# Patient Record
Sex: MALE | Race: AMERICAN INDIAN OR ALASKA NATIVE
[De-identification: names, ages, dates, MRNs, and addresses within clinical notes are randomized per-mention and may not be internally consistent; named-entity substitution may affect disease eponyms.]

---

## 2019-07-04 ENCOUNTER — HOSPITAL ENCOUNTER (INPATIENT)
Dept: HOSPITAL 5 - ED | Age: 59
LOS: 36 days | Discharge: LEFT BEFORE DISCHARGE | DRG: 374 | End: 2019-08-09
Attending: INTERNAL MEDICINE | Admitting: INTERNAL MEDICINE
Payer: COMMERCIAL

## 2019-07-04 DIAGNOSIS — E43: ICD-10-CM

## 2019-07-04 DIAGNOSIS — N18.6: ICD-10-CM

## 2019-07-04 DIAGNOSIS — R18.0: ICD-10-CM

## 2019-07-04 DIAGNOSIS — K65.2: ICD-10-CM

## 2019-07-04 DIAGNOSIS — J45.909: ICD-10-CM

## 2019-07-04 DIAGNOSIS — C79.9: ICD-10-CM

## 2019-07-04 DIAGNOSIS — E87.5: ICD-10-CM

## 2019-07-04 DIAGNOSIS — N13.30: ICD-10-CM

## 2019-07-04 DIAGNOSIS — C20: Primary | ICD-10-CM

## 2019-07-04 DIAGNOSIS — D50.9: ICD-10-CM

## 2019-07-04 DIAGNOSIS — R06.6: ICD-10-CM

## 2019-07-04 DIAGNOSIS — E87.2: ICD-10-CM

## 2019-07-04 DIAGNOSIS — I12.0: ICD-10-CM

## 2019-07-04 DIAGNOSIS — N17.9: ICD-10-CM

## 2019-07-04 DIAGNOSIS — N13.8: ICD-10-CM

## 2019-07-04 LAB
ALBUMIN SERPL-MCNC: 2.8 G/DL (ref 3.9–5)
ALT SERPL-CCNC: 5 UNITS/L (ref 7–56)
BUN SERPL-MCNC: 129 MG/DL (ref 9–20)
BUN/CREAT SERPL: 4 %
CALCIUM SERPL-MCNC: 9.4 MG/DL (ref 8.4–10.2)
HCT VFR BLD CALC: 20.7 % (ref 35.5–45.6)
HEMOLYSIS INDEX: 0
HGB BLD-MCNC: 6.3 GM/DL (ref 11.8–15.2)
MCHC RBC AUTO-ENTMCNC: 31 % (ref 32–34)
MCV RBC AUTO: 58 FL (ref 84–94)
PLATELET # BLD: 693 K/MM3 (ref 140–440)
RBC # BLD AUTO: 3.56 M/MM3 (ref 3.65–5.03)

## 2019-07-04 PROCEDURE — 82106 ALPHA-FETOPROTEIN AMNIOTIC: CPT

## 2019-07-04 PROCEDURE — 83605 ASSAY OF LACTIC ACID: CPT

## 2019-07-04 PROCEDURE — 87186 SC STD MICRODIL/AGAR DIL: CPT

## 2019-07-04 PROCEDURE — C1750 CATH, HEMODIALYSIS,LONG-TERM: HCPCS

## 2019-07-04 PROCEDURE — 83550 IRON BINDING TEST: CPT

## 2019-07-04 PROCEDURE — 82570 ASSAY OF URINE CREATININE: CPT

## 2019-07-04 PROCEDURE — 77001 FLUOROGUIDE FOR VEIN DEVICE: CPT

## 2019-07-04 PROCEDURE — 93005 ELECTROCARDIOGRAM TRACING: CPT

## 2019-07-04 PROCEDURE — 83970 ASSAY OF PARATHORMONE: CPT

## 2019-07-04 PROCEDURE — 88112 CYTOPATH CELL ENHANCE TECH: CPT

## 2019-07-04 PROCEDURE — 84160 ASSAY OF PROTEIN ANY SOURCE: CPT

## 2019-07-04 PROCEDURE — 5A1D70Z PERFORMANCE OF URINARY FILTRATION, INTERMITTENT, LESS THAN 6 HOURS PER DAY: ICD-10-PCS | Performed by: INTERNAL MEDICINE

## 2019-07-04 PROCEDURE — 82728 ASSAY OF FERRITIN: CPT

## 2019-07-04 PROCEDURE — 83690 ASSAY OF LIPASE: CPT

## 2019-07-04 PROCEDURE — 80074 ACUTE HEPATITIS PANEL: CPT

## 2019-07-04 PROCEDURE — 76937 US GUIDE VASCULAR ACCESS: CPT

## 2019-07-04 PROCEDURE — C1788 PORT, INDWELLING, IMP: HCPCS

## 2019-07-04 PROCEDURE — 84132 ASSAY OF SERUM POTASSIUM: CPT

## 2019-07-04 PROCEDURE — 36558 INSERT TUNNELED CV CATH: CPT

## 2019-07-04 PROCEDURE — 86021 WBC ANTIBODY IDENTIFICATION: CPT

## 2019-07-04 PROCEDURE — 82040 ASSAY OF SERUM ALBUMIN: CPT

## 2019-07-04 PROCEDURE — 86920 COMPATIBILITY TEST SPIN: CPT

## 2019-07-04 PROCEDURE — 06HM33Z INSERTION OF INFUSION DEVICE INTO RIGHT FEMORAL VEIN, PERCUTANEOUS APPROACH: ICD-10-PCS | Performed by: SURGERY

## 2019-07-04 PROCEDURE — 86850 RBC ANTIBODY SCREEN: CPT

## 2019-07-04 PROCEDURE — P9016 RBC LEUKOCYTES REDUCED: HCPCS

## 2019-07-04 PROCEDURE — 86301 IMMUNOASSAY TUMOR CA 19-9: CPT

## 2019-07-04 PROCEDURE — 74176 CT ABD & PELVIS W/O CONTRAST: CPT

## 2019-07-04 PROCEDURE — 36415 COLL VENOUS BLD VENIPUNCTURE: CPT

## 2019-07-04 PROCEDURE — 86160 COMPLEMENT ANTIGEN: CPT

## 2019-07-04 PROCEDURE — 87076 CULTURE ANAEROBE IDENT EACH: CPT

## 2019-07-04 PROCEDURE — 88305 TISSUE EXAM BY PATHOLOGIST: CPT

## 2019-07-04 PROCEDURE — 30233N1 TRANSFUSION OF NONAUTOLOGOUS RED BLOOD CELLS INTO PERIPHERAL VEIN, PERCUTANEOUS APPROACH: ICD-10-PCS | Performed by: INTERNAL MEDICINE

## 2019-07-04 PROCEDURE — B54BZZA ULTRASONOGRAPHY OF RIGHT LOWER EXTREMITY VEINS, GUIDANCE: ICD-10-PCS | Performed by: SURGERY

## 2019-07-04 PROCEDURE — 36430 TRANSFUSION BLD/BLD COMPNT: CPT

## 2019-07-04 PROCEDURE — 83735 ASSAY OF MAGNESIUM: CPT

## 2019-07-04 PROCEDURE — 80202 ASSAY OF VANCOMYCIN: CPT

## 2019-07-04 PROCEDURE — 84100 ASSAY OF PHOSPHORUS: CPT

## 2019-07-04 PROCEDURE — 94640 AIRWAY INHALATION TREATMENT: CPT

## 2019-07-04 PROCEDURE — 86901 BLOOD TYPING SEROLOGIC RH(D): CPT

## 2019-07-04 PROCEDURE — 86900 BLOOD TYPING SEROLOGIC ABO: CPT

## 2019-07-04 PROCEDURE — 89051 BODY FLUID CELL COUNT: CPT

## 2019-07-04 PROCEDURE — 85027 COMPLETE CBC AUTOMATED: CPT

## 2019-07-04 PROCEDURE — 86038 ANTINUCLEAR ANTIBODIES: CPT

## 2019-07-04 PROCEDURE — 84165 PROTEIN E-PHORESIS SERUM: CPT

## 2019-07-04 PROCEDURE — 82962 GLUCOSE BLOOD TEST: CPT

## 2019-07-04 PROCEDURE — 82947 ASSAY GLUCOSE BLOOD QUANT: CPT

## 2019-07-04 PROCEDURE — 84300 ASSAY OF URINE SODIUM: CPT

## 2019-07-04 PROCEDURE — 87116 MYCOBACTERIA CULTURE: CPT

## 2019-07-04 PROCEDURE — 82607 VITAMIN B-12: CPT

## 2019-07-04 PROCEDURE — 80048 BASIC METABOLIC PNL TOTAL CA: CPT

## 2019-07-04 PROCEDURE — 85014 HEMATOCRIT: CPT

## 2019-07-04 PROCEDURE — 49083 ABD PARACENTESIS W/IMAGING: CPT

## 2019-07-04 PROCEDURE — 85007 BL SMEAR W/DIFF WBC COUNT: CPT

## 2019-07-04 PROCEDURE — 82378 CARCINOEMBRYONIC ANTIGEN: CPT

## 2019-07-04 PROCEDURE — 81001 URINALYSIS AUTO W/SCOPE: CPT

## 2019-07-04 PROCEDURE — 93010 ELECTROCARDIOGRAM REPORT: CPT

## 2019-07-04 PROCEDURE — 71046 X-RAY EXAM CHEST 2 VIEWS: CPT

## 2019-07-04 PROCEDURE — 85025 COMPLETE CBC W/AUTO DIFF WBC: CPT

## 2019-07-04 PROCEDURE — 82747 ASSAY OF FOLIC ACID RBC: CPT

## 2019-07-04 PROCEDURE — 85610 PROTHROMBIN TIME: CPT

## 2019-07-04 PROCEDURE — 85018 HEMOGLOBIN: CPT

## 2019-07-04 PROCEDURE — 85045 AUTOMATED RETICULOCYTE COUNT: CPT

## 2019-07-04 PROCEDURE — 80053 COMPREHEN METABOLIC PANEL: CPT

## 2019-07-04 RX ADMIN — Medication SCH ML: at 22:15

## 2019-07-04 RX ADMIN — FAMOTIDINE SCH MG: 10 INJECTION, SOLUTION INTRAVENOUS at 22:08

## 2019-07-04 RX ADMIN — METOPROLOL TARTRATE SCH MG: 50 TABLET, FILM COATED ORAL at 23:47

## 2019-07-04 NOTE — OPERATIVE REPORT
Operative Report


Operative Report: 





Date of Procedure: 07/04/2019





Pre-operative Diagnosis: Acute Renal Failure





Post-operative Diagnosis: Same





Procedure(s): 


1.  Ultrasound-Guided Access Right Common Femoral Vein


2.  Placement of 30 Cm Trialysis Vas-Cath Right Common Femoral Vein





Surgeon: Tank Shaw M.D. 





Assistant: None





Anesthesia: 2% Lidocaine





EBL: Minimal





Counts: Correct





Complications: None





Condition: Stable





Findings:  All ports flushed and aspirated easily after placement of right 

femoral Vas-Cath.





Specimen: None





Indication: 





The patient is a 59-year-old male who presented to the emergency department 

complaints of approximately 10 days of abdominal pain and back pain associated 

with 1 day of inability to urinate.  Upon arrival in the emergency department he

was found to be in acute renal failure with hyperkalemia with a potassium of 

9.5.  It was determined that he required emergent dialysis and needed emergent 

placement of a Vas-Cath.  He was given the risk, benefits, and alternative 

procedures and consented to the procedure.





Description of Procedure:  





The procedure was performed at the patient's bedside in the emergency 

department.  Ultrasound was used to identify the right common femoral vein and 

confirmed patency.  Once patency was confirmed the overlying skin and soft 

tissue was anesthetized with 2% lidocaine.  A small stab incision was created 

with an 11 blade and an 18-gauge needle was used with ultrasound guidance and 

the anterior surface of the right common femoral vein.  A 0.035 J-wire was 

advanced into the vein and the tract was serially dilated up to a 14 Estonian 

dilator.  The dilator was removed and the 30 cm Trialysis Vas-Cath was placed by

Seldinger technique.  The wire was moving all 2 ports were easily aspirated and 

flushed with saline.  The catheter was then sutured in place with 3-0 Ethilon 

and dressed sterilely.  The patient tolerated the procedure well and was t

ransported to the ICU for emergent dialysis.

## 2019-07-04 NOTE — EMERGENCY DEPARTMENT REPORT
ED Abdominal Pain HPI





- General


Chief Complaint: Abdominal Pain


Stated Complaint: FREQUENT URINE/BACK/STOMACH PAIN


Time Seen by Provider: 19 12:20


Source: patient


Mode of arrival: Wheelchair


Limitations: No Limitations





- History of Present Illness


Initial Comments: 





Patient is a 59-year-old asthmatic male past history of hypertension who has 

been noncompliant with medications who states for the last week she's felt weak 

fatigued and has some abdominal pain.  Patient states he feels some discomfort 

in his lower abdomen as well as his lower back is aching throbbing pain.  He 

denies any diarrhea but does have some nausea and one episode of vomiting after 

eating several days ago.  Patient states that his abdomen is distended.  Patient

states he does not drink alcohol and does not use drugs.  Patient denies fevers 

chills cough, congestion or dysuria at this time.


Severity scale (0 -10): 8





- Related Data


                                  Previous Rx's











 Medication  Instructions  Recorded  Last Taken  Type


 


Prednisone [predniSONE 10 mg 10 mg PO .TAPER #1 tab.ds.pk 09/10/15 Unknown Rx





(6-Day Pack, 21 Tabs)]    











                                    Allergies











Allergy/AdvReac Type Severity Reaction Status Date / Time


 


No Known Allergies Allergy   Verified 19 12:02














ED Review of Systems


ROS: 


Stated complaint: FREQUENT URINE/BACK/STOMACH PAIN


Other details as noted in HPI





Comment: All other systems reviewed and negative





ED Past Medical Hx





- Past Medical History


Hx Hypertension: Yes





- Surgical History


Past Surgical History?: No





- Social History


Smoking Status: Never Smoker





- Medications


Home Medications: 


                                Home Medications











 Medication  Instructions  Recorded  Confirmed  Last Taken  Type


 


Prednisone [predniSONE 10 mg 10 mg PO .TAPER #1 tab.ds.pk 09/10/15  Unknown Rx





(6-Day Pack, 21 Tabs)]     














ED Physical Exam





- General


Limitations: No Limitations


General appearance: alert, in no apparent distress





- Head


Head exam: Present: atraumatic, normocephalic





- Eye


Eye exam: Present: normal appearance, PERRL





- ENT


ENT exam: Present: mucous membranes moist





- Neck


Neck exam: Present: normal inspection





- Respiratory


Respiratory exam: Present: normal lung sounds bilaterally.  Absent: respiratory 

distress, wheezes, rales, rhonchi





- Cardiovascular


Cardiovascular Exam: Present: regular rate, normal rhythm, normal heart sounds. 

Absent: systolic murmur, diastolic murmur, rubs, gallop





- GI/Abdominal


GI/Abdominal exam: Present: soft, distended, tenderness, normal bowel sounds.  

Absent: guarding, rebound, rigid





- Rectal


Rectal exam: Present: deferred





- Extremities Exam


Extremities exam: Present: normal inspection





- Back Exam


Back exam: Present: normal inspection





- Neurological Exam


Neurological exam: Present: alert, oriented X3





- Psychiatric


Psychiatric exam: Present: normal affect, normal mood





- Skin


Skin exam: Present: warm, dry, intact, normal color.  Absent: rash





ED Course


                                   Vital Signs











  19





  12:12 14:53


 


Temperature 97.5 F L 


 


Pulse Rate 77 75


 


Respiratory 18 18





Rate  


 


Blood Pressure 160/72 


 


Blood Pressure  164/83





[Left]  


 


O2 Sat by Pulse 100 100





Oximetry  














ED Medical Decision Making





- Lab Data


Result diagrams: 


                                 19 12:47





                                 19 14:59








                                   Lab Results











  19 Range/Units





  12:47 12:47 13:54 


 


WBC  13.6 H    (4.5-11.0)  K/mm3


 


RBC  3.56 L    (3.65-5.03)  M/mm3


 


Hgb  6.3 L    (11.8-15.2)  gm/dl


 


Hct  20.7 L    (35.5-45.6)  %


 


MCV  58 L    (84-94)  fl


 


MCH  18 L    (28-32)  pg


 


MCHC  31 L    (32-34)  %


 


RDW  24.5 H    (13.2-15.2)  %


 


Plt Count  693 H    (140-440)  K/mm3


 


Sodium   132 L   (137-145)  mmol/L


 


Potassium   9.1 H*  9.5 H*  (3.6-5.0)  mmol/L


 


Chloride   94.0 L   ()  mmol/L


 


Carbon Dioxide   11 L   (22-30)  mmol/L


 


Anion Gap   36   mmol/L


 


BUN   129 H   (9-20)  mg/dL


 


Creatinine   34.8 H   (0.8-1.5)  mg/dL


 


Estimated GFR   2   ml/min


 


BUN/Creatinine Ratio   4   %


 


Glucose   92   ()  mg/dL


 


POC Glucose     ()  


 


Calcium   9.4   (8.4-10.2)  mg/dL


 


Total Bilirubin   0.40   (0.1-1.2)  mg/dL


 


AST   < 5 L   (5-40)  units/L


 


ALT   5 L   (7-56)  units/L


 


Alkaline Phosphatase   70   ()  units/L


 


Total Protein   8.1   (6.3-8.2)  g/dL


 


Albumin   2.8 L   (3.9-5)  g/dL


 


Albumin/Globulin Ratio   0.5   %


 


Lipase   88 H   (13-60)  units/L














  19 Range/Units





  14:59 15:23 


 


WBC    (4.5-11.0)  K/mm3


 


RBC    (3.65-5.03)  M/mm3


 


Hgb    (11.8-15.2)  gm/dl


 


Hct    (35.5-45.6)  %


 


MCV    (84-94)  fl


 


MCH    (28-32)  pg


 


MCHC    (32-34)  %


 


RDW    (13.2-15.2)  %


 


Plt Count    (140-440)  K/mm3


 


Sodium    (137-145)  mmol/L


 


Potassium  9.5 H*   (3.6-5.0)  mmol/L


 


Chloride    ()  mmol/L


 


Carbon Dioxide    (22-30)  mmol/L


 


Anion Gap    mmol/L


 


BUN    (9-20)  mg/dL


 


Creatinine    (0.8-1.5)  mg/dL


 


Estimated GFR    ml/min


 


BUN/Creatinine Ratio    %


 


Glucose    ()  mg/dL


 


POC Glucose   107 H  ()  


 


Calcium    (8.4-10.2)  mg/dL


 


Total Bilirubin    (0.1-1.2)  mg/dL


 


AST    (5-40)  units/L


 


ALT    (7-56)  units/L


 


Alkaline Phosphatase    ()  units/L


 


Total Protein    (6.3-8.2)  g/dL


 


Albumin    (3.9-5)  g/dL


 


Albumin/Globulin Ratio    %


 


Lipase    (13-60)  units/L














- EKG Data


-: EKG Interpreted by Me


EKG shows normal: sinus rhythm, axis, intervals, QRS complexes


Rate: normal





- EKG Data


Interpretation: other (prominant t waves)





- Radiology Data





[facility] 


[address] [place] [address] 





Cat Scan Report 


Signed 





Patient: RASHAD JACKSON MR#: [id number] 


09 


: 1960 Acct:[id number] 





Age/Sex: 59 / M ADM Date: 19 





Loc: ED 


Attending Dr: 








Ordering Physician: CHATO WALLACE MD 


Date of Service: 19 


Procedure(s): CT abdomen pelvis wo con 


Accession Number(s): [id number] 





cc: CHATO WALLACE MD 








CT abdomen pelvis wo con 





INDICATION: 


abd distension, new renal failure. 





TECHNIQUE: 


All CT scans at this location are performed using the following dose modulation 

technique: 


Automated exposure control. Helical slices were obtained through the abdomen and

 pelvis. No contrast


is administered. 





COMPARISON: 


None available. 





FINDINGS: 


Abdomen: The lung bases are essentially clear. There is ascites in the abdomen 

and pelvis. No focal


hepatic or splenic abnormalities are identified on this noncontrasted study. The

 pancreas, and 


adrenal glands are unremarkable. There is no obstruction. There is no free air. 





There is mild bilateral hydrocele nephrosis. There is minimal nephrolithiasis on

 the left. No 


ureteral calculi are identified. 





There is increased density in the omentum which may simply be related to the 

ascites. The 


possibility of carcinomatosis is included in the differential. 





There are small retroperitoneal lymph nodes small pelvic lymph nodes. These are 

not pathologically 


enlarged. There are multiple small lymph nodes in the perirectal fat. There is 

abnormal soft tissue 


density structure in the mid pelvis to the left of the distal sigmoid colon 

which could represent 


yony lesion. This has a masslike appearance. 











On review of bone windows, no acute osseous abnormalities are seen. 











IMPRESSION: 


1. There is ascites. There is increased density in the omentum. Findings are 

concerning for 


carcinomatosis. There is some mild adenopathy in the pelvis a possible 

pericolonic mass, series 2 


image 139. 





There is mild bilateral hydronephrosis. The exact cause is not determined by 

this study. There is 


minimal nephrolithiasis on the left. No ureteral calculi are identified however.

 





Signer Name: Pradip Rogers MD 


Signed: 2019 2:43 PM 


Workstation Name: [id number] 








Transcribed By:  


Dictated By: Pradip Rogers MD 


Electronically Authenticated By: Pradip Rogers MD 


Signed Date/Time: 19 1443 








- Medical Decision Making





Patient is a 59-year-old Male whose only past medical history is hypertension 

who is presenting with abdominal distention.  Patient states that he's had no 

diarrhea and is still making urine.  Patient was noted to have acute renal 

failure with hyperkalemia.  We did repeat this time showed it was accurate.  

Patient has no significant T-wave issues with his EKG.  Patient was given 

Kayexalate albuterol calcium bicarbonate 10 of insulin and D50 ampule.  Dr. ra house with nephrology been consulted.  Also consulted vascular to place a Vas-Cath 

in this patient.  Patient's vital signs are stable at the time of admission.


Critical Care Time: Yes (50)


Critical care attestation.: 


If time is entered above; I have spent that time in minutes in the direct care 

of this critically ill patient, excluding procedure time.








ED Disposition


Clinical Impression: 


 Hyperkalemia, Ascites





Acute renal failure


Qualifiers:


 Acute renal failure type: unspecified Qualified Code(s): N17.9 - Acute kidney 

failure, unspecified





Disposition: -09 OP ADMIT IP TO THIS HOSP


Is pt being admited?: Yes


Does the pt Need Aspirin: No


Condition: Critical


Referrals: 


[facility], MD [Primary Care Provider] - 3-5 Days


Time of Disposition: 16:02

## 2019-07-04 NOTE — CONSULTATION
History of Present Illness





- Reason for Consult


Consult date: 07/04/19


acute renal failure, hyperkalemia





- History of Present Illness


The patient is a 60 Yo male with history significant for HTN (not on any meds) 

and Asthma who presented presented to [facility] ED with 2 weeks h/o generalized 

weakness, fatigue and abdominal discomfort.  Patient is a vague historian.  He 

also reports nausea, decreased appetite, poor PO intake, abdominal distention, 

one episode of vomiting and back pain on movement.  He denies any fever, chills,

diarrhea, dysuria, hematuria, leg swelling, dizziness or syncope.  In the ED his

labs were significant for creatinine 35, potassium 9.5 and bicarb 11.  CT 

abdomen showed ascites and mild b/l hydronephrosis.  Nephrology was consulted 

for further evaluation.





Past History


Past Medical History: hypertension, other (asthma)





Medications and Allergies


                                    Allergies











Allergy/AdvReac Type Severity Reaction Status Date / Time


 


No Known Allergies Allergy   Verified 07/04/19 12:02











                                Home Medications











 Medication  Instructions  Recorded  Confirmed  Last Taken  Type


 


Prednisone [predniSONE 10 mg 10 mg PO .TAPER #1 tab.ds.pk 09/10/15  Unknown Rx





(6-Day Pack, 21 Tabs)]     











Active Meds: 


Active Medications





Acetaminophen (Tylenol)  650 mg PO Q4H PRN


   PRN Reason: Pain MILD(1-3)/Fever >100.5/HA


Albuterol (Proventil)  2.5 mg IH Q4HRT PRN


   PRN Reason: Shortness Of Breath


Famotidine (Pepcid)  10 mg IV BID BOWEN


Hydromorphone HCl (Dilaudid)  0.5 mg IV Q3H PRN


   PRN Reason: Pain , Severe (7-10)


Sodium Bicarbonate 150 meq/ (Dextrose)  1,150 mls @ 125 mls/hr IV AS DIRECT ONE


   Stop: 07/05/19 00:51


   Last Admin: 07/04/19 16:59 Dose:  125 mls/hr


   Documented by: 


Calcium Gluconate 2,000 mg/ (Sodium Chloride)  120 mls @ 660 mls/hr IV ONCE ONE


   Stop: 07/04/19 17:35


Metoclopramide HCl (Reglan)  10 mg IV Q6H PRN


   PRN Reason: Nausea And Vomiting


Ondansetron HCl (Zofran)  4 mg IV Q3H PRN


   PRN Reason: Nausea And Vomiting


Sodium Chloride (Sodium Chloride Flush Syringe 10 Ml)  10 ml IV BID BOWEN


Sodium Chloride (Sodium Chloride Flush Syringe 10 Ml)  10 ml IV PRN PRN


   PRN Reason: LINE FLUSH











Review of Systems


Constitutional: weight loss, anorexia, fatigue, weakness, malaise, poor 

appetite, chronic pain, no weight gain, no fever, no chills


Cardiovascular: high blood pressure, decreased exercise tolerance, no chest 

pain, no orthopnea, no edema, no syncope, no lightheadedness, no shortness of 

breath, no leg edema


Respiratory: no hemoptysis, no shortness of breath


Gastrointestinal: abdominal pain, nausea, vomiting, no diarrhea, no melena


Genitourinary Male: no dysuria, no hematuria, no urinary frequency, no kidney 

stones


Musculoskeletal: low back pain, muscle weakness, no muscle cramps


Integumentary: no rash, no redness, no wounds, no jaundice


Neurological: no paralysis, no seizures, no syncope, no headaches, no change in 

speech, no change in mentation, no confusion





Exam





- Vital Signs


Vital signs: 


                                   Vital Signs











Temp Pulse Resp BP Pulse Ox


 


 97.5 F L  77   18   160/72   100 


 


 07/04/19 12:12  07/04/19 12:12  07/04/19 12:12  07/04/19 12:12  07/04/19 12:12














- General Appearance


General appearance: well-developed, appears stated age, other (no distress)


EENT: ATNC, PERRL, hearing intact, vision intact


Neck: Present: neck supple, trachea midline


Respiratory: Clear to Ascultation


Heart: regular, S1S2, no murmurs


Gastrointestinal: Present: normoactive bowel sounds, distended.  Absent: 

tenderness


Integumentary: no rash, warm and dry


Neurologic: no focal deficit, no asterixis, alert and oriented x3


Musculoskeletal: Present: other (no edema)


Psychiatric: cooperative





Results





- Lab Results





                                 07/04/19 12:47





                                 07/04/19 14:59


                             Most recent lab results











Calcium  9.4 mg/dL (8.4-10.2)   07/04/19  12:47    














- Image


Kidney/bladder ultrasound: other





Assessment and Plan





1. Acute kidney injury:


Suspect vasomotor ANTHONY from volume depletion.


Other causes are not ruled out.


CT abdomen showed mild bilateral hydronephosis.


Started on bicarb drip.


Monitor renal function.


Renal prognosis is guarded to poor.


Avoid nephrotoxic agents.


Meds dosage based on GFR.


Straight urinary catheter ordered.


Due to persistent hyperkalemia patient require urgent hemodialysis.


D/w patient regarding indications, benefits and risks involved in hemodialysis.


He voiced understanding and gave consent.





2. FEN:


Hyperkalemia, urgent HD.


Anion gap MA, 2/2 ANTHONY.


Continue IV fluids.


Monitor lytes.





3. Anemia:


One unit PRBC today.


Monitor H/H.





4. Uncontrolled HTN:


Start on Metoprolol.





5. Ascites.

## 2019-07-04 NOTE — CAT SCAN REPORT
CT abdomen pelvis wo con



INDICATION:

abd distension, new renal failure.



TECHNIQUE:

All CT scans at this location are performed using the following dose modulation technique: Automated 
exposure control. Helical slices were obtained through the abdomen and pelvis. No contrast is adminis
tered. 



COMPARISON:

None available.



FINDINGS:

Abdomen: The lung bases are essentially clear. There is ascites in the abdomen and pelvis. No focal h
epatic or splenic abnormalities are identified on this noncontrasted study. The pancreas, and adrenal
 glands are unremarkable. There is no obstruction. There is no free air.



There is mild bilateral hydrocele nephrosis. There is minimal nephrolithiasis on the left. No uretera
l calculi are identified.



There is increased density in the omentum which may simply be related to the ascites. The possibility
 of carcinomatosis is included in the differential.



There are small retroperitoneal lymph nodes small pelvic lymph nodes. These are not pathologically en
larged. There are multiple small lymph nodes in the perirectal fat. There is abnormal soft tissue den
sity structure in the mid pelvis to the left of the distal sigmoid colon which could represent yony 
lesion. This has a masslike appearance.







On review of bone windows, no acute osseous abnormalities are seen.







IMPRESSION:

1. There is ascites. There is increased density in the omentum. Findings are concerning for carcinoma
tosis. There is some mild adenopathy in the pelvis a possible pericolonic mass, series 2 image 139.



There is mild bilateral hydronephrosis. The exact cause is not determined by this study. There is min
imal nephrolithiasis on the left. No ureteral calculi are identified however.



Signer Name: Pradip Rogers MD 

Signed: 7/4/2019 2:43 PM

 Workstation Name: [id number]

## 2019-07-04 NOTE — HISTORY AND PHYSICAL REPORT
History of Present Illness


Date of examination: 07/04/19


Date of admission: 


07/04/19 16:04





Chief complaint: 


Abdominal distention for 1 week


Nausea for 1 week


History of present illness: 


59-year-old -American male with history of hypertension and asthma comes 

in for abdominal distention of one week duration.  Associated with nausea.  

Patient has not been taking his antihypertensives for the last 1 year.  Very 

noncompliant.  Also did not see any physician for the last 1-1/2 years.  Patient

states he did not have resources to follow with a physician.  Some nausea 

presented.  No vomiting.  No fever or chills.  Easy fatigability and shortness 

of breath for 1 week.  Not taking any medications.  Some abdominal discomfort 

present.  Pain is about 5 on a scale of 1-10














Past Medical History


Hypertension: Yes





 Surgical History


Past Surgical History?: No





Social History   


Smoking Status: Never Smoker  





Family history


HTN











Past History


Past Medical History: hypertension, other (asthma)





Medications and Allergies


                                    Allergies











Allergy/AdvReac Type Severity Reaction Status Date / Time


 


No Known Allergies Allergy   Verified 07/04/19 12:02











                                Home Medications











 Medication  Instructions  Recorded  Confirmed  Last Taken  Type


 


Prednisone [predniSONE 10 mg 10 mg PO .TAPER #1 tab.ds.pk 09/10/15  Unknown Rx





(6-Day Pack, 21 Tabs)]     











Active Meds: 


Active Medications





Acetaminophen (Tylenol)  650 mg PO Q4H PRN


   PRN Reason: Pain MILD(1-3)/Fever >100.5/HA


Albuterol (Proventil)  2.5 mg IH Q4HRT PRN


   PRN Reason: Shortness Of Breath


Famotidine (Pepcid)  10 mg IV BID BOWEN


Hydromorphone HCl (Dilaudid)  0.5 mg IV Q3H PRN


   PRN Reason: Pain , Severe (7-10)


Sodium Bicarbonate 150 meq/ (Dextrose)  1,150 mls @ 125 mls/hr IV AS DIRECT ONE


   Stop: 07/05/19 00:51


   Last Admin: 07/04/19 16:59 Dose:  125 mls/hr


   Documented by: 


Sodium Chloride (Nacl 0.9%)  100 mls @ 999 mls/hr IV VEE PRN


   PRN Reason: Hypotension


Metoclopramide HCl (Reglan)  10 mg IV Q6H PRN


   PRN Reason: Nausea And Vomiting


Ondansetron HCl (Zofran)  4 mg IV Q3H PRN


   PRN Reason: Nausea And Vomiting


Sodium Chloride (Sodium Chloride Flush Syringe 10 Ml)  10 ml IV BID BOWEN


Sodium Chloride (Sodium Chloride Flush Syringe 10 Ml)  10 ml IV PRN PRN


   PRN Reason: LINE FLUSH











Review of Systems


All systems: negative


Constitutional: anorexia, fatigue, weakness, malaise, poor appetite


Ears, nose, mouth and throat: no ear pain, no ear discharge, no tinnitis, no 

decreased hearing, no nose pain


Cardiovascular: shortness of breath, dyspnea on exertion, no chest pain, no 

orthopnea, no palpitations, no rapid/irregular heart beat, no edema, no syncope,

no lightheadedness


Respiratory: no cough, no cough with sputum, no excessive sputum, no hemoptysis,

no shortness of breath, no dyspnea on exertion


Gastrointestinal: abdominal pain, nausea, vomiting (x1 few days ago)


Genitourinary Male: urinary frequency, no dysuria, no hematuria, no flank pain, 

no discharge, no urinary hesitancy, no nocturia, no incontinence


Rectal: no pain


Musculoskeletal: no neck stiffness, no neck pain, no shooting arm pain, no arm 

numbness/tingling, no low back pain, no shooting leg pain, no leg 

numbness/tingling, no redness of joints


Integumentary: no rash, no pruritis, no redness, no sores, no wounds, no 

jaundice, no boils, no blisters


Neurological: no head injury, no transient paralysis, no paralysis, no weakness,

no parathesias, no numbness, no tingling, no seizures, no syncope, no tremors, 

no ataxia, no lack of coordination


Psychiatric: no anxiety, no memory loss, no change in sleep habits, no sleep 

disturbances, no insomnia, no hypersomnia, no change in appetite, no change in 

libido, no suicidal ideation, no disorientation, no hallucinations


Endocrine: no cold intolerance, no heat intolerance, no polyphagia, no excessive

thirst, no polydipsia, no polyuria, no nocturia, no excessive sweating, no 

flushing, no weight change


Hematologic/Lymphatic: no easy bruising, no easy bleeding


Allergic/Immunologic: no urticaria, no allergic rhinitis, no wheezing





Exam





- Constitutional


Vitals: 


                                        











Temp Pulse Resp BP Pulse Ox


 


 97.5 F L  69   17   167/60   100 


 


 07/04/19 12:12  07/04/19 17:30  07/04/19 17:30  07/04/19 17:30  07/04/19 17:30











General appearance: Present: mild distress, well-nourished





- EENT


Eyes: Present: PERRL


ENT: hearing intact, clear oral mucosa





- Neck


Neck: Present: supple, normal ROM





- Respiratory


Respiratory effort: normal


Respiratory: bilateral: CTA





- Cardiovascular


Heart rate: 88


Rhythm: regular


Heart Sounds: Present: S1 & S2.  Absent: rub, click





- Extremities


Extremities: no ischemia, pulses intact, pulses symmetrical, No edema


Peripheral Pulses: within normal limits





- Abdominal


General gastrointestinal: Present: soft, non-tender, distended, normal bowel 

sounds


Localized gastrointestinal: tender: diffuse


Male genitourinary: Present: normal





- Rectal


Rectal Exam: deferred





- Integumentary


Integumentary: Present: clear, warm, dry





- Musculoskeletal


Musculoskeletal: gait normal, strength equal bilaterally





- Psychiatric


Psychiatric: appropriate mood/affect, intact judgment & insight





- Neurologic


Neurologic: CNII-XII intact, moves all extremities





- Allied Health


Allied health notes reviewed: nursing, case management





Results





- Labs


CBC & Chem 7: 


                                 07/04/19 12:47





                                 07/04/19 14:59


Labs: 


                             Laboratory Last Values











WBC  13.6 K/mm3 (4.5-11.0)  H  07/04/19  12:47    


 


RBC  3.56 M/mm3 (3.65-5.03)  L  07/04/19  12:47    


 


Hgb  6.3 gm/dl (11.8-15.2)  L  07/04/19  12:47    


 


Hct  20.7 % (35.5-45.6)  L  07/04/19  12:47    


 


MCV  58 fl (84-94)  L  07/04/19  12:47    


 


MCH  18 pg (28-32)  L  07/04/19  12:47    


 


MCHC  31 % (32-34)  L  07/04/19  12:47    


 


RDW  24.5 % (13.2-15.2)  H  07/04/19  12:47    


 


Plt Count  693 K/mm3 (140-440)  H  07/04/19  12:47    


 


Sodium  132 mmol/L (137-145)  L  07/04/19  12:47    


 


Potassium  9.5 mmol/L (3.6-5.0)  H*  07/04/19  14:59    


 


Chloride  94.0 mmol/L ()  L  07/04/19  12:47    


 


Carbon Dioxide  11 mmol/L (22-30)  L  07/04/19  12:47    


 


  36 mmol/L  07/04/19  12:47    


 


BUN  129 mg/dL (9-20)  H  07/04/19  12:47    


 


  34.8 mg/dL (0.8-1.5)  H  07/04/19  12:47    


 


Estimated GFR  2 ml/min  07/04/19  12:47    


 


  4 %  07/04/19  12:47    


 


Glucose  92 mg/dL ()   07/04/19  12:47    


 


POC Glucose  164  ()  H  07/04/19  17:35    


 


Calcium  9.4 mg/dL (8.4-10.2)   07/04/19  12:47    


 


  0.40 mg/dL (0.1-1.2)   07/04/19  12:47    


 


AST  < 5 units/L (5-40)  L  07/04/19  12:47    


 


ALT  5 units/L (7-56)  L  07/04/19  12:47    


 


  70 units/L ()   07/04/19  12:47    


 


  8.1 g/dL (6.3-8.2)   07/04/19  12:47    


 


  2.8 g/dL (3.9-5)  L  07/04/19  12:47    


 


  0.5 %  07/04/19  12:47    


 


  88 units/L (13-60)  H  07/04/19  12:47    


 


Hepatitis A IgM Ab  Non-reactive  (NonReactive)   07/04/19  17:48    


 


Hep Bs Antigen  Non-reactive  (Negative)   07/04/19  17:48    


 


Hep B Core IgM Ab  Non-reactive  (NonReactive)   07/04/19  17:48    


 


  Non-reactive  (NonReactive)   07/04/19  17:48    


 


Blood Type  A POSITIVE   07/04/19  17:48    


 


Antibody Screen  Negative   07/04/19  17:48    














- Imaging and Cardiology


EKG: report reviewed (sinus rhythm, probable left atrial enlargement and left 

bundle branch block heart rate of 72/m)


CT scan - abdomen: report reviewed


Imaging and Cardiology: 


CT Abdomen





FINDINGS: 


Abdomen:


 The lung bases are essentially clear. 


There is ascites in the abdomen and pelvis. 


No focal hepatic or splenic abnormalities are identified on this noncontrasted 

study. 


The pancreas, and adrenal glands are unremarkable. 


There is no obstruction. 


There is no free air. 


There is mild bilateral hydrocele nephrosis. There is minimal nephrolithiasis on

the left. 


No ureteral calculi are identified. 


There is increased density in the omentum which may simply be related to the 

ascites. 


The possibility of carcinomatosis is included in the differential. 


There are small retroperitoneal lymph nodes small pelvic lymph nodes. 


These are not pathologically enlarged. There are multiple small lymph nodes in 

the perirectal fat.


 There is abnormal soft tissue density structure in the mid pelvis to the left 

of the distal sigmoid colon which could represent yony lesion.


 This has a masslike appearance. On review of bone windows, no acute osseous 

abnormalities are seen. 





IMPRESSION: 1. There is ascites. There is increased density in the omentum.


 Findings are concerning for carcinomatosis. 


There is some mild adenopathy in the pelvis a possible pericolonic mass, series 

2 image 139. 


There is mild bilateral hydronephrosis. 


The exact cause is not determined by this study. 


There is minimal nephrolithiasis on the left. No ureteral calculi are identified

however. 








Assessment and Plan


Advance Directives: Yes (full code)


VTE prophylaxis?: Chemical


Plan of care discussed with patient/family: Yes





- Patient Problems


(1) Acute renal failure


Current Visit: Yes   Status: Acute   


Qualifiers: 


   Acute renal failure type: unspecified   Qualified Code(s): N17.9 - Acute 

kidney failure, unspecified   


Plan to address problem: 


Patient being taken to emergent hemodialysis


Patient needs emergent Vas-Cath


Nephrology consult and vascular surgery consult requested


Patient has acute on chronic kidney disease and possible end-stage renal disease

secondary to hypertension  which is untreated








(2) Hyperkalemia


Current Visit: Yes   Status: Acute   


Plan to address problem: 


To treat aggressively


Calcium gluconate


Kayexalate


Bicarbonate


And insulin along with D50 W given


Also emergent hemodialysis to bring the potassium down emergently


Low potassium bath


Recheck potassium level








(3) Uncontrolled hypertension


Current Visit: Yes   Status: Chronic   


Plan to address problem: 


Hydralazine added


Coronary added


Losartan was not added








(4) Ascites


Current Visit: Yes   Status: Chronic   


Plan to address problem: 


Possible carcinomatosis


We will get paracentesis fluid for fluid analysis and rule out malignancy


Oncology consult requested








(5) Symptomatic anemia


Current Visit: Yes   Status: Acute   


Plan to address problem: 


Patient being transfused 2 units of packed red blood cells


Secondary to  chronic kidney disease and end-stage renal disease








(6) Severe malnutrition


Current Visit: Yes   Status: Acute   


Plan to address problem: 


Dietitian consult requested








(7) DVT prophylaxis


Current Visit: Yes   Status: Acute   


Plan to address problem: 


On heparin and GI prophylaxis

## 2019-07-04 NOTE — EVENT NOTE
ED Screening Note


ED Screening Note: 


WEAK


ABD PAIN AND BACK PAIN


DIARRHEA





APPEARS ILL





PMH


NONE





RX


NONE





MD


NONE





PSH


NONE





NO CIG


NO ETOH


NO DRUGS/THC





LIVES ALONE





This initial assessment/diagnostic orders/clinical plan/treatment(s) is/are 

subject to change based on patients health status, clinical progression and re-

assessment by fellow clinical providers in the ED. Further treatment and workup 

at subsequent clinical providers discretion. Patient/guardian urged not to elope

from the ED as their condition may be serious if not clinically assessed and 

managed. 





Initial orders include:

## 2019-07-05 LAB
%HYPO/RBC NFR BLD AUTO: (no result) %
ANISOCYTOSIS BLD QL SMEAR: (no result)
BAND NEUTROPHILS # (MANUAL): 0 K/MM3
BUN SERPL-MCNC: 73 MG/DL (ref 9–20)
BUN/CREAT SERPL: 4 %
CALCIUM SERPL-MCNC: 8.5 MG/DL (ref 8.4–10.2)
CREATININE,URINE: 161.7 MG/DL (ref 0.1–20)
HCT VFR BLD CALC: 21.2 % (ref 35.5–45.6)
HEMOLYSIS INDEX: 2
HGB BLD-MCNC: 7 GM/DL (ref 11.8–15.2)
INR PPP: 1.35 (ref 0.87–1.13)
IRON SERPL-MCNC: 20 UG/DL (ref 49–181)
MCHC RBC AUTO-ENTMCNC: 33 % (ref 32–34)
MCV RBC AUTO: 60 FL (ref 84–94)
MONOCYTES # FLD: 51 %
MYELOCYTES # (MANUAL): 0 K/MM3
PLATELET # BLD: 494 K/MM3 (ref 140–440)
POIKILOCYTOSIS BLD QL SMEAR: (no result)
PROMYELOCYTES # (MANUAL): 0 K/MM3
RBC # BLD AUTO: 3.54 M/MM3 (ref 3.65–5.03)
TIBC SERPL-MCNC: 219 MCG/DL (ref 250–450)
TOTAL CELLS COUNTED BLD: 100
TOTAL CELLS COUNTED: 100 /MM3

## 2019-07-05 PROCEDURE — 5A1D70Z PERFORMANCE OF URINARY FILTRATION, INTERMITTENT, LESS THAN 6 HOURS PER DAY: ICD-10-PCS | Performed by: INTERNAL MEDICINE

## 2019-07-05 PROCEDURE — 0W9G3ZZ DRAINAGE OF PERITONEAL CAVITY, PERCUTANEOUS APPROACH: ICD-10-PCS

## 2019-07-05 RX ADMIN — FAMOTIDINE SCH MG: 10 INJECTION, SOLUTION INTRAVENOUS at 22:21

## 2019-07-05 RX ADMIN — HYDROMORPHONE HYDROCHLORIDE PRN MG: 1 INJECTION, SOLUTION INTRAMUSCULAR; INTRAVENOUS; SUBCUTANEOUS at 05:32

## 2019-07-05 RX ADMIN — Medication SCH ML: at 22:21

## 2019-07-05 RX ADMIN — Medication SCH: at 12:18

## 2019-07-05 RX ADMIN — METOPROLOL TARTRATE SCH MG: 50 TABLET, FILM COATED ORAL at 10:00

## 2019-07-05 RX ADMIN — METOPROLOL TARTRATE SCH MG: 50 TABLET, FILM COATED ORAL at 22:20

## 2019-07-05 RX ADMIN — FAMOTIDINE SCH MG: 10 INJECTION, SOLUTION INTRAVENOUS at 10:00

## 2019-07-05 NOTE — PROGRESS NOTE
Assessment and Plan


Assessment and plan: 


Patient is a 58 yo man with a history of hypertension and Asthma who presented 

to [facility] ED with urinary frequency, back pains, n/v, diarrhea and abd pains with 

distension x 1 week. 





* Initial labs: wbc 13.0 to 10.0, hgb 6.3 to 7.0 (mcv 60), na 132, k 9.5, cr 

  34.8, bun 129, bg 164, lipase 88


* CT abd/pelvis without contrast IMPRESSION: 1. There is ascites. There is 

  increased density in the omentum. Findings are concerning for carcinomatosis. 

  There is some mild adenopathy in the pelvis a possible pericolonic mass, 

  series 2 image 139. There is mild bilateral hydronephrosis. The exact cause is

  not determined by this study. There is minimal nephrolithiasis on the left. No

  ureteral calculi are identified however. 





ARF, suspect ATN + post obstructive, poa: s/p Vas-cath, emergent hemodialysis 

done, place [facility]


Bilateral hydronephrosis: place [facility], consulted Urology


Ascites due to suspected Colon cancer with mets to Omentum/Carcinomatosis 

suspected, which would be a very poor prognostic sign: consulted GI


Hyperkalemia: treated with HD, Nephrology is following


Acute Microcytic anemia, suspect cancer related until proven otherwise, I did 

inform patient my concerns after gaining his permission to do so: consult GI 

then consider Heme/Onc consult


Severe malnutrition: Dietitian consulted 


DVT prophylaxis: On heparin and GI prophylaxis





CCT 35 minutes








History


Interval history: 


Patient was seen and examined. Follow-up on current diagnosis ARF. No overnight 

events reported to me. Patient denies any chest pain, shortness breath, or 

severe headaches. Imaging, nursing note, chart, labs and old chart reviewed. 

Discussed with patient.





Hospitalist Physical





- Physical exam


Narrative exam: 


Gen: thin frial, ill appearing, NAD, Awake, Alert, Orientated 


HEENT: NCAT, EOMI, PERRL, OP Clear 


Neck: supple, no adenopathy, no thyromegaly, no JVD 


CVS/Heart: RRR, normal S1S2, pulses present bilaterally 


Chest/Lungs: CTA B, Symmetrical chest expansion, good air entry bilaterally 


GI/Abdomen: soft, NTND, good bowel sounds, no guarding or rebound 


/Bladder: no suprapubic tenderness, no CVA or paraspinal tenderness 


Extermity/Skin: no c/c/e, no obvious rash 


MSK: FROM x 4 


Neuro: CN 2-12 grossly intact, no new focal deficits 


Psych: calm 








- Constitutional


Vitals: 


                                        











Temp Pulse Resp BP Pulse Ox


 


 97.9 F   114 H  18   152/72   100 


 


 07/05/19 04:00  07/04/19 23:47  07/04/19 21:30  07/04/19 23:47  07/04/19 21:30











General appearance: Present: mild distress, well-nourished





Results





- Labs


CBC & Chem 7: 


                                 07/05/19 04:52





                                 07/05/19 04:52


Labs: 


                             Laboratory Last Values











WBC  10.0 K/mm3 (4.5-11.0)   07/05/19  04:52    


 


RBC  3.54 M/mm3 (3.65-5.03)  L  07/05/19  04:52    


 


Hgb  7.0 gm/dl (11.8-15.2)  L  07/05/19  04:52    


 


Hct  21.2 % (35.5-45.6)  L  07/05/19  04:52    


 


MCV  60 fl (84-94)  L  07/05/19  04:52    


 


MCH  20 pg (28-32)  L  07/05/19  04:52    


 


MCHC  33 % (32-34)   07/05/19  04:52    


 


RDW  30.0 % (13.2-15.2)  H  07/05/19  04:52    


 


Plt Count  494 K/mm3 (140-440)  H  07/05/19  04:52    


 


Sodium  134 mmol/L (137-145)  L  07/05/19  04:52    


 


Potassium  5.5 mmol/L (3.6-5.0)  H D 07/05/19  04:52    


 


Chloride  91.7 mmol/L ()  L  07/05/19  04:52    


 


Carbon Dioxide  25 mmol/L (22-30)  D 07/05/19  04:52    


 


  23 mmol/L  07/05/19  04:52    


 


BUN  73 mg/dL (9-20)  H  07/05/19  04:52    


 


  19.1 mg/dL (0.8-1.5)  H  07/05/19  04:52    


 


Estimated GFR  3 ml/min  07/05/19  04:52    


 


  4 %  07/05/19  04:52    


 


Glucose  97 mg/dL ()   07/05/19  04:52    


 


POC Glucose  164  ()  H  07/04/19  17:35    


 


Calcium  8.5 mg/dL (8.4-10.2)   07/05/19  04:52    


 


Phosphorus  7.70 mg/dL (2.5-4.5)  H  07/05/19  04:52    


 


Magnesium  2.00 mg/dL (1.7-2.3)   07/05/19  04:52    


 


  0.40 mg/dL (0.1-1.2)   07/04/19  12:47    


 


AST  < 5 units/L (5-40)  L  07/04/19  12:47    


 


ALT  5 units/L (7-56)  L  07/04/19  12:47    


 


  70 units/L ()   07/04/19  12:47    


 


  8.1 g/dL (6.3-8.2)   07/04/19  12:47    


 


  2.8 g/dL (3.9-5)  L  07/04/19  12:47    


 


  0.5 %  07/04/19  12:47    


 


  88 units/L (13-60)  H  07/04/19  12:47    


 


PTH Intact  238.5 pg/mL (15-65)  H  07/05/19  04:52    


 


Hepatitis A IgM Ab  Non-reactive  (NonReactive)   07/04/19  17:48    


 


Hep Bs Antigen  Non-reactive  (Negative)   07/04/19  17:48    


 


Hep B Core IgM Ab  Non-reactive  (NonReactive)   07/04/19  17:48    


 


  Non-reactive  (NonReactive)   07/04/19  17:48    


 


Blood Type  A POSITIVE   07/04/19  17:48    


 


Antibody Screen  Negative   07/04/19  17:48    


 


Crossmatch  See Detail   07/04/19  17:48    














Active Medications





- Current Medications


Current Medications: 














Generic Name Dose Route Start Last Admin





  Trade Name Freq  PRN Reason Stop Dose Admin


 


Acetaminophen  650 mg  07/04/19 17:22 





  Tylenol  PO  





  Q4H PRN  





  Pain MILD(1-3)/Fever >100.5/HA  


 


Albuterol  2.5 mg  07/04/19 17:22 





  Proventil  IH  





  Q4HRT PRN  





  Shortness Of Breath  


 


Famotidine  10 mg  07/04/19 22:00  07/04/19 22:08





  Pepcid  IV   10 mg





  BID BOWEN   Administration


 


Hydralazine HCl  10 mg  07/04/19 19:14  07/04/19 22:17





  Apresoline  IV   10 mg





  Q3H PRN   Administration





  Blood Pressure  


 


Hydromorphone HCl  0.5 mg  07/04/19 17:22  07/05/19 05:32





  Dilaudid  IV   0.5 mg





  Q3H PRN   Administration





  Pain , Severe (7-10)  


 


Sodium Chloride  100 mls @ 999 mls/hr  07/04/19 17:30 





  Nacl 0.9%  IV  





  VEE PRN  





  Hypotension  


 


Metoclopramide HCl  10 mg  07/04/19 17:22 





  Reglan  IV  





  Q6H PRN  





  Nausea And Vomiting  


 


Metoprolol Tartrate  50 mg  07/04/19 23:00  07/04/19 23:47





  Lopressor  PO   50 mg





  BID BOWEN   Administration


 


Ondansetron HCl  4 mg  07/04/19 17:22 





  Zofran  IV  





  Q3H PRN  





  Nausea And Vomiting  


 


Sodium Chloride  10 ml  07/04/19 22:00  07/04/19 22:15





  Sodium Chloride Flush Syringe 10 Ml  IV   10 ml





  BID BOWEN   Administration


 


Sodium Chloride  10 ml  07/04/19 17:22 





  Sodium Chloride Flush Syringe 10 Ml  IV  





  PRN PRN  





  LINE FLUSH

## 2019-07-05 NOTE — CONSULTATION
History of Present Illness





- Reason for Consult


Consult date: 07/05/19





- History of Present Illness





new to our service





The patient is a 60 Yo male with history significant for HTN (not on any meds) 

and Asthma who presented presented to [facility] ED with 2 weeks h/o generalized 

weakness, fatigue and abdominal discomfort.  Patient is a vague historian.  He 

also reports nausea, decreased appetite, poor PO intake, abdominal distention, 

one episode of vomiting and back pain on movement.  He denies any fever, chills,

diarrhea, dysuria, hematuria, leg swelling, dizziness or syncope.  In the ED his

labs were significant for creatinine 35, potassium 9.5 and bicarb 11.  





CT abdomen showed ascites and mild b/l hydronephrosis.  





abd soft


circ - hernandez draining radha urine








A/P


renal failure


mild bilat hydro


home with hernandez


outpt urodynamics








Past History


Past Medical History: hypertension, other (asthma)


Past Surgical History: No surgical history


Social history: denies: smoking, alcohol abuse


Family history: hypertension





Medications and Allergies


                                    Allergies











Allergy/AdvReac Type Severity Reaction Status Date / Time


 


No Known Allergies Allergy   Verified 07/04/19 12:02











                                Home Medications











 Medication  Instructions  Recorded  Confirmed  Last Taken  Type


 


Prednisone [predniSONE 10 mg 10 mg PO .TAPER #1 tab.ds.pk 09/10/15  Unknown Rx





(6-Day Pack, 21 Tabs)]     











Active Meds: 


Active Medications





Acetaminophen (Tylenol)  650 mg PO Q4H PRN


   PRN Reason: Pain MILD(1-3)/Fever >100.5/HA


Albuterol (Proventil)  2.5 mg IH Q4HRT PRN


   PRN Reason: Shortness Of Breath


Famotidine (Pepcid)  10 mg IV BID [facility]


   Last Admin: 07/05/19 10:00 Dose:  10 mg


   Documented by: 


Hydralazine HCl (Apresoline)  10 mg IV Q3H PRN


   PRN Reason: Blood Pressure


   Last Admin: 07/04/19 22:17 Dose:  10 mg


   Documented by: 


Hydromorphone HCl (Dilaudid)  0.5 mg IV Q3H PRN


   PRN Reason: Pain , Severe (7-10)


   Last Admin: 07/05/19 05:32 Dose:  0.5 mg


   Documented by: 


Sodium Chloride (Nacl 0.9%)  100 mls @ 999 mls/hr IV VEE PRN


   PRN Reason: Hypotension


Metoclopramide HCl (Reglan)  10 mg IV Q6H PRN


   PRN Reason: Nausea And Vomiting


Metoprolol Tartrate (Lopressor)  50 mg PO BID [facility]


   Last Admin: 07/05/19 10:00 Dose:  50 mg


   Documented by: 


Ondansetron HCl (Zofran)  4 mg IV Q3H PRN


   PRN Reason: Nausea And Vomiting


Sodium Chloride (Sodium Chloride Flush Syringe 10 Ml)  10 ml IV BID [facility]


   Last Admin: 07/05/19 12:18 Dose:  Not Given


   Documented by: 


Sodium Chloride (Sodium Chloride Flush Syringe 10 Ml)  10 ml IV PRN PRN


   PRN Reason: LINE FLUSH











Exam





- Constitutional


Vitals: 


                                        











Temp Pulse Resp BP Pulse Ox


 


 97.9 F   90   18   118/62   100 


 


 07/05/19 04:00  07/05/19 10:00  07/04/19 21:30  07/05/19 10:00  07/04/19 21:30














Results





- Labs


CBC & Chem 7: 


                                 07/05/19 04:52





                                 07/05/19 04:52


Labs: 


                              Abnormal lab results











  07/04/19 07/04/19 07/04/19 Range/Units





  12:47 12:47 13:54 


 


WBC  13.6 H    (4.5-11.0)  K/mm3


 


RBC  3.56 L    (3.65-5.03)  M/mm3


 


Hgb  6.3 L    (11.8-15.2)  gm/dl


 


Hct  20.7 L    (35.5-45.6)  %


 


MCV  58 L    (84-94)  fl


 


MCH  18 L    (28-32)  pg


 


MCHC  31 L    (32-34)  %


 


RDW  24.5 H    (13.2-15.2)  %


 


Plt Count  693 H    (140-440)  K/mm3


 


Seg Neuts % (Manual)     (40.0-70.0)  %


 


Lymphocytes % (Manual)     (13.4-35.0)  %


 


Nucleated RBC %     (0.0-0.9)  %


 


Seg Neutrophils # Man     (1.8-7.7)  K/mm3


 


Lymphocytes # (Manual)     (1.2-5.4)  K/mm3


 


PT     (12.2-14.9)  Sec.


 


INR     (0.87-1.13)  


 


Sodium   132 L   (137-145)  mmol/L


 


Potassium   9.1 H*  9.5 H*  (3.6-5.0)  mmol/L


 


Chloride   94.0 L   ()  mmol/L


 


Carbon Dioxide   11 L   (22-30)  mmol/L


 


BUN   129 H   (9-20)  mg/dL


 


Creatinine   34.8 H   (0.8-1.5)  mg/dL


 


POC Glucose     ()  


 


Phosphorus     (2.5-4.5)  mg/dL


 


Iron     ()  ug/dL


 


TIBC     (250-450)  mcg/dL


 


AST   < 5 L   (5-40)  units/L


 


ALT   5 L   (7-56)  units/L


 


Albumin   2.8 L   (3.9-5)  g/dL


 


Lipase   88 H   (13-60)  units/L


 


Vitamin B12     (211-911)  pg/mL


 


Folate     (7.3-26.0)  ng/mL


 


PTH Intact     (15-65)  pg/mL


 


Urine Creatinine     (0.1-20.0)  mg/dL


 


Crossmatch     














  07/04/19 07/04/19 07/04/19 Range/Units





  14:59 15:23 16:14 


 


WBC     (4.5-11.0)  K/mm3


 


RBC     (3.65-5.03)  M/mm3


 


Hgb     (11.8-15.2)  gm/dl


 


Hct     (35.5-45.6)  %


 


MCV     (84-94)  fl


 


MCH     (28-32)  pg


 


MCHC     (32-34)  %


 


RDW     (13.2-15.2)  %


 


Plt Count     (140-440)  K/mm3


 


Seg Neuts % (Manual)     (40.0-70.0)  %


 


Lymphocytes % (Manual)     (13.4-35.0)  %


 


Nucleated RBC %     (0.0-0.9)  %


 


Seg Neutrophils # Man     (1.8-7.7)  K/mm3


 


Lymphocytes # (Manual)     (1.2-5.4)  K/mm3


 


PT     (12.2-14.9)  Sec.


 


INR     (0.87-1.13)  


 


Sodium     (137-145)  mmol/L


 


Potassium  9.5 H*    (3.6-5.0)  mmol/L


 


Chloride     ()  mmol/L


 


Carbon Dioxide     (22-30)  mmol/L


 


BUN     (9-20)  mg/dL


 


Creatinine     (0.8-1.5)  mg/dL


 


POC Glucose   107 H  127 H  ()  


 


Phosphorus     (2.5-4.5)  mg/dL


 


Iron     ()  ug/dL


 


TIBC     (250-450)  mcg/dL


 


AST     (5-40)  units/L


 


ALT     (7-56)  units/L


 


Albumin     (3.9-5)  g/dL


 


Lipase     (13-60)  units/L


 


Vitamin B12     (211-911)  pg/mL


 


Folate     (7.3-26.0)  ng/mL


 


PTH Intact     (15-65)  pg/mL


 


Urine Creatinine     (0.1-20.0)  mg/dL


 


Crossmatch     














  07/04/19 07/04/19 07/05/19 Range/Units





  17:35 17:48 04:52 


 


WBC     (4.5-11.0)  K/mm3


 


RBC    3.54 L  (3.65-5.03)  M/mm3


 


Hgb    7.0 L  (11.8-15.2)  gm/dl


 


Hct    21.2 L  (35.5-45.6)  %


 


MCV    60 L  (84-94)  fl


 


MCH    20 L  (28-32)  pg


 


MCHC     (32-34)  %


 


RDW    30.0 H  (13.2-15.2)  %


 


Plt Count    494 H  (140-440)  K/mm3


 


Seg Neuts % (Manual)    95.0 H  (40.0-70.0)  %


 


Lymphocytes % (Manual)    3.0 L  (13.4-35.0)  %


 


Nucleated RBC %    1.0 H  (0.0-0.9)  %


 


Seg Neutrophils # Man    9.5 H  (1.8-7.7)  K/mm3


 


Lymphocytes # (Manual)    0.3 L  (1.2-5.4)  K/mm3


 


PT     (12.2-14.9)  Sec.


 


INR     (0.87-1.13)  


 


Sodium     (137-145)  mmol/L


 


Potassium     (3.6-5.0)  mmol/L


 


Chloride     ()  mmol/L


 


Carbon Dioxide     (22-30)  mmol/L


 


BUN     (9-20)  mg/dL


 


Creatinine     (0.8-1.5)  mg/dL


 


POC Glucose  164 H    ()  


 


Phosphorus     (2.5-4.5)  mg/dL


 


Iron     ()  ug/dL


 


TIBC     (250-450)  mcg/dL


 


AST     (5-40)  units/L


 


ALT     (7-56)  units/L


 


Albumin     (3.9-5)  g/dL


 


Lipase     (13-60)  units/L


 


Vitamin B12     (211-911)  pg/mL


 


Folate     (7.3-26.0)  ng/mL


 


PTH Intact     (15-65)  pg/mL


 


Urine Creatinine     (0.1-20.0)  mg/dL


 


Crossmatch   See Detail   














  07/05/19 07/05/19 07/05/19 Range/Units





  04:52 04:52 08:51 


 


WBC     (4.5-11.0)  K/mm3


 


RBC     (3.65-5.03)  M/mm3


 


Hgb     (11.8-15.2)  gm/dl


 


Hct     (35.5-45.6)  %


 


MCV     (84-94)  fl


 


MCH     (28-32)  pg


 


MCHC     (32-34)  %


 


RDW     (13.2-15.2)  %


 


Plt Count     (140-440)  K/mm3


 


Seg Neuts % (Manual)     (40.0-70.0)  %


 


Lymphocytes % (Manual)     (13.4-35.0)  %


 


Nucleated RBC %     (0.0-0.9)  %


 


Seg Neutrophils # Man     (1.8-7.7)  K/mm3


 


Lymphocytes # (Manual)     (1.2-5.4)  K/mm3


 


PT     (12.2-14.9)  Sec.


 


INR     (0.87-1.13)  


 


Sodium  134 L    (137-145)  mmol/L


 


Potassium  5.5 H D    (3.6-5.0)  mmol/L


 


Chloride  91.7 L    ()  mmol/L


 


Carbon Dioxide     (22-30)  mmol/L


 


BUN  73 H    (9-20)  mg/dL


 


Creatinine  19.1 H    (0.8-1.5)  mg/dL


 


POC Glucose     ()  


 


Phosphorus  7.70 H    (2.5-4.5)  mg/dL


 


Iron     ()  ug/dL


 


TIBC     (250-450)  mcg/dL


 


AST     (5-40)  units/L


 


ALT     (7-56)  units/L


 


Albumin     (3.9-5)  g/dL


 


Lipase     (13-60)  units/L


 


Vitamin B12     (211-911)  pg/mL


 


Folate     (7.3-26.0)  ng/mL


 


PTH Intact   238.5 H   (15-65)  pg/mL


 


Urine Creatinine    161.7 H  (0.1-20.0)  mg/dL


 


Crossmatch     














  07/05/19 07/05/19 07/05/19 Range/Units





  08:51 08:51 08:51 


 


WBC     (4.5-11.0)  K/mm3


 


RBC     (3.65-5.03)  M/mm3


 


Hgb     (11.8-15.2)  gm/dl


 


Hct     (35.5-45.6)  %


 


MCV     (84-94)  fl


 


MCH     (28-32)  pg


 


MCHC     (32-34)  %


 


RDW     (13.2-15.2)  %


 


Plt Count     (140-440)  K/mm3


 


Seg Neuts % (Manual)     (40.0-70.0)  %


 


Lymphocytes % (Manual)     (13.4-35.0)  %


 


Nucleated RBC %     (0.0-0.9)  %


 


Seg Neutrophils # Man     (1.8-7.7)  K/mm3


 


Lymphocytes # (Manual)     (1.2-5.4)  K/mm3


 


PT     (12.2-14.9)  Sec.


 


INR     (0.87-1.13)  


 


Sodium     (137-145)  mmol/L


 


Potassium     (3.6-5.0)  mmol/L


 


Chloride     ()  mmol/L


 


Carbon Dioxide     (22-30)  mmol/L


 


BUN     (9-20)  mg/dL


 


Creatinine     (0.8-1.5)  mg/dL


 


POC Glucose     ()  


 


Phosphorus     (2.5-4.5)  mg/dL


 


Iron  20 L    ()  ug/dL


 


TIBC  219 L    (250-450)  mcg/dL


 


AST     (5-40)  units/L


 


ALT     (7-56)  units/L


 


Albumin     (3.9-5)  g/dL


 


Lipase     (13-60)  units/L


 


Vitamin B12   1768 H   (211-911)  pg/mL


 


Folate    5.05 L  (7.3-26.0)  ng/mL


 


PTH Intact     (15-65)  pg/mL


 


Urine Creatinine     (0.1-20.0)  mg/dL


 


Crossmatch     














  07/05/19 Range/Units





  08:51 


 


WBC   (4.5-11.0)  K/mm3


 


RBC   (3.65-5.03)  M/mm3


 


Hgb   (11.8-15.2)  gm/dl


 


Hct   (35.5-45.6)  %


 


MCV   (84-94)  fl


 


MCH   (28-32)  pg


 


MCHC   (32-34)  %


 


RDW   (13.2-15.2)  %


 


Plt Count   (140-440)  K/mm3


 


Seg Neuts % (Manual)   (40.0-70.0)  %


 


Lymphocytes % (Manual)   (13.4-35.0)  %


 


Nucleated RBC %   (0.0-0.9)  %


 


Seg Neutrophils # Man   (1.8-7.7)  K/mm3


 


Lymphocytes # (Manual)   (1.2-5.4)  K/mm3


 


PT  16.3 H  (12.2-14.9)  Sec.


 


INR  1.35 H  (0.87-1.13)  


 


Sodium   (137-145)  mmol/L


 


Potassium   (3.6-5.0)  mmol/L


 


Chloride   ()  mmol/L


 


Carbon Dioxide   (22-30)  mmol/L


 


BUN   (9-20)  mg/dL


 


Creatinine   (0.8-1.5)  mg/dL


 


POC Glucose   ()  


 


Phosphorus   (2.5-4.5)  mg/dL


 


Iron   ()  ug/dL


 


TIBC   (250-450)  mcg/dL


 


AST   (5-40)  units/L


 


ALT   (7-56)  units/L


 


Albumin   (3.9-5)  g/dL


 


Lipase   (13-60)  units/L


 


Vitamin B12   (211-911)  pg/mL


 


Folate   (7.3-26.0)  ng/mL


 


PTH Intact   (15-65)  pg/mL


 


Urine Creatinine   (0.1-20.0)  mg/dL


 


Crossmatch

## 2019-07-05 NOTE — PROGRESS NOTE
Assessment and Plan





1. Acute kidney injury:


Likely vasomotor ANTHONY superimposed on CKD in the setting of volume depletion and 

obstructive uropathy.


CT abdomen showed mild bilateral hydronephosis.


Continue IV fluids.


Monitor renal function.


Renal prognosis is guarded to poor.


Avoid nephrotoxic agents.


Meds dosage based on GFR.


Due to persistent hyperkalemia patient was started on hemodialysis yesterday.





2. FEN:


Hyperkalemia, HD today.


Anion gap MA, 2/2 ANTHONY.


Continue IV fluids.


Monitor lytes.





3. Obstructive nephropathy:


S/p hernandez catheter.





4. Anemia:


S/p one unit PRBC yesterday.


Monitor H/H.





5. Uncontrolled HTN:


BP is better.





6. Ascites.











Subjective


Date of service: 07/05/19


Interval history: 


Patient was seen and examined at the bedside.








Objective





- Vital Signs


Vital signs: 


                               Vital Signs - 12hr











  07/05/19 07/05/19





  04:00 10:00


 


Temperature 97.9 F 


 


Pulse Rate  90


 


Blood Pressure  118/62














- General Appearance


General appearance: well-developed, appears stated age, other (no distress)


EENT: ATNC, PERRL, mucous membranes moist, hearing intact, vision intact


Neck: supple


Respiratory: Present: Clear to Ascultation


Cardiology: regular, S1S2, no murmurs


Gastrointestinal: normoactive bowel sounds, no tenderness, no distended, other 

(Hernandez catheter)


Integumentary: no rash, warm and dry


Neurologic: no focal deficit, no asterixis, alert and oriented x3


Musculoskeletal: other (no edema, R groin dialysis catheter)





- Lab





                                 07/05/19 04:52





                                 07/05/19 04:52


                             Most recent lab results











Calcium  8.5 mg/dL (8.4-10.2)   07/05/19  04:52    


 


Phosphorus  7.70 mg/dL (2.5-4.5)  H  07/05/19  04:52    


 


Magnesium  2.00 mg/dL (1.7-2.3)   07/05/19  04:52    


 


  161.7 mg/dL (0.1-20.0)  H  07/05/19  08:51    


 


  72 mmol/L  07/05/19  08:51    














Medications & Allergies





- Medications


Allergies/Adverse Reactions: 


                                    Allergies





No Known Allergies Allergy (Verified 07/04/19 12:02)


   








Home Medications: 


                                Home Medications











 Medication  Instructions  Recorded  Confirmed  Last Taken  Type


 


Prednisone [predniSONE 10 mg 10 mg PO .TAPER #1 tab.ds.pk 09/10/15  Unknown Rx





(6-Day Pack, 21 Tabs)]     











Active Medications: 














Generic Name Dose Route Start Last Admin





  Trade Name Freq  PRN Reason Stop Dose Admin


 


Acetaminophen  650 mg  07/04/19 17:22 





  Tylenol  PO  





  Q4H PRN  





  Pain MILD(1-3)/Fever >100.5/HA  


 


Albuterol  2.5 mg  07/04/19 17:22 





  Proventil  IH  





  Q4HRT PRN  





  Shortness Of Breath  


 


Famotidine  10 mg  07/04/19 22:00  07/05/19 10:00





  Pepcid  IV   10 mg





  BID BOWEN   Administration


 


Hydralazine HCl  10 mg  07/04/19 19:14  07/04/19 22:17





  Apresoline  IV   10 mg





  Q3H PRN   Administration





  Blood Pressure  


 


Hydromorphone HCl  0.5 mg  07/04/19 17:22  07/05/19 05:32





  Dilaudid  IV   0.5 mg





  Q3H PRN   Administration





  Pain , Severe (7-10)  


 


Sodium Chloride  100 mls @ 999 mls/hr  07/04/19 17:30 





  Nacl 0.9%  IV  





  VEE PRN  





  Hypotension  


 


Metoclopramide HCl  10 mg  07/04/19 17:22 





  Reglan  IV  





  Q6H PRN  





  Nausea And Vomiting  


 


Metoprolol Tartrate  50 mg  07/04/19 23:00  07/05/19 10:00





  Lopressor  PO   50 mg





  BID BOWEN   Administration


 


Ondansetron HCl  4 mg  07/04/19 17:22 





  Zofran  IV  





  Q3H PRN  





  Nausea And Vomiting  


 


Sodium Chloride  10 ml  07/04/19 22:00  07/05/19 12:18





  Sodium Chloride Flush Syringe 10 Ml  IV   Not Given





  BID BOWEN  


 


Sodium Chloride  10 ml  07/04/19 17:22 





  Sodium Chloride Flush Syringe 10 Ml  IV  





  PRN PRN  





  LINE FLUSH

## 2019-07-05 NOTE — ULTRASOUND REPORT
Ultrasound-guided paracentesis



HISTORY: ascites.  Chronic renal disease



PROCEDURE:  The risks (including but not limited to bleeding, infection, and bowel injury) and benefi
ts were explained to the patient and informed consent was obtained.  A time out procedure was perform
ed.  



Ultrasound was used to evaluate the abdomen and locate the largest ascites fluid pocket.  Once the sk
in was marked, the procedure site was prepped and draped in the usual sterile fashion and lidocaine w
as used for local anesthesia.  A skin nick was made and a 6-[place] paracentesis catheter was placed. 
 The patient was monitored closely throughout the procedure, and a total of 2400 mL of clear yellow f
luid was aspirated.  Samples were sent to the lab for further evaluation per the primary clinicians o
rders.



The patient tolerated the procedure well with no complications. 



IMPRESSION: Successful paracentesis as above with a total of 2400 mL of clear yellow fluid aspirated.
  



Signer Name: Mir Rooney Jr, MD 

Signed: 7/5/2019 11:29 AM

 Workstation Name: [id number]

## 2019-07-05 NOTE — GASTROENTEROLOGY CONSULTATION
History of Present Illness





- Reason for Consult


Consult date: 07/05/19


ascites; colon cancer with mets?


Requesting physician: MAHAMED JAMESON





- History of Present Illness


Patient is a 58 y/o male with PMH of HTN and asthma who presented to ED with c/o

urinary frequency, back pains, nausea with 1 episode of vomiting, and  abdominal

pain with distension. Upon admission, he was found to have ARF requiring 

emergent dialysis, along with anemia, malnutrition, bilateral hydronephrosis 

(s/p hernandez placement), and ascites with findings concerning for carcinomatosis 

on CT to which GI has been consulted. This morning patient was resting in bed 

w/o acute distress. Reports recent onset of abdominal distention (~1week). 

Admits to associated wt loss and intermittent bright red blood mixed in stool. 

Denies fever, CP, SOB, current N/V, hematemeiss, melena, diarrhea, or consti

pation. Last colonoscopy over 5 years ago (pt does not recall results; records 

unavailable). No known Fhx of colon cancer. 








Past History


Past Medical History: hypertension, other (asthma)


Past Surgical History: No surgical history


Social history: denies: smoking, alcohol abuse


Family history: hypertension





Medications and Allergies


                                    Allergies











Allergy/AdvReac Type Severity Reaction Status Date / Time


 


No Known Allergies Allergy   Verified 07/04/19 12:02











                                Home Medications











 Medication  Instructions  Recorded  Confirmed  Last Taken  Type


 


Prednisone [predniSONE 10 mg 10 mg PO .TAPER #1 tab.ds.pk 09/10/15  Unknown Rx





(6-Day Pack, 21 Tabs)]     











Active Meds: 


Active Medications





Acetaminophen (Tylenol)  650 mg PO Q4H PRN


   PRN Reason: Pain MILD(1-3)/Fever >100.5/HA


Albuterol (Proventil)  2.5 mg IH Q4HRT PRN


   PRN Reason: Shortness Of Breath


Famotidine (Pepcid)  10 mg IV BID BOWEN


   Last Admin: 07/04/19 22:08 Dose:  10 mg


   Documented by: 


Hydralazine HCl (Apresoline)  10 mg IV Q3H PRN


   PRN Reason: Blood Pressure


   Last Admin: 07/04/19 22:17 Dose:  10 mg


   Documented by: 


Hydromorphone HCl (Dilaudid)  0.5 mg IV Q3H PRN


   PRN Reason: Pain , Severe (7-10)


   Last Admin: 07/05/19 05:32 Dose:  0.5 mg


   Documented by: 


Sodium Chloride (Nacl 0.9%)  100 mls @ 999 mls/hr IV VEE PRN


   PRN Reason: Hypotension


Metoclopramide HCl (Reglan)  10 mg IV Q6H PRN


   PRN Reason: Nausea And Vomiting


Metoprolol Tartrate (Lopressor)  50 mg PO BID [facility]


   Last Admin: 07/04/19 23:47 Dose:  50 mg


   Documented by: 


Ondansetron HCl (Zofran)  4 mg IV Q3H PRN


   PRN Reason: Nausea And Vomiting


Sodium Chloride (Sodium Chloride Flush Syringe 10 Ml)  10 ml IV BID [facility]


   Last Admin: 07/04/19 22:15 Dose:  10 ml


   Documented by: 


Sodium Chloride (Sodium Chloride Flush Syringe 10 Ml)  10 ml IV PRN PRN


   PRN Reason: LINE FLUSH





medications reviewed/updated as required





Review of Systems





- Review of Systems


All systems: negative


Constitutional: weight loss, weakness


Gastrointestinal: abdominal pain (distension), nausea, vomiting (x 1 episode), 

BRBPR (mixed with stool)





Exam





- Constitutional


Vital Signs: 


                                        











Temp Pulse Resp BP Pulse Ox


 


 97.9 F   114 H  18   152/72   100 


 


 07/05/19 04:00  07/04/19 23:47  07/04/19 21:30  07/04/19 23:47  07/04/19 21:30











General appearance: no acute distress





- Respiratory


Respiratory effort: normal





- Cardiovascular


Rhythm: other (tachycardia)





- Gastrointestinal


General gastrointestinal: Present: soft, tender (slight TTP), distended 

(ascites), normal bowel sounds





- Neurologic


Neurological: alert and oriented x3





- Labs


CBC & Chem 7: 


                                 07/05/19 04:52





                                 07/05/19 04:52


Lab Results: 


                         Laboratory Results - last 24 hr











  07/04/19 07/04/19 07/04/19





  12:47 12:47 13:54


 


WBC  13.6 H  


 


RBC  3.56 L  


 


Hgb  6.3 L  


 


Hct  20.7 L  


 


MCV  58 L  


 


MCH  18 L  


 


MCHC  31 L  


 


RDW  24.5 H  


 


Plt Count  693 H  


 


Add Manual Diff   


 


Total Counted   


 


Seg Neuts % (Manual)   


 


Band Neutrophils %   


 


Lymphocytes % (Manual)   


 


Reactive Lymphs % (Man)   


 


Monocytes % (Manual)   


 


Eosinophils % (Manual)   


 


Basophils % (Manual)   


 


Metamyelocytes %   


 


Myelocytes %   


 


Promyelocytes %   


 


Blast Cells %   


 


Nucleated RBC %   


 


Seg Neutrophils # Man   


 


Band Neutrophils #   


 


Lymphocytes # (Manual)   


 


Abs React Lymphs (Man)   


 


Monocytes # (Manual)   


 


Eosinophils # (Manual)   


 


Basophils # (Manual)   


 


Metamyelocytes #   


 


Myelocytes #   


 


Promyelocytes #   


 


Blast Cells #   


 


WBC Morphology   


 


Hypersegmented Neuts   


 


Hyposegmented Neuts   


 


Hypogranular Neuts   


 


Smudge Cells   


 


Toxic Granulation   


 


Toxic Vacuolation   


 


Dohle Bodies   


 


Pelger-Huet Anomaly   


 


Veena Rods   


 


Platelet Estimate   


 


Clumped Platelets   


 


Plt Clumps, EDTA   


 


Large Platelets   


 


Giant Platelets   


 


Platelet Satelliting   


 


Plt Morphology Comment   


 


RBC Morphology   


 


Dimorphic RBCs   


 


Polychromasia   


 


Hypochromasia   


 


Poikilocytosis   


 


Anisocytosis   


 


Microcytosis   


 


Macrocytosis   


 


Spherocytes   


 


Pappenheimer Bodies   


 


Sickle Cells   


 


Target Cells   


 


Tear Drop Cells   


 


Ovalocytes   


 


Helmet Cells   


 


Dickerson-Fajardo Bodies   


 


Cabot Rings   


 


[place] Cells   


 


Bite Cells   


 


Crenated Cell   


 


Elliptocytes   


 


Acanthocytes (Spur)   


 


Rouleaux   


 


Hemoglobin C Crystals   


 


Schistocytes   


 


Malaria parasites   


 


Percent Retic   


 


Reynaldo Bodies   


 


Hem Pathologist Commnt   


 


PT   


 


INR   


 


Sodium   132 L 


 


Potassium   9.1 H*  9.5 H*


 


Chloride   94.0 L 


 


Carbon Dioxide   11 L 


 


Anion Gap   36 


 


BUN   129 H 


 


Creatinine   34.8 H 


 


Estimated GFR   2 


 


BUN/Creatinine Ratio   4 


 


Glucose   92 


 


POC Glucose   


 


Calcium   9.4 


 


Phosphorus   


 


Magnesium   


 


Iron   


 


TIBC   


 


Ferritin   


 


Total Bilirubin   0.40 


 


AST   < 5 L 


 


ALT   5 L 


 


Alkaline Phosphatase   70 


 


Total Protein   8.1 


 


Albumin   2.8 L 


 


Albumin/Globulin Ratio   0.5 


 


Lipase   88 H 


 


Vitamin B12   


 


Folate   


 


PTH Intact   


 


Hepatitis A IgM Ab   


 


Hep Bs Antigen   


 


Hep B Core IgM Ab   


 


Hepatitis C Antibody   


 


Blood Type   


 


Antibody Screen   


 


Crossmatch   














  07/04/19 07/04/19 07/04/19





  14:59 15:23 16:14


 


WBC   


 


RBC   


 


Hgb   


 


Hct   


 


MCV   


 


MCH   


 


MCHC   


 


RDW   


 


Plt Count   


 


Add Manual Diff   


 


Total Counted   


 


Seg Neuts % (Manual)   


 


Band Neutrophils %   


 


Lymphocytes % (Manual)   


 


Reactive Lymphs % (Man)   


 


Monocytes % (Manual)   


 


Eosinophils % (Manual)   


 


Basophils % (Manual)   


 


Metamyelocytes %   


 


Myelocytes %   


 


Promyelocytes %   


 


Blast Cells %   


 


Nucleated RBC %   


 


Seg Neutrophils # Man   


 


Band Neutrophils #   


 


Lymphocytes # (Manual)   


 


Abs React Lymphs (Man)   


 


Monocytes # (Manual)   


 


Eosinophils # (Manual)   


 


Basophils # (Manual)   


 


Metamyelocytes #   


 


Myelocytes #   


 


Promyelocytes #   


 


Blast Cells #   


 


WBC Morphology   


 


Hypersegmented Neuts   


 


Hyposegmented Neuts   


 


Hypogranular Neuts   


 


Smudge Cells   


 


Toxic Granulation   


 


Toxic Vacuolation   


 


Dohle Bodies   


 


Pelger-Huet Anomaly   


 


Veena Rods   


 


Platelet Estimate   


 


Clumped Platelets   


 


Plt Clumps, EDTA   


 


Large Platelets   


 


Giant Platelets   


 


Platelet Satelliting   


 


Plt Morphology Comment   


 


RBC Morphology   


 


Dimorphic RBCs   


 


Polychromasia   


 


Hypochromasia   


 


Poikilocytosis   


 


Anisocytosis   


 


Microcytosis   


 


Macrocytosis   


 


Spherocytes   


 


Pappenheimer Bodies   


 


Sickle Cells   


 


Target Cells   


 


Tear Drop Cells   


 


Ovalocytes   


 


Helmet Cells   


 


Dickerson-Fajardo Bodies   


 


Cabot Rings   


 


[place] Cells   


 


Bite Cells   


 


Crenated Cell   


 


Elliptocytes   


 


Acanthocytes (Spur)   


 


Rouleaux   


 


Hemoglobin C Crystals   


 


Schistocytes   


 


Malaria parasites   


 


Percent Retic   


 


Reynaldo Bodies   


 


Hem Pathologist Commnt   


 


PT   


 


INR   


 


Sodium   


 


Potassium  9.5 H*  


 


Chloride   


 


Carbon Dioxide   


 


Anion Gap   


 


BUN   


 


Creatinine   


 


Estimated GFR   


 


BUN/Creatinine Ratio   


 


Glucose   


 


POC Glucose   107 H  127 H


 


Calcium   


 


Phosphorus   


 


Magnesium   


 


Iron   


 


TIBC   


 


Ferritin   


 


Total Bilirubin   


 


AST   


 


ALT   


 


Alkaline Phosphatase   


 


Total Protein   


 


Albumin   


 


Albumin/Globulin Ratio   


 


Lipase   


 


Vitamin B12   


 


Folate   


 


PTH Intact   


 


Hepatitis A IgM Ab   


 


Hep Bs Antigen   


 


Hep B Core IgM Ab   


 


Hepatitis C Antibody   


 


Blood Type   


 


Antibody Screen   


 


Crossmatch   














  07/04/19 07/04/19 07/04/19





  17:35 17:48 17:48


 


WBC   


 


RBC   


 


Hgb   


 


Hct   


 


MCV   


 


MCH   


 


MCHC   


 


RDW   


 


Plt Count   


 


Add Manual Diff   


 


Total Counted   


 


Seg Neuts % (Manual)   


 


Band Neutrophils %   


 


Lymphocytes % (Manual)   


 


Reactive Lymphs % (Man)   


 


Monocytes % (Manual)   


 


Eosinophils % (Manual)   


 


Basophils % (Manual)   


 


Metamyelocytes %   


 


Myelocytes %   


 


Promyelocytes %   


 


Blast Cells %   


 


Nucleated RBC %   


 


Seg Neutrophils # Man   


 


Band Neutrophils #   


 


Lymphocytes # (Manual)   


 


Abs React Lymphs (Man)   


 


Monocytes # (Manual)   


 


Eosinophils # (Manual)   


 


Basophils # (Manual)   


 


Metamyelocytes #   


 


Myelocytes #   


 


Promyelocytes #   


 


Blast Cells #   


 


WBC Morphology   


 


Hypersegmented Neuts   


 


Hyposegmented Neuts   


 


Hypogranular Neuts   


 


Smudge Cells   


 


Toxic Granulation   


 


Toxic Vacuolation   


 


Dohle Bodies   


 


Pelger-Huet Anomaly   


 


Veena Rods   


 


Platelet Estimate   


 


Clumped Platelets   


 


Plt Clumps, EDTA   


 


Large Platelets   


 


Giant Platelets   


 


Platelet Satelliting   


 


Plt Morphology Comment   


 


RBC Morphology   


 


Dimorphic RBCs   


 


Polychromasia   


 


Hypochromasia   


 


Poikilocytosis   


 


Anisocytosis   


 


Microcytosis   


 


Macrocytosis   


 


Spherocytes   


 


Pappenheimer Bodies   


 


Sickle Cells   


 


Target Cells   


 


Tear Drop Cells   


 


Ovalocytes   


 


Helmet Cells   


 


Dickerson-Fajardo Bodies   


 


Cabot Rings   


 


[place] Cells   


 


Bite Cells   


 


Crenated Cell   


 


Elliptocytes   


 


Acanthocytes (Spur)   


 


Rouleaux   


 


Hemoglobin C Crystals   


 


Schistocytes   


 


Malaria parasites   


 


Percent Retic   


 


Reynaldo Bodies   


 


Hem Pathologist Commnt   


 


PT   


 


INR   


 


Sodium   


 


Potassium   


 


Chloride   


 


Carbon Dioxide   


 


Anion Gap   


 


BUN   


 


Creatinine   


 


Estimated GFR   


 


BUN/Creatinine Ratio   


 


Glucose   


 


POC Glucose  164 H  


 


Calcium   


 


Phosphorus   


 


Magnesium   


 


Iron   


 


TIBC   


 


Ferritin   


 


Total Bilirubin   


 


AST   


 


ALT   


 


Alkaline Phosphatase   


 


Total Protein   


 


Albumin   


 


Albumin/Globulin Ratio   


 


Lipase   


 


Vitamin B12   


 


Folate   


 


PTH Intact   


 


Hepatitis A IgM Ab   Non-reactive 


 


Hep Bs Antigen   Non-reactive 


 


Hep B Core IgM Ab   Non-reactive 


 


Hepatitis C Antibody   Non-reactive 


 


Blood Type    A POSITIVE


 


Antibody Screen    Negative


 


Crossmatch    See Detail














  07/05/19 07/05/19 07/05/19





  04:52 04:52 04:52


 


WBC  10.0  


 


RBC  3.54 L  


 


Hgb  7.0 L  


 


Hct  21.2 L  


 


MCV  60 L  


 


MCH  20 L  


 


MCHC  33  


 


RDW  30.0 H  


 


Plt Count  494 H  


 


Add Manual Diff  Complete  


 


Total Counted  100  


 


Seg Neuts % (Manual)  95.0 H  


 


Band Neutrophils %  0  


 


Lymphocytes % (Manual)  3.0 L  


 


Reactive Lymphs % (Man)  0  


 


Monocytes % (Manual)  1.0  


 


Eosinophils % (Manual)  1.0  


 


Basophils % (Manual)  0  


 


Metamyelocytes %  0  


 


Myelocytes %  0  


 


Promyelocytes %  0  


 


Blast Cells %  0  


 


Nucleated RBC %  1.0 H  


 


Seg Neutrophils # Man  9.5 H  


 


Band Neutrophils #  0.0  


 


Lymphocytes # (Manual)  0.3 L  


 


Abs React Lymphs (Man)  0.0  


 


Monocytes # (Manual)  0.1  


 


Eosinophils # (Manual)  0.1  


 


Basophils # (Manual)  0.0  


 


Metamyelocytes #  0.0  


 


Myelocytes #  0.0  


 


Promyelocytes #  0.0  


 


Blast Cells #  0.0  


 


WBC Morphology  Not Reportable  


 


Hypersegmented Neuts  Not Reportable  


 


Hyposegmented Neuts  Not Reportable  


 


Hypogranular Neuts  Not Reportable  


 


Smudge Cells  Not Reportable  


 


Toxic Granulation  Not Reportable  


 


Toxic Vacuolation  Not Reportable  


 


Dohle Bodies  Not Reportable  


 


Pelger-Huet Anomaly  Not Reportable  


 


Veena Rods  Not Reportable  


 


Platelet Estimate  Consistent w auto  


 


Clumped Platelets  Not Reportable  


 


Plt Clumps, EDTA  Not Reportable  


 


Large Platelets  Not Reportable  


 


Giant Platelets  Not Reportable  


 


Platelet Satelliting  Not Reportable  


 


Plt Morphology Comment  Not Reportable  


 


RBC Morphology  Not Reportable  


 


Dimorphic RBCs  Not Reportable  


 


Polychromasia  Not Reportable  


 


Hypochromasia  3+  


 


Poikilocytosis  1+  


 


Anisocytosis  3+  


 


Microcytosis  2+  


 


Macrocytosis  Not Reportable  


 


Spherocytes  Not Reportable  


 


Pappenheimer Bodies  Not Reportable  


 


Sickle Cells  Not Reportable  


 


Target Cells  Few  


 


Tear Drop Cells  Not Reportable  


 


Ovalocytes  Few  


 


Helmet Cells  Not Reportable  


 


Dickerson-Fajardo Bodies  Not Reportable  


 


Cabot Rings  Not Reportable  


 


[place] Cells  Not Reportable  


 


Bite Cells  Not Reportable  


 


Crenated Cell  Not Reportable  


 


Elliptocytes  Few  


 


Acanthocytes (Spur)  Not Reportable  


 


Rouleaux  Not Reportable  


 


Hemoglobin C Crystals  Not Reportable  


 


Schistocytes  Not Reportable  


 


Malaria parasites  Not Reportable  


 


Percent Retic   


 


Reynaldo Bodies  Not Reportable  


 


Hem Pathologist Commnt  No  


 


PT   


 


INR   


 


Sodium   134 L 


 


Potassium   5.5 H D 


 


Chloride   91.7 L 


 


Carbon Dioxide   25  D 


 


Anion Gap   23 


 


BUN   73 H 


 


Creatinine   19.1 H 


 


Estimated GFR   3 


 


BUN/Creatinine Ratio   4 


 


Glucose   97 


 


POC Glucose   


 


Calcium   8.5 


 


Phosphorus   7.70 H 


 


Magnesium   2.00 


 


Iron   


 


TIBC   


 


Ferritin   


 


Total Bilirubin   


 


AST   


 


ALT   


 


Alkaline Phosphatase   


 


Total Protein   


 


Albumin   


 


Albumin/Globulin Ratio   


 


Lipase   


 


Vitamin B12   


 


Folate   


 


PTH Intact    238.5 H


 


Hepatitis A IgM Ab   


 


Hep Bs Antigen   


 


Hep B Core IgM Ab   


 


Hepatitis C Antibody   


 


Blood Type   


 


Antibody Screen   


 


Crossmatch   














  07/05/19 07/05/19 07/05/19





  04:52 08:51 08:51


 


WBC   


 


RBC   


 


Hgb   


 


Hct   


 


MCV   


 


MCH   


 


MCHC   


 


RDW   


 


Plt Count   


 


Add Manual Diff   


 


Total Counted   


 


Seg Neuts % (Manual)   


 


Band Neutrophils %   


 


Lymphocytes % (Manual)   


 


Reactive Lymphs % (Man)   


 


Monocytes % (Manual)   


 


Eosinophils % (Manual)   


 


Basophils % (Manual)   


 


Metamyelocytes %   


 


Myelocytes %   


 


Promyelocytes %   


 


Blast Cells %   


 


Nucleated RBC %   


 


Seg Neutrophils # Man   


 


Band Neutrophils #   


 


Lymphocytes # (Manual)   


 


Abs React Lymphs (Man)   


 


Monocytes # (Manual)   


 


Eosinophils # (Manual)   


 


Basophils # (Manual)   


 


Metamyelocytes #   


 


Myelocytes #   


 


Promyelocytes #   


 


Blast Cells #   


 


WBC Morphology   


 


Hypersegmented Neuts   


 


Hyposegmented Neuts   


 


Hypogranular Neuts   


 


Smudge Cells   


 


Toxic Granulation   


 


Toxic Vacuolation   


 


Dohle Bodies   


 


Pelger-Huet Anomaly   


 


Veena Rods   


 


Platelet Estimate   


 


Clumped Platelets   


 


Plt Clumps, EDTA   


 


Large Platelets   


 


Giant Platelets   


 


Platelet Satelliting   


 


Plt Morphology Comment   


 


RBC Morphology   


 


Dimorphic RBCs   


 


Polychromasia   


 


Hypochromasia   


 


Poikilocytosis   


 


Anisocytosis   


 


Microcytosis   


 


Macrocytosis   


 


Spherocytes   


 


Pappenheimer Bodies   


 


Sickle Cells   


 


Target Cells   


 


Tear Drop Cells   


 


Ovalocytes   


 


Helmet Cells   


 


Dickerson-Fajardo Bodies   


 


Cabot Rings   


 


[place] Cells   


 


Bite Cells   


 


Crenated Cell   


 


Elliptocytes   


 


Acanthocytes (Spur)   


 


Rouleaux   


 


Hemoglobin C Crystals   


 


Schistocytes   


 


Malaria parasites   


 


Percent Retic  1.10  


 


Reynaldo Bodies   


 


Hem Pathologist Commnt   


 


PT   


 


INR   


 


Sodium   


 


Potassium   


 


Chloride   


 


Carbon Dioxide   


 


Anion Gap   


 


BUN   


 


Creatinine   


 


Estimated GFR   


 


BUN/Creatinine Ratio   


 


Glucose   


 


POC Glucose   


 


Calcium   


 


Phosphorus   


 


Magnesium   


 


Iron   20 L 


 


TIBC   219 L 


 


Ferritin    63.8


 


Total Bilirubin   


 


AST   


 


ALT   


 


Alkaline Phosphatase   


 


Total Protein   


 


Albumin   


 


Albumin/Globulin Ratio   


 


Lipase   


 


Vitamin B12   


 


Folate   


 


PTH Intact   


 


Hepatitis A IgM Ab   


 


Hep Bs Antigen   


 


Hep B Core IgM Ab   


 


Hepatitis C Antibody   


 


Blood Type   


 


Antibody Screen   


 


Crossmatch   














  07/05/19 07/05/19 07/05/19





  08:51 08:51 08:51


 


WBC   


 


RBC   


 


Hgb   


 


Hct   


 


MCV   


 


MCH   


 


MCHC   


 


RDW   


 


Plt Count   


 


Add Manual Diff   


 


Total Counted   


 


Seg Neuts % (Manual)   


 


Band Neutrophils %   


 


Lymphocytes % (Manual)   


 


Reactive Lymphs % (Man)   


 


Monocytes % (Manual)   


 


Eosinophils % (Manual)   


 


Basophils % (Manual)   


 


Metamyelocytes %   


 


Myelocytes %   


 


Promyelocytes %   


 


Blast Cells %   


 


Nucleated RBC %   


 


Seg Neutrophils # Man   


 


Band Neutrophils #   


 


Lymphocytes # (Manual)   


 


Abs React Lymphs (Man)   


 


Monocytes # (Manual)   


 


Eosinophils # (Manual)   


 


Basophils # (Manual)   


 


Metamyelocytes #   


 


Myelocytes #   


 


Promyelocytes #   


 


Blast Cells #   


 


WBC Morphology   


 


Hypersegmented Neuts   


 


Hyposegmented Neuts   


 


Hypogranular Neuts   


 


Smudge Cells   


 


Toxic Granulation   


 


Toxic Vacuolation   


 


Dohle Bodies   


 


Pelger-Huet Anomaly   


 


Veena Rods   


 


Platelet Estimate   


 


Clumped Platelets   


 


Plt Clumps, EDTA   


 


Large Platelets   


 


Giant Platelets   


 


Platelet Satelliting   


 


Plt Morphology Comment   


 


RBC Morphology   


 


Dimorphic RBCs   


 


Polychromasia   


 


Hypochromasia   


 


Poikilocytosis   


 


Anisocytosis   


 


Microcytosis   


 


Macrocytosis   


 


Spherocytes   


 


Pappenheimer Bodies   


 


Sickle Cells   


 


Target Cells   


 


Tear Drop Cells   


 


Ovalocytes   


 


Helmet Cells   


 


Dickerson-Fajardo Bodies   


 


Cabot Rings   


 


[place] Cells   


 


Bite Cells   


 


Crenated Cell   


 


Elliptocytes   


 


Acanthocytes (Spur)   


 


Rouleaux   


 


Hemoglobin C Crystals   


 


Schistocytes   


 


Malaria parasites   


 


Percent Retic   


 


Reynaldo Bodies   


 


Hem Pathologist Commnt   


 


PT    16.3 H


 


INR    1.35 H


 


Sodium   


 


Potassium   


 


Chloride   


 


Carbon Dioxide   


 


Anion Gap   


 


BUN   


 


Creatinine   


 


Estimated GFR   


 


BUN/Creatinine Ratio   


 


Glucose   


 


POC Glucose   


 


Calcium   


 


Phosphorus   


 


Magnesium   


 


Iron   


 


TIBC   


 


Ferritin   


 


Total Bilirubin   


 


AST   


 


ALT   


 


Alkaline Phosphatase   


 


Total Protein   


 


Albumin   


 


Albumin/Globulin Ratio   


 


Lipase   


 


Vitamin B12  1768 H  


 


Folate   5.05 L 


 


PTH Intact   


 


Hepatitis A IgM Ab   


 


Hep Bs Antigen   


 


Hep B Core IgM Ab   


 


Hepatitis C Antibody   


 


Blood Type   


 


Antibody Screen   


 


Crossmatch   














Assessment and Plan


1.abdominal pain/distension


 2.ascites with concern for carcinomatosis on CT


 3.wt loss


 4.anemia





-H/H 7.0/21.2-trended up s/p 1 unit PRBCs


-continue to monitor H/H and transfuse as needed


-no active signs of bleeding-pt admits to intermittent bright red blood mixed in

stool


-currently HD stable


-last colonoscopy over 5 years ago per pt reports (results unknown; records 

unavailable)


-abd CT showed ascites, increased density in the omentum, concerning for 

carcinomatosis, mild adenopathy in the plevis with a possible pericolonic mass, 

and bilateral hydronephrosis


-nephrology following, urology consult pending, and oncology consult pending


-diagnostic/therapeutic paracentesis pending for today with fluid ordered for 

cytology


-tumor markers pending


-will consider colonoscopy based on above results/oncology recommendations


-diet as tolerated for now


-continue to trend labs and supportive care


-will follow








 5.ARF (s/p vas-cath placement; emergent HD)


 6.bilateral hydronephrosis


 7.malnutrition

## 2019-07-05 NOTE — PROCEDURE NOTE
Date of procedure: 07/05/19


Pre-op diagnosis: ascites,renal disease


Post-op diagnosis: same


Procedure: 





US paracentesis


Findings: 





moderate ascites


Anesthesia: local


Surgeon: GALLITO ESPINOZA


Estimated blood loss: none


Pathology: list (120cc)


Specimen disposition: to lab


Condition: stable


Disposition: floor

## 2019-07-06 LAB
BUN SERPL-MCNC: 45 MG/DL (ref 9–20)
BUN/CREAT SERPL: 3 %
CALCIUM SERPL-MCNC: 8.1 MG/DL (ref 8.4–10.2)
HCT VFR BLD CALC: 19.7 % (ref 35.5–45.6)
HEMOLYSIS INDEX: 3
HGB BLD-MCNC: 6.5 GM/DL (ref 11.8–15.2)

## 2019-07-06 PROCEDURE — 5A1D70Z PERFORMANCE OF URINARY FILTRATION, INTERMITTENT, LESS THAN 6 HOURS PER DAY: ICD-10-PCS | Performed by: INTERNAL MEDICINE

## 2019-07-06 RX ADMIN — FOLIC ACID SCH MG: 1 TABLET ORAL at 10:15

## 2019-07-06 RX ADMIN — Medication SCH ML: at 10:16

## 2019-07-06 RX ADMIN — Medication SCH ML: at 23:09

## 2019-07-06 RX ADMIN — METOPROLOL TARTRATE SCH MG: 50 TABLET, FILM COATED ORAL at 23:06

## 2019-07-06 RX ADMIN — FAMOTIDINE SCH MG: 10 INJECTION, SOLUTION INTRAVENOUS at 10:16

## 2019-07-06 RX ADMIN — METOPROLOL TARTRATE SCH MG: 50 TABLET, FILM COATED ORAL at 10:15

## 2019-07-06 RX ADMIN — FAMOTIDINE SCH MG: 10 INJECTION, SOLUTION INTRAVENOUS at 23:08

## 2019-07-06 NOTE — GASTROENTEROLOGY PROGRESS NOTE
Assessment and Plan


1. Ascites with concern for possible carcinomatosis


2. Microcytic anemia


3. Renal failure





-f/u cytology from paracentesis and tumor markers.  colonoscopy can be done if 

concern for colon cancer/based on oncology recommendations








Subjective


Date of service: 07/06/19


Principal diagnosis: ascites, anemia, carcinomatosis


Interval history: 


pt seen and examined; abd symptoms improved after paracentesis.  denies gi 

bleeding or bowel habit changes.








Objective





- Constitutional


Vitals: 


                                        











Temp Pulse Resp BP Pulse Ox


 


 98.7 F   74   18   128/72   100 


 


 07/06/19 12:55  07/06/19 12:55  07/06/19 12:55  07/06/19 12:55  07/06/19 12:55











General appearance: no acute distress





- Respiratory


Respiratory effort: normal


Respiratory: bilateral: CTA





- Cardiovascular


Rhythm: regular


Heart Sounds: Present: S1 & S2





- Gastrointestinal


General gastrointestinal: Present: soft, non-tender, non-distended





- Neurologic


Neurological: alert and oriented x3





- Labs


CBC & Chem 7: 


                                 07/06/19 03:56





                                 07/06/19 03:56


Labs: 


                         Laboratory Results - last 24 hr











  07/04/19 07/05/19 07/06/19





  17:48 Unknown 03:56


 


Hgb    6.5 L


 


Hct    19.7 L*


 


Sodium   


 


Potassium   


 


Chloride   


 


Carbon Dioxide   


 


Anion Gap   


 


BUN   


 


Creatinine   


 


Estimated GFR   


 


BUN/Creatinine Ratio   


 


Glucose   


 


Calcium   


 


Fluid Type   Ascitic 


 


Fluid Color   Yellow 


 


Fluid Appearance   Hazy 


 


Fluid WBC   58 


 


Fluid RBC   1850 


 


Fluid Seg Neutrophils   35.0 


 


Fluid Lymphocytes   14.0 


 


Fluid Reactive Lymphs   0 


 


Fluid Monocytes   51.0 


 


Fluid Eosinophils   0 


 


Fluid Basophils   0 


 


Blood Type  A POSITIVE  


 


Antibody Screen  Negative  


 


Crossmatch  See Detail  














  07/06/19





  03:56


 


Hgb 


 


Hct 


 


Sodium  134 L


 


Potassium  5.3 H


 


Chloride  92.3 L


 


Carbon Dioxide  26


 


Anion Gap  21


 


BUN  45 H


 


Creatinine  14.5 H


 


Estimated GFR  4


 


BUN/Creatinine Ratio  3


 


Glucose  94


 


Calcium  8.1 L


 


Fluid Type 


 


Fluid Color 


 


Fluid Appearance 


 


Fluid WBC 


 


Fluid RBC 


 


Fluid Seg Neutrophils 


 


Fluid Lymphocytes 


 


Fluid Reactive Lymphs 


 


Fluid Monocytes 


 


Fluid Eosinophils 


 


Fluid Basophils 


 


Blood Type 


 


Antibody Screen 


 


Crossmatch

## 2019-07-06 NOTE — PROGRESS NOTE
Assessment and Plan


Assessment and plan: 


Patient is a 60 yo man with a history of hypertension and Asthma who presented 

to [facility] ED with urinary frequency, back pains, n/v, diarrhea and abd pains with 

distension x 1 week. 





* Initial labs: wbc 13.0 to 10.0, hgb 6.3 to 7.0 (mcv 60), na 132, k 9.5, cr 

  34.8, bun 129, bg 164, lipase 88


* CT abd/pelvis without contrast IMPRESSION: 1. There is ascites. There is 

  increased density in the omentum. Findings are concerning for carcinomatosis. 

  There is some mild adenopathy in the pelvis a possible pericolonic mass, 

  series 2 image 139. There is mild bilateral hydronephrosis. The exact cause is

  not determined by this study. There is minimal nephrolithiasis on the left. No

  ureteral calculi are identified however. 





Drop in HCT s/p 1 unit PRBC already, still not improved: will transfuse 2 units 

of PRBC, ordered FOBT, GI is following


ARF, suspect ATN + post obstructive+ vasomotor, poa: s/p Vas-cath, emergent 

hemodialysis due to severe hyperkalemia done on 7/4/19, place [facility] on 7/5/19 

with very little output


Bilateral hydronephrosis: place [facility], consulted Urology, input 

noted==>discharge with [facility] 


Ascites due to suspected Colon cancer with mets to Omentum/Carcinomatosis 

suspected, which would be a very poor prognostic sign: consulted GI, input 

noted, s/p Paracentesis on 7/5/19, await cytology


Hyperkalemia: treated with HD, Nephrology is following


Acute Microcytic anemia, suspect cancer related until proven otherwise, I did 

inform patient my concerns after gaining his permission to do so: consult GI 

then consider Heme/Onc consult


Severe malnutrition: Dietitian consulted 


DVT prophylaxis: On heparin and GI prophylaxis





CCT 34 minutes








History


Interval history: 


Patient was seen and examined. Follow-up on current diagnosis ARF. No overnight 

events reported to me. Patient denies any chest pain, shortness breath, or 

severe headaches. Imaging, nursing note, chart, labs and old chart reviewed. 

Discussed with patient.





Hospitalist Physical





- Physical exam


Narrative exam: 


Gen: thin frial, ill appearing, NAD, Awake, Alert, Orientated 


HEENT: NCAT, EOMI, PERRL, OP Clear 


Neck: supple, no adenopathy, no thyromegaly, no JVD 


CVS/Heart: RRR, normal S1S2, pulses present bilaterally 


Chest/Lungs: CTA B, Symmetrical chest expansion, good air entry bilaterally 


GI/Abdomen: soft, abdomen distended, good bowel sounds, no guarding or rebound 


/Bladder: no suprapubic tenderness, no CVA or paraspinal tenderness 


Extermity/Skin: no c/c/e, no obvious rash 


MSK: FROM x 4 


Neuro: CN 2-12 grossly intact, no new focal deficits 


Psych: calm 








- Constitutional


Vitals: 


                                        











Temp Pulse Resp BP Pulse Ox


 


 98.5 F   76   14   119/70   100 


 


 07/06/19 03:26  07/06/19 09:00  07/06/19 09:00  07/06/19 09:00  07/06/19 09:00











General appearance: Present: mild distress.  Absent: well-nourished





Results





- Labs


CBC & Chem 7: 


                                 07/06/19 03:56





                                 07/06/19 03:56


Labs: 


                             Laboratory Last Values











WBC  10.0 K/mm3 (4.5-11.0)   07/05/19  04:52    


 


RBC  3.54 M/mm3 (3.65-5.03)  L  07/05/19  04:52    


 


Hgb  6.5 gm/dl (11.8-15.2)  L  07/06/19  03:56    


 


Hct  19.7 % (35.5-45.6)  L*  07/06/19  03:56    


 


MCV  60 fl (84-94)  L  07/05/19  04:52    


 


MCH  20 pg (28-32)  L  07/05/19  04:52    


 


MCHC  33 % (32-34)   07/05/19  04:52    


 


RDW  30.0 % (13.2-15.2)  H  07/05/19  04:52    


 


Plt Count  494 K/mm3 (140-440)  H  07/05/19  04:52    


 


Add Manual Diff  Complete   07/05/19  04:52    


 


Total Counted  100   07/05/19  04:52    


 


Seg Neuts % (Manual)  95.0 % (40.0-70.0)  H  07/05/19  04:52    


 


  0 %  07/05/19  04:52    


 


  3.0 % (13.4-35.0)  L  07/05/19  04:52    


 


Reactive Lymphs % (Man)  0 %  07/05/19  04:52    


 


  1.0 % (0.0-7.3)   07/05/19  04:52    


 


  1.0 % (0.0-4.3)   07/05/19  04:52    


 


  0 % (0.0-1.8)   07/05/19  04:52    


 


  0 %  07/05/19  04:52    


 


  0 %  07/05/19  04:52    


 


  0 %  07/05/19  04:52    


 


  0 %  07/05/19  04:52    


 


Nucleated RBC %  1.0 % (0.0-0.9)  H  07/05/19  04:52    


 


Seg Neutrophils # Man  9.5 K/mm3 (1.8-7.7)  H  07/05/19  04:52    


 


Band Neutrophils #  0.0 K/mm3  07/05/19  04:52    


 


  0.3 K/mm3 (1.2-5.4)  L  07/05/19  04:52    


 


Abs React Lymphs (Man)  0.0 K/mm3  07/05/19  04:52    


 


  0.1 K/mm3 (0.0-0.8)   07/05/19  04:52    


 


  0.1 K/mm3 (0.0-0.4)   07/05/19  04:52    


 


  0.0 K/mm3 (0.0-0.1)   07/05/19  04:52    


 


  0.0 K/mm3  07/05/19  04:52    


 


  0.0 K/mm3  07/05/19  04:52    


 


  0.0 K/mm3  07/05/19  04:52    


 


Blast Cells #  0.0 K/mm3  07/05/19  04:52    


 


WBC Morphology  Not Reportable   07/05/19  04:52    


 


Hypersegmented Neuts  Not Reportable   07/05/19  04:52    


 


Hyposegmented Neuts  Not Reportable   07/05/19  04:52    


 


Hypogranular Neuts  Not Reportable   07/05/19  04:52    


 


  Not Reportable   07/05/19  04:52    


 


  Not Reportable   07/05/19  04:52    


 


  Not Reportable   07/05/19  04:52    


 


  Not Reportable   07/05/19  04:52    


 


  Not Reportable   07/05/19  04:52    


 


  Not Reportable   07/05/19  04:52    


 


  Consistent w auto   07/05/19  04:52    


 


  Not Reportable   07/05/19  04:52    


 


Plt Clumps, EDTA  Not Reportable   07/05/19  04:52    


 


  Not Reportable   07/05/19  04:52    


 


  Not Reportable   07/05/19  04:52    


 


  Not Reportable   07/05/19  04:52    


 


Plt Morphology Comment  Not Reportable   07/05/19  04:52    


 


RBC Morphology  Not Reportable   07/05/19  04:52    


 


Dimorphic RBCs  Not Reportable   07/05/19  04:52    


 


  Not Reportable   07/05/19  04:52    


 


  3+   07/05/19  04:52    


 


  1+   07/05/19  04:52    


 


  3+   07/05/19  04:52    


 


  2+   07/05/19  04:52    


 


  Not Reportable   07/05/19  04:52    


 


  Not Reportable   07/05/19  04:52    


 


  Not Reportable   07/05/19  04:52    


 


  Not Reportable   07/05/19  04:52    


 


  Few   07/05/19  04:52    


 


  Not Reportable   07/05/19  04:52    


 


  Few   07/05/19  04:52    


 


  Not Reportable   07/05/19  04:52    


 


  Not Reportable   07/05/19  04:52    


 


  Not Reportable   07/05/19  04:52    


 


  Not Reportable   07/05/19  04:52    


 


  Not Reportable   07/05/19  04:52    


 


  Not Reportable   07/05/19  04:52    


 


  Few   07/05/19  04:52    


 


Acanthocytes (Spur)  Not Reportable   07/05/19  04:52    


 


Rouleaux  Not Reportable   07/05/19  04:52    


 


  Not Reportable   07/05/19  04:52    


 


  Not Reportable   07/05/19  04:52    


 


  Not Reportable   07/05/19  04:52    


 


Percent Retic  1.10 % (0.78-2.58)   07/05/19  04:52    


 


  Not Reportable   07/05/19  04:52    


 


Hem Pathologist Commnt  No   07/05/19  04:52    


 


PT  16.3 Sec. (12.2-14.9)  H  07/05/19  08:51    


 


INR  1.35  (0.87-1.13)  H  07/05/19  08:51    


 


Sodium  134 mmol/L (137-145)  L  07/06/19  03:56    


 


Potassium  5.3 mmol/L (3.6-5.0)  H  07/06/19  03:56    


 


Chloride  92.3 mmol/L ()  L  07/06/19  03:56    


 


Carbon Dioxide  26 mmol/L (22-30)   07/06/19  03:56    


 


  21 mmol/L  07/06/19  03:56    


 


BUN  45 mg/dL (9-20)  H  07/06/19  03:56    


 


  14.5 mg/dL (0.8-1.5)  H  07/06/19  03:56    


 


Estimated GFR  4 ml/min  07/06/19  03:56    


 


  3 %  07/06/19  03:56    


 


Glucose  94 mg/dL ()   07/06/19  03:56    


 


POC Glucose  164  ()  H  07/04/19  17:35    


 


Calcium  8.1 mg/dL (8.4-10.2)  L  07/06/19  03:56    


 


Phosphorus  7.70 mg/dL (2.5-4.5)  H  07/05/19  04:52    


 


Magnesium  2.00 mg/dL (1.7-2.3)   07/05/19  04:52    


 


Iron  20 ug/dL ()  L  07/05/19  08:51    


 


TIBC  219 mcg/dL (250-450)  L  07/05/19  08:51    


 


  63.8 ng/mL (13.0-400.0)   07/05/19  08:51    


 


  0.40 mg/dL (0.1-1.2)   07/04/19  12:47    


 


AST  < 5 units/L (5-40)  L  07/04/19  12:47    


 


ALT  5 units/L (7-56)  L  07/04/19  12:47    


 


  70 units/L ()   07/04/19  12:47    


 


  8.1 g/dL (6.3-8.2)   07/04/19  12:47    


 


  2.8 g/dL (3.9-5)  L  07/04/19  12:47    


 


  0.5 %  07/04/19  12:47    


 


  88 units/L (13-60)  H  07/04/19  12:47    


 


Vitamin B12  1768 pg/mL (211-911)  H  07/05/19  08:51    


 


  5.05 ng/mL (7.3-26.0)  L  07/05/19  08:51    


 


PTH Intact  238.5 pg/mL (15-65)  H  07/05/19  04:52    


 


  161.7 mg/dL (0.1-20.0)  H  07/05/19  08:51    


 


  72 mmol/L  07/05/19  08:51    


 


Fluid Type  Ascitic   07/05/19  Unknown


 


Fluid Color  Yellow   07/05/19  Unknown


 


Fluid Appearance  Hazy   07/05/19  Unknown


 


Fluid WBC  58 /mm3  07/05/19  Unknown


 


Fluid RBC  1850 /mm3  07/05/19  Unknown


 


Fluid Seg Neutrophils  35.0 %  07/05/19  Unknown


 


Fluid Lymphocytes  14.0 %  07/05/19  Unknown


 


Fluid Reactive Lymphs  0 %  07/05/19  Unknown


 


Fluid Monocytes  51.0 %  07/05/19  Unknown


 


Fluid Eosinophils  0 %  07/05/19  Unknown


 


Fluid Basophils  0 %  07/05/19  Unknown


 


Hepatitis A IgM Ab  Non-reactive  (NonReactive)   07/04/19  17:48    


 


Hep Bs Antigen  Non-reactive  (Negative)   07/04/19  17:48    


 


Hep B Core IgM Ab  Non-reactive  (NonReactive)   07/04/19  17:48    


 


  Non-reactive  (NonReactive)   07/04/19  17:48    


 


Blood Type  A POSITIVE   07/04/19  17:48    


 


Antibody Screen  Negative   07/04/19  17:48    


 


Crossmatch  See Detail   07/04/19  17:48    














Active Medications





- Current Medications


Current Medications: 














Generic Name Dose Route Start Last Admin





  Trade Name Freq  PRN Reason Stop Dose Admin


 


Acetaminophen  650 mg  07/04/19 17:22 





  Tylenol  PO  





  Q4H PRN  





  Pain MILD(1-3)/Fever >100.5/HA  


 


Albuterol  2.5 mg  07/04/19 17:22 





  Proventil  IH  





  Q4HRT PRN  





  Shortness Of Breath  


 


Famotidine  10 mg  07/04/19 22:00  07/05/19 22:21





  Pepcid  IV   10 mg





  BID BOWEN   Administration


 


Folic Acid  1 mg  07/06/19 10:00 





  Folvite  PO  





  QDAY BOWEN  


 


Hydralazine HCl  10 mg  07/04/19 19:14  07/04/19 22:17





  Apresoline  IV   10 mg





  Q3H PRN   Administration





  Blood Pressure  


 


Hydromorphone HCl  0.5 mg  07/04/19 17:22  07/05/19 05:32





  Dilaudid  IV   0.5 mg





  Q3H PRN   Administration





  Pain , Severe (7-10)  


 


Sodium Chloride  100 mls @ 999 mls/hr  07/05/19 13:30 





  Nacl 0.9%  IV  





  VEE PRN  





  Hypotension  


 


Sodium Chloride  500 mls @ 0 mls/hr  07/06/19 09:32 





  Nacl 0.9% 500 Ml  IV  07/06/19 09:33 





  ONCE ONE  





  As Directed  


 


Metoclopramide HCl  10 mg  07/04/19 17:22 





  Reglan  IV  





  Q6H PRN  





  Nausea And Vomiting  


 


Metoprolol Tartrate  50 mg  07/04/19 23:00  07/05/19 22:20





  Lopressor  PO   50 mg





  BID BOWEN   Administration


 


Ondansetron HCl  4 mg  07/04/19 17:22 





  Zofran  IV  





  Q3H PRN  





  Nausea And Vomiting  


 


Sodium Chloride  10 ml  07/04/19 22:00  07/05/19 22:21





  Sodium Chloride Flush Syringe 10 Ml  IV   10 ml





  BID BOWEN   Administration


 


Sodium Chloride  10 ml  07/04/19 17:22 





  Sodium Chloride Flush Syringe 10 Ml  IV  





  PRN PRN  





  LINE FLUSH

## 2019-07-06 NOTE — CONSULTATION
REFERRED BY:  Dr. Dallas.



REASON FOR CONSULTATION:  Radiology mentions carcinomatosis in the abdomen.



HISTORY OF PRESENT ILLNESS:  I saw the patient, a 59-year-old Afro-American male

in the ICU.  The patient has a history of hypertension, asthma, came in because

of abdominal distention, discomfort for about a month.  He also had nausea.  He

has been having hiccups for a few days.  The patient also has compliance issues

with hypertensive medication.  During this admission, the patient has been seen

by GI team, Radiology team.  The patient underwent paracentesis.  CT of the

pelvis shows ascites, increased density in the omentum concerning for

carcinomatosis, some mild adenopathy in the pelvis.  I have been asked to

evaluate the patient.  At this time, no headache, no visual disturbances.  No

ear discharge, no chest pain, no shortness of breath at rest.  Has abdominal

discomfort, abdominal distention, nausea present.  No diarrhea.  No hematemesis,

no hematochezia.  History of hiccups present.  No seizure or syncope or loss of

consciousness.  No fever.



PAST MEDICAL HISTORY:  As above.



SURGICAL HISTORY:  None.



SOCIAL HISTORY:  Nonsmoker.



FAMILY HISTORY:  Hypertension.



ALLERGIES:  None.



HOME MEDICATIONS:  He was on prednisone.  During this admission, pain medication

and nausea medications.



PHYSICAL EXAMINATION:

VITAL SIGNS:  Temperature 98, pulse 84, respirations 14, /68.

HEENT:  Pallor present.  No icterus.

NECK:  No neck lymph nodes.

HEART:  S1, S2.

LUNGS:  Clear to auscultation anteriorly.

ABDOMEN:  Distention present.

EXTREMITIES:  No calf tenderness.

NEUROLOGIC:  Alert, awake.



LABORATORY DATA:  White cell 10, hemoglobin was 6.3, MCV 58, platelets 693,

potassium was 9.5 and then 5.5.  Creatinine ____, bilirubin 0.4, B12 1000,

folate 5.  Serum iron 20, ferritin 63.



RADIOLOGY:  CT abdomen as above.



ASSESSMENT AND PLAN:

1.  Radiology shows carcinomatosis.  The patient has anemia.  MCV is low. 

Platelets are high.  This could be GI or pancreatic primary.  We will do tumor

markers.

2.  Anemia, low iron, B12 normal, folate low.  We will replenish.

3.  Thrombocytosis, likely reactive.

4.  History of hiccups.  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

involvement.

5.  Renal failure.  Nephrology following.

6.  Hyperkalemia status post treatment.

7.  History of hypertension.



I will follow the patient during inpatient stay.  We will await pathology of

paracentesis.  GI team has seen the patient.





DD: 07/05/2019 19:24

DT: 07/05/2019 22:44

JOB# [id number]  [id number]

NM/NTS

## 2019-07-06 NOTE — HEM/ONC PROGRESS NOTE
Assessment and Plan





1.  Radiology shows carcinomatosis.  The patient has anemia.  MCV is low. 


Platelets are high.  This could be GI or pancreatic primary.  We will do tumor 

markers.


2.  Anemia, low iron, B12 normal, folate low.  We will replenish.


3.  Thrombocytosis, likely reactive.


4.  History of hiccups.  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


involvement.


5.  Renal failure.  Nephrology following.


6.  Hyperkalemia status post treatment.


7.  History of hypertension.





I will follow the patient during inpatient stay.  We will await pathology of


paracentesis.  GI team has seen the patient.





PRBC


hiccough - likely tumor related





- Patient Problems


(1) Ascites


Current Visit: Yes   Status: Chronic   





Subjective


Date of service: 07/06/19


Principal diagnosis: ascites


Interval history: 





anemia





Objective





- Exam


Narrative Exam: 





Pain  - none


General appearance  no acute distress


Performance status limited self care


Eyes - no icterus


ENT  no thrush





LNs  cervical  not palpable


Neck  - normal ROM


Respiratory  Normal


Breath sounds - CTA





CVS  S1 S2 +


Extremities  normal temperature


General GI  Soft - diatended


Rectal  deferred


male  - deferred


Skin  warm


Musculoskeletal  moving normal


Neurologically  no focal deficit





- Constitutional


Vitals: 


                                Last Vital Signs











Temp  98.5 F   07/06/19 03:26


 


Pulse  72   07/06/19 04:51


 


Resp  15   07/06/19 04:51


 


BP  159/80   07/06/19 04:51


 


Pulse Ox  100   07/06/19 04:51














- Labs


Lab Results: 


                         Laboratory Results - last 24 hr











  07/04/19 07/05/19 07/05/19





  17:48 04:52 04:52


 


Hgb   


 


Hct   


 


Add Manual Diff   Complete 


 


Total Counted   100 


 


Seg Neuts % (Manual)   95.0 H 


 


Band Neutrophils %   0 


 


Lymphocytes % (Manual)   3.0 L 


 


Reactive Lymphs % (Man)   0 


 


Monocytes % (Manual)   1.0 


 


Eosinophils % (Manual)   1.0 


 


Basophils % (Manual)   0 


 


Metamyelocytes %   0 


 


Myelocytes %   0 


 


Promyelocytes %   0 


 


Blast Cells %   0 


 


Nucleated RBC %   1.0 H 


 


Seg Neutrophils # Man   9.5 H 


 


Band Neutrophils #   0.0 


 


Lymphocytes # (Manual)   0.3 L 


 


Abs React Lymphs (Man)   0.0 


 


Monocytes # (Manual)   0.1 


 


Eosinophils # (Manual)   0.1 


 


Basophils # (Manual)   0.0 


 


Metamyelocytes #   0.0 


 


Myelocytes #   0.0 


 


Promyelocytes #   0.0 


 


Blast Cells #   0.0 


 


WBC Morphology   Not Reportable 


 


Hypersegmented Neuts   Not Reportable 


 


Hyposegmented Neuts   Not Reportable 


 


Hypogranular Neuts   Not Reportable 


 


Smudge Cells   Not Reportable 


 


Toxic Granulation   Not Reportable 


 


Toxic Vacuolation   Not Reportable 


 


Dohle Bodies   Not Reportable 


 


Pelger-Huet Anomaly   Not Reportable 


 


Veena Rods   Not Reportable 


 


Platelet Estimate   Consistent w auto 


 


Clumped Platelets   Not Reportable 


 


Plt Clumps, EDTA   Not Reportable 


 


Large Platelets   Not Reportable 


 


Giant Platelets   Not Reportable 


 


Platelet Satelliting   Not Reportable 


 


Plt Morphology Comment   Not Reportable 


 


RBC Morphology   Not Reportable 


 


Dimorphic RBCs   Not Reportable 


 


Polychromasia   Not Reportable 


 


Hypochromasia   3+ 


 


Poikilocytosis   1+ 


 


Anisocytosis   3+ 


 


Microcytosis   2+ 


 


Macrocytosis   Not Reportable 


 


Spherocytes   Not Reportable 


 


Pappenheimer Bodies   Not Reportable 


 


Sickle Cells   Not Reportable 


 


Target Cells   Few 


 


Tear Drop Cells   Not Reportable 


 


Ovalocytes   Few 


 


Helmet Cells   Not Reportable 


 


[place] Bodies   Not Reportable 


 


Cabot Rings   Not Reportable 


 


[place] Cells   Not Reportable 


 


Bite Cells   Not Reportable 


 


Crenated Cell   Not Reportable 


 


Elliptocytes   Few 


 


Acanthocytes (Spur)   Not Reportable 


 


Rouleaux   Not Reportable 


 


Hemoglobin C Crystals   Not Reportable 


 


Schistocytes   Not Reportable 


 


Malaria parasites   Not Reportable 


 


Percent Retic    1.10


 


Reynaldo Bodies   Not Reportable 


 


Hem Pathologist Commnt   No 


 


PT   


 


INR   


 


Sodium   


 


Potassium   


 


Chloride   


 


Carbon Dioxide   


 


Anion Gap   


 


BUN   


 


Creatinine   


 


Estimated GFR   


 


BUN/Creatinine Ratio   


 


Glucose   


 


Calcium   


 


Iron   


 


TIBC   


 


Ferritin   


 


Vitamin B12   


 


Folate   


 


Urine Creatinine   


 


Urine Sodium   


 


Fluid Type   


 


Fluid Color   


 


Fluid Appearance   


 


Fluid WBC   


 


Fluid RBC   


 


Fluid Seg Neutrophils   


 


Fluid Lymphocytes   


 


Fluid Reactive Lymphs   


 


Fluid Monocytes   


 


Fluid Eosinophils   


 


Fluid Basophils   


 


Crossmatch  See Detail  














  07/05/19 07/05/19 07/05/19





  08:51 08:51 08:51


 


Hgb   


 


Hct   


 


Add Manual Diff   


 


Total Counted   


 


Seg Neuts % (Manual)   


 


Band Neutrophils %   


 


Lymphocytes % (Manual)   


 


Reactive Lymphs % (Man)   


 


Monocytes % (Manual)   


 


Eosinophils % (Manual)   


 


Basophils % (Manual)   


 


Metamyelocytes %   


 


Myelocytes %   


 


Promyelocytes %   


 


Blast Cells %   


 


Nucleated RBC %   


 


Seg Neutrophils # Man   


 


Band Neutrophils #   


 


Lymphocytes # (Manual)   


 


Abs React Lymphs (Man)   


 


Monocytes # (Manual)   


 


Eosinophils # (Manual)   


 


Basophils # (Manual)   


 


Metamyelocytes #   


 


Myelocytes #   


 


Promyelocytes #   


 


Blast Cells #   


 


WBC Morphology   


 


Hypersegmented Neuts   


 


Hyposegmented Neuts   


 


Hypogranular Neuts   


 


Smudge Cells   


 


Toxic Granulation   


 


Toxic Vacuolation   


 


Dohle Bodies   


 


Pelger-Huet Anomaly   


 


Veena Rods   


 


Platelet Estimate   


 


Clumped Platelets   


 


Plt Clumps, EDTA   


 


Large Platelets   


 


Giant Platelets   


 


Platelet Satelliting   


 


Plt Morphology Comment   


 


RBC Morphology   


 


Dimorphic RBCs   


 


Polychromasia   


 


Hypochromasia   


 


Poikilocytosis   


 


Anisocytosis   


 


Microcytosis   


 


Macrocytosis   


 


Spherocytes   


 


Pappenheimer Bodies   


 


Sickle Cells   


 


Target Cells   


 


Tear Drop Cells   


 


Ovalocytes   


 


Helmet Cells   


 


[place] Bodies   


 


Cabot Rings   


 


[place] Cells   


 


Bite Cells   


 


Crenated Cell   


 


Elliptocytes   


 


Acanthocytes (Spur)   


 


Rouleaux   


 


Hemoglobin C Crystals   


 


Schistocytes   


 


Malaria parasites   


 


Percent Retic   


 


Reynaldo Bodies   


 


Hem Pathologist Commnt   


 


PT   


 


INR   


 


Sodium   


 


Potassium   


 


Chloride   


 


Carbon Dioxide   


 


Anion Gap   


 


BUN   


 


Creatinine   


 


Estimated GFR   


 


BUN/Creatinine Ratio   


 


Glucose   


 


Calcium   


 


Iron   20 L 


 


TIBC   219 L 


 


Ferritin    63.8


 


Vitamin B12   


 


Folate   


 


Urine Creatinine  161.7 H  


 


Urine Sodium  72  


 


Fluid Type   


 


Fluid Color   


 


Fluid Appearance   


 


Fluid WBC   


 


Fluid RBC   


 


Fluid Seg Neutrophils   


 


Fluid Lymphocytes   


 


Fluid Reactive Lymphs   


 


Fluid Monocytes   


 


Fluid Eosinophils   


 


Fluid Basophils   


 


Crossmatch   














  07/05/19 07/05/19 07/05/19





  08:51 08:51 08:51


 


Hgb   


 


Hct   


 


Add Manual Diff   


 


Total Counted   


 


Seg Neuts % (Manual)   


 


Band Neutrophils %   


 


Lymphocytes % (Manual)   


 


Reactive Lymphs % (Man)   


 


Monocytes % (Manual)   


 


Eosinophils % (Manual)   


 


Basophils % (Manual)   


 


Metamyelocytes %   


 


Myelocytes %   


 


Promyelocytes %   


 


Blast Cells %   


 


Nucleated RBC %   


 


Seg Neutrophils # Man   


 


Band Neutrophils #   


 


Lymphocytes # (Manual)   


 


Abs React Lymphs (Man)   


 


Monocytes # (Manual)   


 


Eosinophils # (Manual)   


 


Basophils # (Manual)   


 


Metamyelocytes #   


 


Myelocytes #   


 


Promyelocytes #   


 


Blast Cells #   


 


WBC Morphology   


 


Hypersegmented Neuts   


 


Hyposegmented Neuts   


 


Hypogranular Neuts   


 


Smudge Cells   


 


Toxic Granulation   


 


Toxic Vacuolation   


 


Dohle Bodies   


 


Pelger-Huet Anomaly   


 


Veena Rods   


 


Platelet Estimate   


 


Clumped Platelets   


 


Plt Clumps, EDTA   


 


Large Platelets   


 


Giant Platelets   


 


Platelet Satelliting   


 


Plt Morphology Comment   


 


RBC Morphology   


 


Dimorphic RBCs   


 


Polychromasia   


 


Hypochromasia   


 


Poikilocytosis   


 


Anisocytosis   


 


Microcytosis   


 


Macrocytosis   


 


Spherocytes   


 


Pappenheimer Bodies   


 


Sickle Cells   


 


Target Cells   


 


Tear Drop Cells   


 


Ovalocytes   


 


Helmet Cells   


 


[place] Bodies   


 


Cabot Rings   


 


[place] Cells   


 


Bite Cells   


 


Crenated Cell   


 


Elliptocytes   


 


Acanthocytes (Spur)   


 


Rouleaux   


 


Hemoglobin C Crystals   


 


Schistocytes   


 


Malaria parasites   


 


Percent Retic   


 


Reynaldo Bodies   


 


Hem Pathologist Commnt   


 


PT    16.3 H


 


INR    1.35 H


 


Sodium   


 


Potassium   


 


Chloride   


 


Carbon Dioxide   


 


Anion Gap   


 


BUN   


 


Creatinine   


 


Estimated GFR   


 


BUN/Creatinine Ratio   


 


Glucose   


 


Calcium   


 


Iron   


 


TIBC   


 


Ferritin   


 


Vitamin B12  1768 H  


 


Folate   5.05 L 


 


Urine Creatinine   


 


Urine Sodium   


 


Fluid Type   


 


Fluid Color   


 


Fluid Appearance   


 


Fluid WBC   


 


Fluid RBC   


 


Fluid Seg Neutrophils   


 


Fluid Lymphocytes   


 


Fluid Reactive Lymphs   


 


Fluid Monocytes   


 


Fluid Eosinophils   


 


Fluid Basophils   


 


Crossmatch   














  07/05/19 07/06/19 07/06/19





  Unknown 03:56 03:56


 


Hgb   6.5 L 


 


Hct   19.7 L* 


 


Add Manual Diff   


 


Total Counted   


 


Seg Neuts % (Manual)   


 


Band Neutrophils %   


 


Lymphocytes % (Manual)   


 


Reactive Lymphs % (Man)   


 


Monocytes % (Manual)   


 


Eosinophils % (Manual)   


 


Basophils % (Manual)   


 


Metamyelocytes %   


 


Myelocytes %   


 


Promyelocytes %   


 


Blast Cells %   


 


Nucleated RBC %   


 


Seg Neutrophils # Man   


 


Band Neutrophils #   


 


Lymphocytes # (Manual)   


 


Abs React Lymphs (Man)   


 


Monocytes # (Manual)   


 


Eosinophils # (Manual)   


 


Basophils # (Manual)   


 


Metamyelocytes #   


 


Myelocytes #   


 


Promyelocytes #   


 


Blast Cells #   


 


WBC Morphology   


 


Hypersegmented Neuts   


 


Hyposegmented Neuts   


 


Hypogranular Neuts   


 


Smudge Cells   


 


Toxic Granulation   


 


Toxic Vacuolation   


 


Dohle Bodies   


 


Pelger-Huet Anomaly   


 


Veena Rods   


 


Platelet Estimate   


 


Clumped Platelets   


 


Plt Clumps, EDTA   


 


Large Platelets   


 


Giant Platelets   


 


Platelet Satelliting   


 


Plt Morphology Comment   


 


RBC Morphology   


 


Dimorphic RBCs   


 


Polychromasia   


 


Hypochromasia   


 


Poikilocytosis   


 


Anisocytosis   


 


Microcytosis   


 


Macrocytosis   


 


Spherocytes   


 


Pappenheimer Bodies   


 


Sickle Cells   


 


Target Cells   


 


Tear Drop Cells   


 


Ovalocytes   


 


Helmet Cells   


 


[place] Bodies   


 


Cabot Rings   


 


[place] Cells   


 


Bite Cells   


 


Crenated Cell   


 


Elliptocytes   


 


Acanthocytes (Spur)   


 


Rouleaux   


 


Hemoglobin C Crystals   


 


Schistocytes   


 


Malaria parasites   


 


Percent Retic   


 


Reynaldo Bodies   


 


Hem Pathologist Commnt   


 


PT   


 


INR   


 


Sodium    134 L


 


Potassium    5.3 H


 


Chloride    92.3 L


 


Carbon Dioxide    26


 


Anion Gap    21


 


BUN    45 H


 


Creatinine    14.5 H


 


Estimated GFR    4


 


BUN/Creatinine Ratio    3


 


Glucose    94


 


Calcium    8.1 L


 


Iron   


 


TIBC   


 


Ferritin   


 


Vitamin B12   


 


Folate   


 


Urine Creatinine   


 


Urine Sodium   


 


Fluid Type  Ascitic  


 


Fluid Color  Yellow  


 


Fluid Appearance  Hazy  


 


Fluid WBC  58  


 


Fluid RBC  1850  


 


Fluid Seg Neutrophils  35.0  


 


Fluid Lymphocytes  14.0  


 


Fluid Reactive Lymphs  0  


 


Fluid Monocytes  51.0  


 


Fluid Eosinophils  0  


 


Fluid Basophils  0  


 


Crossmatch   














Medications & Allergies





- Medications


Allergies/Adverse Reactions: 


                                    Allergies





No Known Allergies Allergy (Verified 07/04/19 12:02)


   








Home Medications: 


                                Home Medications











 Medication  Instructions  Recorded  Confirmed  Last Taken  Type


 


Prednisone [predniSONE 10 mg 10 mg PO .TAPER #1 tab.ds.pk 09/10/15 07/06/19 

Unknown Rx





(6-Day Pack, 21 Tabs)]     











Active Medications: 














Generic Name Dose Route Start Last Admin





  Trade Name Freq  PRN Reason Stop Dose Admin


 


Acetaminophen  650 mg  07/04/19 17:22 





  Tylenol  PO  





  Q4H PRN  





  Pain MILD(1-3)/Fever >100.5/HA  


 


Albuterol  2.5 mg  07/04/19 17:22 





  Proventil  IH  





  Q4HRT PRN  





  Shortness Of Breath  


 


Famotidine  10 mg  07/04/19 22:00  07/05/19 22:21





  Pepcid  IV   10 mg





  BID BOWEN   Administration


 


Folic Acid  1 mg  07/06/19 10:00 





  Folvite  PO  





  QDAY BOWEN  


 


Hydralazine HCl  10 mg  07/04/19 19:14  07/04/19 22:17





  Apresoline  IV   10 mg





  Q3H PRN   Administration





  Blood Pressure  


 


Hydromorphone HCl  0.5 mg  07/04/19 17:22  07/05/19 05:32





  Dilaudid  IV   0.5 mg





  Q3H PRN   Administration





  Pain , Severe (7-10)  


 


Sodium Chloride  100 mls @ 999 mls/hr  07/05/19 13:30 





  Nacl 0.9%  IV  





  VEE PRN  





  Hypotension  


 


Metoclopramide HCl  10 mg  07/04/19 17:22 





  Reglan  IV  





  Q6H PRN  





  Nausea And Vomiting  


 


Metoprolol Tartrate  50 mg  07/04/19 23:00  07/05/19 22:20





  Lopressor  PO   50 mg





  BID BOWEN   Administration


 


Ondansetron HCl  4 mg  07/04/19 17:22 





  Zofran  IV  





  Q3H PRN  





  Nausea And Vomiting  


 


Sodium Chloride  10 ml  07/04/19 22:00  07/05/19 22:21





  Sodium Chloride Flush Syringe 10 Ml  IV   10 ml





  BID BOWEN   Administration


 


Sodium Chloride  10 ml  07/04/19 17:22 





  Sodium Chloride Flush Syringe 10 Ml  IV  





  PRN PRN  





  LINE FLUSH

## 2019-07-06 NOTE — PROGRESS NOTE
Assessment and Plan





1. Acute kidney injury:


Likely vasomotor ANTHONY superimposed on CKD in the setting of volume depletion and 

obstructive uropathy.


CT abdomen showed mild bilateral hydronephosis.


Continue IV fluids.


Monitor renal function.


Renal prognosis is guarded to poor.


Avoid nephrotoxic agents.


Meds dosage based on GFR.


Due to persistent hyperkalemia patient was started on hemodialysis on 7/4/2019.


Hemodialysis: 7/4, 6/5, 7/6.





2. FEN:


Hyperkalemia, HD today.


Anion gap MA, improved.


Continue IV fluids.


Monitor lytes.





3. Obstructive nephropathy:


S/p hernandez catheter.





4. Anemia:


One unit PRBC today.


Monitor H/H.





5. Uncontrolled HTN:


BP is better.





6. Ascites.











Subjective


Date of service: 07/06/19


Interval history: 


Patient was seen and examined at the bedside.


No new complaint.








Objective





- Vital Signs


Vital signs: 


                               Vital Signs - 12hr











  07/06/19 07/06/19 07/06/19





  00:00 03:04 03:10


 


Temperature   


 


Pulse Rate  77 74


 


Pulse Rate [ 91 H  





From Monitor]   


 


Respiratory 15 12 13





Rate   


 


Blood Pressure  119/61 119/61


 


O2 Sat by Pulse 99 99 100





Oximetry   














  07/06/19 07/06/19 07/06/19





  03:20 03:26 03:30


 


Temperature  98.5 F 


 


Pulse Rate 75  74


 


Pulse Rate [   





From Monitor]   


 


Respiratory 15  11 L





Rate   


 


Blood Pressure 119/61  119/61


 


O2 Sat by Pulse 100  99





Oximetry   














  07/06/19 07/06/19 07/06/19





  03:40 03:51 04:00


 


Temperature   


 


Pulse Rate 72 76 


 


Pulse Rate [   80





From Monitor]   


 


Respiratory 13 13 16





Rate   


 


Blood Pressure  119/61 


 


O2 Sat by Pulse 99 98 98





Oximetry   














  07/06/19 07/06/19 07/06/19





  04:01 04:11 04:21


 


Temperature   


 


Pulse Rate 71 74 79


 


Pulse Rate [   





From Monitor]   


 


Respiratory 14 12 13





Rate   


 


Blood Pressure 159/80 159/80 159/80


 


O2 Sat by Pulse 100 100 99





Oximetry   














  07/06/19 07/06/19 07/06/19





  04:31 04:41 04:51


 


Temperature   


 


Pulse Rate  73 72


 


Pulse Rate [   





From Monitor]   


 


Respiratory  16 15





Rate   


 


Blood Pressure 159/80 167/71 159/80


 


O2 Sat by Pulse 100 99 100





Oximetry   














  07/06/19 07/06/19 07/06/19





  05:00 05:11 05:21


 


Temperature   


 


Pulse Rate 75 70 72


 


Pulse Rate [   





From Monitor]   


 


Respiratory 15 13 11 L





Rate   


 


Blood Pressure 149/80 149/80 167/71


 


O2 Sat by Pulse 97 99 99





Oximetry   














  07/06/19 07/06/19 07/06/19





  05:31 05:41 05:51


 


Temperature   


 


Pulse Rate 75 79 72


 


Pulse Rate [   





From Monitor]   


 


Respiratory 15 15 13





Rate   


 


Blood Pressure 167/71 167/71 167/71


 


O2 Sat by Pulse 100 100 100





Oximetry   














  07/06/19 07/06/19 07/06/19





  06:00 06:11 06:21


 


Temperature   


 


Pulse Rate 72 74 73


 


Pulse Rate [   





From Monitor]   


 


Respiratory 13 12 15





Rate   


 


Blood Pressure 122/71 122/71 122/71


 


O2 Sat by Pulse 99 98 98





Oximetry   














  07/06/19 07/06/19 07/06/19





  06:31 06:41 06:51


 


Temperature   


 


Pulse Rate 72 70 74


 


Pulse Rate [   





From Monitor]   


 


Respiratory 15 15 15





Rate   


 


Blood Pressure 122/71 122/71 122/71


 


O2 Sat by Pulse 100 100 99





Oximetry   














  07/06/19 07/06/19 07/06/19





  07:00 07:11 07:21


 


Temperature   


 


Pulse Rate 75 74 83


 


Pulse Rate [   





From Monitor]   


 


Respiratory 15 16 16





Rate   


 


Blood Pressure 129/70 129/70 129/70


 


O2 Sat by Pulse 99 98 100





Oximetry   














  07/06/19 07/06/19 07/06/19





  07:31 07:41 07:51


 


Temperature   


 


Pulse Rate 75 72 72


 


Pulse Rate [   





From Monitor]   


 


Respiratory 12 15 12





Rate   


 


Blood Pressure 129/70 129/70 129/70


 


O2 Sat by Pulse 100 100 100





Oximetry   














  07/06/19 07/06/19 07/06/19





  08:00 08:01 08:11


 


Temperature 98.3 F  


 


Pulse Rate  74 75


 


Pulse Rate [   





From Monitor]   


 


Respiratory  9 L 11 L





Rate   


 


Blood Pressure  118/67 118/67


 


O2 Sat by Pulse  100 98





Oximetry   














  07/06/19 07/06/19 07/06/19





  08:21 08:31 08:41


 


Temperature   


 


Pulse Rate 74 80 78


 


Pulse Rate [   





From Monitor]   


 


Respiratory 10 L 12 15





Rate   


 


Blood Pressure 118/67 118/67 118/67


 


O2 Sat by Pulse 97 97 100





Oximetry   














  07/06/19 07/06/19 07/06/19





  08:51 09:00 09:15


 


Temperature   


 


Pulse Rate 75 76 79


 


Pulse Rate [   





From Monitor]   


 


Respiratory 16 14 14





Rate   


 


Blood Pressure 118/67 119/70 119/70


 


O2 Sat by Pulse 99 100 100





Oximetry   














  07/06/19 07/06/19 07/06/19





  09:31 09:45 10:00


 


Temperature   


 


Pulse Rate 80 85 80


 


Pulse Rate [   





From Monitor]   


 


Respiratory 15 14 12





Rate   


 


Blood Pressure 119/70 119/70 125/77


 


O2 Sat by Pulse 100 100 100





Oximetry   














  07/06/19 07/06/19 07/06/19





  10:15 10:31 10:45


 


Temperature   


 


Pulse Rate 82 82 79


 


Pulse Rate [   





From Monitor]   


 


Respiratory 12 12 14





Rate   


 


Blood Pressure 125/77 125/77 124/71


 


O2 Sat by Pulse 100 99 98





Oximetry   














  07/06/19





  11:00


 


Temperature 


 


Pulse Rate 79


 


Pulse Rate [ 





From Monitor] 


 


Respiratory 14





Rate 


 


Blood Pressure 130/73


 


O2 Sat by Pulse 100





Oximetry 














- General Appearance


General appearance: well-developed, appears stated age, other (no distress)


EENT: ATNC, PERRL, hearing intact, vision intact


Neck: supple


Respiratory: Present: Clear to Ascultation


Cardiology: regular, S1S2, no murmurs


Gastrointestinal: normoactive bowel sounds, no tenderness, no distended


Integumentary: no rash, warm and dry


Neurologic: no focal deficit, no asterixis, alert and oriented x3


Musculoskeletal: other (R groin dialysis catheter)


Psychiatric: cooperative





- Lab





                                 07/06/19 03:56





                                 07/06/19 03:56


                             Most recent lab results











Calcium  8.1 mg/dL (8.4-10.2)  L  07/06/19  03:56    


 


Phosphorus  7.70 mg/dL (2.5-4.5)  H  07/05/19  04:52    


 


Magnesium  2.00 mg/dL (1.7-2.3)   07/05/19  04:52    


 


  161.7 mg/dL (0.1-20.0)  H  07/05/19  08:51    


 


  72 mmol/L  07/05/19  08:51    














Medications & Allergies





- Medications


Allergies/Adverse Reactions: 


                                    Allergies





No Known Allergies Allergy (Verified 07/04/19 12:02)


   








Home Medications: 


                                Home Medications











 Medication  Instructions  Recorded  Confirmed  Last Taken  Type


 


Prednisone [predniSONE 10 mg 10 mg PO .TAPER #1 tab.ds.pk 09/10/15 07/06/19 

Unknown Rx





(6-Day Pack, 21 Tabs)]     











Active Medications: 














Generic Name Dose Route Start Last Admin





  Trade Name Freq  PRN Reason Stop Dose Admin


 


Acetaminophen  650 mg  07/04/19 17:22 





  Tylenol  PO  





  Q4H PRN  





  Pain MILD(1-3)/Fever >100.5/HA  


 


Albuterol  2.5 mg  07/04/19 17:22 





  Proventil  IH  





  Q4HRT PRN  





  Shortness Of Breath  


 


Famotidine  10 mg  07/04/19 22:00  07/06/19 10:16





  Pepcid  IV   10 mg





  BID BOWEN   Administration


 


Folic Acid  1 mg  07/06/19 10:00  07/06/19 10:15





  Folvite  PO   1 mg





  QDAY BOWEN   Administration


 


Hydralazine HCl  10 mg  07/04/19 19:14  07/04/19 22:17





  Apresoline  IV   10 mg





  Q3H PRN   Administration





  Blood Pressure  


 


Hydromorphone HCl  0.5 mg  07/04/19 17:22  07/05/19 05:32





  Dilaudid  IV   0.5 mg





  Q3H PRN   Administration





  Pain , Severe (7-10)  


 


Sodium Chloride  500 mls @ 0 mls/hr  07/06/19 09:32 





  Nacl 0.9% 500 Ml  IV  07/06/19 13:00 





  ONCE NR  





  As Directed  


 


Sodium Chloride  100 mls @ 999 mls/hr  07/06/19 09:38 





  Nacl 0.9%  IV  





  VEE PRN  





  Hypotension  


 


Sodium Chloride  500 mls @ 0 mls/hr  07/06/19 09:40 





  Nacl 0.9% 500 Ml  IV  07/06/19 13:00 





  ONCE NR  





  As Directed  


 


Metoclopramide HCl  10 mg  07/04/19 17:22 





  Reglan  IV  





  Q6H PRN  





  Nausea And Vomiting  


 


Metoprolol Tartrate  50 mg  07/04/19 23:00  07/06/19 10:15





  Lopressor  PO   50 mg





  BID BOWEN   Administration


 


Ondansetron HCl  4 mg  07/04/19 17:22 





  Zofran  IV  





  Q3H PRN  





  Nausea And Vomiting  


 


Sodium Chloride  10 ml  07/04/19 22:00  07/06/19 10:16





  Sodium Chloride Flush Syringe 10 Ml  IV   10 ml





  BID BOWEN   Administration


 


Sodium Chloride  10 ml  07/04/19 17:22 





  Sodium Chloride Flush Syringe 10 Ml  IV  





  PRN PRN  





  LINE FLUSH

## 2019-07-07 LAB
BUN SERPL-MCNC: 31 MG/DL (ref 9–20)
BUN/CREAT SERPL: 3 %
CALCIUM SERPL-MCNC: 8.6 MG/DL (ref 8.4–10.2)
HCT VFR BLD CALC: 25.6 % (ref 35.5–45.6)
HEMOLYSIS INDEX: 1
HGB BLD-MCNC: 8.1 GM/DL (ref 11.8–15.2)

## 2019-07-07 RX ADMIN — FAMOTIDINE SCH MG: 10 INJECTION, SOLUTION INTRAVENOUS at 10:07

## 2019-07-07 RX ADMIN — METOPROLOL TARTRATE SCH MG: 50 TABLET, FILM COATED ORAL at 22:11

## 2019-07-07 RX ADMIN — ACETAMINOPHEN PRN MG: 325 TABLET ORAL at 10:36

## 2019-07-07 RX ADMIN — METOPROLOL TARTRATE SCH MG: 50 TABLET, FILM COATED ORAL at 10:07

## 2019-07-07 RX ADMIN — Medication PRN ML: at 10:08

## 2019-07-07 RX ADMIN — Medication SCH ML: at 10:08

## 2019-07-07 RX ADMIN — Medication SCH ML: at 21:27

## 2019-07-07 RX ADMIN — FAMOTIDINE SCH MG: 10 INJECTION, SOLUTION INTRAVENOUS at 21:22

## 2019-07-07 RX ADMIN — FOLIC ACID SCH MG: 1 TABLET ORAL at 10:06

## 2019-07-07 RX ADMIN — HYDROMORPHONE HYDROCHLORIDE PRN MG: 1 INJECTION, SOLUTION INTRAMUSCULAR; INTRAVENOUS; SUBCUTANEOUS at 23:20

## 2019-07-07 RX ADMIN — ACETAMINOPHEN PRN MG: 325 TABLET ORAL at 06:52

## 2019-07-07 RX ADMIN — ACETAMINOPHEN PRN MG: 325 TABLET ORAL at 19:57

## 2019-07-07 NOTE — PROGRESS NOTE
Assessment and Plan





1. Acute kidney injury:


Likely vasomotor ANTHONY superimposed on CKD in the setting of volume depletion and 

obstructive uropathy.


CT abdomen showed mild bilateral hydronephosis.


Continue IV fluids.


Monitor renal function.


Renal prognosis appears to be poor.


Avoid nephrotoxic agents.


Meds dosage based on GFR.


Due to persistent hyperkalemia patient was started on hemodialysis on 7/4/2019.


Hemodialysis: 7/4, 6/5, 7/6.





2. FEN:


Hyperkalemia, K level is better today.


Anion gap MA, improved.


Continue IV fluids.


Monitor lytes.





3. Obstructive nephropathy:


S/p hernandez catheter.





4. Anemia:


S/p PRBC.


Epogen with HD.


Monitor H/H.





5. Uncontrolled HTN:


BP is better.





6. Ascites.











Subjective


Date of service: 07/07/19


Principal diagnosis: ascites, anemia, carcinomatosis


Interval history: 


Patient was seen and examined at the bedside.


No new complaint.








Objective





- Vital Signs


Vital signs: 


                               Vital Signs - 12hr











  07/06/19 07/07/19 07/07/19





  23:51 00:00 00:11


 


Temperature   


 


Pulse Rate 83 76 76


 


Respiratory 20 17 16





Rate   


 


Blood Pressure 152/100 137/75 137/75


 


O2 Sat by Pulse 99 99 98





Oximetry   














  07/07/19 07/07/19 07/07/19





  00:21 00:30 00:41


 


Temperature   


 


Pulse Rate 75 76 74


 


Respiratory 16 15 15





Rate   


 


Blood Pressure 137/75 137/87 137/87


 


O2 Sat by Pulse 99 100 100





Oximetry   














  07/07/19 07/07/19 07/07/19





  00:51 01:00 01:11


 


Temperature   


 


Pulse Rate 76 75 78


 


Respiratory 16 16 16





Rate   


 


Blood Pressure 137/75 154/72 154/72


 


O2 Sat by Pulse 100 100 100





Oximetry   














  07/07/19 07/07/19 07/07/19





  01:21 01:30 01:41


 


Temperature   


 


Pulse Rate 75 74 74


 


Respiratory 17 16 15





Rate   


 


Blood Pressure 154/72 135/77 135/77


 


O2 Sat by Pulse 100 99 98





Oximetry   














  07/07/19 07/07/19 07/07/19





  01:51 02:00 02:11


 


Temperature   


 


Pulse Rate 74 74 75


 


Respiratory 15 15 15





Rate   


 


Blood Pressure 154/72 137/76 137/76


 


O2 Sat by Pulse 99 100 100





Oximetry   














  07/07/19 07/07/19 07/07/19





  02:21 02:30 02:41


 


Temperature   


 


Pulse Rate 74 76 74


 


Respiratory 16 16 8 L





Rate   


 


Blood Pressure 137/76 145/80 145/80


 


O2 Sat by Pulse 98 100 100





Oximetry   














  07/07/19 07/07/19 07/07/19





  02:51 03:00 03:11


 


Temperature   


 


Pulse Rate 77 73 74


 


Respiratory 16 16 15





Rate   


 


Blood Pressure 145/80 139/73 139/73


 


O2 Sat by Pulse 97 100 100





Oximetry   














  07/07/19 07/07/19 07/07/19





  03:21 03:31 03:39


 


Temperature   98.7 F


 


Pulse Rate 75 77 


 


Respiratory 15 14 





Rate   


 


Blood Pressure 145/80 137/78 


 


O2 Sat by Pulse 100 100 





Oximetry   














  07/07/19 07/07/19 07/07/19





  03:41 03:51 04:00


 


Temperature   


 


Pulse Rate 77 76 76


 


Respiratory 12 16 15





Rate   


 


Blood Pressure 137/78 137/78 150/72


 


O2 Sat by Pulse 99 100 98





Oximetry   














  07/07/19 07/07/19 07/07/19





  04:11 04:21 04:30


 


Temperature   


 


Pulse Rate 89 74 76


 


Respiratory 13 17 14





Rate   


 


Blood Pressure 150/72 150/72 123/57


 


O2 Sat by Pulse 97 99 100





Oximetry   














  07/07/19 07/07/19 07/07/19





  04:41 04:51 05:00


 


Temperature   


 


Pulse Rate 76 74 74


 


Respiratory 18 16 14





Rate   


 


Blood Pressure 123/57 123/57 138/76


 


O2 Sat by Pulse 98 98 100





Oximetry   














  07/07/19 07/07/19 07/07/19





  05:11 05:21 05:30


 


Temperature   


 


Pulse Rate 74 76 74


 


Respiratory 15 14 15





Rate   


 


Blood Pressure 138/76 138/76 140/77


 


O2 Sat by Pulse 100 99 100





Oximetry   














  07/07/19 07/07/19 07/07/19





  05:41 05:51 06:00


 


Temperature   


 


Pulse Rate 74 76 80


 


Respiratory 15 16 19





Rate   


 


Blood Pressure 140/77 140/77 133/67


 


O2 Sat by Pulse 99 98 100





Oximetry   














  07/07/19 07/07/19 07/07/19





  06:11 06:21 06:30


 


Temperature   


 


Pulse Rate 74 81 77


 


Respiratory 18 15 16





Rate   


 


Blood Pressure 133/67 133/67 138/72


 


O2 Sat by Pulse 100 99 99





Oximetry   














  07/07/19 07/07/19 07/07/19





  06:41 06:51 07:00


 


Temperature   


 


Pulse Rate 76 75 79


 


Respiratory 15 15 16





Rate   


 


Blood Pressure 138/72 138/72 138/74


 


O2 Sat by Pulse 100 100 98





Oximetry   














  07/07/19 07/07/19 07/07/19





  07:11 07:21 07:30


 


Temperature   


 


Pulse Rate 75 75 74


 


Respiratory 17 22 13





Rate   


 


Blood Pressure 138/74 138/74 132/79


 


O2 Sat by Pulse 99 100 100





Oximetry   














  07/07/19





  10:07


 


Temperature 


 


Pulse Rate 90


 


Respiratory 





Rate 


 


Blood Pressure 117/66


 


O2 Sat by Pulse 





Oximetry 














- General Appearance


General appearance: well-developed, well-nourished, appears stated age, other 

(no distress)


EENT: ATNC, PERRL, hearing intact, vision intact


Neck: no JVD, supple


Respiratory: Present: Clear to Ascultation


Cardiology: regular, S1S2, no murmurs


Gastrointestinal: normoactive bowel sounds, no tenderness, distended


Integumentary: no rash, warm and dry


Neurologic: no focal deficit, no asterixis, alert and oriented x3


Musculoskeletal: other (R groin dialysis catheter, no edema)


Psychiatric: cooperative





- Lab





                                 07/07/19 03:49





                                 07/07/19 03:49


                             Most recent lab results











Calcium  8.6 mg/dL (8.4-10.2)   07/07/19  03:49    


 


Phosphorus  7.70 mg/dL (2.5-4.5)  H  07/05/19  04:52    


 


Magnesium  2.00 mg/dL (1.7-2.3)   07/05/19  04:52    


 


  161.7 mg/dL (0.1-20.0)  H  07/05/19  08:51    


 


  72 mmol/L  07/05/19  08:51    














Medications & Allergies





- Medications


Allergies/Adverse Reactions: 


                                    Allergies





No Known Allergies Allergy (Verified 07/04/19 12:02)


   








Home Medications: 


                                Home Medications











 Medication  Instructions  Recorded  Confirmed  Last Taken  Type


 


Prednisone [predniSONE 10 mg 10 mg PO .TAPER #1 tab.ds.pk 09/10/15 07/06/19 

Unknown Rx





(6-Day Pack, 21 Tabs)]     











Active Medications: 














Generic Name Dose Route Start Last Admin





  Trade Name Freq  PRN Reason Stop Dose Admin


 


Acetaminophen  650 mg  07/04/19 17:22  07/07/19 10:36





  Tylenol  PO   650 mg





  Q4H PRN   Administration





  Pain MILD(1-3)/Fever >100.5/HA  


 


Albuterol  2.5 mg  07/04/19 17:22 





  Proventil  IH  





  Q4HRT PRN  





  Shortness Of Breath  


 


Famotidine  10 mg  07/04/19 22:00  07/07/19 10:07





  Pepcid  IV   10 mg





  BID BOWEN   Administration


 


Folic Acid  1 mg  07/06/19 10:00  07/07/19 10:06





  Folvite  PO   1 mg





  QDAY BOWEN   Administration


 


Hydralazine HCl  10 mg  07/04/19 19:14  07/04/19 22:17





  Apresoline  IV   10 mg





  Q3H PRN   Administration





  Blood Pressure  


 


Hydromorphone HCl  0.5 mg  07/04/19 17:22  07/05/19 05:32





  Dilaudid  IV   0.5 mg





  Q3H PRN   Administration





  Pain , Severe (7-10)  


 


Sodium Chloride  100 mls @ 999 mls/hr  07/06/19 09:38 





  Nacl 0.9%  IV  





  VEE PRN  





  Hypotension  


 


Metoclopramide HCl  10 mg  07/04/19 17:22 





  Reglan  IV  





  Q6H PRN  





  Nausea And Vomiting  


 


Metoprolol Tartrate  50 mg  07/04/19 23:00  07/07/19 10:07





  Lopressor  PO   50 mg





  BID BOWEN   Administration


 


Ondansetron HCl  4 mg  07/04/19 17:22 





  Zofran  IV  





  Q3H PRN  





  Nausea And Vomiting  


 


Sodium Chloride  10 ml  07/04/19 22:00  07/07/19 10:08





  Sodium Chloride Flush Syringe 10 Ml  IV   10 ml





  BID BOWEN   Administration


 


Sodium Chloride  10 ml  07/04/19 17:22  07/07/19 10:08





  Sodium Chloride Flush Syringe 10 Ml  IV   10 ml





  PRN PRN   Administration





  LINE FLUSH

## 2019-07-07 NOTE — PROGRESS NOTE
Assessment and Plan


Assessment and plan: 


Patient is a 58 yo man with a history of hypertension and Asthma who presented 

to [facility] ED with urinary frequency, back pains, n/v, diarrhea and abd pains with 

distension x 1 week. 





* Initial labs: wbc 13.0 to 10.0, hgb 6.3 to 7.0 (mcv 60), na 132, k 9.5, cr 

  34.8, bun 129, bg 164, lipase 88


* CT abd/pelvis without contrast IMPRESSION: 1. There is ascites. There is 

  increased density in the omentum. Findings are concerning for carcinomatosis. 

  There is some mild adenopathy in the pelvis a possible pericolonic mass, 

  series 2 image 139. There is mild bilateral hydronephrosis. The exact cause is

  not determined by this study. There is minimal nephrolithiasis on the left. No

  ureteral calculi are identified however. 





Acute anemia with Drop in HCT s/p transfused 2 units of PRBC, ordered FOBT, GI 

is following, d/w Dr. Joel, colonoscopy tomorrow, instructed to order prep


ARF, suspect ATN + post obstructive+ vasomotor, poa: s/p Vas-cath, emergent 

hemodialysis due to severe hyperkalemia done on 7/4/19, place hernandez on 7/5/19 

with very little output


Bilateral hydronephrosis: place hernandez, consulted Urology, input noted==>discharg

e with hernandez 


Ascites due to suspected Colon cancer with mets to Omentum/Carcinomatosis 

suspected, which would be a very poor prognostic sign: consulted GI, input 

noted, s/p Paracentesis on 7/5/19, await cytology


Hyperkalemia: treated with HD, Nephrology is following


Acute Microcytic anemia, suspect cancer related until proven otherwise, I did 

inform patient my concerns after gaining his permission to do so: consult GI th

en consider Heme/Onc consult


Severe malnutrition: Dietitian consulted 


DVT prophylaxis: On heparin and GI prophylaxis





Ascites fluid culture growing Gram positive cocci in clusters, add IV vancomycin


okay to transfer to [facility] surgery 





CCT 31 minutes








History


Interval history: 


Patient was seen and examined. Follow-up on current diagnosis ARF. No overnight 

events reported to me. Patient denies any chest pain, shortness breath, or 

severe headaches. Imaging, nursing note, chart, labs and old chart reviewed. D

iscussed with patient.





Hospitalist Physical





- Physical exam


Narrative exam: 


Gen: thin frial, ill appearing, NAD, Awake, Alert, Orientated 


HEENT: NCAT, EOMI, PERRL, OP Clear 


Neck: supple, no adenopathy, no thyromegaly, no JVD 


CVS/Heart: RRR, normal S1S2, pulses present bilaterally 


Chest/Lungs: CTA B, Symmetrical chest expansion, good air entry bilaterally 


GI/Abdomen: soft, abdomen distended, good bowel sounds, no guarding or rebound 


/Bladder: no suprapubic tenderness, no CVA or paraspinal tenderness 


Extermity/Skin: no c/c/e, no obvious rash 


MSK: FROM x 4 


Neuro: CN 2-12 grossly intact, no new focal deficits 


Psych: calm 








- Constitutional


Vitals: 


                                        











Temp Pulse Resp BP Pulse Ox


 


 98.4 F   88   20   117/66   99 


 


 07/07/19 12:00  07/07/19 12:00  07/07/19 12:00  07/07/19 10:07  07/07/19 12:00











General appearance: Absent: mild distress, well-nourished





Results





- Labs


CBC & Chem 7: 


                                 07/07/19 03:49





                                 07/07/19 03:49


Labs: 


                             Laboratory Last Values











WBC  10.0 K/mm3 (4.5-11.0)   07/05/19  04:52    


 


RBC  3.54 M/mm3 (3.65-5.03)  L  07/05/19  04:52    


 


Hgb  8.1 gm/dl (11.8-15.2)  L  07/07/19  03:49    


 


Hct  25.6 % (35.5-45.6)  L  07/07/19  03:49    


 


MCV  60 fl (84-94)  L  07/05/19  04:52    


 


MCH  20 pg (28-32)  L  07/05/19  04:52    


 


MCHC  33 % (32-34)   07/05/19  04:52    


 


RDW  30.0 % (13.2-15.2)  H  07/05/19  04:52    


 


Plt Count  494 K/mm3 (140-440)  H  07/05/19  04:52    


 


Add Manual Diff  Complete   07/05/19  04:52    


 


Total Counted  100   07/05/19  04:52    


 


Seg Neuts % (Manual)  95.0 % (40.0-70.0)  H  07/05/19  04:52    


 


  0 %  07/05/19  04:52    


 


  3.0 % (13.4-35.0)  L  07/05/19  04:52    


 


Reactive Lymphs % (Man)  0 %  07/05/19  04:52    


 


  1.0 % (0.0-7.3)   07/05/19  04:52    


 


  1.0 % (0.0-4.3)   07/05/19  04:52    


 


  0 % (0.0-1.8)   07/05/19  04:52    


 


  0 %  07/05/19  04:52    


 


  0 %  07/05/19  04:52    


 


  0 %  07/05/19  04:52    


 


  0 %  07/05/19  04:52    


 


Nucleated RBC %  1.0 % (0.0-0.9)  H  07/05/19  04:52    


 


Seg Neutrophils # Man  9.5 K/mm3 (1.8-7.7)  H  07/05/19  04:52    


 


Band Neutrophils #  0.0 K/mm3  07/05/19  04:52    


 


  0.3 K/mm3 (1.2-5.4)  L  07/05/19  04:52    


 


Abs React Lymphs (Man)  0.0 K/mm3  07/05/19  04:52    


 


  0.1 K/mm3 (0.0-0.8)   07/05/19  04:52    


 


  0.1 K/mm3 (0.0-0.4)   07/05/19  04:52    


 


  0.0 K/mm3 (0.0-0.1)   07/05/19  04:52    


 


  0.0 K/mm3  07/05/19  04:52    


 


  0.0 K/mm3  07/05/19  04:52    


 


  0.0 K/mm3  07/05/19  04:52    


 


Blast Cells #  0.0 K/mm3  07/05/19  04:52    


 


WBC Morphology  Not Reportable   07/05/19  04:52    


 


Hypersegmented Neuts  Not Reportable   07/05/19  04:52    


 


Hyposegmented Neuts  Not Reportable   07/05/19  04:52    


 


Hypogranular Neuts  Not Reportable   07/05/19  04:52    


 


  Not Reportable   07/05/19  04:52    


 


  Not Reportable   07/05/19  04:52    


 


  Not Reportable   07/05/19  04:52    


 


  Not Reportable   07/05/19  04:52    


 


  Not Reportable   07/05/19  04:52    


 


  Not Reportable   07/05/19  04:52    


 


  Consistent w auto   07/05/19  04:52    


 


  Not Reportable   07/05/19  04:52    


 


Plt Clumps, EDTA  Not Reportable   07/05/19  04:52    


 


  Not Reportable   07/05/19  04:52    


 


  Not Reportable   07/05/19  04:52    


 


  Not Reportable   07/05/19  04:52    


 


Plt Morphology Comment  Not Reportable   07/05/19  04:52    


 


RBC Morphology  Not Reportable   07/05/19  04:52    


 


Dimorphic RBCs  Not Reportable   07/05/19  04:52    


 


  Not Reportable   07/05/19  04:52    


 


  3+   07/05/19  04:52    


 


  1+   07/05/19  04:52    


 


  3+   07/05/19  04:52    


 


  2+   07/05/19  04:52    


 


  Not Reportable   07/05/19  04:52    


 


  Not Reportable   07/05/19  04:52    


 


  Not Reportable   07/05/19  04:52    


 


  Not Reportable   07/05/19  04:52    


 


  Few   07/05/19  04:52    


 


  Not Reportable   07/05/19  04:52    


 


  Few   07/05/19  04:52    


 


  Not Reportable   07/05/19  04:52    


 


  Not Reportable   07/05/19  04:52    


 


  Not Reportable   07/05/19  04:52    


 


  Not Reportable   07/05/19  04:52    


 


  Not Reportable   07/05/19  04:52    


 


  Not Reportable   07/05/19  04:52    


 


  Few   07/05/19  04:52    


 


Acanthocytes (Spur)  Not Reportable   07/05/19  04:52    


 


Rouleaux  Not Reportable   07/05/19  04:52    


 


  Not Reportable   07/05/19  04:52    


 


  Not Reportable   07/05/19  04:52    


 


  Not Reportable   07/05/19  04:52    


 


Percent Retic  1.10 % (0.78-2.58)   07/05/19  04:52    


 


  Not Reportable   07/05/19  04:52    


 


Hem Pathologist Commnt  No   07/05/19  04:52    


 


PT  16.3 Sec. (12.2-14.9)  H  07/05/19  08:51    


 


INR  1.35  (0.87-1.13)  H  07/05/19  08:51    


 


Sodium  139 mmol/L (137-145)   07/07/19  03:49    


 


Potassium  4.9 mmol/L (3.6-5.0)   07/07/19  03:49    


 


Chloride  97.8 mmol/L ()  L  07/07/19  03:49    


 


Carbon Dioxide  30 mmol/L (22-30)   07/07/19  03:49    


 


  16 mmol/L  07/07/19  03:49    


 


BUN  31 mg/dL (9-20)  H  07/07/19  03:49    


 


  10.8 mg/dL (0.8-1.5)  H  07/07/19  03:49    


 


Estimated GFR  6 ml/min  07/07/19  03:49    


 


  3 %  07/07/19  03:49    


 


Glucose  92 mg/dL ()   07/07/19  03:49    


 


POC Glucose  164  ()  H  07/04/19  17:35    


 


Calcium  8.6 mg/dL (8.4-10.2)   07/07/19  03:49    


 


Phosphorus  7.70 mg/dL (2.5-4.5)  H  07/05/19  04:52    


 


Magnesium  2.00 mg/dL (1.7-2.3)   07/05/19  04:52    


 


Iron  20 ug/dL ()  L  07/05/19  08:51    


 


TIBC  219 mcg/dL (250-450)  L  07/05/19  08:51    


 


  63.8 ng/mL (13.0-400.0)   07/05/19  08:51    


 


  0.40 mg/dL (0.1-1.2)   07/04/19  12:47    


 


AST  < 5 units/L (5-40)  L  07/04/19  12:47    


 


ALT  5 units/L (7-56)  L  07/04/19  12:47    


 


  70 units/L ()   07/04/19  12:47    


 


  8.1 g/dL (6.3-8.2)   07/04/19  12:47    


 


  2.8 g/dL (3.9-5)  L  07/04/19  12:47    


 


  0.5 %  07/04/19  12:47    


 


  88 units/L (13-60)  H  07/04/19  12:47    


 


Vitamin B12  1768 pg/mL (211-911)  H  07/05/19  08:51    


 


  5.05 ng/mL (7.3-26.0)  L  07/05/19  08:51    


 


PTH Intact  238.5 pg/mL (15-65)  H  07/05/19  04:52    


 


  161.7 mg/dL (0.1-20.0)  H  07/05/19  08:51    


 


  72 mmol/L  07/05/19  08:51    


 


Fluid Type  Ascitic   07/05/19  Unknown


 


Fluid Color  Yellow   07/05/19  Unknown


 


Fluid Appearance  Hazy   07/05/19  Unknown


 


Fluid WBC  58 /mm3  07/05/19  Unknown


 


Fluid RBC  1850 /mm3  07/05/19  Unknown


 


Fluid Seg Neutrophils  35.0 %  07/05/19  Unknown


 


Fluid Lymphocytes  14.0 %  07/05/19  Unknown


 


Fluid Reactive Lymphs  0 %  07/05/19  Unknown


 


Fluid Monocytes  51.0 %  07/05/19  Unknown


 


Fluid Eosinophils  0 %  07/05/19  Unknown


 


Fluid Basophils  0 %  07/05/19  Unknown


 


Hepatitis A IgM Ab  Non-reactive  (NonReactive)   07/04/19  17:48    


 


Hep Bs Antigen  Non-reactive  (Negative)   07/04/19  17:48    


 


Hep B Core IgM Ab  Non-reactive  (NonReactive)   07/04/19  17:48    


 


  Non-reactive  (NonReactive)   07/04/19  17:48    


 


Blood Type  A POSITIVE   07/04/19  17:48    


 


Antibody Screen  Negative   07/04/19  17:48    


 


Crossmatch  See Detail   07/04/19  17:48    














Active Medications





- Current Medications


Current Medications: 














Generic Name Dose Route Start Last Admin





  Trade Name Freq  PRN Reason Stop Dose Admin


 


Acetaminophen  650 mg  07/04/19 17:22  07/07/19 10:36





  Tylenol  PO   650 mg





  Q4H PRN   Administration





  Pain MILD(1-3)/Fever >100.5/HA  


 


Albuterol  2.5 mg  07/04/19 17:22 





  Proventil  IH  





  Q4HRT PRN  





  Shortness Of Breath  


 


Bisacodyl  15 mg  07/07/19 14:51 





  Dulcolax  PO  





  QDAY PRN  





  Constipation  


 


Famotidine  10 mg  07/04/19 22:00  07/07/19 10:07





  Pepcid  IV   10 mg





  BID BOWEN   Administration


 


Folic Acid  1 mg  07/06/19 10:00  07/07/19 10:06





  Folvite  PO   1 mg





  QDAY BOWEN   Administration


 


Hydralazine HCl  10 mg  07/04/19 19:14  07/04/19 22:17





  Apresoline  IV   10 mg





  Q3H PRN   Administration





  Blood Pressure  


 


Hydromorphone HCl  0.5 mg  07/04/19 17:22  07/05/19 05:32





  Dilaudid  IV   0.5 mg





  Q3H PRN   Administration





  Pain , Severe (7-10)  


 


Sodium Chloride  100 mls @ 999 mls/hr  07/06/19 09:38 





  Nacl 0.9%  IV  





  VEE PRN  





  Hypotension  


 


Metoclopramide HCl  10 mg  07/04/19 17:22 





  Reglan  IV  





  Q6H PRN  





  Nausea And Vomiting  


 


Metoprolol Tartrate  50 mg  07/04/19 23:00  07/07/19 10:07





  Lopressor  PO   50 mg





  BID BOWEN   Administration


 


Ondansetron HCl  4 mg  07/04/19 17:22 





  Zofran  IV  





  Q3H PRN  





  Nausea And Vomiting  


 


Polyethylene Glycol/Electrolytes  4,000 ml  07/07/19 14:51 





  Golytely  PO  07/07/19 14:52 





  ONCE ONE  


 


Sodium Chloride  10 ml  07/04/19 22:00  07/07/19 10:08





  Sodium Chloride Flush Syringe 10 Ml  IV   10 ml





  BID BOWEN   Administration


 


Sodium Chloride  10 ml  07/04/19 17:22  07/07/19 10:08





  Sodium Chloride Flush Syringe 10 Ml  IV   10 ml





  PRN PRN   Administration





  LINE FLUSH

## 2019-07-07 NOTE — EVENT NOTE
Date: 07/07/19





Discussed with Dr Auguste.  Will plan for colonoscopy tomorrow to evaluate for 

possible primary source for suspected neoplastic process.

## 2019-07-07 NOTE — HEM/ONC PROGRESS NOTE
Assessment and Plan





1.  Radiology shows carcinomatosis.  The patient has anemia.  MCV is low. 


Platelets are high.  This could be GI or pancreatic primary.  We will do tumor 

markers.


2.  Anemia, low iron, B12 normal, folate low.  We will replenish. renal 

impairement may have a role


3.  Thrombocytosis, likely reactive.


4.  History of hiccups.  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


involvement.


5.  Renal failure.  Nephrology following.


6.  Hyperkalemia status post treatment.


7.  History of hypertension.





I will follow the patient during inpatient stay.  We will await pathology of


paracentesis.  GI team has seen the patient.





PRBC


hiccough - likely tumor related








- Patient Problems


(1) Ascites


Current Visit: Yes   Status: Chronic   





Subjective


Date of service: 07/07/19


Principal diagnosis: anemia 


Interval history: 





no bleeding





Objective





- Exam


Narrative Exam: 





Pain  - none


General appearance  no acute distress


Performance status limited self care


Eyes - no icterus


ENT  no thrush





LNs  cervical  not palpable


Neck  - normal ROM


Respiratory  Normal


Breath sounds - CTA





CVS  S1 S2 +


Extremities  normal temperature


General GI  Soft - distended


Rectal  deferred


male  - deferred


Skin  warm


Musculoskeletal  moving normal


Neurologically  no focal deficit





- Constitutional


Vitals: 


                                Last Vital Signs











Temp  98.7 F   07/07/19 03:39


 


Pulse  89   07/07/19 04:11


 


Resp  13   07/07/19 04:11


 


BP  150/72   07/07/19 04:11


 


Pulse Ox  97   07/07/19 04:11














- Labs


Lab Results: 


                         Laboratory Results - last 24 hr











  07/04/19 07/07/19 07/07/19





  17:48 03:49 03:49


 


Hgb   8.1 L 


 


Hct   25.6 L 


 


Sodium    139


 


Potassium    4.9


 


Chloride    97.8 L


 


Carbon Dioxide    30


 


Anion Gap    16


 


BUN    31 H


 


Creatinine    10.8 H


 


Estimated GFR    6


 


BUN/Creatinine Ratio    3


 


Glucose    92


 


Calcium    8.6


 


Blood Type  A POSITIVE  


 


Antibody Screen  Negative  


 


Crossmatch  See Detail  














Medications & Allergies





- Medications


Allergies/Adverse Reactions: 


                                    Allergies





No Known Allergies Allergy (Verified 07/04/19 12:02)


   








Home Medications: 


                                Home Medications











 Medication  Instructions  Recorded  Confirmed  Last Taken  Type


 


Prednisone [predniSONE 10 mg 10 mg PO .TAPER #1 tab.ds.pk 09/10/15 07/06/19 

Unknown Rx





(6-Day Pack, 21 Tabs)]     











Active Medications: 














Generic Name Dose Route Start Last Admin





  Trade Name Freq  PRN Reason Stop Dose Admin


 


Acetaminophen  650 mg  07/04/19 17:22 





  Tylenol  PO  





  Q4H PRN  





  Pain MILD(1-3)/Fever >100.5/HA  


 


Albuterol  2.5 mg  07/04/19 17:22 





  Proventil  IH  





  Q4HRT PRN  





  Shortness Of Breath  


 


Famotidine  10 mg  07/04/19 22:00  07/06/19 23:08





  Pepcid  IV   10 mg





  BID BOWEN   Administration


 


Folic Acid  1 mg  07/06/19 10:00  07/06/19 10:15





  Folvite  PO   1 mg





  QDAY BOWEN   Administration


 


Hydralazine HCl  10 mg  07/04/19 19:14  07/04/19 22:17





  Apresoline  IV   10 mg





  Q3H PRN   Administration





  Blood Pressure  


 


Hydromorphone HCl  0.5 mg  07/04/19 17:22  07/05/19 05:32





  Dilaudid  IV   0.5 mg





  Q3H PRN   Administration





  Pain , Severe (7-10)  


 


Sodium Chloride  100 mls @ 999 mls/hr  07/06/19 09:38 





  Nacl 0.9%  IV  





  VEE PRN  





  Hypotension  


 


Metoclopramide HCl  10 mg  07/04/19 17:22 





  Reglan  IV  





  Q6H PRN  





  Nausea And Vomiting  


 


Metoprolol Tartrate  50 mg  07/04/19 23:00  07/06/19 23:06





  Lopressor  PO   50 mg





  BID BOWEN   Administration


 


Ondansetron HCl  4 mg  07/04/19 17:22 





  Zofran  IV  





  Q3H PRN  





  Nausea And Vomiting  


 


Sodium Chloride  10 ml  07/04/19 22:00  07/06/19 23:09





  Sodium Chloride Flush Syringe 10 Ml  IV   10 ml





  BID BOEWN   Administration


 


Sodium Chloride  10 ml  07/04/19 17:22 





  Sodium Chloride Flush Syringe 10 Ml  IV  





  PRN PRN  





  LINE FLUSH

## 2019-07-08 LAB
BUN SERPL-MCNC: 40 MG/DL (ref 9–20)
BUN/CREAT SERPL: 3 %
CALCIUM SERPL-MCNC: 8.4 MG/DL (ref 8.4–10.2)
HCT VFR BLD CALC: 28.8 % (ref 35.5–45.6)
HEMOLYSIS INDEX: 5
HGB BLD-MCNC: 8.8 GM/DL (ref 11.8–15.2)

## 2019-07-08 PROCEDURE — 0DBP8ZX EXCISION OF RECTUM, VIA NATURAL OR ARTIFICIAL OPENING ENDOSCOPIC, DIAGNOSTIC: ICD-10-PCS | Performed by: INTERNAL MEDICINE

## 2019-07-08 PROCEDURE — 5A1D70Z PERFORMANCE OF URINARY FILTRATION, INTERMITTENT, LESS THAN 6 HOURS PER DAY: ICD-10-PCS | Performed by: INTERNAL MEDICINE

## 2019-07-08 RX ADMIN — FAMOTIDINE SCH MG: 10 INJECTION, SOLUTION INTRAVENOUS at 18:18

## 2019-07-08 RX ADMIN — METOPROLOL TARTRATE SCH MG: 50 TABLET, FILM COATED ORAL at 18:19

## 2019-07-08 RX ADMIN — Medication SCH: at 19:46

## 2019-07-08 RX ADMIN — HYDROMORPHONE HYDROCHLORIDE PRN MG: 1 INJECTION, SOLUTION INTRAMUSCULAR; INTRAVENOUS; SUBCUTANEOUS at 18:18

## 2019-07-08 RX ADMIN — FOLIC ACID SCH MG: 1 TABLET ORAL at 18:19

## 2019-07-08 RX ADMIN — FAMOTIDINE SCH MG: 10 INJECTION, SOLUTION INTRAVENOUS at 21:36

## 2019-07-08 RX ADMIN — Medication SCH ML: at 23:41

## 2019-07-08 RX ADMIN — METOPROLOL TARTRATE SCH MG: 50 TABLET, FILM COATED ORAL at 21:36

## 2019-07-08 NOTE — ANESTHESIA DAY OF SURGERY
Anesthesia Day of Surgery





- Day of Surgery


Patient Examined: Yes


Patient H&P Reviewed: Yes


Patient is NPO: Yes


German's Test: N/A

## 2019-07-08 NOTE — OPERATIVE REPORT
Operative Report


Operative Report: 


Flexible Sigmoidoscopy Procedure Note with Biopsies





Date of procedure: 7/8/2019





Endoscopist: Jian Joel





Pre-op diagnosis:  Iron deficiency anemia





Post-op diagnosis:  Rectal tumor





Anesthesia: MAC





Complications: No immediate complications





Estimated blood loss: minimal





Procedure:  After consent was obtained, the patient was placed in the left 

lateral decubitus position.  The olympus colonoscope was inserted into the 

patient's rectum under direct vision with the intent of reaching the cecum.  

However, the extent of exam was proximal rectum due to severe 

stenosis/obstruction from tumor (unable to be traversed).  The patient tolerated

the procedure well.  The views of the mucosa were good.  The quality of prep was

good.  The patient's vital signs were monitored continuously throughout the 

procedure.





Findings:





There was a circumferential rectal tumor from the dentate line extending to 

mid-proximal rectum.  There was severe stenosis related to circumferential tumor

which was unable to be traversed.  The tumor was friable to touch.   Multiple 

biopsies were obtained.





Impression:


1. Rectal tumor from dentate line to mid/proximal rectum (extent of exam due to 

severe stenosis related to tumor).  Biopsies were obtained.





Recommendations:


-follow-up pathology


-surgery consult


-heme/onc following


-screen first degree relatives for colorectal cancer


-will need colonoscopy in 3-6 months to rule out proximal colon lesions

## 2019-07-08 NOTE — HEM/ONC PROGRESS NOTE
Assessment and Plan





1.  Radiology shows carcinomatosis.  The patient has anemia.  MCV is low. 


Platelets are high.  This could be GI or pancreatic primary.  


2.  Anemia, low iron, B12 normal, folate low.  renal impairement may have a role


3.  h/o Thrombocytosis, likely reactive.


4.  History of hiccups.  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


involvement.


5.  Renal failure.  Nephrology following.


6.  Hyperkalemia status post treatment.


7.  History of hypertension.





I will follow the patient during inpatient stay.  We will await pathology of


paracentesis.  GI team has seen the patient.





PRBC


hiccough - likely tumor related





DUE GI procedure 


path still not availablefrom ascites fluid


CEA 17


CA 19-9 - normal 11





- Patient Problems


(1) Ascites


Current Visit: Yes   Status: Chronic   





Subjective


Date of service: 07/08/19


Principal diagnosis: ? carcinomatosis


Interval history: 





due GI procedure





Objective





- Exam


Narrative Exam: 





Pain  - none


General appearance  no acute distress


Performance status limited self care


Eyes - no icterus


ENT  no thrush





LNs  cervical  not palpable


Neck  - normal ROM


Respiratory  Normal


Breath sounds - CTA





CVS  S1 S2 +


Extremities  normal temperature


General GI  Soft - distended


Rectal  deferred


male  - deferred


Skin  warm


Musculoskeletal  moving normal


Neurologically  no focal deficit





- Constitutional


Vitals: 


                                Last Vital Signs











Temp  97.6 F   07/07/19 20:25


 


Pulse  84   07/07/19 22:11


 


Resp  20   07/07/19 20:25


 


BP  131/80   07/07/19 22:11


 


Pulse Ox  99   07/07/19 20:25














Medications & Allergies





- Medications


Allergies/Adverse Reactions: 


                                    Allergies





No Known Allergies Allergy (Verified 07/04/19 12:02)


   








Home Medications: 


                                Home Medications











 Medication  Instructions  Recorded  Confirmed  Last Taken  Type


 


Prednisone [predniSONE 10 mg 10 mg PO .TAPER #1 tab.ds.pk 09/10/15 07/06/19 

Unknown Rx





(6-Day Pack, 21 Tabs)]     











Active Medications: 














Generic Name Dose Route Start Last Admin





  Trade Name Freq  PRN Reason Stop Dose Admin


 


Acetaminophen  650 mg  07/04/19 17:22  07/07/19 19:57





  Tylenol  PO   650 mg





  Q4H PRN   Administration





  Pain MILD(1-3)/Fever >100.5/HA  


 


Albuterol  2.5 mg  07/04/19 17:22 





  Proventil  IH  





  Q4HRT PRN  





  Shortness Of Breath  


 


Bisacodyl  15 mg  07/07/19 14:51 





  Dulcolax  PO  





  QDAY PRN  





  Constipation  


 


Famotidine  10 mg  07/04/19 22:00  07/07/19 21:22





  Pepcid  IV   10 mg





  BID BOWEN   Administration


 


Folic Acid  1 mg  07/06/19 10:00  07/07/19 10:06





  Folvite  PO   1 mg





  QDAY BOWEN   Administration


 


Hydralazine HCl  10 mg  07/04/19 19:14  07/04/19 22:17





  Apresoline  IV   10 mg





  Q3H PRN   Administration





  Blood Pressure  


 


Hydromorphone HCl  0.5 mg  07/04/19 17:22  07/07/19 23:20





  Dilaudid  IV   0.5 mg





  Q3H PRN   Administration





  Pain , Severe (7-10)  


 


Sodium Chloride  100 mls @ 999 mls/hr  07/06/19 09:38 





  Nacl 0.9%  IV  





  VEE PRN  





  Hypotension  


 


Metoclopramide HCl  10 mg  07/04/19 17:22 





  Reglan  IV  





  Q6H PRN  





  Nausea And Vomiting  


 


Metoprolol Tartrate  50 mg  07/04/19 23:00  07/07/19 22:11





  Lopressor  PO   50 mg





  BID BOWEN   Administration


 


Ondansetron HCl  4 mg  07/04/19 17:22 





  Zofran  IV  





  Q3H PRN  





  Nausea And Vomiting  


 


Sodium Chloride  10 ml  07/04/19 22:00  07/07/19 21:27





  Sodium Chloride Flush Syringe 10 Ml  IV   10 ml





  BID BOWEN   Administration


 


Sodium Chloride  10 ml  07/04/19 17:22  07/07/19 10:08





  Sodium Chloride Flush Syringe 10 Ml  IV   10 ml





  PRN PRN   Administration





  LINE FLUSH

## 2019-07-08 NOTE — ANESTHESIA CONSULTATION
Anesthesia Consult and Med Hx





- Airway


ROM Head & Neck: Adequate


Mental/Hyoid Distance: Adequate


Mallampati Class: Class II


Intubation Access Assessment: Probably Good





- Pulmonary Exam


CTA: Yes





- Cardiac Exam


Cardiac Exam: RRR





- Pre-Operative Health Status


ASA Pre-Surgery Classification: ASA3


Proposed Anesthetic Plan: General, MAC





- Pulmonary


Hx Asthma: Yes





- Cardiovascular System


Hx Hypertension: Yes





- Endocrine


Hx Renal Disease: Yes

## 2019-07-08 NOTE — PROGRESS NOTE
Assessment and Plan





1. ESRD:


Likely CKD has progressed to ESRD


CT abdomen showed mild bilateral hydronephosis.


No improvement in the renal function.


Monitor renal function.


Renal prognosis appears to be poor.


Avoid nephrotoxic agents.


Meds dosage based on GFR.


Due to persistent hyperkalemia patient was started on hemodialysis on 7/4/2019.


Hemodialysis: 7/4, 6/5, 7/6, 7/8.





2. FEN:


Hyperkalemia, HD today.


Anion gap MA, improved.


Monitor lytes.





3. Mild bilateral hydronephosis:


S/p hernandez catheter.





4. Ascites with possible carcinomatosis:


Colonoscopy - rectal tumor.


Followed by GI.





5. Anemia:


S/p PRBC.


Epogen with HD.


Monitor H/H.





6. Uncontrolled HTN:


BP is better.














Subjective


Date of service: 07/08/19


Principal diagnosis: ? carcinomatosis


Interval history: 


Patient was seen and examined at the bedside.


No new complaint.








Objective





- Vital Signs


Vital signs: 


                               Vital Signs - 12hr











  07/08/19 07/08/19 07/08/19





  09:33 09:37 10:31


 


Temperature 97.5 F L 97.5 F L 98 F


 


Pulse Rate 85 85 87


 


Respiratory 12 12 14





Rate   


 


Blood Pressure 125/76 125/76 122/93


 


O2 Sat by Pulse 99 99 100





Oximetry   














  07/08/19 07/08/19





  10:46 11:01


 


Temperature  


 


Pulse Rate 85 81


 


Respiratory 15 13





Rate  


 


Blood Pressure 130/86 132/75


 


O2 Sat by Pulse 97 98





Oximetry  














- General Appearance


General appearance: well-developed, appears stated age, other (no distress)


EENT: ATNC, PERRL, hearing intact, vision intact


Neck: supple


Respiratory: Present: Clear to Ascultation


Cardiology: regular, S1S2, no murmurs


Gastrointestinal: normoactive bowel sounds, no tenderness, distended


Integumentary: no rash, warm and dry


Neurologic: no focal deficit, no asterixis, alert and oriented x3


Musculoskeletal: other (no edema, R groin dialysis catheter noted)





- Lab





                                 07/08/19 07:08





                                 07/08/19 07:08


                             Most recent lab results











Calcium  8.4 mg/dL (8.4-10.2)   07/08/19  07:08    


 


Phosphorus  6.90 mg/dL (2.5-4.5)  H  07/08/19  07:08    


 


Magnesium  2.00 mg/dL (1.7-2.3)   07/05/19  04:52    


 


  161.7 mg/dL (0.1-20.0)  H  07/05/19  08:51    


 


  72 mmol/L  07/05/19  08:51    














Medications & Allergies





- Medications


Allergies/Adverse Reactions: 


                                    Allergies





No Known Allergies Allergy (Verified 07/04/19 12:02)


   








Home Medications: 


                                Home Medications











 Medication  Instructions  Recorded  Confirmed  Last Taken  Type


 


Prednisone [predniSONE 10 mg 10 mg PO .TAPER #1 tab.ds.pk 09/10/15 07/06/19 

Unknown Rx





(6-Day Pack, 21 Tabs)]     











Active Medications: 














Generic Name Dose Route Start Last Admin





  Trade Name Freq  PRN Reason Stop Dose Admin


 


Acetaminophen  650 mg  07/04/19 17:22  07/07/19 19:57





  Tylenol  PO   650 mg





  Q4H PRN   Administration





  Pain MILD(1-3)/Fever >100.5/HA  


 


Albuterol  2.5 mg  07/04/19 17:22 





  Proventil  IH  





  Q4HRT PRN  





  Shortness Of Breath  


 


Bisacodyl  15 mg  07/07/19 14:51 





  Dulcolax  PO  





  QDAY PRN  





  Constipation  


 


Famotidine  10 mg  07/04/19 22:00  07/07/19 21:22





  Pepcid  IV   10 mg





  BID BOWEN   Administration


 


Folic Acid  1 mg  07/06/19 10:00  07/07/19 10:06





  Folvite  PO   1 mg





  QDAY BOWEN   Administration


 


Hydralazine HCl  10 mg  07/04/19 19:14  07/04/19 22:17





  Apresoline  IV   10 mg





  Q3H PRN   Administration





  Blood Pressure  


 


Hydromorphone HCl  0.5 mg  07/04/19 17:22  07/07/19 23:20





  Dilaudid  IV   0.5 mg





  Q3H PRN   Administration





  Pain , Severe (7-10)  


 


Sodium Chloride  1,000 mls @ 50 mls/hr  07/08/19 09:00  07/08/19 09:45





  Nacl 0.9% 1000 Ml  IV   50 mls/hr





  AS DIRECT BOWEN   Administration


 


Sodium Chloride  100 mls @ 999 mls/hr  07/08/19 08:22 





  Nacl 0.9%  IV  





  VEE PRN  





  Hypotension  


 


Metoclopramide HCl  10 mg  07/04/19 17:22 





  Reglan  IV  





  Q6H PRN  





  Nausea And Vomiting  


 


Metoprolol Tartrate  50 mg  07/04/19 23:00  07/07/19 22:11





  Lopressor  PO   50 mg





  BID BOWEN   Administration


 


Ondansetron HCl  4 mg  07/04/19 17:22 





  Zofran  IV  





  Q3H PRN  





  Nausea And Vomiting  


 


Sodium Chloride  10 ml  07/04/19 22:00  07/07/19 21:27





  Sodium Chloride Flush Syringe 10 Ml  IV   10 ml





  BID BOWEN   Administration


 


Sodium Chloride  10 ml  07/04/19 17:22  07/07/19 10:08





  Sodium Chloride Flush Syringe 10 Ml  IV   10 ml





  PRN PRN   Administration





  LINE FLUSH

## 2019-07-09 LAB
ALBUMIN SERPL-MCNC: 2.1 G/DL (ref 3.9–5)
ALT SERPL-CCNC: 11 UNITS/L (ref 7–56)
BUN SERPL-MCNC: 27 MG/DL (ref 9–20)
BUN/CREAT SERPL: 3 %
CALCIUM SERPL-MCNC: 8.3 MG/DL (ref 8.4–10.2)
HCT VFR BLD CALC: 23.9 % (ref 35.5–45.6)
HEMOLYSIS INDEX: 0
HGB BLD-MCNC: 7.6 GM/DL (ref 11.8–15.2)
MCHC RBC AUTO-ENTMCNC: 32 % (ref 32–34)
MCV RBC AUTO: 67 FL (ref 84–94)
PLATELET # BLD: 363 K/MM3 (ref 140–440)
RBC # BLD AUTO: 3.54 M/MM3 (ref 3.65–5.03)

## 2019-07-09 RX ADMIN — HYDROMORPHONE HYDROCHLORIDE PRN MG: 1 INJECTION, SOLUTION INTRAMUSCULAR; INTRAVENOUS; SUBCUTANEOUS at 04:33

## 2019-07-09 RX ADMIN — METOPROLOL TARTRATE SCH MG: 50 TABLET, FILM COATED ORAL at 22:32

## 2019-07-09 RX ADMIN — CHLORPROMAZINE HYDROCHLORIDE PRN MG: 10 TABLET, FILM COATED ORAL at 22:33

## 2019-07-09 RX ADMIN — FAMOTIDINE SCH MG: 10 INJECTION, SOLUTION INTRAVENOUS at 11:23

## 2019-07-09 RX ADMIN — HYDROMORPHONE HYDROCHLORIDE PRN MG: 1 INJECTION, SOLUTION INTRAMUSCULAR; INTRAVENOUS; SUBCUTANEOUS at 17:40

## 2019-07-09 RX ADMIN — Medication SCH ML: at 22:36

## 2019-07-09 RX ADMIN — FOLIC ACID SCH MG: 1 TABLET ORAL at 11:22

## 2019-07-09 RX ADMIN — FAMOTIDINE SCH MG: 10 INJECTION, SOLUTION INTRAVENOUS at 22:33

## 2019-07-09 RX ADMIN — METOPROLOL TARTRATE SCH: 50 TABLET, FILM COATED ORAL at 11:24

## 2019-07-09 RX ADMIN — ACETAMINOPHEN PRN MG: 325 TABLET ORAL at 11:32

## 2019-07-09 RX ADMIN — HYDROMORPHONE HYDROCHLORIDE PRN MG: 1 INJECTION, SOLUTION INTRAMUSCULAR; INTRAVENOUS; SUBCUTANEOUS at 22:30

## 2019-07-09 RX ADMIN — Medication SCH ML: at 11:23

## 2019-07-09 NOTE — PROGRESS NOTE
Assessment and Plan


Assessment and plan: 


Patient is a 60 yo man with a history of hypertension and Asthma who presented 

to [facility] ED with urinary frequency, back pains, n/v, diarrhea and abd pains with 

distension x 1 week. 





* Initial labs: wbc 13.0 to 10.0, hgb 6.3 to 7.0 (mcv 60), na 132, k 9.5, cr 

  34.8, bun 129, bg 164, lipase 88


* CT abd/pelvis without contrast IMPRESSION: 1. There is ascites. There is 

  increased density in the omentum. Findings are concerning for carcinomatosis. 

  There is some mild adenopathy in the pelvis a possible pericolonic mass, 

  series 2 image 139. There is mild bilateral hydronephrosis. The exact cause is

  not determined by this study. There is minimal nephrolithiasis on the left. No

  ureteral calculi are identified however.


*  There was a circumferential rectal tumor from the dentate line extending to 

  mid-proximal rectum.  There was severe stenosis related to circumferential 

  tumor which was unable to be traversed.  The tumor was friable to touch.   

  Multiple biopsies were obtained.


   * Impression:1. Rectal tumor from dentate line to mid/proximal rectum (extent

     of exam due to severe stenosis related to tumor).  Biopsies were obtained.


   * Recommendations:


   * -follow-up pathology, -surgery consult, -heme/onc following, -screen first 

     degree relatives for colorectal cancer, -will need colonoscopy in 3-6 

     months to rule out proximal colon lesions








Acute anemia with Drop in HCT s/p transfused 2 units of PRBC, ordered FOBT, GI 

is following, d/w Dr. Joel,


Findings of colonosocpy as noted above


ARF, suspect ATN + post obstructive+ vasomotor, poa: s/p Vas-cath, emergent 

hemodialysis due to severe hyperkalemia done on 7/4/19, place hernandez on 7/5/19 

with very little output


Bilateral hydronephrosis: place hernandez, consulted Urology, input no

dallin==>discharge with hernandez 


Ascites due to suspected Colon cancer with mets to Omentum/Carcinomatosis 

suspected, which would be a very poor prognostic sign: consulted GI, input 

noted, s/p Paracentesis on 7/5/19, await cytology CEA 17,CA 19-9 - normal 11


Ascites fluid culture growing Gram positive cocci in clusters, add IV 

vancomycin, follow cultures,


Hyperkalemia: treated with HD, Nephrology is following


Acute Microcytic anemia, suspect cancer related until proven otherwise, I did 

inform patient my concerns after gaining his permission to do so: consult GI 

then consider Heme/Onc consult


Severe malnutrition: Dietitian consulted 


hiccough - likely tumor related, will add Thorazine.


DVT prophylaxis: On heparin and GI prophylaxis











History


Interval history: 


Patient seen and examined no new complaints at this time very emotional 

concerning his diagnosis.  No other adverse event reported except for 

persistently cups.








Hospitalist Physical





- Physical exam


Narrative exam: 


Gen: thin frial, ill appearing, NAD, Awake, Alert, Orientated , sitting up at 

bedside


HEENT: NCAT, EOMI, PERRL, OP Clear 


Neck: supple, no adenopathy, no thyromegaly, no JVD 


CVS/Heart: RRR, normal S1S2, pulses present bilaterally 


Chest/Lungs: CTA B, Symmetrical chest expansion, good air entry bilaterally 


GI/Abdomen: soft, abdomen distended, good bowel sounds, no guarding or rebound 


/Bladder: no suprapubic tenderness, no CVA or paraspinal tenderness 


Extermity/Skin: no c/c/e, no obvious rash 


MSK: FROM x 4 


Neuro: CN 2-12 grossly intact, no new focal deficits 


Psych: calm 








- Constitutional


Vitals: 


                                        











Temp Pulse Resp BP Pulse Ox


 


 98.1 F   84   20   111/69   100 


 


 07/09/19 12:45  07/09/19 12:45  07/09/19 12:45  07/09/19 12:45  07/09/19 12:45











General appearance: Absent: mild distress, well-nourished





Results





- Labs


CBC & Chem 7: 


                                 07/10/19 04:43





                                 07/10/19 04:43


Labs: 


                             Laboratory Last Values











WBC  16.4 K/mm3 (4.5-11.0)  H  07/09/19  04:50    


 


RBC  3.54 M/mm3 (3.65-5.03)  L  07/09/19  04:50    


 


Hgb  7.6 gm/dl (11.8-15.2)  L  07/09/19  04:50    


 


Hct  23.9 % (35.5-45.6)  L  07/09/19  04:50    


 


MCV  67 fl (84-94)  L  07/09/19  04:50    


 


MCH  21 pg (28-32)  L  07/09/19  04:50    


 


MCHC  32 % (32-34)   07/09/19  04:50    


 


RDW  33.3 % (13.2-15.2)  H  07/09/19  04:50    


 


Plt Count  363 K/mm3 (140-440)   07/09/19  04:50    


 


Add Manual Diff  Complete   07/05/19  04:52    


 


Total Counted  100   07/05/19  04:52    


 


Seg Neuts % (Manual)  95.0 % (40.0-70.0)  H  07/05/19  04:52    


 


  0 %  07/05/19  04:52    


 


  3.0 % (13.4-35.0)  L  07/05/19  04:52    


 


Reactive Lymphs % (Man)  0 %  07/05/19  04:52    


 


  1.0 % (0.0-7.3)   07/05/19  04:52    


 


  1.0 % (0.0-4.3)   07/05/19  04:52    


 


  0 % (0.0-1.8)   07/05/19  04:52    


 


  0 %  07/05/19  04:52    


 


  0 %  07/05/19  04:52    


 


  0 %  07/05/19  04:52    


 


  0 %  07/05/19  04:52    


 


Nucleated RBC %  1.0 % (0.0-0.9)  H  07/05/19  04:52    


 


Seg Neutrophils # Man  9.5 K/mm3 (1.8-7.7)  H  07/05/19  04:52    


 


Band Neutrophils #  0.0 K/mm3  07/05/19  04:52    


 


  0.3 K/mm3 (1.2-5.4)  L  07/05/19  04:52    


 


Abs React Lymphs (Man)  0.0 K/mm3  07/05/19  04:52    


 


  0.1 K/mm3 (0.0-0.8)   07/05/19  04:52    


 


  0.1 K/mm3 (0.0-0.4)   07/05/19  04:52    


 


  0.0 K/mm3 (0.0-0.1)   07/05/19  04:52    


 


  0.0 K/mm3  07/05/19  04:52    


 


  0.0 K/mm3  07/05/19  04:52    


 


  0.0 K/mm3  07/05/19  04:52    


 


Blast Cells #  0.0 K/mm3  07/05/19  04:52    


 


WBC Morphology  Not Reportable   07/05/19  04:52    


 


Hypersegmented Neuts  Not Reportable   07/05/19  04:52    


 


Hyposegmented Neuts  Not Reportable   07/05/19  04:52    


 


Hypogranular Neuts  Not Reportable   07/05/19  04:52    


 


  Not Reportable   07/05/19  04:52    


 


  Not Reportable   07/05/19  04:52    


 


  Not Reportable   07/05/19  04:52    


 


  Not Reportable   07/05/19  04:52    


 


  Not Reportable   07/05/19  04:52    


 


  Not Reportable   07/05/19  04:52    


 


  Consistent w auto   07/05/19  04:52    


 


  Not Reportable   07/05/19  04:52    


 


Plt Clumps, EDTA  Not Reportable   07/05/19  04:52    


 


  Not Reportable   07/05/19  04:52    


 


  Not Reportable   07/05/19  04:52    


 


  Not Reportable   07/05/19  04:52    


 


Plt Morphology Comment  Not Reportable   07/05/19  04:52    


 


RBC Morphology  Not Reportable   07/05/19  04:52    


 


Dimorphic RBCs  Not Reportable   07/05/19  04:52    


 


  Not Reportable   07/05/19  04:52    


 


  3+   07/05/19  04:52    


 


  1+   07/05/19  04:52    


 


  3+   07/05/19  04:52    


 


  2+   07/05/19  04:52    


 


  Not Reportable   07/05/19  04:52    


 


  Not Reportable   07/05/19  04:52    


 


  Not Reportable   07/05/19  04:52    


 


  Not Reportable   07/05/19  04:52    


 


  Few   07/05/19  04:52    


 


  Not Reportable   07/05/19  04:52    


 


  Few   07/05/19  04:52    


 


  Not Reportable   07/05/19  04:52    


 


  Not Reportable   07/05/19  04:52    


 


  Not Reportable   07/05/19  04:52    


 


  Not Reportable   07/05/19  04:52    


 


  Not Reportable   07/05/19  04:52    


 


  Not Reportable   07/05/19  04:52    


 


  Few   07/05/19  04:52    


 


Acanthocytes (Spur)  Not Reportable   07/05/19  04:52    


 


Rouleaux  Not Reportable   07/05/19  04:52    


 


  Not Reportable   07/05/19  04:52    


 


  Not Reportable   07/05/19  04:52    


 


  Not Reportable   07/05/19  04:52    


 


Percent Retic  1.10 % (0.78-2.58)   07/05/19  04:52    


 


  Not Reportable   07/05/19  04:52    


 


Hem Pathologist Commnt  No   07/05/19  04:52    


 


PT  16.3 Sec. (12.2-14.9)  H  07/05/19  08:51    


 


INR  1.35  (0.87-1.13)  H  07/05/19  08:51    


 


Sodium  137 mmol/L (137-145)   07/09/19  04:50    


 


Potassium  4.5 mmol/L (3.6-5.0)   07/09/19  04:50    


 


Chloride  94.6 mmol/L ()  L  07/09/19  04:50    


 


Carbon Dioxide  27 mmol/L (22-30)   07/09/19  04:50    


 


  20 mmol/L  07/09/19  04:50    


 


BUN  27 mg/dL (9-20)  H  07/09/19  04:50    


 


  9.6 mg/dL (0.8-1.5)  H  07/09/19  04:50    


 


Estimated GFR  7 ml/min  07/09/19  04:50    


 


  3 %  07/09/19  04:50    


 


Glucose  92 mg/dL ()   07/09/19  04:50    


 


POC Glucose  164  ()  H  07/04/19  17:35    


 


Calcium  8.3 mg/dL (8.4-10.2)  L  07/09/19  04:50    


 


Phosphorus  6.90 mg/dL (2.5-4.5)  H  07/08/19  07:08    


 


Magnesium  2.00 mg/dL (1.7-2.3)   07/05/19  04:52    


 


Iron  20 ug/dL ()  L  07/05/19  08:51    


 


TIBC  219 mcg/dL (250-450)  L  07/05/19  08:51    


 


  63.8 ng/mL (13.0-400.0)   07/05/19  08:51    


 


  0.20 mg/dL (0.1-1.2)   07/09/19  04:50    


 


AST  8 units/L (5-40)   07/09/19  04:50    


 


ALT  11 units/L (7-56)   07/09/19  04:50    


 


  65 units/L ()   07/09/19  04:50    


 


  6.6 g/dL (6.3-8.2)   07/09/19  04:50    


 


  2.1 g/dL (3.9-5)  L  07/09/19  04:50    


 


  0.5 %  07/09/19  04:50    


 


  88 units/L (13-60)  H  07/04/19  12:47    


 


Tumor Marker AFP  See scanned result   07/05/19  08:51    


 


Carcinoembryonic Ag  17.4 ng/mL (0.0-2.4)  H  07/05/19  08:51    


 


  11 U/mL (<34)   07/05/19  08:51    


 


Vitamin B12  1768 pg/mL (211-911)  H  07/05/19  08:51    


 


  5.05 ng/mL (7.3-26.0)  L  07/05/19  08:51    


 


PTH Intact  238.5 pg/mL (15-65)  H  07/05/19  04:52    


 


  161.7 mg/dL (0.1-20.0)  H  07/05/19  08:51    


 


  72 mmol/L  07/05/19  08:51    


 


Fluid Type  Ascitic   07/05/19  Unknown


 


Fluid Color  Yellow   07/05/19  Unknown


 


Fluid Appearance  Hazy   07/05/19  Unknown


 


Fluid WBC  58 /mm3  07/05/19  Unknown


 


Fluid RBC  1850 /mm3  07/05/19  Unknown


 


Fluid Diff Comment     07/05/19  Unknown


 


Fluid Seg Neutrophils  35.0 %  07/05/19  Unknown


 


Fluid Lymphocytes  14.0 %  07/05/19  Unknown


 


Fluid Reactive Lymphs  0 %  07/05/19  Unknown


 


Fluid Monocytes  51.0 %  07/05/19  Unknown


 


Fluid Eosinophils  0 %  07/05/19  Unknown


 


Fluid Basophils  0 %  07/05/19  Unknown


 


Random Vancomycin  13.1 ug/mL (0-40.0)   07/09/19  04:50    


 


Hepatitis A IgM Ab  Non-reactive  (NonReactive)   07/04/19  17:48    


 


Hep Bs Antigen  Non-reactive  (Negative)   07/04/19  17:48    


 


Hep B Core IgM Ab  Non-reactive  (NonReactive)   07/04/19  17:48    


 


  Non-reactive  (NonReactive)   07/04/19  17:48    


 


Blood Type  A POSITIVE   07/04/19  17:48    


 


Antibody Screen  Negative   07/04/19  17:48    


 


Crossmatch  See Detail   07/04/19  17:48    














Active Medications





- Current Medications


Current Medications: 














Generic Name Dose Route Start Last Admin





  Trade Name Freq  PRN Reason Stop Dose Admin


 


Acetaminophen  650 mg  07/04/19 17:22  07/09/19 11:32





  Tylenol  PO   650 mg





  Q4H PRN   Administration





  Pain MILD(1-3)/Fever >100.5/HA  


 


Albuterol  2.5 mg  07/04/19 17:22 





  Proventil  IH  





  Q4HRT PRN  





  Shortness Of Breath  


 


Bisacodyl  15 mg  07/07/19 14:51 





  Dulcolax  PO  





  QDAY PRN  





  Constipation  


 


Chlorpromazine HCl  10 mg  07/09/19 15:20 





  Thorazine  PO  





  Q6H PRN  





  Hiccups  


 


Famotidine  10 mg  07/04/19 22:00  07/09/19 11:23





  Pepcid  IV   10 mg





  BID BOWEN   Administration


 


Folic Acid  1 mg  07/06/19 10:00  07/09/19 11:22





  Folvite  PO   1 mg





  QDAY BOWEN   Administration


 


Hydralazine HCl  10 mg  07/04/19 19:14  07/04/19 22:17





  Apresoline  IV   10 mg





  Q3H PRN   Administration





  Blood Pressure  


 


Hydromorphone HCl  0.5 mg  07/04/19 17:22  07/09/19 04:33





  Dilaudid  IV   0.5 mg





  Q3H PRN   Administration





  Pain , Severe (7-10)  


 


Sodium Chloride  1,000 mls @ 50 mls/hr  07/08/19 09:00  07/08/19 09:45





  Nacl 0.9% 1000 Ml  IV   50 mls/hr





  AS DIRECT BOWEN   Administration


 


Sodium Chloride  100 mls @ 999 mls/hr  07/08/19 08:22 





  Nacl 0.9%  IV  





  VEE PRN  





  Hypotension  


 


Metoclopramide HCl  10 mg  07/04/19 17:22 





  Reglan  IV  





  Q6H PRN  





  Nausea And Vomiting  


 


Metoprolol Tartrate  50 mg  07/04/19 23:00  07/09/19 11:24





  Lopressor  PO   Not Given





  BID BOWEN  


 


Ondansetron HCl  4 mg  07/04/19 17:22 





  Zofran  IV  





  Q3H PRN  





  Nausea And Vomiting  


 


Sodium Chloride  10 ml  07/04/19 22:00  07/09/19 11:23





  Sodium Chloride Flush Syringe 10 Ml  IV   10 ml





  BID BOWEN   Administration


 


Sodium Chloride  10 ml  07/04/19 17:22  07/07/19 10:08





  Sodium Chloride Flush Syringe 10 Ml  IV   10 ml





  PRN PRN   Administration





  LINE FLUSH

## 2019-07-09 NOTE — PROGRESS NOTE
Assessment and Plan





1. ESRD:


Likely CKD has progressed to ESRD


CT abdomen showed mild bilateral hydronephosis.


No improvement in the renal function.


Monitor renal function.


Renal prognosis appears to be poor.


Avoid nephrotoxic agents.


Meds dosage based on GFR.


Due to persistent hyperkalemia patient was started on hemodialysis on 7/4/2019.


Hemodialysis: 7/4, 6/5, 7/6, 7/8.





2. FEN:


Hyperkalemia, K level is better today.


Anion gap MA, improved.


Monitor lytes.





3. Mild bilateral hydronephosis:


S/p hernandez catheter.





4. Rectal adenocarcinoma with malignant Ascites.


Followed by Heme-onc.





5. Anemia:


S/p PRBC.


Monitor H/H.





6. Uncontrolled HTN:


BP is better.





Also d/w his girlfriend at the bedside.














Subjective


Date of service: 07/09/19


Principal diagnosis: ? carcinomatosis


Interval history: 


Patient was seen and examined at the bedside.


No new complaint.








Objective





- Vital Signs


Vital signs: 


                               Vital Signs - 12hr











  07/08/19 07/09/19 07/09/19





  23:24 05:04 05:05


 


Temperature 98.6 F 98.8 F 98.8 F


 


Pulse Rate 77 80 


 


Pulse Rate [   80





Left]   


 


Respiratory 18 18 18





Rate   


 


Blood Pressure 123/70 99/64 99/64


 


O2 Sat by Pulse 100 97 97





Oximetry   














- General Appearance


General appearance: well-developed, appears stated age, other (no distress)


EENT: ATNC, PERRL, hearing intact, vision intact


Neck: supple


Respiratory: Present: Clear to Ascultation


Cardiology: regular, S1S2, no murmurs


Gastrointestinal: normoactive bowel sounds, no tenderness, distended, other 

(hernandez catheter)


Integumentary: no rash, warm and dry


Neurologic: no focal deficit, no asterixis, alert and oriented x3


Musculoskeletal: other (R groin dialysis catheter)


Psychiatric: cooperative





- Lab





                                 07/09/19 04:50





                                 07/09/19 04:50


                             Most recent lab results











Calcium  8.3 mg/dL (8.4-10.2)  L  07/09/19  04:50    


 


Phosphorus  6.90 mg/dL (2.5-4.5)  H  07/08/19  07:08    


 


Magnesium  2.00 mg/dL (1.7-2.3)   07/05/19  04:52    


 


  161.7 mg/dL (0.1-20.0)  H  07/05/19  08:51    


 


  72 mmol/L  07/05/19  08:51    














Medications & Allergies





- Medications


Allergies/Adverse Reactions: 


                                    Allergies





No Known Allergies Allergy (Verified 07/04/19 12:02)


   








Home Medications: 


                                Home Medications











 Medication  Instructions  Recorded  Confirmed  Last Taken  Type


 


Prednisone [predniSONE 10 mg 10 mg PO .TAPER #1 tab.ds.pk 09/10/15 07/06/19 

Unknown Rx





(6-Day Pack, 21 Tabs)]     











Active Medications: 














Generic Name Dose Route Start Last Admin





  Trade Name Freq  PRN Reason Stop Dose Admin


 


Acetaminophen  650 mg  07/04/19 17:22  07/07/19 19:57





  Tylenol  PO   650 mg





  Q4H PRN   Administration





  Pain MILD(1-3)/Fever >100.5/HA  


 


Albuterol  2.5 mg  07/04/19 17:22 





  Proventil  IH  





  Q4HRT PRN  





  Shortness Of Breath  


 


Bisacodyl  15 mg  07/07/19 14:51 





  Dulcolax  PO  





  QDAY PRN  





  Constipation  


 


Famotidine  10 mg  07/04/19 22:00  07/08/19 21:36





  Pepcid  IV   10 mg





  BID BOWEN   Administration


 


Folic Acid  1 mg  07/06/19 10:00  07/08/19 18:19





  Folvite  PO   1 mg





  QDAY BOWEN   Administration


 


Hydralazine HCl  10 mg  07/04/19 19:14  07/04/19 22:17





  Apresoline  IV   10 mg





  Q3H PRN   Administration





  Blood Pressure  


 


Hydromorphone HCl  0.5 mg  07/04/19 17:22  07/09/19 04:33





  Dilaudid  IV   0.5 mg





  Q3H PRN   Administration





  Pain , Severe (7-10)  


 


Sodium Chloride  1,000 mls @ 50 mls/hr  07/08/19 09:00  07/08/19 09:45





  Nacl 0.9% 1000 Ml  IV   50 mls/hr





  AS DIRECT BOWEN   Administration


 


Sodium Chloride  100 mls @ 999 mls/hr  07/08/19 08:22 





  Nacl 0.9%  IV  





  VEE PRN  





  Hypotension  


 


Metoclopramide HCl  10 mg  07/04/19 17:22 





  Reglan  IV  





  Q6H PRN  





  Nausea And Vomiting  


 


Metoprolol Tartrate  50 mg  07/04/19 23:00  07/08/19 21:36





  Lopressor  PO   50 mg





  BID BOWEN   Administration


 


Ondansetron HCl  4 mg  07/04/19 17:22 





  Zofran  IV  





  Q3H PRN  





  Nausea And Vomiting  


 


Sodium Chloride  10 ml  07/04/19 22:00  07/08/19 23:41





  Sodium Chloride Flush Syringe 10 Ml  IV   10 ml





  BID BOWEN   Administration


 


Sodium Chloride  10 ml  07/04/19 17:22  07/07/19 10:08





  Sodium Chloride Flush Syringe 10 Ml  IV   10 ml





  PRN PRN   Administration





  LINE FLUSH

## 2019-07-09 NOTE — HEM/ONC PROGRESS NOTE
Assessment and Plan





1.  Radiology shows carcinomatosis.  The patient has anemia.  MCV is low. 


Platelets are high.  This could be GI or pancreatic primary.  


2.  Anemia, low iron, B12 normal, folate low.  renal impairement may have a role


3.  h/o Thrombocytosis, likely reactive.


4.  History of hiccups.  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


involvement.


5.  Renal failure.  Nephrology following.


6.  Hyperkalemia status post treatment.


7.  History of hypertension.





I will follow the patient during inpatient stay.  We will await pathology of


paracentesis.  GI team has seen the patient.





PRBC


hiccough - likely tumor related





DUE GI procedure 


path still not availablefrom ascites fluid


CEA 17


CA 19-9 - normal 11








colonoscopy - Rectal tumor from dentate line to mid/proximal rectum (extent of 

exam due to severe stenosis related to tumor)





d/w pt reg same


based on ascites and omental lesions -this is likely metastatic ca


Later I spoke to Milton Fung - he said no ascites fluid is available in the lab


d/w pt reg stage IV - chemo - port - he wants to talk to the dr michelle port


d/w pt reg non curative nature 


will await other drs thoughts 





- Patient Problems


(1) Ascites


Current Visit: Yes   Status: Chronic   





Subjective


Date of service: 07/09/19


Principal diagnosis: rectal mass


Interval history: 





s/p colonoscopy





Objective





- Exam


Narrative Exam: 





Pain  - none


General appearance  no acute distress


Performance status limited self care


Eyes - no icterus


ENT  no thrush





LNs  cervical  not palpable


Neck  - normal ROM


Respiratory  Normal


Breath sounds - CTA





CVS  S1 S2 +


Extremities  normal temperature


General GI  Soft - distended


Rectal  deferred


male  - deferred


Skin  warm


Musculoskeletal  moving normal


Neurologically  no focal deficit





- Constitutional


Vitals: 


                                Last Vital Signs











Temp  98.8 F   07/09/19 05:05


 


Pulse  80   07/09/19 05:05


 


Resp  18   07/09/19 05:05


 


BP  99/64   07/09/19 05:05


 


Pulse Ox  97   07/09/19 05:05














- Labs


Lab Results: 


                         Laboratory Results - last 24 hr











  07/04/19 07/05/19 07/05/19





  17:48 08:51 08:51


 


WBC   


 


RBC   


 


Hgb   


 


Hct   


 


MCV   


 


MCH   


 


MCHC   


 


RDW   


 


Plt Count   


 


Sodium   


 


Potassium   


 


Chloride   


 


Carbon Dioxide   


 


Anion Gap   


 


BUN   


 


Creatinine   


 


Estimated GFR   


 


BUN/Creatinine Ratio   


 


Glucose   


 


Calcium   


 


Phosphorus   


 


Total Bilirubin   


 


AST   


 


ALT   


 


Alkaline Phosphatase   


 


Total Protein   


 


Albumin   


 


Albumin/Globulin Ratio   


 


Carcinoembryonic Ag    17.4 H


 


CA 19-9 Antigen   11 


 


Fluid Diff Comment   


 


Random Vancomycin   


 


Crossmatch  See Detail  














  07/05/19 07/08/19 07/08/19





  Unknown 07:08 07:08


 


WBC   


 


RBC   


 


Hgb   8.8 L 


 


Hct   28.8 L 


 


MCV   


 


MCH   


 


MCHC   


 


RDW   


 


Plt Count   


 


Sodium    136 L


 


Potassium    5.1 H


 


Chloride    94.0 L


 


Carbon Dioxide    24


 


Anion Gap    23


 


BUN    40 H


 


Creatinine    12.7 H


 


Estimated GFR    5


 


BUN/Creatinine Ratio    3


 


Glucose    76


 


Calcium    8.4


 


Phosphorus    6.90 H


 


Total Bilirubin   


 


AST   


 


ALT   


 


Alkaline Phosphatase   


 


Total Protein   


 


Albumin   


 


Albumin/Globulin Ratio   


 


Carcinoembryonic Ag   


 


CA 19-9 Antigen   


 


Fluid Diff Comment    


 


Random Vancomycin   


 


Crossmatch   














  07/09/19 07/09/19 07/09/19





  04:50 04:50 04:50


 


WBC   16.4 H 


 


RBC   3.54 L 


 


Hgb   7.6 L 


 


Hct   23.9 L 


 


MCV   67 L 


 


MCH   21 L 


 


MCHC   32 


 


RDW   33.3 H 


 


Plt Count   363 


 


Sodium    137


 


Potassium    4.5


 


Chloride    94.6 L


 


Carbon Dioxide    27


 


Anion Gap    20


 


BUN    27 H


 


Creatinine    9.6 H


 


Estimated GFR    7


 


BUN/Creatinine Ratio    3


 


Glucose    92


 


Calcium    8.3 L


 


Phosphorus   


 


Total Bilirubin    0.20


 


AST    8


 


ALT    11


 


Alkaline Phosphatase    65


 


Total Protein    6.6


 


Albumin    2.1 L


 


Albumin/Globulin Ratio    0.5


 


Carcinoembryonic Ag   


 


CA 19-9 Antigen   


 


Fluid Diff Comment   


 


Random Vancomycin  13.1  


 


Crossmatch   














Medications & Allergies





- Medications


Allergies/Adverse Reactions: 


                                    Allergies





No Known Allergies Allergy (Verified 07/04/19 12:02)


   








Home Medications: 


                                Home Medications











 Medication  Instructions  Recorded  Confirmed  Last Taken  Type


 


Prednisone [predniSONE 10 mg 10 mg PO .TAPER #1 tab.ds.pk 09/10/15 07/06/19 

Unknown Rx





(6-Day Pack, 21 Tabs)]     











Active Medications: 














Generic Name Dose Route Start Last Admin





  Trade Name Freq  PRN Reason Stop Dose Admin


 


Acetaminophen  650 mg  07/04/19 17:22  07/07/19 19:57





  Tylenol  PO   650 mg





  Q4H PRN   Administration





  Pain MILD(1-3)/Fever >100.5/HA  


 


Albuterol  2.5 mg  07/04/19 17:22 





  Proventil  IH  





  Q4HRT PRN  





  Shortness Of Breath  


 


Bisacodyl  15 mg  07/07/19 14:51 





  Dulcolax  PO  





  QDAY PRN  





  Constipation  


 


Famotidine  10 mg  07/04/19 22:00  07/08/19 21:36





  Pepcid  IV   10 mg





  BID BOWEN   Administration


 


Folic Acid  1 mg  07/06/19 10:00  07/08/19 18:19





  Folvite  PO   1 mg





  QDAY BOWEN   Administration


 


Hydralazine HCl  10 mg  07/04/19 19:14  07/04/19 22:17





  Apresoline  IV   10 mg





  Q3H PRN   Administration





  Blood Pressure  


 


Hydromorphone HCl  0.5 mg  07/04/19 17:22  07/09/19 04:33





  Dilaudid  IV   0.5 mg





  Q3H PRN   Administration





  Pain , Severe (7-10)  


 


Sodium Chloride  1,000 mls @ 50 mls/hr  07/08/19 09:00  07/08/19 09:45





  Nacl 0.9% 1000 Ml  IV   50 mls/hr





  AS DIRECT BOWEN   Administration


 


Sodium Chloride  100 mls @ 999 mls/hr  07/08/19 08:22 





  Nacl 0.9%  IV  





  VEE PRN  





  Hypotension  


 


Metoclopramide HCl  10 mg  07/04/19 17:22 





  Reglan  IV  





  Q6H PRN  





  Nausea And Vomiting  


 


Metoprolol Tartrate  50 mg  07/04/19 23:00  07/08/19 21:36





  Lopressor  PO   50 mg





  BID BOWEN   Administration


 


Ondansetron HCl  4 mg  07/04/19 17:22 





  Zofran  IV  





  Q3H PRN  





  Nausea And Vomiting  


 


Sodium Chloride  10 ml  07/04/19 22:00  07/08/19 23:41





  Sodium Chloride Flush Syringe 10 Ml  IV   10 ml





  BID BOWEN   Administration


 


Sodium Chloride  10 ml  07/04/19 17:22  07/07/19 10:08





  Sodium Chloride Flush Syringe 10 Ml  IV   10 ml





  PRN PRN   Administration





  LINE FLUSH

## 2019-07-10 LAB
ALBUMIN SERPL-MCNC: 2 G/DL (ref 3.9–5)
ALT SERPL-CCNC: 12 UNITS/L (ref 7–56)
BUN SERPL-MCNC: 36 MG/DL (ref 9–20)
BUN/CREAT SERPL: 3 %
CALCIUM SERPL-MCNC: 8.4 MG/DL (ref 8.4–10.2)
HCT VFR BLD CALC: 23.9 % (ref 35.5–45.6)
HEMOLYSIS INDEX: 2
HGB BLD-MCNC: 7.5 GM/DL (ref 11.8–15.2)
MCHC RBC AUTO-ENTMCNC: 31 % (ref 32–34)
MCV RBC AUTO: 68 FL (ref 84–94)
PLATELET # BLD: 383 K/MM3 (ref 140–440)
RBC # BLD AUTO: 3.54 M/MM3 (ref 3.65–5.03)

## 2019-07-10 PROCEDURE — 5A1D70Z PERFORMANCE OF URINARY FILTRATION, INTERMITTENT, LESS THAN 6 HOURS PER DAY: ICD-10-PCS | Performed by: INTERNAL MEDICINE

## 2019-07-10 RX ADMIN — FOLIC ACID SCH MG: 1 TABLET ORAL at 09:28

## 2019-07-10 RX ADMIN — HYDROMORPHONE HYDROCHLORIDE PRN MG: 1 INJECTION, SOLUTION INTRAMUSCULAR; INTRAVENOUS; SUBCUTANEOUS at 19:39

## 2019-07-10 RX ADMIN — HYDROMORPHONE HYDROCHLORIDE PRN MG: 1 INJECTION, SOLUTION INTRAMUSCULAR; INTRAVENOUS; SUBCUTANEOUS at 15:47

## 2019-07-10 RX ADMIN — METOPROLOL TARTRATE SCH: 50 TABLET, FILM COATED ORAL at 10:00

## 2019-07-10 RX ADMIN — HYDROMORPHONE HYDROCHLORIDE PRN MG: 1 INJECTION, SOLUTION INTRAMUSCULAR; INTRAVENOUS; SUBCUTANEOUS at 23:51

## 2019-07-10 RX ADMIN — HYDROMORPHONE HYDROCHLORIDE PRN MG: 1 INJECTION, SOLUTION INTRAMUSCULAR; INTRAVENOUS; SUBCUTANEOUS at 05:17

## 2019-07-10 RX ADMIN — FAMOTIDINE SCH MG: 10 INJECTION, SOLUTION INTRAVENOUS at 09:26

## 2019-07-10 RX ADMIN — FERROUS SULFATE TAB 325 MG (65 MG ELEMENTAL FE) SCH MG: 325 (65 FE) TAB at 23:56

## 2019-07-10 RX ADMIN — HYDROMORPHONE HYDROCHLORIDE PRN MG: 1 INJECTION, SOLUTION INTRAMUSCULAR; INTRAVENOUS; SUBCUTANEOUS at 09:49

## 2019-07-10 RX ADMIN — METOPROLOL TARTRATE SCH MG: 50 TABLET, FILM COATED ORAL at 23:58

## 2019-07-10 RX ADMIN — Medication SCH ML: at 09:28

## 2019-07-10 RX ADMIN — FAMOTIDINE SCH MG: 10 INJECTION, SOLUTION INTRAVENOUS at 23:56

## 2019-07-10 NOTE — CONSULTATION
History of Present Illness


Consult date: 07/10/19


Chief complaint: 





abdominal pain, rectal mass





- History of present illness


History of present illness: 


60 y/o M with PMHx of HTN, asthma who presented to ER with abdominal distension,

weight loss, poor appetite. He underwent w/u which revealed anemia, ascites with

possible carcinomatosis. This was followed up by a paracentesis and a flexible 

sigmoidoscopy which showed a friable rectal mass that the scope could not 

traverse. The patient states he feels ok now. He has not had any change in bowel

habits that he knows of. No melena or hematochezia. He has been tolerating a 

diet without n/v. No f/c. c/o hiccups





Past History


Past Medical History: hypertension, other (asthma)


Past Surgical History: No surgical history


Social history: denies: smoking, alcohol abuse


Family history: hypertension





Medications and Allergies


                                    Allergies











Allergy/AdvReac Type Severity Reaction Status Date / Time


 


No Known Allergies Allergy   Verified 07/04/19 12:02











                                Home Medications











 Medication  Instructions  Recorded  Confirmed  Last Taken  Type


 


Prednisone [predniSONE 10 mg 10 mg PO .TAPER #1 tab.ds.pk 09/10/15 07/06/19 

Unknown Rx





(6-Day Pack, 21 Tabs)]     











Active Meds: 


Active Medications





Acetaminophen (Tylenol)  650 mg PO Q4H PRN


   PRN Reason: Pain MILD(1-3)/Fever >100.5/HA


   Last Admin: 07/09/19 11:32 Dose:  650 mg


   Documented by: 


Albuterol (Proventil)  2.5 mg IH Q4HRT PRN


   PRN Reason: Shortness Of Breath


Bisacodyl (Dulcolax)  15 mg PO QDAY PRN


   PRN Reason: Constipation


Chlorpromazine HCl (Thorazine)  10 mg PO Q6H PRN


   PRN Reason: Hiccups


   Last Admin: 07/09/19 22:33 Dose:  10 mg


   Documented by: 


Famotidine (Pepcid)  10 mg IV BID [facility]


   Last Admin: 07/10/19 09:26 Dose:  10 mg


   Documented by: 


Ferrous Sulfate (Feosol)  325 mg PO BID BOWEN


Folic Acid (Folvite)  1 mg PO QDAY [facility]


   Last Admin: 07/10/19 09:28 Dose:  1 mg


   Documented by: 


Hydralazine HCl (Apresoline)  10 mg IV Q3H PRN


   PRN Reason: Blood Pressure


   Last Admin: 07/04/19 22:17 Dose:  10 mg


   Documented by: 


Hydromorphone HCl (Dilaudid)  0.5 mg IV Q3H PRN


   PRN Reason: Pain , Severe (7-10)


   Last Admin: 07/10/19 09:49 Dose:  0.5 mg


   Documented by: 


Sodium Chloride (Nacl 0.9% 1000 Ml)  1,000 mls @ 50 mls/hr IV AS DIRECT [facility]


   Last Admin: 07/08/19 09:45 Dose:  50 mls/hr


   Documented by: 


Sodium Chloride (Nacl 0.9%)  100 mls @ 999 mls/hr IV VEE PRN


   PRN Reason: Hypotension


Vancomycin HCl (Vancomycin/Ns 1 Gm/250 Ml)  1 gm in 250 mls @ 167.007 mls/hr IV 

ONCE ONE


   Stop: 07/10/19 23:29


Ferric Sodium Gluconate Complex 125 mg/ Sodium Chloride  110 mls @ 100 mls/hr IV

ONCE ONE


   Stop: 07/10/19 14:01


Metoclopramide HCl (Reglan)  10 mg IV Q6H PRN


   PRN Reason: Nausea And Vomiting


Metoprolol Tartrate (Lopressor)  50 mg PO BID [facility]


   Last Admin: 07/09/19 22:32 Dose:  50 mg


   Documented by: 


Ondansetron HCl (Zofran)  4 mg IV Q3H PRN


   PRN Reason: Nausea And Vomiting


Sodium Chloride (Sodium Chloride Flush Syringe 10 Ml)  10 ml IV BID [facility]


   Last Admin: 07/10/19 09:28 Dose:  10 ml


   Documented by: 


Sodium Chloride (Sodium Chloride Flush Syringe 10 Ml)  10 ml IV PRN PRN


   PRN Reason: LINE FLUSH


   Last Admin: 07/07/19 10:08 Dose:  10 ml


   Documented by: 











Review of Systems


All systems: negative (10 pt ROS performed and negative except for that listed 

in HPI)





Exam


                                   Vital Signs











Temp Pulse Resp BP Pulse Ox


 


 97.5 F L  77   18   160/72   100 


 


 07/04/19 12:12  07/04/19 12:12  07/04/19 12:12  07/04/19 12:12  07/04/19 12:12











Narrative exam: 





Gen; AAOx3. NAD. cachectic


ENT; b/l temporal wasting


CV; s1, S2+


resp: even and unlabored


Abd: soft


Ext: no c/c/e. Thin





Results





- Labs





                                 07/10/19 04:43





                                 07/10/19 04:43


                              Abnormal lab results











  07/10/19 07/10/19 Range/Units





  04:43 04:43 


 


WBC  14.1 H   (4.5-11.0)  K/mm3


 


RBC  3.54 L   (3.65-5.03)  M/mm3


 


Hgb  7.5 L   (11.8-15.2)  gm/dl


 


Hct  23.9 L   (35.5-45.6)  %


 


MCV  68 L   (84-94)  fl


 


MCH  21 L   (28-32)  pg


 


MCHC  31 L   (32-34)  %


 


RDW  33.6 H   (13.2-15.2)  %


 


Sodium   136 L  (137-145)  mmol/L


 


Chloride   96.0 L  ()  mmol/L


 


BUN   36 H  (9-20)  mg/dL


 


Creatinine   12.1 H  (0.8-1.5)  mg/dL


 


Albumin   2.0 L  (3.9-5)  g/dL








                                 Diabetes panel











  07/10/19 Range/Units





  04:43 


 


Sodium  136 L  (137-145)  mmol/L


 


Potassium  4.8  (3.6-5.0)  mmol/L


 


Chloride  96.0 L  ()  mmol/L


 


Carbon Dioxide  26  (22-30)  mmol/L


 


BUN  36 H  (9-20)  mg/dL


 


Creatinine  12.1 H  (0.8-1.5)  mg/dL


 


Glucose  86  ()  mg/dL


 


Calcium  8.4  (8.4-10.2)  mg/dL


 


AST  11  (5-40)  units/L


 


ALT  12  (7-56)  units/L


 


Alkaline Phosphatase  67  ()  units/L


 


Total Protein  6.7  (6.3-8.2)  g/dL


 


Albumin  2.0 L  (3.9-5)  g/dL








                                  Calcium panel











  07/10/19 Range/Units





  04:43 


 


Calcium  8.4  (8.4-10.2)  mg/dL


 


Albumin  2.0 L  (3.9-5)  g/dL








                                 Pituitary panel











  07/10/19 Range/Units





  04:43 


 


Sodium  136 L  (137-145)  mmol/L


 


Potassium  4.8  (3.6-5.0)  mmol/L


 


Chloride  96.0 L  ()  mmol/L


 


Carbon Dioxide  26  (22-30)  mmol/L


 


BUN  36 H  (9-20)  mg/dL


 


Creatinine  12.1 H  (0.8-1.5)  mg/dL


 


Glucose  86  ()  mg/dL


 


Calcium  8.4  (8.4-10.2)  mg/dL








                                  Adrenal panel











  07/10/19 Range/Units





  04:43 


 


Sodium  136 L  (137-145)  mmol/L


 


Potassium  4.8  (3.6-5.0)  mmol/L


 


Chloride  96.0 L  ()  mmol/L


 


Carbon Dioxide  26  (22-30)  mmol/L


 


BUN  36 H  (9-20)  mg/dL


 


Creatinine  12.1 H  (0.8-1.5)  mg/dL


 


Glucose  86  ()  mg/dL


 


Calcium  8.4  (8.4-10.2)  mg/dL


 


Total Bilirubin  0.20  (0.1-1.2)  mg/dL


 


AST  11  (5-40)  units/L


 


ALT  12  (7-56)  units/L


 


Alkaline Phosphatase  67  ()  units/L


 


Total Protein  6.7  (6.3-8.2)  g/dL


 


Albumin  2.0 L  (3.9-5)  g/dL














- Imaging


CT scan - abdomen: report reviewed, image reviewed


CT scan - pelvis: report reviewed, image reviewed





Assessment and Plan





60 yo M with 





1. metastatic rectal cancer


2. malignant ascites


3. CKD on HD


4. anemia





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 

cells and lymphovascular invasion


Path Ascites: malignant cells consistent with metastatic carcinoma





Plan:





1. oncology on board. CEA 17.4


2. CXR for completion


3. Discussed oncology recs for port placement with patient. He is undecided 

about starting chemotherapy or any aggressive treatments. 


4. Currently no urgent need for surgical intervention for rectal mass. It is not

obstructing or actively bleeding. Patient will need neoadjuvant treatment and 

any surgical intervention would be palliative due to the advanced stage of the 

cancer. 


5. DVT ppx


6. soft diet


7. prn pain control





Patient would like to notify his family of his diagnosis and discuss the 

treatment options with them prior to making a decision regarding neoadjuvant 

therapy. I explained to him my part in his treatment as the surgeon. He 

understands. Will follow up in am and await decision about port placement.





Thank you, please call with questions.

## 2019-07-10 NOTE — PROGRESS NOTE
Assessment and Plan





1. ESRD:


Likely CKD has progressed to ESRD


CT abdomen showed mild bilateral hydronephosis.


No improvement in the renal function.


Monitor renal function.


Renal prognosis appears to be poor.


Avoid nephrotoxic agents.


Meds dosage based on GFR.


Due to persistent hyperkalemia patient was started on hemodialysis on 7/4/2019.


Hemodialysis: 7/4, 6/5, 7/6, 7/8, 7/10.


Will contact [facility] for Tunnel dialysis catheter.





2. FEN:


Hyperkalemia, K level is better today.


Anion gap MA, improved.


Monitor lytes.





3. Mild bilateral hydronephosis:


S/p hernandez catheter.





4. Rectal adenocarcinoma with malignant Ascites.


Followed by Heme-onc.





5. Anemia:


S/p PRBC.


Monitor H/H.





6. Uncontrolled HTN:


BP is better.














Subjective


Date of service: 07/10/19


Principal diagnosis: ? carcinomatosis


Interval history: 


Patient was seen and examined at the bedside.


No new complaint.








Objective





- Vital Signs


Vital signs: 


                               Vital Signs - 12hr











  07/09/19 07/09/19 07/10/19





  22:32 23:49 04:51


 


Temperature  98.5 F 98.4 F


 


Pulse Rate 78 79 87


 


Respiratory  17 18





Rate   


 


Blood Pressure 120/80 123/72 130/73


 


O2 Sat by Pulse  100 100





Oximetry   














- General Appearance


General appearance: well-developed, appears stated age, other (no distress, R 

groin dialysis catheter noted)


EENT: ATNC, PERRL, hearing intact, vision intact


Neck: supple


Respiratory: Present: Clear to Ascultation


Cardiology: regular, S1S2, no murmurs


Gastrointestinal: normoactive bowel sounds, no tenderness, distended, other 

(hernandez catheter)


Integumentary: no rash, warm and dry


Neurologic: no focal deficit, no asterixis, alert and oriented x3


Musculoskeletal: other (no edema)





- Lab





                                 07/10/19 04:43





                                 07/10/19 04:43


                             Most recent lab results











Calcium  8.4 mg/dL (8.4-10.2)   07/10/19  04:43    


 


Phosphorus  6.90 mg/dL (2.5-4.5)  H  07/08/19  07:08    


 


Magnesium  2.00 mg/dL (1.7-2.3)   07/05/19  04:52    


 


  161.7 mg/dL (0.1-20.0)  H  07/05/19  08:51    


 


  72 mmol/L  07/05/19  08:51    














Medications & Allergies





- Medications


Allergies/Adverse Reactions: 


                                    Allergies





No Known Allergies Allergy (Verified 07/04/19 12:02)


   








Home Medications: 


                                Home Medications











 Medication  Instructions  Recorded  Confirmed  Last Taken  Type


 


Prednisone [predniSONE 10 mg 10 mg PO .TAPER #1 tab.ds.pk 09/10/15 07/06/19 

Unknown Rx





(6-Day Pack, 21 Tabs)]     











Active Medications: 














Generic Name Dose Route Start Last Admin





  Trade Name Freq  PRN Reason Stop Dose Admin


 


Acetaminophen  650 mg  07/04/19 17:22  07/09/19 11:32





  Tylenol  PO   650 mg





  Q4H PRN   Administration





  Pain MILD(1-3)/Fever >100.5/HA  


 


Albuterol  2.5 mg  07/04/19 17:22 





  Proventil  IH  





  Q4HRT PRN  





  Shortness Of Breath  


 


Bisacodyl  15 mg  07/07/19 14:51 





  Dulcolax  PO  





  QDAY PRN  





  Constipation  


 


Chlorpromazine HCl  10 mg  07/09/19 15:20  07/09/19 22:33





  Thorazine  PO   10 mg





  Q6H PRN   Administration





  Hiccups  


 


Famotidine  10 mg  07/04/19 22:00  07/09/19 22:33





  Pepcid  IV   10 mg





  BID BOWEN   Administration


 


Folic Acid  1 mg  07/06/19 10:00  07/09/19 11:22





  Folvite  PO   1 mg





  QDAY BOWEN   Administration


 


Hydralazine HCl  10 mg  07/04/19 19:14  07/04/19 22:17





  Apresoline  IV   10 mg





  Q3H PRN   Administration





  Blood Pressure  


 


Hydromorphone HCl  0.5 mg  07/04/19 17:22  07/10/19 05:17





  Dilaudid  IV   0.5 mg





  Q3H PRN   Administration





  Pain , Severe (7-10)  


 


Sodium Chloride  1,000 mls @ 50 mls/hr  07/08/19 09:00  07/08/19 09:45





  Nacl 0.9% 1000 Ml  IV   50 mls/hr





  AS DIRECT BOWEN   Administration


 


Sodium Chloride  100 mls @ 999 mls/hr  07/10/19 07:30 





  Nacl 0.9%  IV  





  VEE PRN  





  Hypotension  


 


Vancomycin HCl  1 gm in 250 mls @ 167.007 mls/hr  07/10/19 22:00 





  Vancomycin/Ns 1 Gm/250 Ml  IV  07/10/19 23:29 





  ONCE ONE  


 


Metoclopramide HCl  10 mg  07/04/19 17:22 





  Reglan  IV  





  Q6H PRN  





  Nausea And Vomiting  


 


Metoprolol Tartrate  50 mg  07/04/19 23:00  07/09/19 22:32





  Lopressor  PO   50 mg





  BID BOWEN   Administration


 


Ondansetron HCl  4 mg  07/04/19 17:22 





  Zofran  IV  





  Q3H PRN  





  Nausea And Vomiting  


 


Sodium Chloride  10 ml  07/04/19 22:00  07/09/19 22:36





  Sodium Chloride Flush Syringe 10 Ml  IV   10 ml





  BID BOWEN   Administration


 


Sodium Chloride  10 ml  07/04/19 17:22  07/07/19 10:08





  Sodium Chloride Flush Syringe 10 Ml  IV   10 ml





  PRN PRN   Administration





  LINE FLUSH

## 2019-07-10 NOTE — PROGRESS NOTE
Assessment and Plan


Assessment and plan: 


Patient is a 58 yo man with a history of hypertension and Asthma who presented 

to [facility] ED with urinary frequency, back pains, n/v, diarrhea and abd pains with 

distension x 1 week. 





* Initial labs: wbc 13.0 to 10.0, hgb 6.3 to 7.0 (mcv 60), na 132, k 9.5, cr 

  34.8, bun 129, bg 164, lipase 88


* CT abd/pelvis without contrast IMPRESSION: 1. There is ascites. There is 

  increased density in the omentum. Findings are concerning for carcinomatosis. 

  There is some mild adenopathy in the pelvis a possible pericolonic mass, 

  series 2 image 139. There is mild bilateral hydronephrosis. The exact cause is

  not determined by this study. There is minimal nephrolithiasis on the left. No

  ureteral calculi are identified however.


*  There was a circumferential rectal tumor from the dentate line extending to 

  mid-proximal rectum.  There was severe stenosis related to circumferential 

  tumor which was unable to be traversed.  The tumor was friable to touch.   

  Multiple biopsies were obtained.


   * Impression:1. Rectal tumor from dentate line to mid/proximal rectum (extent

     of exam due to severe stenosis related to tumor).  Biopsies were obtained.


   * Recommendations:


   * follow-up pathology, -surgery consult, -heme/onc following, -screen first 

     degree relatives for colorectal cancer, -will need colonoscopy in 3-6 

     months to rule out proximal colon lesions








Acute anemia with Drop in HCT s/p transfused 2 units of PRBC, ordered FOBT, GI 

is following, d/w Dr. Joel,


Findings of colonosocpy as noted above.


ARF, suspect ATN + post obstructive+ vasomotor, poa: s/p Vas-cath, emergent 

hemodialysis due to severe hyperkalemia done on 7/4/19, place [facility] on 7/5/19 

with very little output


Bilateral hydronephrosis: place [facility], consulted Urology, input no

dallin==>discharge with [facility] 


Metastatic Rectal Cancer: Awaiting full pathology set up. 


Ascites due to suspected Colon cancer with mets to Omentum/Carcinomatosis 

suspected, which would be a very poor prognostic sign: consulted GI, input 

noted, s/p Paracentesis on 7/5/19, await cytology, CEA 17 ELEVATED, AFP 

ELEVATED, CA 19-9 - normal 11


Ascites fluid culture growing Gram positive cocci in clusters, add IV 

vancomycin, follow cultures,


Hyperkalemia: treated with HD, Nephrology is following


Acute Microcytic anemia, suspect cancer related until proven otherwise, I did 

inform patient my concerns after gaining his permission to do so: consult GI 

then consider Heme/Onc consult


Severe malnutrition: Dietitian consulted 


hiccough - likely tumor related, will add Thorazine.


DVT prophylaxis: On heparin and GI prophylaxis











History


Interval history: 


Patient seen and examined no new complaints. Undergoing Dialysis today





Hospitalist Physical





- Physical exam


Narrative exam: 


Gen: thin frial, ill appearing, NAD, Awake, Alert, Orientated


HEENT: NCAT, EOMI, PERRL, OP Clear 


Neck: supple, no adenopathy, no thyromegaly, no JVD 


CVS/Heart: RRR, normal S1S2, pulses present bilaterally 


Chest/Lungs: CTA B, Symmetrical chest expansion, good air entry bilaterally 


GI/Abdomen: soft, abdomen distended, good bowel sounds, no guarding or rebound 


/Bladder: no suprapubic tenderness, no CVA or paraspinal tenderness 


Extermity/Skin: no c/c/e, no obvious rash 


MSK: FROM x 4 


Neuro: CN 2-12 grossly intact, no new focal deficits 


Psych: calm 








- Constitutional


Vitals: 


                                        











Temp Pulse Resp BP Pulse Ox


 


 98.8 F   85   16   130/80   100 


 


 07/10/19 13:45  07/10/19 13:45  07/10/19 13:45  07/10/19 13:45  07/10/19 04:51











General appearance: Absent: mild distress, well-nourished





Results





- Labs


CBC & Chem 7: 


                                 07/10/19 04:43





                                 07/10/19 04:43


Labs: 


                             Laboratory Last Values











WBC  14.1 K/mm3 (4.5-11.0)  H  07/10/19  04:43    


 


RBC  3.54 M/mm3 (3.65-5.03)  L  07/10/19  04:43    


 


Hgb  7.5 gm/dl (11.8-15.2)  L  07/10/19  04:43    


 


Hct  23.9 % (35.5-45.6)  L  07/10/19  04:43    


 


MCV  68 fl (84-94)  L  07/10/19  04:43    


 


MCH  21 pg (28-32)  L  07/10/19  04:43    


 


MCHC  31 % (32-34)  L  07/10/19  04:43    


 


RDW  33.6 % (13.2-15.2)  H  07/10/19  04:43    


 


Plt Count  383 K/mm3 (140-440)   07/10/19  04:43    


 


Add Manual Diff  Complete   07/05/19  04:52    


 


Total Counted  100   07/05/19  04:52    


 


Seg Neuts % (Manual)  95.0 % (40.0-70.0)  H  07/05/19  04:52    


 


  0 %  07/05/19  04:52    


 


  3.0 % (13.4-35.0)  L  07/05/19  04:52    


 


Reactive Lymphs % (Man)  0 %  07/05/19  04:52    


 


  1.0 % (0.0-7.3)   07/05/19  04:52    


 


  1.0 % (0.0-4.3)   07/05/19  04:52    


 


  0 % (0.0-1.8)   07/05/19  04:52    


 


  0 %  07/05/19  04:52    


 


  0 %  07/05/19  04:52    


 


  0 %  07/05/19  04:52    


 


  0 %  07/05/19  04:52    


 


Nucleated RBC %  1.0 % (0.0-0.9)  H  07/05/19  04:52    


 


Seg Neutrophils # Man  9.5 K/mm3 (1.8-7.7)  H  07/05/19  04:52    


 


Band Neutrophils #  0.0 K/mm3  07/05/19  04:52    


 


  0.3 K/mm3 (1.2-5.4)  L  07/05/19  04:52    


 


Abs React Lymphs (Man)  0.0 K/mm3  07/05/19  04:52    


 


  0.1 K/mm3 (0.0-0.8)   07/05/19  04:52    


 


  0.1 K/mm3 (0.0-0.4)   07/05/19  04:52    


 


  0.0 K/mm3 (0.0-0.1)   07/05/19  04:52    


 


  0.0 K/mm3  07/05/19  04:52    


 


  0.0 K/mm3  07/05/19  04:52    


 


  0.0 K/mm3  07/05/19  04:52    


 


Blast Cells #  0.0 K/mm3  07/05/19  04:52    


 


WBC Morphology  Not Reportable   07/05/19  04:52    


 


Hypersegmented Neuts  Not Reportable   07/05/19  04:52    


 


Hyposegmented Neuts  Not Reportable   07/05/19  04:52    


 


Hypogranular Neuts  Not Reportable   07/05/19  04:52    


 


  Not Reportable   07/05/19  04:52    


 


  Not Reportable   07/05/19  04:52    


 


  Not Reportable   07/05/19  04:52    


 


  Not Reportable   07/05/19  04:52    


 


  Not Reportable   07/05/19  04:52    


 


  Not Reportable   07/05/19  04:52    


 


  Consistent w auto   07/05/19  04:52    


 


  Not Reportable   07/05/19  04:52    


 


Plt Clumps, EDTA  Not Reportable   07/05/19  04:52    


 


  Not Reportable   07/05/19  04:52    


 


  Not Reportable   07/05/19  04:52    


 


  Not Reportable   07/05/19  04:52    


 


Plt Morphology Comment  Not Reportable   07/05/19  04:52    


 


RBC Morphology  Not Reportable   07/05/19  04:52    


 


Dimorphic RBCs  Not Reportable   07/05/19  04:52    


 


  Not Reportable   07/05/19  04:52    


 


  3+   07/05/19  04:52    


 


  1+   07/05/19  04:52    


 


  3+   07/05/19  04:52    


 


  2+   07/05/19  04:52    


 


  Not Reportable   07/05/19  04:52    


 


  Not Reportable   07/05/19  04:52    


 


  Not Reportable   07/05/19  04:52    


 


  Not Reportable   07/05/19  04:52    


 


  Few   07/05/19  04:52    


 


  Not Reportable   07/05/19  04:52    


 


  Few   07/05/19  04:52    


 


  Not Reportable   07/05/19  04:52    


 


  Not Reportable   07/05/19  04:52    


 


  Not Reportable   07/05/19  04:52    


 


  Not Reportable   07/05/19  04:52    


 


  Not Reportable   07/05/19  04:52    


 


  Not Reportable   07/05/19  04:52    


 


  Few   07/05/19  04:52    


 


Acanthocytes (Spur)  Not Reportable   07/05/19  04:52    


 


Rouleaux  Not Reportable   07/05/19  04:52    


 


  Not Reportable   07/05/19  04:52    


 


  Not Reportable   07/05/19  04:52    


 


  Not Reportable   07/05/19  04:52    


 


Percent Retic  1.10 % (0.78-2.58)   07/05/19  04:52    


 


  Not Reportable   07/05/19  04:52    


 


Hem Pathologist Commnt  No   07/05/19  04:52    


 


PT  16.3 Sec. (12.2-14.9)  H  07/05/19  08:51    


 


INR  1.35  (0.87-1.13)  H  07/05/19  08:51    


 


Sodium  136 mmol/L (137-145)  L  07/10/19  04:43    


 


Potassium  4.8 mmol/L (3.6-5.0)   07/10/19  04:43    


 


Chloride  96.0 mmol/L ()  L  07/10/19  04:43    


 


Carbon Dioxide  26 mmol/L (22-30)   07/10/19  04:43    


 


  19 mmol/L  07/10/19  04:43    


 


BUN  36 mg/dL (9-20)  H  07/10/19  04:43    


 


  12.1 mg/dL (0.8-1.5)  H  07/10/19  04:43    


 


Estimated GFR  5 ml/min  07/10/19  04:43    


 


  3 %  07/10/19  04:43    


 


Glucose  86 mg/dL ()   07/10/19  04:43    


 


POC Glucose  164  ()  H  07/04/19  17:35    


 


Calcium  8.4 mg/dL (8.4-10.2)   07/10/19  04:43    


 


Phosphorus  6.90 mg/dL (2.5-4.5)  H  07/08/19  07:08    


 


Magnesium  2.00 mg/dL (1.7-2.3)   07/05/19  04:52    


 


Iron  20 ug/dL ()  L  07/05/19  08:51    


 


TIBC  219 mcg/dL (250-450)  L  07/05/19  08:51    


 


  63.8 ng/mL (13.0-400.0)   07/05/19  08:51    


 


  0.20 mg/dL (0.1-1.2)   07/10/19  04:43    


 


AST  11 units/L (5-40)   07/10/19  04:43    


 


ALT  12 units/L (7-56)   07/10/19  04:43    


 


  67 units/L ()   07/10/19  04:43    


 


  6.7 g/dL (6.3-8.2)   07/10/19  04:43    


 


  2.0 g/dL (3.9-5)  L  07/10/19  04:43    


 


  0.4 %  07/10/19  04:43    


 


  88 units/L (13-60)  H  07/04/19  12:47    


 


Tumor Marker AFP  See scanned result   07/05/19  08:51    


 


Carcinoembryonic Ag  17.4 ng/mL (0.0-2.4)  H  07/05/19  08:51    


 


  11 U/mL (<34)   07/05/19  08:51    


 


Vitamin B12  1768 pg/mL (211-911)  H  07/05/19  08:51    


 


  5.05 ng/mL (7.3-26.0)  L  07/05/19  08:51    


 


PTH Intact  238.5 pg/mL (15-65)  H  07/05/19  04:52    


 


  161.7 mg/dL (0.1-20.0)  H  07/05/19  08:51    


 


  72 mmol/L  07/05/19  08:51    


 


Fluid Type  Ascitic   07/05/19  Unknown


 


Fluid Color  Yellow   07/05/19  Unknown


 


Fluid Appearance  Hazy   07/05/19  Unknown


 


Fluid WBC  58 /mm3  07/05/19  Unknown


 


Fluid RBC  1850 /mm3  07/05/19  Unknown


 


Fluid Diff Comment     07/05/19  Unknown


 


Fluid Seg Neutrophils  35.0 %  07/05/19  Unknown


 


Fluid Lymphocytes  14.0 %  07/05/19  Unknown


 


Fluid Reactive Lymphs  0 %  07/05/19  Unknown


 


Fluid Monocytes  51.0 %  07/05/19  Unknown


 


Fluid Eosinophils  0 %  07/05/19  Unknown


 


Fluid Basophils  0 %  07/05/19  Unknown


 


Random Vancomycin  13.1 ug/mL (0-40.0)   07/09/19  04:50    


 


Hepatitis A IgM Ab  Non-reactive  (NonReactive)   07/04/19  17:48    


 


Hep Bs Antigen  Non-reactive  (Negative)   07/04/19  17:48    


 


Hep B Core IgM Ab  Non-reactive  (NonReactive)   07/04/19  17:48    


 


  Non-reactive  (NonReactive)   07/04/19  17:48    


 


Blood Type  A POSITIVE   07/04/19  17:48    


 


Antibody Screen  Negative   07/04/19  17:48    


 


Crossmatch  See Detail   07/04/19  17:48    














Active Medications





- Current Medications


Current Medications: 














Generic Name Dose Route Start Last Admin





  Trade Name Marina  PRN Reason Stop Dose Admin


 


Acetaminophen  650 mg  07/04/19 17:22  07/09/19 11:32





  Tylenol  PO   650 mg





  Q4H PRN   Administration





  Pain MILD(1-3)/Fever >100.5/HA  


 


Albuterol  2.5 mg  07/04/19 17:22 





  Proventil  IH  





  Q4HRT PRN  





  Shortness Of Breath  


 


Bisacodyl  15 mg  07/07/19 14:51 





  Dulcolax  PO  





  QDAY PRN  





  Constipation  


 


Chlorpromazine HCl  10 mg  07/09/19 15:20  07/09/19 22:33





  Thorazine  PO   10 mg





  Q6H PRN   Administration





  Hiccups  


 


Famotidine  10 mg  07/04/19 22:00  07/10/19 09:26





  Pepcid  IV   10 mg





  BID BOWEN   Administration


 


Ferrous Sulfate  325 mg  07/10/19 22:00 





  Feosol  PO  





  BID BOWEN  


 


Folic Acid  1 mg  07/06/19 10:00  07/10/19 09:28





  Folvite  PO   1 mg





  QDAY BOWEN   Administration


 


Hydralazine HCl  10 mg  07/04/19 19:14  07/04/19 22:17





  Apresoline  IV   10 mg





  Q3H PRN   Administration





  Blood Pressure  


 


Hydromorphone HCl  0.5 mg  07/04/19 17:22  07/10/19 09:49





  Dilaudid  IV   0.5 mg





  Q3H PRN   Administration





  Pain , Severe (7-10)  


 


Sodium Chloride  1,000 mls @ 50 mls/hr  07/08/19 09:00  07/08/19 09:45





  Nacl 0.9% 1000 Ml  IV   50 mls/hr





  AS DIRECT BOWEN   Administration


 


Sodium Chloride  100 mls @ 999 mls/hr  07/10/19 07:30 





  Nacl 0.9%  IV  





  VEE PRN  





  Hypotension  


 


Vancomycin HCl  1 gm in 250 mls @ 167.007 mls/hr  07/10/19 22:00 





  Vancomycin/Ns 1 Gm/250 Ml  IV  07/10/19 23:29 





  ONCE ONE  


 


Metoclopramide HCl  10 mg  07/04/19 17:22 





  Reglan  IV  





  Q6H PRN  





  Nausea And Vomiting  


 


Metoprolol Tartrate  50 mg  07/04/19 23:00  07/09/19 22:32





  Lopressor  PO   50 mg





  BID BOWEN   Administration


 


Ondansetron HCl  4 mg  07/04/19 17:22 





  Zofran  IV  





  Q3H PRN  





  Nausea And Vomiting  


 


Sodium Chloride  10 ml  07/04/19 22:00  07/10/19 09:28





  Sodium Chloride Flush Syringe 10 Ml  IV   10 ml





  BID BOWEN   Administration


 


Sodium Chloride  10 ml  07/04/19 17:22  07/07/19 10:08





  Sodium Chloride Flush Syringe 10 Ml  IV   10 ml





  PRN PRN   Administration





  LINE FLUSH

## 2019-07-10 NOTE — GASTROENTEROLOGY PROGRESS NOTE
Assessment and Plan


1.abdominal pain/distension


 2.ascites with concern for carcinomatosis on CT


 3.wt loss


 4.anemia


 5.renal failure





-H/H 7.5/23.9-stable


-continue to monitor H/H and transfuse as needed


-abd CT showed ascites, increased density in the omentum, concerning for 

carcinomatosis, mild adenopathy in the plevis with a possible pericolonic mass, 

and bilateral hydronephrosis


-CEA 17, AFP 6.4, CA 19-9 11


-s/p  Flexible Sigmoidoscopy that showed a rectal tumor from dentate line to 

mid/proximal rectum (extent of exam due to severe stenosis related to tumor)


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 

signet rign cells, lymphovascular invasion present


-s/p paracentesis with cytology results also positive for malignant cells c/w 

metastatic carcinoma with signet ring features


-recommend surgery consult


-continue supportive care


-will defer further management per oncology


-patient will need colonoscopy in ~3 months to r/o proximal colon lesions 


-will sign off, please call if needed














Subjective


Date of service: 07/10/19


Principal diagnosis: ? carcinomatosis


Interval history: 


No acute distress








Objective





- Constitutional


Vitals: 


                                        











Temp Pulse Resp BP Pulse Ox


 


 98.4 F   87   20   130/73   100 


 


 07/10/19 04:51  07/10/19 04:51  07/10/19 09:49  07/10/19 04:51  07/10/19 04:51











General appearance: no acute distress





- Respiratory


Respiratory effort: normal





- Cardiovascular


Rhythm: regular





- Gastrointestinal


General gastrointestinal: Present: soft, distended (slightly), normal bowel 

sounds





- Neurologic


Neurological: alert and oriented x3





- Labs


CBC & Chem 7: 


                                 07/10/19 04:43





                                 07/10/19 04:43


Labs: 


                         Laboratory Results - last 24 hr











  07/05/19 07/10/19 07/10/19





  08:51 04:43 04:43


 


WBC   14.1 H 


 


RBC   3.54 L 


 


Hgb   7.5 L 


 


Hct   23.9 L 


 


MCV   68 L 


 


MCH   21 L 


 


MCHC   31 L 


 


RDW   33.6 H 


 


Plt Count   383 


 


Sodium    136 L


 


Potassium    4.8


 


Chloride    96.0 L


 


Carbon Dioxide    26


 


Anion Gap    19


 


BUN    36 H


 


Creatinine    12.1 H


 


Estimated GFR    5


 


BUN/Creatinine Ratio    3


 


Glucose    86


 


Calcium    8.4


 


Total Bilirubin    0.20


 


AST    11


 


ALT    12


 


Alkaline Phosphatase    67


 


Total Protein    6.7


 


Albumin    2.0 L


 


Albumin/Globulin Ratio    0.4


 


Tumor Marker AFP  See scanned result

## 2019-07-11 RX ADMIN — FOLIC ACID SCH MG: 1 TABLET ORAL at 10:29

## 2019-07-11 RX ADMIN — Medication PRN ML: at 20:41

## 2019-07-11 RX ADMIN — Medication SCH ML: at 10:34

## 2019-07-11 RX ADMIN — FAMOTIDINE SCH MG: 10 INJECTION, SOLUTION INTRAVENOUS at 10:30

## 2019-07-11 RX ADMIN — CHLORPROMAZINE HYDROCHLORIDE PRN MG: 10 TABLET, FILM COATED ORAL at 10:29

## 2019-07-11 RX ADMIN — HYDROMORPHONE HYDROCHLORIDE PRN MG: 1 INJECTION, SOLUTION INTRAMUSCULAR; INTRAVENOUS; SUBCUTANEOUS at 16:59

## 2019-07-11 RX ADMIN — FAMOTIDINE SCH MG: 10 INJECTION, SOLUTION INTRAVENOUS at 22:24

## 2019-07-11 RX ADMIN — CHLORPROMAZINE HYDROCHLORIDE PRN MG: 10 TABLET, FILM COATED ORAL at 01:15

## 2019-07-11 RX ADMIN — FERROUS SULFATE TAB 325 MG (65 MG ELEMENTAL FE) SCH MG: 325 (65 FE) TAB at 22:24

## 2019-07-11 RX ADMIN — METOPROLOL TARTRATE SCH: 50 TABLET, FILM COATED ORAL at 22:25

## 2019-07-11 RX ADMIN — CHLORPROMAZINE HYDROCHLORIDE PRN MG: 10 TABLET, FILM COATED ORAL at 22:24

## 2019-07-11 RX ADMIN — Medication SCH ML: at 00:11

## 2019-07-11 RX ADMIN — HYDROMORPHONE HYDROCHLORIDE PRN MG: 1 INJECTION, SOLUTION INTRAMUSCULAR; INTRAVENOUS; SUBCUTANEOUS at 10:41

## 2019-07-11 RX ADMIN — Medication SCH ML: at 22:24

## 2019-07-11 RX ADMIN — METOPROLOL TARTRATE SCH MG: 50 TABLET, FILM COATED ORAL at 10:30

## 2019-07-11 RX ADMIN — HYDROMORPHONE HYDROCHLORIDE PRN MG: 1 INJECTION, SOLUTION INTRAMUSCULAR; INTRAVENOUS; SUBCUTANEOUS at 20:40

## 2019-07-11 RX ADMIN — HYDROMORPHONE HYDROCHLORIDE PRN MG: 1 INJECTION, SOLUTION INTRAMUSCULAR; INTRAVENOUS; SUBCUTANEOUS at 05:34

## 2019-07-11 RX ADMIN — FERROUS SULFATE TAB 325 MG (65 MG ELEMENTAL FE) SCH MG: 325 (65 FE) TAB at 10:29

## 2019-07-11 NOTE — PROGRESS NOTE
Assessment and Plan


Assessment and plan: 


Patient is a 58 yo man with a history of hypertension and Asthma who presented 

to [facility] ED with urinary frequency, back pains, n/v, diarrhea and abd pains with 

distension x 1 week. 





* Initial labs: wbc 13.0 to 10.0, hgb 6.3 to 7.0 (mcv 60), na 132, k 9.5, cr 

  34.8, bun 129, bg 164, lipase 88


* CT abd/pelvis without contrast IMPRESSION: 1. There is ascites. There is 

  increased density in the omentum. Findings are concerning for carcinomatosis. 

  There is some mild adenopathy in the pelvis a possible pericolonic mass, 

  series 2 image 139. There is mild bilateral hydronephrosis. The exact cause is

  not determined by this study. There is minimal nephrolithiasis on the left. No

  ureteral calculi are identified however.


*  There was a circumferential rectal tumor from the dentate line extending to 

  mid-proximal rectum.  There was severe stenosis related to circumferential 

  tumor which was unable to be traversed.  The tumor was friable to touch.   

  Multiple biopsies were obtained.


   * Impression:1. Rectal tumor from dentate line to mid/proximal rectum (extent

     of exam due to severe stenosis related to tumor).  Biopsies were obtained.


   * Recommendations:


   * follow-up pathology, -surgery consult, -heme/onc following, -screen first 

     degree relatives for colorectal cancer, -will need colonoscopy in 3-6 

     months to rule out proximal colon lesions








Acute anemia with Drop in HCT s/p transfused 2 units of PRBC, ordered FOBT, GI 

is following, d/w Dr. Joel,


Findings of colonosocpy as noted above.


ARF, suspect ATN + post obstructive+ vasomotor, poa: s/p Vas-cath, emergent 

hemodialysis due to severe hyperkalemia done on 7/4/19, place [facility] on 7/5/19 

with very little output


Bilateral hydronephrosis: place hernandez, consulted Urology, input no

dallin==>discharge with hernandez 


Metastatic Rectal Cancer: Awaiting full pathology set up. 


Ascites due to suspected Colon cancer with mets to Omentum/Carcinomatosis 

suspected, which would be a very poor prognostic sign: consulted GI, input 

noted, s/p Paracentesis on 7/5/19, await cytology, CEA 17 ELEVATED, AFP 

ELEVATED, CA 19-9 - normal 11


Abx discontinued following ID eval as culture appears to be contaminant


Hyperkalemia: treated with HD, Nephrology is following


Acute Microcytic anemia, suspect cancer related until proven otherwise, I did 

inform patient my concerns after gaining his permission to do so: consult GI 

then consider Heme/Onc consult


Severe malnutrition: Dietitian consulted 


hiccough - likely tumor related, will add Thorazine.


DVT prophylaxis: On heparin and GI prophylaxis











History


Interval history: 


Patient seen and examined no new complaints. considering pursing treatment. 





Hospitalist Physical





- Physical exam


Narrative exam: 


Gen: thin frial, ill appearing, NAD, Awake, Alert, Orientated


HEENT: NCAT, EOMI, PERRL, OP Clear 


Neck: supple, no adenopathy, no thyromegaly, no JVD 


CVS/Heart: RRR, normal S1S2, pulses present bilaterally 


Chest/Lungs: CTA B, Symmetrical chest expansion, good air entry bilaterally 


GI/Abdomen: soft, abdomen distended, good bowel sounds, no guarding or rebound 


/Bladder: no suprapubic tenderness, no CVA or paraspinal tenderness 


Extermity/Skin: no c/c/e, no obvious rash 


MSK: FROM x 4 


Neuro: CN 2-12 grossly intact, no new focal deficits 


Psych: calm 








- Constitutional


Vitals: 


                                        











Temp Pulse Resp BP Pulse Ox


 


 98.3 F   84   16   125/80   99 


 


 07/11/19 11:47  07/11/19 11:47  07/11/19 11:47  07/11/19 11:47  07/11/19 11:47











General appearance: Absent: mild distress, well-nourished





Results





- Labs


CBC & Chem 7: 


                                 07/10/19 04:43





                                 07/10/19 04:43


Labs: 


                             Laboratory Last Values











WBC  14.1 K/mm3 (4.5-11.0)  H  07/10/19  04:43    


 


RBC  3.54 M/mm3 (3.65-5.03)  L  07/10/19  04:43    


 


Hgb  7.5 gm/dl (11.8-15.2)  L  07/10/19  04:43    


 


Hct  23.9 % (35.5-45.6)  L  07/10/19  04:43    


 


MCV  68 fl (84-94)  L  07/10/19  04:43    


 


MCH  21 pg (28-32)  L  07/10/19  04:43    


 


MCHC  31 % (32-34)  L  07/10/19  04:43    


 


RDW  33.6 % (13.2-15.2)  H  07/10/19  04:43    


 


Plt Count  383 K/mm3 (140-440)   07/10/19  04:43    


 


Add Manual Diff  Complete   07/05/19  04:52    


 


Total Counted  100   07/05/19  04:52    


 


Seg Neuts % (Manual)  95.0 % (40.0-70.0)  H  07/05/19  04:52    


 


  0 %  07/05/19  04:52    


 


  3.0 % (13.4-35.0)  L  07/05/19  04:52    


 


Reactive Lymphs % (Man)  0 %  07/05/19  04:52    


 


  1.0 % (0.0-7.3)   07/05/19  04:52    


 


  1.0 % (0.0-4.3)   07/05/19  04:52    


 


  0 % (0.0-1.8)   07/05/19  04:52    


 


  0 %  07/05/19  04:52    


 


  0 %  07/05/19  04:52    


 


  0 %  07/05/19  04:52    


 


  0 %  07/05/19  04:52    


 


Nucleated RBC %  1.0 % (0.0-0.9)  H  07/05/19  04:52    


 


Seg Neutrophils # Man  9.5 K/mm3 (1.8-7.7)  H  07/05/19  04:52    


 


Band Neutrophils #  0.0 K/mm3  07/05/19  04:52    


 


  0.3 K/mm3 (1.2-5.4)  L  07/05/19  04:52    


 


Abs React Lymphs (Man)  0.0 K/mm3  07/05/19  04:52    


 


  0.1 K/mm3 (0.0-0.8)   07/05/19  04:52    


 


  0.1 K/mm3 (0.0-0.4)   07/05/19  04:52    


 


  0.0 K/mm3 (0.0-0.1)   07/05/19  04:52    


 


  0.0 K/mm3  07/05/19  04:52    


 


  0.0 K/mm3  07/05/19  04:52    


 


  0.0 K/mm3  07/05/19  04:52    


 


Blast Cells #  0.0 K/mm3  07/05/19  04:52    


 


WBC Morphology  Not Reportable   07/05/19  04:52    


 


Hypersegmented Neuts  Not Reportable   07/05/19  04:52    


 


Hyposegmented Neuts  Not Reportable   07/05/19  04:52    


 


Hypogranular Neuts  Not Reportable   07/05/19  04:52    


 


  Not Reportable   07/05/19  04:52    


 


  Not Reportable   07/05/19  04:52    


 


  Not Reportable   07/05/19  04:52    


 


  Not Reportable   07/05/19  04:52    


 


  Not Reportable   07/05/19  04:52    


 


  Not Reportable   07/05/19  04:52    


 


  Consistent w auto   07/05/19  04:52    


 


  Not Reportable   07/05/19  04:52    


 


Plt Clumps, EDTA  Not Reportable   07/05/19  04:52    


 


  Not Reportable   07/05/19  04:52    


 


  Not Reportable   07/05/19  04:52    


 


  Not Reportable   07/05/19  04:52    


 


Plt Morphology Comment  Not Reportable   07/05/19  04:52    


 


RBC Morphology  Not Reportable   07/05/19  04:52    


 


Dimorphic RBCs  Not Reportable   07/05/19  04:52    


 


  Not Reportable   07/05/19  04:52    


 


  3+   07/05/19  04:52    


 


  1+   07/05/19  04:52    


 


  3+   07/05/19  04:52    


 


  2+   07/05/19  04:52    


 


  Not Reportable   07/05/19  04:52    


 


  Not Reportable   07/05/19  04:52    


 


  Not Reportable   07/05/19  04:52    


 


  Not Reportable   07/05/19  04:52    


 


  Few   07/05/19  04:52    


 


  Not Reportable   07/05/19  04:52    


 


  Few   07/05/19  04:52    


 


  Not Reportable   07/05/19  04:52    


 


  Not Reportable   07/05/19  04:52    


 


  Not Reportable   07/05/19  04:52    


 


  Not Reportable   07/05/19  04:52    


 


  Not Reportable   07/05/19  04:52    


 


  Not Reportable   07/05/19  04:52    


 


  Few   07/05/19  04:52    


 


Acanthocytes (Spur)  Not Reportable   07/05/19  04:52    


 


Rouleaux  Not Reportable   07/05/19  04:52    


 


  Not Reportable   07/05/19  04:52    


 


  Not Reportable   07/05/19  04:52    


 


  Not Reportable   07/05/19  04:52    


 


Percent Retic  1.10 % (0.78-2.58)   07/05/19  04:52    


 


  Not Reportable   07/05/19  04:52    


 


Hem Pathologist Commnt  No   07/05/19  04:52    


 


PT  16.3 Sec. (12.2-14.9)  H  07/05/19  08:51    


 


INR  1.35  (0.87-1.13)  H  07/05/19  08:51    


 


Sodium  136 mmol/L (137-145)  L  07/10/19  04:43    


 


Potassium  4.8 mmol/L (3.6-5.0)   07/10/19  04:43    


 


Chloride  96.0 mmol/L ()  L  07/10/19  04:43    


 


Carbon Dioxide  26 mmol/L (22-30)   07/10/19  04:43    


 


  19 mmol/L  07/10/19  04:43    


 


BUN  36 mg/dL (9-20)  H  07/10/19  04:43    


 


  12.1 mg/dL (0.8-1.5)  H  07/10/19  04:43    


 


Estimated GFR  5 ml/min  07/10/19  04:43    


 


  3 %  07/10/19  04:43    


 


Glucose  86 mg/dL ()   07/10/19  04:43    


 


POC Glucose  164  ()  H  07/04/19  17:35    


 


Calcium  8.4 mg/dL (8.4-10.2)   07/10/19  04:43    


 


Phosphorus  6.90 mg/dL (2.5-4.5)  H  07/08/19  07:08    


 


Magnesium  2.00 mg/dL (1.7-2.3)   07/05/19  04:52    


 


Iron  20 ug/dL ()  L  07/05/19  08:51    


 


TIBC  219 mcg/dL (250-450)  L  07/05/19  08:51    


 


  63.8 ng/mL (13.0-400.0)   07/05/19  08:51    


 


  0.20 mg/dL (0.1-1.2)   07/10/19  04:43    


 


AST  11 units/L (5-40)   07/10/19  04:43    


 


ALT  12 units/L (7-56)   07/10/19  04:43    


 


  67 units/L ()   07/10/19  04:43    


 


  6.7 g/dL (6.3-8.2)   07/10/19  04:43    


 


  2.0 g/dL (3.9-5)  L  07/10/19  04:43    


 


  0.4 %  07/10/19  04:43    


 


  88 units/L (13-60)  H  07/04/19  12:47    


 


Tumor Marker AFP  See scanned result   07/05/19  08:51    


 


Carcinoembryonic Ag  17.4 ng/mL (0.0-2.4)  H  07/05/19  08:51    


 


  11 U/mL (<34)   07/05/19  08:51    


 


Vitamin B12  1768 pg/mL (211-911)  H  07/05/19  08:51    


 


  5.05 ng/mL (7.3-26.0)  L  07/05/19  08:51    


 


PTH Intact  238.5 pg/mL (15-65)  H  07/05/19  04:52    


 


  161.7 mg/dL (0.1-20.0)  H  07/05/19  08:51    


 


  72 mmol/L  07/05/19  08:51    


 


Fluid Type  Ascitic   07/05/19  Unknown


 


Fluid Color  Yellow   07/05/19  Unknown


 


Fluid Appearance  Hazy   07/05/19  Unknown


 


Fluid WBC  58 /mm3  07/05/19  Unknown


 


Fluid RBC  1850 /mm3  07/05/19  Unknown


 


Fluid Diff Comment     07/05/19  Unknown


 


Fluid Seg Neutrophils  35.0 %  07/05/19  Unknown


 


Fluid Lymphocytes  14.0 %  07/05/19  Unknown


 


Fluid Reactive Lymphs  0 %  07/05/19  Unknown


 


Fluid Monocytes  51.0 %  07/05/19  Unknown


 


Fluid Eosinophils  0 %  07/05/19  Unknown


 


Fluid Basophils  0 %  07/05/19  Unknown


 


Fluid Glucose  93 mg/dL (40-70)  H  07/05/19  Unknown


 


Fluid Total Protein  4.6  (15.0-45.0)  L  07/05/19  Unknown


 


Fluid Albumin  1.5 g/dL  07/05/19  Unknown


 


Fluid LDH  195   07/05/19  Unknown


 


Random Vancomycin  13.1 ug/mL (0-40.0)   07/09/19  04:50    


 


Hepatitis A IgM Ab  Non-reactive  (NonReactive)   07/04/19  17:48    


 


Hep Bs Antigen  Non-reactive  (Negative)   07/04/19  17:48    


 


Hep B Core IgM Ab  Non-reactive  (NonReactive)   07/04/19  17:48    


 


  Non-reactive  (NonReactive)   07/04/19  17:48    


 


Blood Type  A POSITIVE   07/04/19  17:48    


 


Antibody Screen  Negative   07/04/19  17:48    


 


Crossmatch  See Detail   07/04/19  17:48    














Active Medications





- Current Medications


Current Medications: 














Generic Name Dose Route Start Last Admin





  Trade Name Freq  PRN Reason Stop Dose Admin


 


Acetaminophen  650 mg  07/04/19 17:22  07/09/19 11:32





  Tylenol  PO   650 mg





  Q4H PRN   Administration





  Pain MILD(1-3)/Fever >100.5/HA  


 


Albuterol  2.5 mg  07/04/19 17:22 





  Proventil  IH  





  Q4HRT PRN  





  Shortness Of Breath  


 


Bisacodyl  15 mg  07/07/19 14:51 





  Dulcolax  PO  





  QDAY PRN  





  Constipation  


 


Chlorpromazine HCl  10 mg  07/09/19 15:20  07/11/19 10:29





  Thorazine  PO   10 mg





  Q6H PRN   Administration





  Hiccups  


 


Famotidine  10 mg  07/04/19 22:00  07/11/19 10:30





  Pepcid  IV   10 mg





  BID BOWEN   Administration


 


Ferrous Sulfate  325 mg  07/10/19 22:00  07/11/19 10:29





  Feosol  PO   325 mg





  BID BOWEN   Administration


 


Folic Acid  1 mg  07/06/19 10:00  07/11/19 10:29





  Folvite  PO   1 mg





  QDAY BOWEN   Administration


 


Hydralazine HCl  10 mg  07/04/19 19:14  07/04/19 22:17





  Apresoline  IV   10 mg





  Q3H PRN   Administration





  Blood Pressure  


 


Hydromorphone HCl  0.5 mg  07/04/19 17:22  07/11/19 10:41





  Dilaudid  IV   0.5 mg





  Q3H PRN   Administration





  Pain , Severe (7-10)  


 


Sodium Chloride  1,000 mls @ 50 mls/hr  07/08/19 09:00  07/08/19 09:45





  Nacl 0.9% 1000 Ml  IV   50 mls/hr





  AS DIRECT BOWEN   Administration


 


Sodium Chloride  100 mls @ 999 mls/hr  07/10/19 07:30 





  Nacl 0.9%  IV  





  VEE PRN  





  Hypotension  


 


Metoclopramide HCl  10 mg  07/04/19 17:22 





  Reglan  IV  





  Q6H PRN  





  Nausea And Vomiting  


 


Metoprolol Tartrate  50 mg  07/04/19 23:00  07/11/19 10:30





  Lopressor  PO   50 mg





  BID BOWEN   Administration


 


Ondansetron HCl  4 mg  07/04/19 17:22 





  Zofran  IV  





  Q3H PRN  





  Nausea And Vomiting  


 


Sodium Chloride  10 ml  07/04/19 22:00  07/11/19 10:34





  Sodium Chloride Flush Syringe 10 Ml  IV   10 ml





  BID BOWEN   Administration


 


Sodium Chloride  10 ml  07/04/19 17:22  07/07/19 10:08





  Sodium Chloride Flush Syringe 10 Ml  IV   10 ml





  PRN PRN   Administration





  LINE FLUSH  














Nutrition/Malnutrition Assess





- Dietary Evaluation


Nutrition/Malnutrition Findings: 


                                        





Nutrition Notes                                            Start:  07/11/19 

12:03


Freq:                                                      Status: Active       




Protocol:                                                                       




 Document     07/11/19 12:03  LP  (Rec: 07/11/19 12:06  LP  [id number])


 Nutrition Notes


     Need for Assessment generated from:         LOS


     Initial or Follow up                        Brief Note


     Subjective/Other Information                Screen for LOS. Pt states


                                                 eating well PTA and now.


                                                 Denies wt changes. Consuming


                                                 % of meals.


 Nutrition Intervention


     Revisit per MD consult or patient           Sign Off


      request:

## 2019-07-11 NOTE — CONSULTATION
History of Present Illness





- Reason for Consult


Consult date: 07/11/19


SBP


Requesting physician: MARLYS YU





- History of Present Illness


The patient is a 59-year-old male with hypertension and asthma who had presented

to the emergency room on 07/04/2019 with complaints of abdominal pain and 

distention along with back pain, nausea and vomiting was found to have findings 

concerning for a colonic versus rectal cancer and carcinomatosis on CT abdomen 

and pelvis. He was also found to have acute renal failure with hyperkalemia for 

which nephrology has been following him, patient has been requiring dialysis. On

07/05/2019, patient underwent an ultrasound-guided paracentesis, due to ascitic 

fluid culture growing Staphylococcus epidermidis and Staphylococcus hemolyticus,

infectious diseases was consulted due to concerns for SBP. Patient is status 

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 

with poorly differentiated mucinous adenocarcinoma. Ascitic fluid cytology 

results also positive for malignant cells. Patient initially received IV 

vancomycin and then was switched to IV cefazolin.





He has otherwise been afebrile throughout the hospitalization.





Review of Systems: 


General: no fevers,chills or rigors


HEENT: no new visual disturbance


Respiratory: No cough, sputum, hemoptysis or shortness of breath


Cardiovascular: No chest pain, syncope


Gastrointestinal: No nausea, vomiting or diarrhea


Genitourinary: No dysuria or hematuria


Musculoskeletal: No new or worsening neck pain or back pain 


Neurologic: No headaches, seizures


Hematologic: No easy bruising or bleeding


Endocrine: No night sweats or heat/cold intolerance


Skin: negative for rash, jaundice


Psychiatric: No suicidal or homicidal ideation








Past History


Past Medical History: hypertension, other (asthma)


Past Surgical History: No surgical history


Social history: denies: smoking, alcohol abuse


Family history: hypertension





Medications and Allergies


                                    Allergies











Allergy/AdvReac Type Severity Reaction Status Date / Time


 


No Known Allergies Allergy   Verified 07/04/19 12:02











                                Home Medications











 Medication  Instructions  Recorded  Confirmed  Last Taken  Type


 


Prednisone [predniSONE 10 mg 10 mg PO .TAPER #1 tab.ds.pk 09/10/15 07/06/19 

Unknown Rx





(6-Day Pack, 21 Tabs)]     











Active Meds: 


Active Medications





Acetaminophen (Tylenol)  650 mg PO Q4H PRN


   PRN Reason: Pain MILD(1-3)/Fever >100.5/HA


   Last Admin: 07/09/19 11:32 Dose:  650 mg


   Documented by: 


Albuterol (Proventil)  2.5 mg IH Q4HRT PRN


   PRN Reason: Shortness Of Breath


Bisacodyl (Dulcolax)  15 mg PO QDAY PRN


   PRN Reason: Constipation


Chlorpromazine HCl (Thorazine)  10 mg PO Q6H PRN


   PRN Reason: Hiccups


   Last Admin: 07/11/19 10:29 Dose:  10 mg


   Documented by: 


Famotidine (Pepcid)  10 mg IV BID [facility]


   Last Admin: 07/11/19 10:30 Dose:  10 mg


   Documented by: 


Ferrous Sulfate (Feosol)  325 mg PO BID [facility]


   Last Admin: 07/11/19 10:29 Dose:  325 mg


   Documented by: 


Folic Acid (Folvite)  1 mg PO QDAY [facility]


   Last Admin: 07/11/19 10:29 Dose:  1 mg


   Documented by: 


Hydralazine HCl (Apresoline)  10 mg IV Q3H PRN


   PRN Reason: Blood Pressure


   Last Admin: 07/04/19 22:17 Dose:  10 mg


   Documented by: 


Hydromorphone HCl (Dilaudid)  0.5 mg IV Q3H PRN


   PRN Reason: Pain , Severe (7-10)


   Last Admin: 07/11/19 10:41 Dose:  0.5 mg


   Documented by: 


Sodium Chloride (Nacl 0.9% 1000 Ml)  1,000 mls @ 50 mls/hr IV AS DIRECT [facility]


   Last Admin: 07/08/19 09:45 Dose:  50 mls/hr


   Documented by: 


Sodium Chloride (Nacl 0.9%)  100 mls @ 999 mls/hr IV VEE PRN


   PRN Reason: Hypotension


Cefazolin Sodium 2 gm/ Sodium (Chloride)  100 mls @ 100 mls/hr IV MoWe [facility]


Cefazolin Sodium 3 gm/ Sodium (Chloride)  100 mls @ 100 mls/30 min IV Fr [facility]


Metoclopramide HCl (Reglan)  10 mg IV Q6H PRN


   PRN Reason: Nausea And Vomiting


Metoprolol Tartrate (Lopressor)  50 mg PO BID [facility]


   Last Admin: 07/11/19 10:30 Dose:  50 mg


   Documented by: 


Ondansetron HCl (Zofran)  4 mg IV Q3H PRN


   PRN Reason: Nausea And Vomiting


Sodium Chloride (Sodium Chloride Flush Syringe 10 Ml)  10 ml IV BID [facility]


   Last Admin: 07/11/19 10:34 Dose:  10 ml


   Documented by: 


Sodium Chloride (Sodium Chloride Flush Syringe 10 Ml)  10 ml IV PRN PRN


   PRN Reason: LINE FLUSH


   Last Admin: 07/07/19 10:08 Dose:  10 ml


   Documented by: 











Physical Examination





- Physical Exam


Narrative exam: 





Physical Exam: 


Constitutional: Alert, cooperative. No acute distress


Head, Ears, Nose: Normocephalic, atraumatic. External ears, nose normal


Eyes: Conjunctivae/corneas clear. No icterus. No ptosis.


Neck: Supple, no meningeal signs


Oral: dentition fair, no thrush


Cardiovascular: S1, S2 normal. 


Respiratory: Good air entry, clear to auscultation bilaterally


GI: distended, non-tender; bowel sounds normal. No peritoneal signs


Musculoskeletal: No pedal edema, no cyanosis.


Skin: No rash or abscess


Hem/Lymphatic: No palpable cervical or supraclavicular nodes. No lymphangitis


Psych: Mood ok. Affect normal


Neurological: Awake, alert, oriented. No gross abnormality





- Constitutional


Vitals: 


                                   Vital Signs











Temp Pulse Resp BP Pulse Ox


 


 98.3 F   84   16   125/80   99 


 


 07/11/19 11:47  07/11/19 11:47  07/11/19 11:47  07/11/19 11:47  07/11/19 11:47








                           Temperature -Last 24 Hours











Temperature                    98.3 F


 


Temperature                    99.1 F


 


Temperature                    99.0 F


 


Temperature                    98.9 F


 


Temperature                    98.8 F

















Results





- Labs


CBC & Chem 7: 


                                 07/10/19 04:43





                                 07/10/19 04:43


Labs: 


                              Abnormal lab results











  07/05/19 Range/Units





  Unknown 


 


Fluid Glucose  93 H  (40-70)  mg/dL


 


Fluid Total Protein  4.6 L  (15.0-45.0)  














- Imaging and Cardiology


CT scan - abdomen: report reviewed, image reviewed (ascites, mild hydronephrosis

b/l and carcinomatosis)





Assessment and Plan





Cultures:


07/05/2019 Ascitic fluid culture: Staphylococcus epidermidis, Staphylococcus 

hemolyticus (both Oxacillin S)





A/P:


59-year-old male with hypertension and asthma admitted with abdominal 

distension. Found to have:





1) Malignant Ascites with ?concern for SBP: Ascitic fluid cell count on 

07/05/2019 showed 58 WBCs, 35% neutrophils with cytology positive for malignant 

cells. Culture is growing Staphylococcus epidermidis and Staphylococcus hemolyti

cus, both of which are coag-negative staph and likely reflective of skin 

contaminants. Also, they grew on thio-broth subculture making skin or lab 

contamination more likely. Patient afebrile, cell count is also not consistent 

with an SBP diagnosis. No antibiotics needed.





2) Metastatic rectal cancer: Oncology and general surgery following.





3) Acute renal failure requiring hemodialysis. Nephrology following





4) Leucocytosis: likely related to malignancy.





Recs:


- d/jenniffer Cefazolin





D/W Dr. Yu








Will sign off. Please call with questions.








Antonio Banks MD, FACP


[facility] ([facility])


C: [phone]


O: [phone]


F: [phone]

## 2019-07-11 NOTE — PROGRESS NOTE
Assessment and Plan





1. ESRD:


Likely CKD has progressed to ESRD


CT abdomen showed mild bilateral hydronephosis.


No improvement in the renal function.


Monitor renal function.


Renal prognosis appears to be poor.


Avoid nephrotoxic agents.


Meds dosage based on GFR.


Due to persistent hyperkalemia patient was started on hemodialysis on 7/4/2019.


Hemodialysis: 7/4, 6/5, 7/6, 7/8, 7/10.


D/w Vascular regarding Tunnel dialysis catheter palcement.





2. FEN:


Hyperkalemia, K level is better.


Anion gap MA, improved.


Monitor lytes.





3. Mild bilateral hydronephosis:


S/p hernandez catheter.





4. Rectal adenocarcinoma with malignant Ascites.


Followed by Heme-onc.





5. Anemia:


S/p PRBC.


Monitor H/H.





6. Uncontrolled HTN:


BP is better.














Subjective


Date of service: 07/11/19


Principal diagnosis: ? carcinomatosis


Interval history: 


Patient was seen and examined at the bedside.


No new complaint.








Objective





- Vital Signs


Vital signs: 


                               Vital Signs - 12hr











  07/10/19 07/11/19





  23:28 05:36


 


Temperature 99.0 F 99.1 F


 


Pulse Rate 105 H 88


 


Respiratory 20 20





Rate  


 


Blood Pressure 119/67 132/79


 


O2 Sat by Pulse 98 93





Oximetry  














- General Appearance


General appearance: well-developed, appears stated age, other (no distress)


EENT: ATNC, PERRL, mucous membranes moist, hearing intact, vision intact


Neck: supple


Respiratory: Present: Clear to Ascultation


Cardiology: regular, S1S2, no murmurs


Gastrointestinal: normoactive bowel sounds, no tenderness, distended, other 

(hernandez catheter)


Integumentary: no rash, warm and dry


Neurologic: no focal deficit, no asterixis, alert and oriented x3


Musculoskeletal: other (R groin hemodialysis catheter)


Psychiatric: cooperative





- Lab





                                 07/10/19 04:43





                                 07/10/19 04:43


                             Most recent lab results











Calcium  8.4 mg/dL (8.4-10.2)   07/10/19  04:43    


 


Phosphorus  6.90 mg/dL (2.5-4.5)  H  07/08/19  07:08    


 


Magnesium  2.00 mg/dL (1.7-2.3)   07/05/19  04:52    


 


  161.7 mg/dL (0.1-20.0)  H  07/05/19  08:51    


 


  72 mmol/L  07/05/19  08:51    














Medications & Allergies





- Medications


Allergies/Adverse Reactions: 


                                    Allergies





No Known Allergies Allergy (Verified 07/04/19 12:02)


   








Home Medications: 


                                Home Medications











 Medication  Instructions  Recorded  Confirmed  Last Taken  Type


 


Prednisone [predniSONE 10 mg 10 mg PO .TAPER #1 tab.ds.pk 09/10/15 07/06/19 

Unknown Rx





(6-Day Pack, 21 Tabs)]     











Active Medications: 














Generic Name Dose Route Start Last Admin





  Trade Name Freq  PRN Reason Stop Dose Admin


 


Acetaminophen  650 mg  07/04/19 17:22  07/09/19 11:32





  Tylenol  PO   650 mg





  Q4H PRN   Administration





  Pain MILD(1-3)/Fever >100.5/HA  


 


Albuterol  2.5 mg  07/04/19 17:22 





  Proventil  IH  





  Q4HRT PRN  





  Shortness Of Breath  


 


Bisacodyl  15 mg  07/07/19 14:51 





  Dulcolax  PO  





  QDAY PRN  





  Constipation  


 


Chlorpromazine HCl  10 mg  07/09/19 15:20  07/11/19 01:15





  Thorazine  PO   10 mg





  Q6H PRN   Administration





  Hiccups  


 


Famotidine  10 mg  07/04/19 22:00  07/10/19 23:56





  Pepcid  IV   10 mg





  BID BOWEN   Administration


 


Ferrous Sulfate  325 mg  07/10/19 22:00  07/10/19 23:56





  Feosol  PO   325 mg





  BID BOWEN   Administration


 


Folic Acid  1 mg  07/06/19 10:00  07/10/19 09:28





  Folvite  PO   1 mg





  QDAY BOWEN   Administration


 


Hydralazine HCl  10 mg  07/04/19 19:14  07/04/19 22:17





  Apresoline  IV   10 mg





  Q3H PRN   Administration





  Blood Pressure  


 


Hydromorphone HCl  0.5 mg  07/04/19 17:22  07/11/19 05:34





  Dilaudid  IV   0.5 mg





  Q3H PRN   Administration





  Pain , Severe (7-10)  


 


Sodium Chloride  1,000 mls @ 50 mls/hr  07/08/19 09:00  07/08/19 09:45





  Nacl 0.9% 1000 Ml  IV   50 mls/hr





  AS DIRECT BOWEN   Administration


 


Sodium Chloride  100 mls @ 999 mls/hr  07/10/19 07:30 





  Nacl 0.9%  IV  





  VEE PRN  





  Hypotension  


 


Metoclopramide HCl  10 mg  07/04/19 17:22 





  Reglan  IV  





  Q6H PRN  





  Nausea And Vomiting  


 


Metoprolol Tartrate  50 mg  07/04/19 23:00  07/10/19 23:58





  Lopressor  PO   50 mg





  BID BOWEN   Administration


 


Ondansetron HCl  4 mg  07/04/19 17:22 





  Zofran  IV  





  Q3H PRN  





  Nausea And Vomiting  


 


Sodium Chloride  10 ml  07/04/19 22:00  07/11/19 00:11





  Sodium Chloride Flush Syringe 10 Ml  IV   10 ml





  BID BOWEN   Administration


 


Sodium Chloride  10 ml  07/04/19 17:22  07/07/19 10:08





  Sodium Chloride Flush Syringe 10 Ml  IV   10 ml





  PRN PRN   Administration





  LINE FLUSH

## 2019-07-11 NOTE — EVENT NOTE
Date: 07/11/19





Discussed situation with Dr. Randhawa who wants to discuss options with the 

patient given his advanced cancer. Additionally, although the patient is 

afebrile, he does have leukocytosis with SBP obtained from paracentesis making 

catheter placement higher risk of infection. Option may be to consider 

palliative care. If he wants to proceed with tunneled catheter placement, he 

will contact us and we will set the patient up for procedure.

## 2019-07-11 NOTE — EVENT NOTE
Date: 07/11/19





Spoke with patient. He has not discussed diagnosis with his family as they did 

not arrive in [place] until late last night. Requests time to speak with family 

before making decision on how to proceed and if he wants port placed. Family 

will be here tomorrow am. Will follow up tomorrow when family is here.

## 2019-07-11 NOTE — PROGRESS NOTE
Objective


                               Vital Signs - 12hr











  07/11/19 07/11/19





  05:36 10:30


 


Temperature 99.1 F 


 


Pulse Rate 88 88


 


Respiratory 20 





Rate  


 


Blood Pressure 132/79 


 


O2 Sat by Pulse 93 





Oximetry  














- Labs





                                 07/10/19 04:43





                                 07/10/19 04:43

## 2019-07-11 NOTE — XRAY REPORT
CHEST 2 VIEWS 



INDICATION: 

R/O TB dialysis placement. Renal failure



COMPARISON: 

None



FINDINGS:

Support devices: None.



Heart: Within normal limits. 

Lungs/pleura: No acute air space or interstitial disease.  No pneumothorax.



Additional findings: None.



IMPRESSION:

No acute findings.



Signer Name: Mir Rooney Jr, MD 

Signed: 7/11/2019 2:31 PM

 Workstation Name: [id number]

## 2019-07-12 LAB
ALBUMIN SERPL-MCNC: 1.9 G/DL (ref 3.8–4.8)
GAMMA GLOB SERPL ELPH-MCNC: 1.6 G/DL (ref 0.8–1.7)

## 2019-07-12 PROCEDURE — 0JH63XZ INSERTION OF TUNNELED VASCULAR ACCESS DEVICE INTO CHEST SUBCUTANEOUS TISSUE AND FASCIA, PERCUTANEOUS APPROACH: ICD-10-PCS | Performed by: RADIOLOGY

## 2019-07-12 PROCEDURE — 5A1D70Z PERFORMANCE OF URINARY FILTRATION, INTERMITTENT, LESS THAN 6 HOURS PER DAY: ICD-10-PCS | Performed by: INTERNAL MEDICINE

## 2019-07-12 PROCEDURE — B244YZZ ULTRASONOGRAPHY OF RIGHT HEART USING OTHER CONTRAST: ICD-10-PCS | Performed by: RADIOLOGY

## 2019-07-12 PROCEDURE — B2141ZZ FLUOROSCOPY OF RIGHT HEART USING LOW OSMOLAR CONTRAST: ICD-10-PCS | Performed by: RADIOLOGY

## 2019-07-12 PROCEDURE — 02H633Z INSERTION OF INFUSION DEVICE INTO RIGHT ATRIUM, PERCUTANEOUS APPROACH: ICD-10-PCS | Performed by: RADIOLOGY

## 2019-07-12 RX ADMIN — CHLORPROMAZINE HYDROCHLORIDE PRN MG: 10 TABLET, FILM COATED ORAL at 23:12

## 2019-07-12 RX ADMIN — HYDROMORPHONE HYDROCHLORIDE PRN MG: 1 INJECTION, SOLUTION INTRAMUSCULAR; INTRAVENOUS; SUBCUTANEOUS at 23:28

## 2019-07-12 RX ADMIN — HEPARIN SODIUM ONE UNIT: 1000 INJECTION, SOLUTION INTRAVENOUS; SUBCUTANEOUS at 10:03

## 2019-07-12 RX ADMIN — MIDAZOLAM ONE MG: 1 INJECTION INTRAMUSCULAR; INTRAVENOUS at 09:53

## 2019-07-12 RX ADMIN — METOPROLOL TARTRATE SCH MG: 50 TABLET, FILM COATED ORAL at 11:38

## 2019-07-12 RX ADMIN — HEPARIN SODIUM ONE UNIT: 1000 INJECTION, SOLUTION INTRAVENOUS; SUBCUTANEOUS at 10:04

## 2019-07-12 RX ADMIN — HYDROMORPHONE HYDROCHLORIDE PRN MG: 1 INJECTION, SOLUTION INTRAMUSCULAR; INTRAVENOUS; SUBCUTANEOUS at 06:48

## 2019-07-12 RX ADMIN — HEPARIN SODIUM ONE UNIT: 1000 INJECTION, SOLUTION INTRAVENOUS; SUBCUTANEOUS at 10:02

## 2019-07-12 RX ADMIN — HEPARIN SODIUM ONE UNIT: 1000 INJECTION, SOLUTION INTRAVENOUS; SUBCUTANEOUS at 09:51

## 2019-07-12 RX ADMIN — FENTANYL CITRATE ONE MCG: 50 INJECTION, SOLUTION INTRAMUSCULAR; INTRAVENOUS at 09:50

## 2019-07-12 RX ADMIN — Medication SCH ML: at 11:39

## 2019-07-12 RX ADMIN — HYDROMORPHONE HYDROCHLORIDE PRN MG: 1 INJECTION, SOLUTION INTRAMUSCULAR; INTRAVENOUS; SUBCUTANEOUS at 20:36

## 2019-07-12 RX ADMIN — METOPROLOL TARTRATE SCH: 50 TABLET, FILM COATED ORAL at 22:38

## 2019-07-12 RX ADMIN — HYDROMORPHONE HYDROCHLORIDE PRN MG: 1 INJECTION, SOLUTION INTRAMUSCULAR; INTRAVENOUS; SUBCUTANEOUS at 11:29

## 2019-07-12 RX ADMIN — HEPARIN SODIUM ONE UNIT: 1000 INJECTION, SOLUTION INTRAVENOUS; SUBCUTANEOUS at 10:01

## 2019-07-12 RX ADMIN — FENTANYL CITRATE ONE MCG: 50 INJECTION, SOLUTION INTRAMUSCULAR; INTRAVENOUS at 09:53

## 2019-07-12 RX ADMIN — Medication SCH ML: at 22:38

## 2019-07-12 RX ADMIN — FAMOTIDINE SCH MG: 10 INJECTION, SOLUTION INTRAVENOUS at 11:37

## 2019-07-12 RX ADMIN — FERROUS SULFATE TAB 325 MG (65 MG ELEMENTAL FE) SCH MG: 325 (65 FE) TAB at 22:38

## 2019-07-12 RX ADMIN — FERROUS SULFATE TAB 325 MG (65 MG ELEMENTAL FE) SCH MG: 325 (65 FE) TAB at 11:38

## 2019-07-12 RX ADMIN — LIDOCAINE HYDROCHLORIDE ONE ML: 20 INJECTION, SOLUTION INFILTRATION; PERINEURAL at 09:54

## 2019-07-12 RX ADMIN — MIDAZOLAM ONE MG: 1 INJECTION INTRAMUSCULAR; INTRAVENOUS at 09:50

## 2019-07-12 RX ADMIN — HEPARIN SODIUM ONE UNIT: 1000 INJECTION, SOLUTION INTRAVENOUS; SUBCUTANEOUS at 09:59

## 2019-07-12 RX ADMIN — LIDOCAINE HYDROCHLORIDE ONE ML: 20 INJECTION, SOLUTION INFILTRATION; PERINEURAL at 09:51

## 2019-07-12 RX ADMIN — FAMOTIDINE SCH: 10 TABLET ORAL at 22:38

## 2019-07-12 RX ADMIN — FOLIC ACID SCH MG: 1 TABLET ORAL at 11:38

## 2019-07-12 RX ADMIN — HYDROMORPHONE HYDROCHLORIDE PRN MG: 1 INJECTION, SOLUTION INTRAMUSCULAR; INTRAVENOUS; SUBCUTANEOUS at 17:13

## 2019-07-12 RX ADMIN — ERYTHROPOIETIN PRN UNIT: 20000 INJECTION, SOLUTION INTRAVENOUS; SUBCUTANEOUS at 16:19

## 2019-07-12 NOTE — PROGRESS NOTE
Assessment and Plan





60 yo M with 





1. metastatic rectal cancer


2. malignant ascites


3. CKD on HD


4. anemia





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 

cells and lymphovascular invasion


Path Ascites: malignant cells consistent with metastatic carcinoma





Plan:





1. oncology on board, recommend port placement. CEA 17.4


2. Patient is still uncertain about treatments. He has discussed with his family

and is considering pursuing chemotherapy. He has more questions which I will 

defer to oncology. Will follow up with him on Monday regarding port placement 

and timing.


3. Currently no urgent need for surgical intervention for rectal mass. It is not

obstructing or actively bleeding. Patient will need neoadjuvant treatment and 

any surgical intervention would be palliative due to the advanced stage of the 

cancer. 


4. DVT ppx


5. soft diet


6. prn pain control





Thank you, please call with questions.





Subjective


Date of service: 07/12/19


Narrative: 





Pt seen and examined. No acute complaints.





Objective


                               Vital Signs - 12hr











  07/12/19 07/12/19 07/12/19





  06:29 11:29 11:30


 


Temperature   98.4 F


 


Pulse Rate 94 H  92 H


 


Respiratory  15 20





Rate   


 


Blood Pressure 116/73  131/80


 


O2 Sat by Pulse 98  100





Oximetry   














  07/12/19 07/12/19 07/12/19





  11:38 11:59 12:30


 


Temperature   97.9 F


 


Pulse Rate 94 H  94 H


 


Respiratory  16 16





Rate   


 


Blood Pressure 116/73  115/80


 


O2 Sat by Pulse   





Oximetry   














  07/12/19 07/12/19 07/12/19





  12:55 13:15 13:30


 


Temperature   


 


Pulse Rate 94 H 86 92 H


 


Respiratory   





Rate   


 


Blood Pressure 115/80 136/85 134/87


 


O2 Sat by Pulse   





Oximetry   














  07/12/19 07/12/19 07/12/19





  13:45 14:00 14:15


 


Temperature   


 


Pulse Rate 82 81 82


 


Respiratory   





Rate   


 


Blood Pressure 134/79 117/73 110/69


 


O2 Sat by Pulse   





Oximetry   














  07/12/19





  14:30


 


Temperature 


 


Pulse Rate 77


 


Respiratory 





Rate 


 


Blood Pressure 127/79


 


O2 Sat by Pulse 





Oximetry 














- General physical appearance


Narrative Exam: 





Gen: AAOx3. NAD. cachectic


CV: s1, s2+


resp; even and unlabored


Chest: R chest wall permcath


Abd: soft, ND


Ext: no c/c/e





- Labs





                                 07/10/19 04:43





                                 07/10/19 04:43


                                 Diabetes panel











  07/09/19 Range/Units





  04:50 


 


Albumin  1.9 L  (3.8-4.8)  g/dL








                                  Calcium panel











  07/09/19 Range/Units





  04:50 


 


Albumin  1.9 L  (3.8-4.8)  g/dL








                                  Adrenal panel











  07/09/19 Range/Units





  04:50 


 


Albumin  1.9 L  (3.8-4.8)  g/dL

## 2019-07-12 NOTE — PROGRESS NOTE
Assessment and Plan





1. ESRD:


Likely CKD has progressed to ESRD


CT abdomen showed mild bilateral hydronephosis.


No improvement in the renal function.


Monitor renal function.


Renal prognosis appears to be poor.


Avoid nephrotoxic agents.


Meds dosage based on GFR.


Due to persistent hyperkalemia patient was started on hemodialysis on 7/4/2019.


Hemodialysis: 7/4, 6/5, 7/6, 7/8, 7/10, 7/12.


Scheduled to get Tunnel dialysis catheter today.





2. FEN:


Hyperkalemia, K level is better.


Anion gap MA, improved.


Monitor lytes.





3. Mild bilateral hydronephosis:


S/p hernandez catheter.





4. Rectal adenocarcinoma with malignant Ascites.


Followed by Heme-onc.





5. Anemia:


S/p PRBC.


Monitor H/H.





6. Uncontrolled HTN:


BP is better.














Subjective


Date of service: 07/12/19


Principal diagnosis: ? carcinomatosis


Interval history: 


Patient was seen and examined at the bedside.


No new complaint.








Objective





- Vital Signs


Vital signs: 


                               Vital Signs - 12hr











  07/11/19 07/11/19 07/11/19





  22:21 22:25 23:41


 


Temperature 98.5 F  98.5 F


 


Pulse Rate 95 H 95 H 89


 


Respiratory 18  18





Rate   


 


Blood Pressure 112/70 112/70 117/68


 


O2 Sat by Pulse 99  99





Oximetry   














  07/12/19





  06:29


 


Temperature 


 


Pulse Rate 94 H


 


Respiratory 





Rate 


 


Blood Pressure 116/73


 


O2 Sat by Pulse 98





Oximetry 














- General Appearance


General appearance: well-developed, appears stated age, other (no distress)


EENT: ATNC, PERRL, hearing intact, vision intact


Neck: supple


Respiratory: Present: Clear to Ascultation


Cardiology: regular, S1S2, no murmurs


Gastrointestinal: normoactive bowel sounds, no tenderness, distended, other 

(hernandez catheter)


Integumentary: no rash, warm and dry


Neurologic: no focal deficit, no asterixis, alert and oriented x3


Musculoskeletal: other (no edema, R femoral dialysis catheter)





- Lab





                                 07/10/19 04:43





                                 07/10/19 04:43


                             Most recent lab results











Calcium  8.4 mg/dL (8.4-10.2)   07/10/19  04:43    


 


Phosphorus  6.90 mg/dL (2.5-4.5)  H  07/08/19  07:08    


 


Magnesium  2.00 mg/dL (1.7-2.3)   07/05/19  04:52    


 


  161.7 mg/dL (0.1-20.0)  H  07/05/19  08:51    


 


  72 mmol/L  07/05/19  08:51    














Medications & Allergies





- Medications


Allergies/Adverse Reactions: 


                                    Allergies





No Known Allergies Allergy (Verified 07/04/19 12:02)


   








Home Medications: 


                                Home Medications











 Medication  Instructions  Recorded  Confirmed  Last Taken  Type


 


Prednisone [predniSONE 10 mg 10 mg PO .TAPER #1 tab.ds.pk 09/10/15 07/06/19 

Unknown Rx





(6-Day Pack, 21 Tabs)]     











Active Medications: 














Generic Name Dose Route Start Last Admin





  Trade Name Freq  PRN Reason Stop Dose Admin


 


Acetaminophen  650 mg  07/04/19 17:22  07/09/19 11:32





  Tylenol  PO   650 mg





  Q4H PRN   Administration





  Pain MILD(1-3)/Fever >100.5/HA  


 


Albuterol  2.5 mg  07/04/19 17:22 





  Proventil  IH  





  Q4HRT PRN  





  Shortness Of Breath  


 


Bisacodyl  15 mg  07/07/19 14:51 





  Dulcolax  PO  





  QDAY PRN  





  Constipation  


 


Chlorpromazine HCl  10 mg  07/09/19 15:20  07/11/19 22:24





  Thorazine  PO   10 mg





  Q6H PRN   Administration





  Hiccups  


 


Famotidine  10 mg  07/04/19 22:00  07/11/19 22:24





  Pepcid  IV   10 mg





  BID BOWEN   Administration


 


Ferrous Sulfate  325 mg  07/10/19 22:00  07/11/19 22:24





  Feosol  PO   325 mg





  BID BOWEN   Administration


 


Folic Acid  1 mg  07/06/19 10:00  07/11/19 10:29





  Folvite  PO   1 mg





  QDAY BOWEN   Administration


 


Hydralazine HCl  10 mg  07/04/19 19:14  07/04/19 22:17





  Apresoline  IV   10 mg





  Q3H PRN   Administration





  Blood Pressure  


 


Hydromorphone HCl  0.5 mg  07/04/19 17:22  07/12/19 06:48





  Dilaudid  IV   0.5 mg





  Q3H PRN   Administration





  Pain , Severe (7-10)  


 


Sodium Chloride  1,000 mls @ 50 mls/hr  07/08/19 09:00  07/08/19 09:45





  Nacl 0.9% 1000 Ml  IV   50 mls/hr





  AS DIRECT BOWEN   Administration


 


Sodium Chloride  100 mls @ 999 mls/hr  07/10/19 07:30 





  Nacl 0.9%  IV  





  VEE PRN  





  Hypotension  


 


Metoclopramide HCl  10 mg  07/04/19 17:22 





  Reglan  IV  





  Q6H PRN  





  Nausea And Vomiting  


 


Metoprolol Tartrate  50 mg  07/04/19 23:00  07/11/19 22:25





  Lopressor  PO   Not Given





  BID BOWEN  


 


Ondansetron HCl  4 mg  07/04/19 17:22 





  Zofran  IV  





  Q3H PRN  





  Nausea And Vomiting  


 


Sodium Chloride  10 ml  07/04/19 22:00  07/11/19 22:24





  Sodium Chloride Flush Syringe 10 Ml  IV   10 ml





  BID BOWEN   Administration


 


Sodium Chloride  10 ml  07/04/19 17:22  07/11/19 20:41





  Sodium Chloride Flush Syringe 10 Ml  IV   10 ml





  PRN PRN   Administration





  LINE FLUSH

## 2019-07-12 NOTE — OPERATIVE REPORT
Operative Report


Operative Report: 





Exam: Ultrasound of fluoroscopic adequate placement of tunneled hemodialysis 

catheter





Clinical indication: Patient with end-stage renal disease requiring longer-term 

dialysis access





Date: 07/12/2019





Procedure: Following an explanation of the risks, benefits and alternatives; 

written informed consent was obtained.  The patient was brought to the 

angiographic suite and placed in supine position on the examination table.  

Initial ultrasound evaluation of the neck demonstrated a patent right internal 

jugular vein.  The patient's right neck and chest wall were prepped and draped 

in usual sterile fashion.  1% lidocaine was used for anesthesia.





Under ultrasound guidance, the right internal jugular vein was cannulated with a

7 cm 18-gauge needle.  A 0.035 guidewire was advanced to the IVC under 

fluoroscopy for anchoring N to document intravenous positioning.  Images were 

saved.  The needle was removed.





An appropriate catheter exit site was chosen along the lateral right chest wall.

 1% lidocaine was used for anesthesia at the catheter exit site along the tunnel

tract.  A 23 cm tunneled hemodialysis catheter was then tunneled antegrade from 

the catheter exit site to the venotomy site.





Following serial dilation over the guidewire under fluoroscopy, a 15 [place] 

peel-away sheath was placed over the guidewire under fluoroscopy and advanced 

centrally.  The guidewire and trocar were removed.  The catheter was placed 

through the peel-away sheath and positioned so that tip is in the proximal right

atrium.  The peel-away sheath was removed.  Both ports flushed and aspirated 

easily and more than left with appropriate volumes of heparin.





The venotomy was approximated using 3-0 Vicryl suture.





Vicryl suture was also used to approximate the catheter exit site.  Dermabond 

was then applied.  Sterile dressings were then applied.





The patient tolerated the procedure well.  There were no immediate post 

procedure complications.





Conscious sedation was performed under the guidance of radiologic nursing.  

Continuous cardiopulmonary monitoring was utilized.





Impression: Ultrasound and fluoroscopic guided placement of tunneled 

hemodialysis catheter via the right internal jugular vein.

## 2019-07-12 NOTE — PROGRESS NOTE
Subjective


Date of service: 07/12/19


Principal diagnosis: ? carcinomatosis





Objective





- Constitutional


Vitals: 


                                   Vital Signs











Temp Pulse Resp BP Pulse Ox


 


 98.5 F   94 H  18   116/73   98 


 


 07/11/19 23:41  07/12/19 06:29  07/11/19 23:41  07/12/19 06:29  07/12/19 06:29








                           Temperature -Last 24 Hours











Temperature                    98.5 F


 


Temperature                    98.5 F


 


Temperature                    97.8 F


 


Temperature                    98.6 F


 


Temperature                    98.3 F

















- Labs


CBC & Chem 7: 


                                 07/10/19 04:43





                                 07/10/19 04:43


Labs: 


                              Abnormal lab results











  07/04/19 07/09/19 Range/Units





  17:48 04:50 


 


Serum Total Protein   5.9 L  (6.1-8.1)  g/dL


 


Albumin   1.9 L  (3.8-4.8)  g/dL


 


Alpha-1-Globulins   0.7 H  (0.2-0.3)  g/dL


 


PEP Interpretation   see below H  


 


Crossmatch  See Detail

## 2019-07-12 NOTE — PROGRESS NOTE
Assessment and Plan


Assessment and plan: 


Patient is a 58 yo man with a history of hypertension and Asthma who presented 

to [facility] ED with urinary frequency, back pains, n/v, diarrhea and abd pains with 

distension x 1 week. 





* Initial labs: wbc 13.0 to 10.0, hgb 6.3 to 7.0 (mcv 60), na 132, k 9.5, cr 

  34.8, bun 129, bg 164, lipase 88


* CT abd/pelvis without contrast IMPRESSION: 1. There is ascites. There is 

  increased density in the omentum. Findings are concerning for carcinomatosis. 

  There is some mild adenopathy in the pelvis a possible pericolonic mass, 

  series 2 image 139. There is mild bilateral hydronephrosis. The exact cause is

  not determined by this study. There is minimal nephrolithiasis on the left. No

  ureteral calculi are identified however.


*  There was a circumferential rectal tumor from the dentate line extending to 

  mid-proximal rectum.  There was severe stenosis related to circumferential 

  tumor which was unable to be traversed.  The tumor was friable to touch.   

  Multiple biopsies were obtained.


   * Impression:1. Rectal tumor from dentate line to mid/proximal rectum (extent

     of exam due to severe stenosis related to tumor).  Biopsies were obtained.


   * Recommendations:


   * follow-up pathology, -surgery consult, -heme/onc following, -screen first 

     degree relatives for colorectal cancer, -will need colonoscopy in 3-6 

     months to rule out proximal colon lesions








Acute anemia with Drop in HCT s/p transfused 2 units of PRBC, ordered FOBT, GI 

is following, d/w Dr. Joel,


Findings of colonosocpy as noted above.


ARF, suspect ATN + post obstructive+ vasomotor, poa: s/p Vas-cath, emergent 

hemodialysis due to severe hyperkalemia done on 7/4/19, place [facility] on 7/5/19 

with very little output.


Permacath placed, Femoral line to be removed 


Bilateral hydronephrosis: place hernandez, consulted Urology, input 

noted==>discharge with hernandez 


Metastatic Rectal Cancer: Awaiting full pathology set up. 


Ascites due to suspected Colon cancer with mets to Omentum/Carcinomatosis 

suspected, which would be a very poor prognostic sign: consulted GI, input 

noted, s/p Paracentesis on 7/5/19, await cytology, CEA 17 ELEVATED, AFP 

ELEVATED, CA 19-9 - normal 11


Abx discontinued following ID eval as culture appears to be contaminant


Hyperkalemia: treated with HD, Nephrology is following


Acute Microcytic anemia, suspect cancer related until proven otherwise, I did 

inform patient my concerns after gaining his permission to do so: consult GI 

then consider Heme/Onc consult


Severe malnutrition: Dietitian consulted 


hiccough - likely tumor related, will add Thorazine.


DVT prophylaxis: On heparin and GI prophylaxis











History


Interval history: 


Patient seen and examined no new complaints. Family at bedside. 





Hospitalist Physical





- Physical exam


Narrative exam: 


Gen: thin frial, ill appearing, NAD, Awake, Alert, Orientated


HEENT: NCAT, EOMI, PERRL, OP Clear 


Neck: supple, no adenopathy, no thyromegaly, no JVD 


CVS/Heart: RRR, normal S1S2, pulses present bilaterally 


Chest/Lungs: CTA B, Symmetrical chest expansion, good air entry bilaterally 


GI/Abdomen: soft, abdomen distended, good bowel sounds, no guarding or rebound 


/Bladder: no suprapubic tenderness, no CVA or paraspinal tenderness 


Extermity/Skin: no c/c/e, no obvious rash 


MSK: FROM x 4 


Neuro: CN 2-12 grossly intact, no new focal deficits 


Psych: calm 








- Constitutional


Vitals: 


                                        











Temp Pulse Resp BP Pulse Ox


 


 97.9 F   82   16   134/79   100 


 


 07/12/19 12:30  07/12/19 13:45  07/12/19 12:30  07/12/19 13:45  07/12/19 11:30











General appearance: Absent: mild distress, well-nourished





Results





- Labs


CBC & Chem 7: 


                                 07/10/19 04:43





                                 07/10/19 04:43


Labs: 


                             Laboratory Last Values











WBC  14.1 K/mm3 (4.5-11.0)  H  07/10/19  04:43    


 


RBC  3.54 M/mm3 (3.65-5.03)  L  07/10/19  04:43    


 


Hgb  7.5 gm/dl (11.8-15.2)  L  07/10/19  04:43    


 


Hct  23.9 % (35.5-45.6)  L  07/10/19  04:43    


 


MCV  68 fl (84-94)  L  07/10/19  04:43    


 


MCH  21 pg (28-32)  L  07/10/19  04:43    


 


MCHC  31 % (32-34)  L  07/10/19  04:43    


 


RDW  33.6 % (13.2-15.2)  H  07/10/19  04:43    


 


Plt Count  383 K/mm3 (140-440)   07/10/19  04:43    


 


Add Manual Diff  Complete   07/05/19  04:52    


 


Total Counted  100   07/05/19  04:52    


 


Seg Neuts % (Manual)  95.0 % (40.0-70.0)  H  07/05/19  04:52    


 


  0 %  07/05/19  04:52    


 


  3.0 % (13.4-35.0)  L  07/05/19  04:52    


 


Reactive Lymphs % (Man)  0 %  07/05/19  04:52    


 


  1.0 % (0.0-7.3)   07/05/19  04:52    


 


  1.0 % (0.0-4.3)   07/05/19  04:52    


 


  0 % (0.0-1.8)   07/05/19  04:52    


 


  0 %  07/05/19  04:52    


 


  0 %  07/05/19  04:52    


 


  0 %  07/05/19  04:52    


 


  0 %  07/05/19  04:52    


 


Nucleated RBC %  1.0 % (0.0-0.9)  H  07/05/19  04:52    


 


Seg Neutrophils # Man  9.5 K/mm3 (1.8-7.7)  H  07/05/19  04:52    


 


Band Neutrophils #  0.0 K/mm3  07/05/19  04:52    


 


  0.3 K/mm3 (1.2-5.4)  L  07/05/19  04:52    


 


Abs React Lymphs (Man)  0.0 K/mm3  07/05/19  04:52    


 


  0.1 K/mm3 (0.0-0.8)   07/05/19  04:52    


 


  0.1 K/mm3 (0.0-0.4)   07/05/19  04:52    


 


  0.0 K/mm3 (0.0-0.1)   07/05/19  04:52    


 


  0.0 K/mm3  07/05/19  04:52    


 


  0.0 K/mm3  07/05/19  04:52    


 


  0.0 K/mm3  07/05/19  04:52    


 


Blast Cells #  0.0 K/mm3  07/05/19  04:52    


 


WBC Morphology  Not Reportable   07/05/19  04:52    


 


Hypersegmented Neuts  Not Reportable   07/05/19  04:52    


 


Hyposegmented Neuts  Not Reportable   07/05/19  04:52    


 


Hypogranular Neuts  Not Reportable   07/05/19  04:52    


 


  Not Reportable   07/05/19  04:52    


 


  Not Reportable   07/05/19  04:52    


 


  Not Reportable   07/05/19  04:52    


 


  Not Reportable   07/05/19  04:52    


 


  Not Reportable   07/05/19  04:52    


 


  Not Reportable   07/05/19  04:52    


 


  Consistent w auto   07/05/19  04:52    


 


  Not Reportable   07/05/19  04:52    


 


Plt Clumps, EDTA  Not Reportable   07/05/19  04:52    


 


  Not Reportable   07/05/19  04:52    


 


  Not Reportable   07/05/19  04:52    


 


  Not Reportable   07/05/19  04:52    


 


Plt Morphology Comment  Not Reportable   07/05/19  04:52    


 


RBC Morphology  Not Reportable   07/05/19  04:52    


 


Dimorphic RBCs  Not Reportable   07/05/19  04:52    


 


  Not Reportable   07/05/19  04:52    


 


  3+   07/05/19  04:52    


 


  1+   07/05/19  04:52    


 


  3+   07/05/19  04:52    


 


  2+   07/05/19  04:52    


 


  Not Reportable   07/05/19  04:52    


 


  Not Reportable   07/05/19  04:52    


 


  Not Reportable   07/05/19  04:52    


 


  Not Reportable   07/05/19  04:52    


 


  Few   07/05/19  04:52    


 


  Not Reportable   07/05/19  04:52    


 


  Few   07/05/19  04:52    


 


  Not Reportable   07/05/19  04:52    


 


  Not Reportable   07/05/19  04:52    


 


  Not Reportable   07/05/19  04:52    


 


  Not Reportable   07/05/19  04:52    


 


  Not Reportable   07/05/19  04:52    


 


  Not Reportable   07/05/19  04:52    


 


  Few   07/05/19  04:52    


 


Acanthocytes (Spur)  Not Reportable   07/05/19  04:52    


 


Rouleaux  Not Reportable   07/05/19  04:52    


 


  Not Reportable   07/05/19  04:52    


 


  Not Reportable   07/05/19  04:52    


 


  Not Reportable   07/05/19  04:52    


 


Percent Retic  1.10 % (0.78-2.58)   07/05/19  04:52    


 


  Not Reportable   07/05/19  04:52    


 


Hem Pathologist Commnt  No   07/05/19  04:52    


 


PT  16.3 Sec. (12.2-14.9)  H  07/05/19  08:51    


 


INR  1.35  (0.87-1.13)  H  07/05/19  08:51    


 


Sodium  136 mmol/L (137-145)  L  07/10/19  04:43    


 


Potassium  4.8 mmol/L (3.6-5.0)   07/10/19  04:43    


 


Chloride  96.0 mmol/L ()  L  07/10/19  04:43    


 


Carbon Dioxide  26 mmol/L (22-30)   07/10/19  04:43    


 


  19 mmol/L  07/10/19  04:43    


 


BUN  36 mg/dL (9-20)  H  07/10/19  04:43    


 


  12.1 mg/dL (0.8-1.5)  H  07/10/19  04:43    


 


Estimated GFR  5 ml/min  07/10/19  04:43    


 


  3 %  07/10/19  04:43    


 


Glucose  86 mg/dL ()   07/10/19  04:43    


 


POC Glucose  164  ()  H  07/04/19  17:35    


 


Calcium  8.4 mg/dL (8.4-10.2)   07/10/19  04:43    


 


Phosphorus  6.90 mg/dL (2.5-4.5)  H  07/08/19  07:08    


 


Magnesium  2.00 mg/dL (1.7-2.3)   07/05/19  04:52    


 


Iron  20 ug/dL ()  L  07/05/19  08:51    


 


TIBC  219 mcg/dL (250-450)  L  07/05/19  08:51    


 


  63.8 ng/mL (13.0-400.0)   07/05/19  08:51    


 


  0.20 mg/dL (0.1-1.2)   07/10/19  04:43    


 


AST  11 units/L (5-40)   07/10/19  04:43    


 


ALT  12 units/L (7-56)   07/10/19  04:43    


 


  67 units/L ()   07/10/19  04:43    


 


  5.9 g/dL (6.1-8.1)  L  07/09/19  04:50    


 


  6.7 g/dL (6.3-8.2)   07/10/19  04:43    


 


  2.0 g/dL (3.9-5)  L  07/10/19  04:43    


 


  0.4 %  07/10/19  04:43    


 


  0.7 g/dL (0.2-0.3)  H  07/09/19  04:50    


 


  0.8 g/dL (0.5-0.9)   07/09/19  04:50    


 


  0.5 g/dL (0.2-0.5)   07/09/19  04:50    


 


  1.6 g/dL (0.8-1.7)   07/09/19  04:50    


 


Abnorm Protein Band 1  see below   07/09/19  04:50    


 


PEP Interpretation  see below  H  07/09/19  04:50    


 


  88 units/L (13-60)  H  07/04/19  12:47    


 


Tumor Marker AFP  See scanned result   07/05/19  08:51    


 


Carcinoembryonic Ag  17.4 ng/mL (0.0-2.4)  H  07/05/19  08:51    


 


  11 U/mL (<34)   07/05/19  08:51    


 


Vitamin B12  1768 pg/mL (211-911)  H  07/05/19  08:51    


 


  5.05 ng/mL (7.3-26.0)  L  07/05/19  08:51    


 


PTH Intact  238.5 pg/mL (15-65)  H  07/05/19  04:52    


 


  161.7 mg/dL (0.1-20.0)  H  07/05/19  08:51    


 


  72 mmol/L  07/05/19  08:51    


 


Fluid Type  Ascitic   07/05/19  Unknown


 


Fluid Color  Yellow   07/05/19  Unknown


 


Fluid Appearance  Hazy   07/05/19  Unknown


 


Fluid WBC  58 /mm3  07/05/19  Unknown


 


Fluid RBC  1850 /mm3  07/05/19  Unknown


 


Fluid Diff Comment     07/05/19  Unknown


 


Fluid Seg Neutrophils  35.0 %  07/05/19  Unknown


 


Fluid Lymphocytes  14.0 %  07/05/19  Unknown


 


Fluid Reactive Lymphs  0 %  07/05/19  Unknown


 


Fluid Monocytes  51.0 %  07/05/19  Unknown


 


Fluid Eosinophils  0 %  07/05/19  Unknown


 


Fluid Basophils  0 %  07/05/19  Unknown


 


Fluid Glucose  93 mg/dL (40-70)  H  07/05/19  Unknown


 


Fluid Total Protein  4.6  (15.0-45.0)  L  07/05/19  Unknown


 


Fluid Albumin  1.5 g/dL  07/05/19  Unknown


 


Fluid LDH  195   07/05/19  Unknown


 


Random Vancomycin  13.1 ug/mL (0-40.0)   07/09/19  04:50    


 


EVELYN Screen  Negative  (Negative)   07/09/19  04:50    


 


Proteinase 3 (PR3) Ab  <1.0 AI (<1.0)   07/09/19  04:50    


 


Myeloperoxidase Ab  <1.0 AI (<1.0)   07/09/19  04:50    


 


  158 mg/dL ()   07/09/19  04:50    


 


  32 mg/dL (15-53)   07/09/19  04:50    


 


Hepatitis A IgM Ab  Non-reactive  (NonReactive)   07/04/19  17:48    


 


Hep Bs Antigen  Non-reactive  (Negative)   07/04/19  17:48    


 


Hep B Core IgM Ab  Non-reactive  (NonReactive)   07/04/19  17:48    


 


  Non-reactive  (NonReactive)   07/04/19  17:48    


 


Blood Type  A POSITIVE   07/04/19  17:48    


 


Antibody Screen  Negative   07/04/19  17:48    


 


Crossmatch  See Detail   07/04/19  17:48    














Active Medications





- Current Medications


Current Medications: 














Generic Name Dose Route Start Last Admin





  Trade Name Marina  PRN Reason Stop Dose Admin


 


Acetaminophen  650 mg  07/04/19 17:22  07/09/19 11:32





  Tylenol  PO   650 mg





  Q4H PRN   Administration





  Pain MILD(1-3)/Fever >100.5/HA  


 


Albuterol  2.5 mg  07/04/19 17:22 





  Proventil  IH  





  Q4HRT PRN  





  Shortness Of Breath  


 


Bisacodyl  15 mg  07/07/19 14:51 





  Dulcolax  PO  





  QDAY PRN  





  Constipation  


 


Chlorpromazine HCl  10 mg  07/09/19 15:20  07/11/19 22:24





  Thorazine  PO   10 mg





  Q6H PRN   Administration





  Hiccups  


 


Epoetin Tye  20,000 unit  07/12/19 09:36 





  Procrit  SUB-Q  





  VEE PRN  





  hemodialysis  


 


Famotidine  10 mg  07/12/19 22:00 





  Pepcid  PO  





  BID BOWEN  


 


Ferrous Sulfate  325 mg  07/10/19 22:00  07/12/19 11:38





  Feosol  PO   325 mg





  BID BOWEN   Administration


 


Folic Acid  1 mg  07/06/19 10:00  07/12/19 11:38





  Folvite  PO   1 mg





  QDAY BOWEN   Administration


 


Heparin Sodium (Porcine)  3,000 unit  07/12/19 09:36 





  Heparin 10,000 Units/10 Ml  IV  





  VEE PRN  





  hemodialysis  


 


Hydralazine HCl  10 mg  07/04/19 19:14  07/04/19 22:17





  Apresoline  IV   10 mg





  Q3H PRN   Administration





  Blood Pressure  


 


Hydromorphone HCl  0.5 mg  07/04/19 17:22  07/12/19 11:29





  Dilaudid  IV   0.5 mg





  Q3H PRN   Administration





  Pain , Severe (7-10)  


 


Sodium Chloride  1,000 mls @ 50 mls/hr  07/08/19 09:00  07/08/19 09:45





  Nacl 0.9% 1000 Ml  IV   50 mls/hr





  AS DIRECT BOWEN   Administration


 


Sodium Chloride  100 mls @ 999 mls/hr  07/12/19 09:36 





  Nacl 0.9%  IV  





  VEE PRN  





  Hypotension  


 


Metoclopramide HCl  10 mg  07/04/19 17:22 





  Reglan  IV  





  Q6H PRN  





  Nausea And Vomiting  


 


Metoprolol Tartrate  50 mg  07/04/19 23:00  07/12/19 11:38





  Lopressor  PO   50 mg





  BID BOWEN   Administration


 


Ondansetron HCl  4 mg  07/04/19 17:22 





  Zofran  IV  





  Q3H PRN  





  Nausea And Vomiting  


 


Sodium Chloride  10 ml  07/04/19 22:00  07/12/19 11:39





  Sodium Chloride Flush Syringe 10 Ml  IV   10 ml





  BID BOWEN   Administration


 


Sodium Chloride  10 ml  07/04/19 17:22  07/11/19 20:41





  Sodium Chloride Flush Syringe 10 Ml  IV   10 ml





  PRN PRN   Administration





  LINE FLUSH  














Nutrition/Malnutrition Assess





- Dietary Evaluation


Nutrition/Malnutrition Findings: 


                                        





Nutrition Notes                                            Start:  07/11/19 

12:03


Freq:                                                      Status: Active       




Protocol:                                                                       




 Document     07/11/19 12:03  LP  (Rec: 07/11/19 12:06  LP  [id number])


 Nutrition Notes


     Need for Assessment generated from:         LOS


     Initial or Follow up                        Brief Note


     Subjective/Other Information                Screen for LOS. Pt states


                                                 eating well PTA and now.


                                                 Denies wt changes. Consuming


                                                 % of meals.


 Nutrition Intervention


     Revisit per MD consult or patient           Sign Off


      request:

## 2019-07-13 RX ADMIN — FOLIC ACID SCH MG: 1 TABLET ORAL at 10:45

## 2019-07-13 RX ADMIN — FAMOTIDINE SCH MG: 10 TABLET ORAL at 10:44

## 2019-07-13 RX ADMIN — FERROUS SULFATE TAB 325 MG (65 MG ELEMENTAL FE) SCH MG: 325 (65 FE) TAB at 21:21

## 2019-07-13 RX ADMIN — METOPROLOL TARTRATE SCH MG: 50 TABLET, FILM COATED ORAL at 23:15

## 2019-07-13 RX ADMIN — FERROUS SULFATE TAB 325 MG (65 MG ELEMENTAL FE) SCH MG: 325 (65 FE) TAB at 10:44

## 2019-07-13 RX ADMIN — HYDROMORPHONE HYDROCHLORIDE PRN MG: 1 INJECTION, SOLUTION INTRAMUSCULAR; INTRAVENOUS; SUBCUTANEOUS at 21:32

## 2019-07-13 RX ADMIN — OXYCODONE AND ACETAMINOPHEN PRN TAB: 5; 325 TABLET ORAL at 18:17

## 2019-07-13 RX ADMIN — FAMOTIDINE SCH: 10 TABLET ORAL at 21:23

## 2019-07-13 RX ADMIN — HYDROMORPHONE HYDROCHLORIDE PRN MG: 1 INJECTION, SOLUTION INTRAMUSCULAR; INTRAVENOUS; SUBCUTANEOUS at 06:51

## 2019-07-13 RX ADMIN — HYDROMORPHONE HYDROCHLORIDE PRN MG: 1 INJECTION, SOLUTION INTRAMUSCULAR; INTRAVENOUS; SUBCUTANEOUS at 15:30

## 2019-07-13 RX ADMIN — METOPROLOL TARTRATE SCH MG: 50 TABLET, FILM COATED ORAL at 10:43

## 2019-07-13 RX ADMIN — CHLORPROMAZINE HYDROCHLORIDE PRN MG: 10 TABLET, FILM COATED ORAL at 07:46

## 2019-07-13 RX ADMIN — HYDROMORPHONE HYDROCHLORIDE PRN MG: 1 INJECTION, SOLUTION INTRAMUSCULAR; INTRAVENOUS; SUBCUTANEOUS at 03:25

## 2019-07-13 RX ADMIN — OXYCODONE AND ACETAMINOPHEN PRN TAB: 5; 325 TABLET ORAL at 11:05

## 2019-07-13 RX ADMIN — Medication SCH ML: at 10:45

## 2019-07-13 RX ADMIN — Medication SCH ML: at 21:22

## 2019-07-13 NOTE — PROGRESS NOTE
Assessment and Plan


Assessment and plan: 


Patient is a 60 yo man with a history of hypertension and Asthma who presented 

to [facility] ED with urinary frequency, back pains, n/v, diarrhea and abd pains with 

distension x 1 week. 





* Initial labs: wbc 13.0 to 10.0, hgb 6.3 to 7.0 (mcv 60), na 132, k 9.5, cr 

  34.8, bun 129, bg 164, lipase 88


* CT abd/pelvis without contrast IMPRESSION: 1. There is ascites. There is 

  increased density in the omentum. Findings are concerning for carcinomatosis. 

  There is some mild adenopathy in the pelvis a possible pericolonic mass, 

  series 2 image 139. There is mild bilateral hydronephrosis. The exact cause is

  not determined by this study. There is minimal nephrolithiasis on the left. No

  ureteral calculi are identified however.


*  There was a circumferential rectal tumor from the dentate line extending to 

  mid-proximal rectum.  There was severe stenosis related to circumferential 

  tumor which was unable to be traversed.  The tumor was friable to touch.   

  Multiple biopsies were obtained.


   * Impression:1. Rectal tumor from dentate line to mid/proximal rectum (extent

     of exam due to severe stenosis related to tumor).  Biopsies were obtained.


   * Recommendations:


   * follow-up pathology, -surgery consult, -heme/onc following, -screen first 

     degree relatives for colorectal cancer, -will need colonoscopy in 3-6 

     months to rule out proximal colon lesions








Acute anemia with Drop in HCT s/p transfused 2 units of PRBC, ordered FOBT, GI 

is following, d/w Dr. Joel,


Findings of colonosocpy as noted above.


ARF, suspect ATN + post obstructive+ vasomotor, poa: s/p permacath, had emergent

hemodialysis due to severe hyperkalemia done on 7/4/19, place hernandez on 7/5/19 

with very little output.


Bilateral hydronephrosis: place heranndez, consulted Urology, input 

noted==>discharge with hernandez 


Metastatic Rectal Cancer: Awaiting full pathology set up. 


Ascites due to suspected Colon cancer with mets to Omentum/Carcinomatosis 

suspected, which would be a very poor prognostic sign: consulted GI, input 

noted, s/p Paracentesis on 7/5/19, await cytology, CEA 17 ELEVATED, AFP 

ELEVATED, CA 19-9 - normal 11


Abx discontinued following ID eval as culture appears to be contaminant


Hyperkalemia: treated with HD, Nephrology is following


Acute Microcytic anemia, suspect cancer related until proven otherwise, I did 

inform patient my concerns after gaining his permission to do so: consult GI 

then consider Heme/Onc consult


Severe malnutrition: Dietitian consulted 


hiccough - likely tumor related, will add Thorazine.


DVT prophylaxis: On heparin and GI prophylaxis


Discharge when Outpatient Dialysis ready. Patient will discuss with surgery 

about port placement for chemotherapy. Outpatient follow up with oncologist 











History


Interval history: 


Patient seen and examined no new complaints. 





Hospitalist Physical





- Physical exam


Narrative exam: 


Gen: thin frial, ill appearing, NAD, Awake, Alert, Orientated


HEENT: NCAT, EOMI, PERRL, OP Clear 


Neck: supple, no adenopathy, no thyromegaly, no JVD 


CVS/Heart: RRR, normal S1S2, pulses present bilaterally 


Chest/Lungs: CTA B, Symmetrical chest expansion, good air entry bilaterally 


GI/Abdomen: soft, abdomen distended, good bowel sounds, no guarding or rebound 


/Bladder: no suprapubic tenderness, no CVA or paraspinal tenderness 


Extermity/Skin: no c/c/e, no obvious rash 


MSK: FROM x 4 


Neuro: CN 2-12 grossly intact, no new focal deficits 


Psych: calm 








- Constitutional


Vitals: 


                                        











Temp Pulse Resp BP Pulse Ox


 


 98.8 F   97 H  19   123/97   98 


 


 07/13/19 05:58  07/13/19 10:43  07/13/19 06:51  07/13/19 10:43  07/13/19 05:58











General appearance: Absent: mild distress, well-nourished





Results





- Labs


CBC & Chem 7: 


                                 07/10/19 04:43





                                 07/10/19 04:43


Labs: 


                             Laboratory Last Values











WBC  14.1 K/mm3 (4.5-11.0)  H  07/10/19  04:43    


 


RBC  3.54 M/mm3 (3.65-5.03)  L  07/10/19  04:43    


 


Hgb  7.5 gm/dl (11.8-15.2)  L  07/10/19  04:43    


 


Hct  23.9 % (35.5-45.6)  L  07/10/19  04:43    


 


MCV  68 fl (84-94)  L  07/10/19  04:43    


 


MCH  21 pg (28-32)  L  07/10/19  04:43    


 


MCHC  31 % (32-34)  L  07/10/19  04:43    


 


RDW  33.6 % (13.2-15.2)  H  07/10/19  04:43    


 


Plt Count  383 K/mm3 (140-440)   07/10/19  04:43    


 


Add Manual Diff  Complete   07/05/19  04:52    


 


Total Counted  100   07/05/19  04:52    


 


Seg Neuts % (Manual)  95.0 % (40.0-70.0)  H  07/05/19  04:52    


 


  0 %  07/05/19  04:52    


 


  3.0 % (13.4-35.0)  L  07/05/19  04:52    


 


Reactive Lymphs % (Man)  0 %  07/05/19  04:52    


 


  1.0 % (0.0-7.3)   07/05/19  04:52    


 


  1.0 % (0.0-4.3)   07/05/19  04:52    


 


  0 % (0.0-1.8)   07/05/19  04:52    


 


  0 %  07/05/19  04:52    


 


  0 %  07/05/19  04:52    


 


  0 %  07/05/19  04:52    


 


  0 %  07/05/19  04:52    


 


Nucleated RBC %  1.0 % (0.0-0.9)  H  07/05/19  04:52    


 


Seg Neutrophils # Man  9.5 K/mm3 (1.8-7.7)  H  07/05/19  04:52    


 


Band Neutrophils #  0.0 K/mm3  07/05/19  04:52    


 


  0.3 K/mm3 (1.2-5.4)  L  07/05/19  04:52    


 


Abs React Lymphs (Man)  0.0 K/mm3  07/05/19  04:52    


 


  0.1 K/mm3 (0.0-0.8)   07/05/19  04:52    


 


  0.1 K/mm3 (0.0-0.4)   07/05/19  04:52    


 


  0.0 K/mm3 (0.0-0.1)   07/05/19  04:52    


 


  0.0 K/mm3  07/05/19  04:52    


 


  0.0 K/mm3  07/05/19  04:52    


 


  0.0 K/mm3  07/05/19  04:52    


 


Blast Cells #  0.0 K/mm3  07/05/19  04:52    


 


WBC Morphology  Not Reportable   07/05/19  04:52    


 


Hypersegmented Neuts  Not Reportable   07/05/19  04:52    


 


Hyposegmented Neuts  Not Reportable   07/05/19  04:52    


 


Hypogranular Neuts  Not Reportable   07/05/19  04:52    


 


  Not Reportable   07/05/19  04:52    


 


  Not Reportable   07/05/19  04:52    


 


  Not Reportable   07/05/19  04:52    


 


  Not Reportable   07/05/19  04:52    


 


  Not Reportable   07/05/19  04:52    


 


  Not Reportable   07/05/19  04:52    


 


  Consistent w auto   07/05/19  04:52    


 


  Not Reportable   07/05/19  04:52    


 


Plt Clumps, EDTA  Not Reportable   07/05/19  04:52    


 


  Not Reportable   07/05/19  04:52    


 


  Not Reportable   07/05/19  04:52    


 


  Not Reportable   07/05/19  04:52    


 


Plt Morphology Comment  Not Reportable   07/05/19  04:52    


 


RBC Morphology  Not Reportable   07/05/19  04:52    


 


Dimorphic RBCs  Not Reportable   07/05/19  04:52    


 


  Not Reportable   07/05/19  04:52    


 


  3+   07/05/19  04:52    


 


  1+   07/05/19  04:52    


 


  3+   07/05/19  04:52    


 


  2+   07/05/19  04:52    


 


  Not Reportable   07/05/19  04:52    


 


  Not Reportable   07/05/19  04:52    


 


  Not Reportable   07/05/19  04:52    


 


  Not Reportable   07/05/19  04:52    


 


  Few   07/05/19  04:52    


 


  Not Reportable   07/05/19  04:52    


 


  Few   07/05/19  04:52    


 


  Not Reportable   07/05/19  04:52    


 


  Not Reportable   07/05/19  04:52    


 


  Not Reportable   07/05/19  04:52    


 


  Not Reportable   07/05/19  04:52    


 


  Not Reportable   07/05/19  04:52    


 


  Not Reportable   07/05/19  04:52    


 


  Few   07/05/19  04:52    


 


Acanthocytes (Spur)  Not Reportable   07/05/19  04:52    


 


Rouleaux  Not Reportable   07/05/19  04:52    


 


  Not Reportable   07/05/19  04:52    


 


  Not Reportable   07/05/19  04:52    


 


  Not Reportable   07/05/19  04:52    


 


Percent Retic  1.10 % (0.78-2.58)   07/05/19  04:52    


 


  Not Reportable   07/05/19  04:52    


 


Hem Pathologist Commnt  No   07/05/19  04:52    


 


PT  16.3 Sec. (12.2-14.9)  H  07/05/19  08:51    


 


INR  1.35  (0.87-1.13)  H  07/05/19  08:51    


 


Sodium  136 mmol/L (137-145)  L  07/10/19  04:43    


 


Potassium  4.8 mmol/L (3.6-5.0)   07/10/19  04:43    


 


Chloride  96.0 mmol/L ()  L  07/10/19  04:43    


 


Carbon Dioxide  26 mmol/L (22-30)   07/10/19  04:43    


 


  19 mmol/L  07/10/19  04:43    


 


BUN  36 mg/dL (9-20)  H  07/10/19  04:43    


 


  12.1 mg/dL (0.8-1.5)  H  07/10/19  04:43    


 


Estimated GFR  5 ml/min  07/10/19  04:43    


 


  3 %  07/10/19  04:43    


 


Glucose  86 mg/dL ()   07/10/19  04:43    


 


POC Glucose  164  ()  H  07/04/19  17:35    


 


Calcium  8.4 mg/dL (8.4-10.2)   07/10/19  04:43    


 


Phosphorus  6.90 mg/dL (2.5-4.5)  H  07/08/19  07:08    


 


Magnesium  2.00 mg/dL (1.7-2.3)   07/05/19  04:52    


 


Iron  20 ug/dL ()  L  07/05/19  08:51    


 


TIBC  219 mcg/dL (250-450)  L  07/05/19  08:51    


 


  63.8 ng/mL (13.0-400.0)   07/05/19  08:51    


 


  0.20 mg/dL (0.1-1.2)   07/10/19  04:43    


 


AST  11 units/L (5-40)   07/10/19  04:43    


 


ALT  12 units/L (7-56)   07/10/19  04:43    


 


  67 units/L ()   07/10/19  04:43    


 


  5.9 g/dL (6.1-8.1)  L  07/09/19  04:50    


 


  6.7 g/dL (6.3-8.2)   07/10/19  04:43    


 


  2.0 g/dL (3.9-5)  L  07/10/19  04:43    


 


  0.4 %  07/10/19  04:43    


 


  0.7 g/dL (0.2-0.3)  H  07/09/19  04:50    


 


  0.8 g/dL (0.5-0.9)   07/09/19  04:50    


 


  0.5 g/dL (0.2-0.5)   07/09/19  04:50    


 


  1.6 g/dL (0.8-1.7)   07/09/19  04:50    


 


Abnorm Protein Band 1  see below   07/09/19  04:50    


 


PEP Interpretation  see below  H  07/09/19  04:50    


 


  88 units/L (13-60)  H  07/04/19  12:47    


 


Tumor Marker AFP  See scanned result   07/05/19  08:51    


 


Carcinoembryonic Ag  17.4 ng/mL (0.0-2.4)  H  07/05/19  08:51    


 


  11 U/mL (<34)   07/05/19  08:51    


 


Vitamin B12  1768 pg/mL (211-911)  H  07/05/19  08:51    


 


  5.05 ng/mL (7.3-26.0)  L  07/05/19  08:51    


 


PTH Intact  238.5 pg/mL (15-65)  H  07/05/19  04:52    


 


  161.7 mg/dL (0.1-20.0)  H  07/05/19  08:51    


 


  72 mmol/L  07/05/19  08:51    


 


Fluid Type  Ascitic   07/05/19  Unknown


 


Fluid Color  Yellow   07/05/19  Unknown


 


Fluid Appearance  Hazy   07/05/19  Unknown


 


Fluid WBC  58 /mm3  07/05/19  Unknown


 


Fluid RBC  1850 /mm3  07/05/19  Unknown


 


Fluid Diff Comment     07/05/19  Unknown


 


Fluid Seg Neutrophils  35.0 %  07/05/19  Unknown


 


Fluid Lymphocytes  14.0 %  07/05/19  Unknown


 


Fluid Reactive Lymphs  0 %  07/05/19  Unknown


 


Fluid Monocytes  51.0 %  07/05/19  Unknown


 


Fluid Eosinophils  0 %  07/05/19  Unknown


 


Fluid Basophils  0 %  07/05/19  Unknown


 


Fluid Glucose  93 mg/dL (40-70)  H  07/05/19  Unknown


 


Fluid Total Protein  4.6  (15.0-45.0)  L  07/05/19  Unknown


 


Fluid Albumin  1.5 g/dL  07/05/19  Unknown


 


Fluid LDH  195   07/05/19  Unknown


 


Random Vancomycin  13.1 ug/mL (0-40.0)   07/09/19  04:50    


 


EVELYN Screen  Negative  (Negative)   07/09/19  04:50    


 


Proteinase 3 (PR3) Ab  <1.0 AI (<1.0)   07/09/19  04:50    


 


Myeloperoxidase Ab  <1.0 AI (<1.0)   07/09/19  04:50    


 


  158 mg/dL ()   07/09/19  04:50    


 


  32 mg/dL (15-53)   07/09/19  04:50    


 


Hepatitis A IgM Ab  Non-reactive  (NonReactive)   07/04/19  17:48    


 


Hep Bs Antigen  Non-reactive  (Negative)   07/04/19  17:48    


 


Hep B Core IgM Ab  Non-reactive  (NonReactive)   07/04/19  17:48    


 


  Non-reactive  (NonReactive)   07/04/19  17:48    


 


Blood Type  A POSITIVE   07/04/19  17:48    


 


Antibody Screen  Negative   07/04/19  17:48    


 


Crossmatch  See Detail   07/04/19  17:48    














Active Medications





- Current Medications


Current Medications: 














Generic Name Dose Route Start Last Admin





  Trade Name Freq  PRN Reason Stop Dose Admin


 


Acetaminophen  650 mg  07/04/19 17:22  07/09/19 11:32





  Tylenol  PO   650 mg





  Q4H PRN   Administration





  Pain MILD(1-3)/Fever >100.5/HA  


 


Albuterol  2.5 mg  07/04/19 17:22 





  Proventil  IH  





  Q4HRT PRN  





  Shortness Of Breath  


 


Bisacodyl  15 mg  07/07/19 14:51 





  Dulcolax  PO  





  QDAY PRN  





  Constipation  


 


Chlorpromazine HCl  10 mg  07/09/19 15:20  07/13/19 07:46





  Thorazine  PO   10 mg





  Q6H PRN   Administration





  Hiccups  


 


Epoetin Tye  20,000 unit  07/12/19 09:36  07/12/19 16:19





  Procrit  SUB-Q   20,000 unit





  VEE PRN   Administration





  hemodialysis  


 


Famotidine  10 mg  07/12/19 22:00  07/13/19 10:44





  Pepcid  PO   10 mg





  BID BOWEN   Administration


 


Ferrous Sulfate  325 mg  07/10/19 22:00  07/13/19 10:44





  Feosol  PO   325 mg





  BID BOWEN   Administration


 


Folic Acid  1 mg  07/06/19 10:00  07/13/19 10:45





  Folvite  PO   1 mg





  QDAY BOWEN   Administration


 


Heparin Sodium (Porcine)  3,000 unit  07/12/19 09:36 





  Heparin 10,000 Units/10 Ml  IV  





  VEE PRN  





  hemodialysis  


 


Hydralazine HCl  10 mg  07/04/19 19:14  07/04/19 22:17





  Apresoline  IV   10 mg





  Q3H PRN   Administration





  Blood Pressure  


 


Hydromorphone HCl  0.5 mg  07/04/19 17:22  07/13/19 06:51





  Dilaudid  IV   0.5 mg





  Q3H PRN   Administration





  Pain , Severe (7-10)  


 


Sodium Chloride  1,000 mls @ 50 mls/hr  07/08/19 09:00  07/08/19 09:45





  Nacl 0.9% 1000 Ml  IV   50 mls/hr





  AS DIRECT BOWEN   Administration


 


Sodium Chloride  100 mls @ 999 mls/hr  07/12/19 09:36 





  Nacl 0.9%  IV  





  VEE PRN  





  Hypotension  


 


Chlorpromazine HCl 25 mg/  51 mls @ 100 mls/hr  07/13/19 12:26 





  Sodium Chloride  IV  





  Q4H PRN  





  Hiccups  


 


Metoclopramide HCl  10 mg  07/04/19 17:22 





  Reglan  IV  





  Q6H PRN  





  Nausea And Vomiting  


 


Metoprolol Tartrate  50 mg  07/04/19 23:00  07/13/19 10:43





  Lopressor  PO   50 mg





  BID BOWEN   Administration


 


Ondansetron HCl  4 mg  07/04/19 17:22 





  Zofran  IV  





  Q3H PRN  





  Nausea And Vomiting  


 


Oxycodone/Acetaminophen  1 tab  07/13/19 10:53  07/13/19 11:05





  Percocet 5/325  PO   1 tab





  Q6H PRN   Administration





  Pain, Moderate (4-6)  


 


Sodium Chloride  10 ml  07/04/19 22:00  07/13/19 10:45





  Sodium Chloride Flush Syringe 10 Ml  IV   10 ml





  BID BOWEN   Administration


 


Sodium Chloride  10 ml  07/04/19 17:22  07/11/19 20:41





  Sodium Chloride Flush Syringe 10 Ml  IV   10 ml





  PRN PRN   Administration





  LINE FLUSH  














Nutrition/Malnutrition Assess





- Dietary Evaluation


Nutrition/Malnutrition Findings: 


                                        





Nutrition Notes                                            Start:  07/11/19 

12:03


Freq:                                                      Status: Active       




Protocol:                                                                       




 Document     07/11/19 12:03  LP  (Rec: 07/11/19 12:06  LP  [id number])


 Nutrition Notes


     Need for Assessment generated from:         LOS


     Initial or Follow up                        Brief Note


     Subjective/Other Information                Screen for LOS. Pt states


                                                 eating well PTA and now.


                                                 Denies wt changes. Consuming


                                                 % of meals.


 Nutrition Intervention


     Revisit per MD consult or patient           Sign Off


      request:

## 2019-07-13 NOTE — PROGRESS NOTE
Assessment and Plan





1. ESRD:


Likely CKD has progressed to ESRD


CT abdomen showed mild bilateral hydronephosis.


No improvement in the renal function.


Monitor renal function.


Renal prognosis appears to be poor.


Avoid nephrotoxic agents.


Meds dosage based on GFR.


Due to persistent hyperkalemia patient was started on hemodialysis on 7/4/2019.


Hemodialysis: 7/4, 6/5, 7/6, 7/8, 7/10, 7/12.





2. FEN:


Hyperkalemia, K level is better.


Anion gap MA, improved.


Monitor lytes.





3. Mild bilateral hydronephosis:


S/p hernandez catheter.





4. Rectal adenocarcinoma with malignant Ascites.


Followed by Heme-onc.





5. Anemia:


S/p PRBC.


Monitor H/H.





6. Uncontrolled HTN:


BP is better.





Also d/w his parents at the bedside.














Subjective


Date of service: 07/13/19


Principal diagnosis: ? carcinomatosis


Interval history: 


Patient was seen and examined at the bedside.


No new complaint.








Objective





- Vital Signs


Vital signs: 


                               Vital Signs - 12hr











  07/12/19 07/12/19 07/13/19





  23:49 23:58 00:00


 


Temperature 98.4 F  


 


Pulse Rate 89  


 


Respiratory 18 17 18





Rate   


 


Respiratory   18





Rate [Abdomen]   


 


Blood Pressure 103/59  


 


O2 Sat by Pulse 95  





Oximetry   














  07/13/19 07/13/19 07/13/19





  03:25 03:55 05:58


 


Temperature   98.8 F


 


Pulse Rate   97 H


 


Respiratory 19 18 20





Rate   


 


Respiratory   





Rate [Abdomen]   


 


Blood Pressure   123/77


 


O2 Sat by Pulse   98





Oximetry   














  07/13/19 07/13/19





  06:51 10:43


 


Temperature  


 


Pulse Rate  97 H


 


Respiratory 19 





Rate  


 


Respiratory  





Rate [Abdomen]  


 


Blood Pressure  123/97


 


O2 Sat by Pulse  





Oximetry  














- General Appearance


General appearance: well-developed, appears stated age, other (no distress, R IJ

tunnel catheter)


EENT: ATNC, PERRL, mucous membranes moist, hearing intact, vision intact


Neck: supple


Respiratory: Present: Clear to Ascultation


Cardiology: regular, S1S2, no murmurs


Gastrointestinal: normoactive bowel sounds, no tenderness, distended, other 

(Hernandez catheter)


Integumentary: no rash, warm and dry


Neurologic: no focal deficit, no asterixis, alert and oriented x3


Musculoskeletal: other (no edema)


Psychiatric: cooperative





- Lab





                                 07/10/19 04:43





                                 07/10/19 04:43


                             Most recent lab results











Calcium  8.4 mg/dL (8.4-10.2)   07/10/19  04:43    


 


Phosphorus  6.90 mg/dL (2.5-4.5)  H  07/08/19  07:08    


 


Magnesium  2.00 mg/dL (1.7-2.3)   07/05/19  04:52    


 


  161.7 mg/dL (0.1-20.0)  H  07/05/19  08:51    


 


  72 mmol/L  07/05/19  08:51    














Medications & Allergies





- Medications


Allergies/Adverse Reactions: 


                                    Allergies





No Known Allergies Allergy (Verified 07/04/19 12:02)


   








Home Medications: 


                                Home Medications











 Medication  Instructions  Recorded  Confirmed  Last Taken  Type


 


Prednisone [predniSONE 10 mg 10 mg PO .TAPER #1 tab.ds.pk 09/10/15 07/06/19 

Unknown Rx





(6-Day Pack, 21 Tabs)]     











Active Medications: 














Generic Name Dose Route Start Last Admin





  Trade Name Cuongq  PRN Reason Stop Dose Admin


 


Acetaminophen  650 mg  07/04/19 17:22  07/09/19 11:32





  Tylenol  PO   650 mg





  Q4H PRN   Administration





  Pain MILD(1-3)/Fever >100.5/HA  


 


Albuterol  2.5 mg  07/04/19 17:22 





  Proventil  IH  





  Q4HRT PRN  





  Shortness Of Breath  


 


Bisacodyl  15 mg  07/07/19 14:51 





  Dulcolax  PO  





  QDAY PRN  





  Constipation  


 


Chlorpromazine HCl  10 mg  07/09/19 15:20  07/13/19 07:46





  Thorazine  PO   10 mg





  Q6H PRN   Administration





  Hiccups  


 


Epoetin Tye  20,000 unit  07/12/19 09:36  07/12/19 16:19





  Procrit  SUB-Q   20,000 unit





  VEE PRN   Administration





  hemodialysis  


 


Famotidine  10 mg  07/12/19 22:00  07/13/19 10:44





  Pepcid  PO   10 mg





  BID BOWEN   Administration


 


Ferrous Sulfate  325 mg  07/10/19 22:00  07/13/19 10:44





  Feosol  PO   325 mg





  BID BOWEN   Administration


 


Folic Acid  1 mg  07/06/19 10:00  07/13/19 10:45





  Folvite  PO   1 mg





  QDAY BOWEN   Administration


 


Heparin Sodium (Porcine)  3,000 unit  07/12/19 09:36 





  Heparin 10,000 Units/10 Ml  IV  





  VEE PRN  





  hemodialysis  


 


Hydralazine HCl  10 mg  07/04/19 19:14  07/04/19 22:17





  Apresoline  IV   10 mg





  Q3H PRN   Administration





  Blood Pressure  


 


Hydromorphone HCl  0.5 mg  07/04/19 17:22  07/13/19 06:51





  Dilaudid  IV   0.5 mg





  Q3H PRN   Administration





  Pain , Severe (7-10)  


 


Sodium Chloride  1,000 mls @ 50 mls/hr  07/08/19 09:00  07/08/19 09:45





  Nacl 0.9% 1000 Ml  IV   50 mls/hr





  AS DIRECT BOWEN   Administration


 


Sodium Chloride  100 mls @ 999 mls/hr  07/12/19 09:36 





  Nacl 0.9%  IV  





  VEE PRN  





  Hypotension  


 


Metoclopramide HCl  10 mg  07/04/19 17:22 





  Reglan  IV  





  Q6H PRN  





  Nausea And Vomiting  


 


Metoprolol Tartrate  50 mg  07/04/19 23:00  07/13/19 10:43





  Lopressor  PO   50 mg





  BID BOWEN   Administration


 


Ondansetron HCl  4 mg  07/04/19 17:22 





  Zofran  IV  





  Q3H PRN  





  Nausea And Vomiting  


 


Oxycodone/Acetaminophen  1 tab  07/13/19 10:53  07/13/19 11:05





  Percocet 5/325  PO   1 tab





  Q6H PRN   Administration





  Pain, Moderate (4-6)  


 


Sodium Chloride  10 ml  07/04/19 22:00  07/13/19 10:45





  Sodium Chloride Flush Syringe 10 Ml  IV   10 ml





  BID BOWEN   Administration


 


Sodium Chloride  10 ml  07/04/19 17:22  07/11/19 20:41





  Sodium Chloride Flush Syringe 10 Ml  IV   10 ml





  PRN PRN   Administration





  LINE FLUSH

## 2019-07-14 RX ADMIN — OXYCODONE AND ACETAMINOPHEN PRN TAB: 5; 325 TABLET ORAL at 18:15

## 2019-07-14 RX ADMIN — METOPROLOL TARTRATE SCH MG: 50 TABLET, FILM COATED ORAL at 10:46

## 2019-07-14 RX ADMIN — FOLIC ACID SCH MG: 1 TABLET ORAL at 10:46

## 2019-07-14 RX ADMIN — OXYCODONE AND ACETAMINOPHEN PRN TAB: 5; 325 TABLET ORAL at 10:46

## 2019-07-14 RX ADMIN — FERROUS SULFATE TAB 325 MG (65 MG ELEMENTAL FE) SCH MG: 325 (65 FE) TAB at 21:33

## 2019-07-14 RX ADMIN — FAMOTIDINE SCH MG: 10 TABLET ORAL at 10:45

## 2019-07-14 RX ADMIN — OXYCODONE AND ACETAMINOPHEN PRN TAB: 5; 325 TABLET ORAL at 03:47

## 2019-07-14 RX ADMIN — FERROUS SULFATE TAB 325 MG (65 MG ELEMENTAL FE) SCH MG: 325 (65 FE) TAB at 10:45

## 2019-07-14 RX ADMIN — Medication SCH ML: at 10:47

## 2019-07-14 RX ADMIN — CHLORPROMAZINE HYDROCHLORIDE PRN MG: 10 TABLET, FILM COATED ORAL at 12:09

## 2019-07-14 RX ADMIN — FAMOTIDINE SCH: 10 TABLET ORAL at 22:37

## 2019-07-14 RX ADMIN — METOPROLOL TARTRATE SCH: 50 TABLET, FILM COATED ORAL at 22:39

## 2019-07-14 RX ADMIN — HYDROMORPHONE HYDROCHLORIDE PRN MG: 1 INJECTION, SOLUTION INTRAMUSCULAR; INTRAVENOUS; SUBCUTANEOUS at 21:57

## 2019-07-14 RX ADMIN — Medication SCH ML: at 21:33

## 2019-07-14 NOTE — PROGRESS NOTE
Assessment and Plan


Assessment and plan: 


Patient is a 58 yo man with a history of hypertension and Asthma who presented 

to [facility] ED with urinary frequency, back pains, n/v, diarrhea and abd pains with 

distension x 1 week. 





* Initial labs: wbc 13.0 to 10.0, hgb 6.3 to 7.0 (mcv 60), na 132, k 9.5, cr 

  34.8, bun 129, bg 164, lipase 88


* CT abd/pelvis without contrast IMPRESSION: 1. There is ascites. There is 

  increased density in the omentum. Findings are concerning for carcinomatosis. 

  There is some mild adenopathy in the pelvis a possible pericolonic mass, 

  series 2 image 139. There is mild bilateral hydronephrosis. The exact cause is

  not determined by this study. There is minimal nephrolithiasis on the left. No

  ureteral calculi are identified however.


*  There was a circumferential rectal tumor from the dentate line extending to 

  mid-proximal rectum.  There was severe stenosis related to circumferential 

  tumor which was unable to be traversed.  The tumor was friable to touch.   

  Multiple biopsies were obtained.


   * Impression:1. Rectal tumor from dentate line to mid/proximal rectum (extent

     of exam due to severe stenosis related to tumor).  Biopsies were obtained.


   * Recommendations:


   * follow-up pathology, -surgery consult, -heme/onc following, -screen first 

     degree relatives for colorectal cancer, -will need colonoscopy in 3-6 

     months to rule out proximal colon lesions.





Acute anemia with Drop in HCT s/p transfused 2 units of PRBC, ordered FOBT, GI 

is following, d/w Dr. Joel,


Findings of colonoscopy as noted above.


Discussed with surgery, will plan for port placement


ESRD: s/p permacath, had emergent hemodialysis due to severe hyperkalemia done 

on 7/4/19, place [facility] on 7/5/19 with very little output.


Bilateral hydronephrosis: Place hern[facility], consulted Urology, input 

noted==>discharge with her[facility] 


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 

signet ring cells. 


Ascites due to suspected Colon cancer with mets to Omentum/Carcinomatosis 

suspected, which would be a very poor prognostic sign: consulted GI, input 

noted, s/p Paracentesis on 7/5/19, await cytology, CEA 17 ELEVATED, AFP 

ELEVATED, CA 19-9 - normal 11


Abx discontinued following ID eval as culture appears to be contaminant


Hyperkalemia: treated with HD, Nephrology is following


Acute Microcytic anemia, suspect cancer related until proven otherwise, I did 

inform patient my concerns after gaining his permission to do so: consult GI 

then consider Heme/Onc consult


Severe malnutrition: Dietitian consulted 


hiccough - likely tumor related, will add Thorazine.


DVT prophylaxis: On heparin and GI prophylaxis


Discharge when Outpatient Dialysis ready. Patient will discuss with surgery 

about port placement for chemotherapy. Outpatient follow up with oncologist 











History


Interval history: 


Patient seen and examined no new complaints. 





Hospitalist Physical





- Physical exam


Narrative exam: 


Gen: thin frial, ill appearing, NAD, Awake, Alert, Orientated


HEENT: NCAT, EOMI, PERRL, OP Clear 


Neck: supple, no adenopathy, no thyromegaly, no JVD 


CVS/Heart: RRR, normal S1S2, pulses present bilaterally 


Chest/Lungs: CTA B, Symmetrical chest expansion, good air entry bilaterally 


GI/Abdomen: soft, abdomen distended, good bowel sounds, no guarding or rebound 


/Bladder: no suprapubic tenderness, no CVA or paraspinal tenderness 


Extermity/Skin: no c/c/e, no obvious rash 


MSK: FROM x 4 


Neuro: CN 2-12 grossly intact, no new focal deficits 


Psych: calm 








- Constitutional


Vitals: 


                                        











Temp Pulse Resp BP Pulse Ox


 


 98.0 F   90   16   117/79   100 


 


 07/14/19 04:58  07/14/19 04:58  07/14/19 04:58  07/14/19 04:58  07/14/19 04:58











General appearance: Absent: mild distress, well-nourished





Results





- Labs


CBC & Chem 7: 


                                 07/15/19 06:57





                                 07/15/19 06:57


Labs: 


                             Laboratory Last Values











WBC  14.1 K/mm3 (4.5-11.0)  H  07/10/19  04:43    


 


RBC  3.54 M/mm3 (3.65-5.03)  L  07/10/19  04:43    


 


Hgb  7.5 gm/dl (11.8-15.2)  L  07/10/19  04:43    


 


Hct  23.9 % (35.5-45.6)  L  07/10/19  04:43    


 


MCV  68 fl (84-94)  L  07/10/19  04:43    


 


MCH  21 pg (28-32)  L  07/10/19  04:43    


 


MCHC  31 % (32-34)  L  07/10/19  04:43    


 


RDW  33.6 % (13.2-15.2)  H  07/10/19  04:43    


 


Plt Count  383 K/mm3 (140-440)   07/10/19  04:43    


 


Add Manual Diff  Complete   07/05/19  04:52    


 


Total Counted  100   07/05/19  04:52    


 


Seg Neuts % (Manual)  95.0 % (40.0-70.0)  H  07/05/19  04:52    


 


  0 %  07/05/19  04:52    


 


  3.0 % (13.4-35.0)  L  07/05/19  04:52    


 


Reactive Lymphs % (Man)  0 %  07/05/19  04:52    


 


  1.0 % (0.0-7.3)   07/05/19  04:52    


 


  1.0 % (0.0-4.3)   07/05/19  04:52    


 


  0 % (0.0-1.8)   07/05/19  04:52    


 


  0 %  07/05/19  04:52    


 


  0 %  07/05/19  04:52    


 


  0 %  07/05/19  04:52    


 


  0 %  07/05/19  04:52    


 


Nucleated RBC %  1.0 % (0.0-0.9)  H  07/05/19  04:52    


 


Seg Neutrophils # Man  9.5 K/mm3 (1.8-7.7)  H  07/05/19  04:52    


 


Band Neutrophils #  0.0 K/mm3  07/05/19  04:52    


 


  0.3 K/mm3 (1.2-5.4)  L  07/05/19  04:52    


 


Abs React Lymphs (Man)  0.0 K/mm3  07/05/19  04:52    


 


  0.1 K/mm3 (0.0-0.8)   07/05/19  04:52    


 


  0.1 K/mm3 (0.0-0.4)   07/05/19  04:52    


 


  0.0 K/mm3 (0.0-0.1)   07/05/19  04:52    


 


  0.0 K/mm3  07/05/19  04:52    


 


  0.0 K/mm3  07/05/19  04:52    


 


  0.0 K/mm3  07/05/19  04:52    


 


Blast Cells #  0.0 K/mm3  07/05/19  04:52    


 


WBC Morphology  Not Reportable   07/05/19  04:52    


 


Hypersegmented Neuts  Not Reportable   07/05/19  04:52    


 


Hyposegmented Neuts  Not Reportable   07/05/19  04:52    


 


Hypogranular Neuts  Not Reportable   07/05/19  04:52    


 


  Not Reportable   07/05/19  04:52    


 


  Not Reportable   07/05/19  04:52    


 


  Not Reportable   07/05/19  04:52    


 


  Not Reportable   07/05/19  04:52    


 


  Not Reportable   07/05/19  04:52    


 


  Not Reportable   07/05/19  04:52    


 


  Consistent w auto   07/05/19  04:52    


 


  Not Reportable   07/05/19  04:52    


 


Plt Clumps, EDTA  Not Reportable   07/05/19  04:52    


 


  Not Reportable   07/05/19  04:52    


 


  Not Reportable   07/05/19  04:52    


 


  Not Reportable   07/05/19  04:52    


 


Plt Morphology Comment  Not Reportable   07/05/19  04:52    


 


RBC Morphology  Not Reportable   07/05/19  04:52    


 


Dimorphic RBCs  Not Reportable   07/05/19  04:52    


 


  Not Reportable   07/05/19  04:52    


 


  3+   07/05/19  04:52    


 


  1+   07/05/19  04:52    


 


  3+   07/05/19  04:52    


 


  2+   07/05/19  04:52    


 


  Not Reportable   07/05/19  04:52    


 


  Not Reportable   07/05/19  04:52    


 


  Not Reportable   07/05/19  04:52    


 


  Not Reportable   07/05/19  04:52    


 


  Few   07/05/19  04:52    


 


  Not Reportable   07/05/19  04:52    


 


  Few   07/05/19  04:52    


 


  Not Reportable   07/05/19  04:52    


 


  Not Reportable   07/05/19  04:52    


 


  Not Reportable   07/05/19  04:52    


 


  Not Reportable   07/05/19  04:52    


 


  Not Reportable   07/05/19  04:52    


 


  Not Reportable   07/05/19  04:52    


 


  Few   07/05/19  04:52    


 


Acanthocytes (Spur)  Not Reportable   07/05/19  04:52    


 


Rouleaux  Not Reportable   07/05/19  04:52    


 


  Not Reportable   07/05/19  04:52    


 


  Not Reportable   07/05/19  04:52    


 


  Not Reportable   07/05/19  04:52    


 


Percent Retic  1.10 % (0.78-2.58)   07/05/19  04:52    


 


  Not Reportable   07/05/19  04:52    


 


Hem Pathologist Commnt  No   07/05/19  04:52    


 


PT  16.3 Sec. (12.2-14.9)  H  07/05/19  08:51    


 


INR  1.35  (0.87-1.13)  H  07/05/19  08:51    


 


Sodium  136 mmol/L (137-145)  L  07/10/19  04:43    


 


Potassium  4.8 mmol/L (3.6-5.0)   07/10/19  04:43    


 


Chloride  96.0 mmol/L ()  L  07/10/19  04:43    


 


Carbon Dioxide  26 mmol/L (22-30)   07/10/19  04:43    


 


  19 mmol/L  07/10/19  04:43    


 


BUN  36 mg/dL (9-20)  H  07/10/19  04:43    


 


  12.1 mg/dL (0.8-1.5)  H  07/10/19  04:43    


 


Estimated GFR  5 ml/min  07/10/19  04:43    


 


  3 %  07/10/19  04:43    


 


Glucose  86 mg/dL ()   07/10/19  04:43    


 


POC Glucose  164  ()  H  07/04/19  17:35    


 


Calcium  8.4 mg/dL (8.4-10.2)   07/10/19  04:43    


 


Phosphorus  6.90 mg/dL (2.5-4.5)  H  07/08/19  07:08    


 


Magnesium  2.00 mg/dL (1.7-2.3)   07/05/19  04:52    


 


Iron  20 ug/dL ()  L  07/05/19  08:51    


 


TIBC  219 mcg/dL (250-450)  L  07/05/19  08:51    


 


  63.8 ng/mL (13.0-400.0)   07/05/19  08:51    


 


  0.20 mg/dL (0.1-1.2)   07/10/19  04:43    


 


AST  11 units/L (5-40)   07/10/19  04:43    


 


ALT  12 units/L (7-56)   07/10/19  04:43    


 


  67 units/L ()   07/10/19  04:43    


 


  5.9 g/dL (6.1-8.1)  L  07/09/19  04:50    


 


  6.7 g/dL (6.3-8.2)   07/10/19  04:43    


 


  2.0 g/dL (3.9-5)  L  07/10/19  04:43    


 


  0.4 %  07/10/19  04:43    


 


  0.7 g/dL (0.2-0.3)  H  07/09/19  04:50    


 


  0.8 g/dL (0.5-0.9)   07/09/19  04:50    


 


  0.5 g/dL (0.2-0.5)   07/09/19  04:50    


 


  1.6 g/dL (0.8-1.7)   07/09/19  04:50    


 


Abnorm Protein Band 1  see below   07/09/19  04:50    


 


PEP Interpretation  see below  H  07/09/19  04:50    


 


  88 units/L (13-60)  H  07/04/19  12:47    


 


Tumor Marker AFP  See scanned result   07/05/19  08:51    


 


Carcinoembryonic Ag  17.4 ng/mL (0.0-2.4)  H  07/05/19  08:51    


 


  11 U/mL (<34)   07/05/19  08:51    


 


Vitamin B12  1768 pg/mL (211-911)  H  07/05/19  08:51    


 


  5.05 ng/mL (7.3-26.0)  L  07/05/19  08:51    


 


PTH Intact  238.5 pg/mL (15-65)  H  07/05/19  04:52    


 


  161.7 mg/dL (0.1-20.0)  H  07/05/19  08:51    


 


  72 mmol/L  07/05/19  08:51    


 


Fluid Type  Ascitic   07/05/19  Unknown


 


Fluid Color  Yellow   07/05/19  Unknown


 


Fluid Appearance  Hazy   07/05/19  Unknown


 


Fluid WBC  58 /mm3  07/05/19  Unknown


 


Fluid RBC  1850 /mm3  07/05/19  Unknown


 


Fluid Diff Comment     07/05/19  Unknown


 


Fluid Seg Neutrophils  35.0 %  07/05/19  Unknown


 


Fluid Lymphocytes  14.0 %  07/05/19  Unknown


 


Fluid Reactive Lymphs  0 %  07/05/19  Unknown


 


Fluid Monocytes  51.0 %  07/05/19  Unknown


 


Fluid Eosinophils  0 %  07/05/19  Unknown


 


Fluid Basophils  0 %  07/05/19  Unknown


 


Fluid Glucose  93 mg/dL (40-70)  H  07/05/19  Unknown


 


Fluid Total Protein  4.6  (15.0-45.0)  L  07/05/19  Unknown


 


Fluid Albumin  1.5 g/dL  07/05/19  Unknown


 


Fluid LDH  195   07/05/19  Unknown


 


Random Vancomycin  13.1 ug/mL (0-40.0)   07/09/19  04:50    


 


EVELYN Screen  Negative  (Negative)   07/09/19  04:50    


 


Proteinase 3 (PR3) Ab  <1.0 AI (<1.0)   07/09/19  04:50    


 


Myeloperoxidase Ab  <1.0 AI (<1.0)   07/09/19  04:50    


 


  158 mg/dL ()   07/09/19  04:50    


 


  32 mg/dL (15-53)   07/09/19  04:50    


 


Hepatitis A IgM Ab  Non-reactive  (NonReactive)   07/04/19  17:48    


 


Hep Bs Antigen  Non-reactive  (Negative)   07/04/19  17:48    


 


Hep B Core IgM Ab  Non-reactive  (NonReactive)   07/04/19  17:48    


 


  Non-reactive  (NonReactive)   07/04/19  17:48    


 


Blood Type  A POSITIVE   07/04/19  17:48    


 


Antibody Screen  Negative   07/04/19  17:48    


 


Crossmatch  See Detail   07/04/19  17:48    














Active Medications





- Current Medications


Current Medications: 














Generic Name Dose Route Start Last Admin





  Trade Name Freq  PRN Reason Stop Dose Admin


 


Acetaminophen  650 mg  07/04/19 17:22  07/09/19 11:32





  Tylenol  PO   650 mg





  Q4H PRN   Administration





  Pain MILD(1-3)/Fever >100.5/HA  


 


Albuterol  2.5 mg  07/04/19 17:22 





  Proventil  IH  





  Q4HRT PRN  





  Shortness Of Breath  


 


Bisacodyl  15 mg  07/07/19 14:51 





  Dulcolax  PO  





  QDAY PRN  





  Constipation  


 


Chlorpromazine HCl  10 mg  07/09/19 15:20  07/13/19 07:46





  Thorazine  PO   10 mg





  Q6H PRN   Administration





  Hiccups  


 


Epoetin Tye  20,000 unit  07/12/19 09:36  07/12/19 16:19





  Procrit  SUB-Q   20,000 unit





  VEE PRN   Administration





  hemodialysis  


 


Famotidine  10 mg  07/12/19 22:00  07/13/19 21:23





  Pepcid  PO   Not Given





  BID BOWEN  


 


Ferrous Sulfate  325 mg  07/10/19 22:00  07/13/19 21:21





  Feosol  PO   325 mg





  BID BOWEN   Administration


 


Folic Acid  1 mg  07/06/19 10:00  07/13/19 10:45





  Folvite  PO   1 mg





  QDAY BOWEN   Administration


 


Heparin Sodium (Porcine)  3,000 unit  07/12/19 09:36 





  Heparin 10,000 Units/10 Ml  IV  





  VEE PRN  





  hemodialysis  


 


Hydralazine HCl  10 mg  07/04/19 19:14  07/04/19 22:17





  Apresoline  IV   10 mg





  Q3H PRN   Administration





  Blood Pressure  


 


Hydromorphone HCl  0.5 mg  07/04/19 17:22  07/13/19 21:32





  Dilaudid  IV   0.5 mg





  Q3H PRN   Administration





  Pain , Severe (7-10)  


 


Sodium Chloride  1,000 mls @ 50 mls/hr  07/08/19 09:00  07/08/19 09:45





  Nacl 0.9% 1000 Ml  IV   50 mls/hr





  AS DIRECT BOWEN   Administration


 


Sodium Chloride  100 mls @ 999 mls/hr  07/12/19 09:36 





  Nacl 0.9%  IV  





  VEE PRN  





  Hypotension  


 


Chlorpromazine HCl 25 mg/  51 mls @ 100 mls/hr  07/13/19 12:26 





  Sodium Chloride  IV  





  Q4H PRN  





  Hiccups  


 


Metoclopramide HCl  10 mg  07/04/19 17:22 





  Reglan  IV  





  Q6H PRN  





  Nausea And Vomiting  


 


Metoprolol Tartrate  50 mg  07/04/19 23:00  07/13/19 23:15





  Lopressor  PO   50 mg





  BID BOWEN   Administration


 


Ondansetron HCl  4 mg  07/04/19 17:22 





  Zofran  IV  





  Q3H PRN  





  Nausea And Vomiting  


 


Oxycodone/Acetaminophen  1 tab  07/13/19 10:53  07/14/19 03:47





  Percocet 5/325  PO   1 tab





  Q6H PRN   Administration





  Pain, Moderate (4-6)  


 


Sodium Chloride  10 ml  07/04/19 22:00  07/13/19 21:22





  Sodium Chloride Flush Syringe 10 Ml  IV   10 ml





  BID BOWEN   Administration


 


Sodium Chloride  10 ml  07/04/19 17:22  07/11/19 20:41





  Sodium Chloride Flush Syringe 10 Ml  IV   10 ml





  PRN PRN   Administration





  LINE FLUSH  














Nutrition/Malnutrition Assess





- Dietary Evaluation


Nutrition/Malnutrition Findings: 


                                        





Nutrition Notes                                            Start:  07/11/19 

12:03


Freq:                                                      Status: Active       




Protocol:                                                                       




 Document     07/11/19 12:03  LP  (Rec: 07/11/19 12:06  LP  [id number])


 Nutrition Notes


     Need for Assessment generated from:         LOS


     Initial or Follow up                        Brief Note


     Subjective/Other Information                Screen for LOS. Pt states


                                                 eating well PTA and now.


                                                 Denies wt changes. Consuming


                                                 % of meals.


 Nutrition Intervention


     Revisit per MD consult or patient           Sign Off


      request:

## 2019-07-15 LAB
BUN SERPL-MCNC: 42 MG/DL (ref 9–20)
BUN/CREAT SERPL: 3 %
CALCIUM SERPL-MCNC: 8.7 MG/DL (ref 8.4–10.2)
HCT VFR BLD CALC: 22.7 % (ref 35.5–45.6)
HEMOLYSIS INDEX: 115
HGB BLD-MCNC: 7.2 GM/DL (ref 11.8–15.2)
MCHC RBC AUTO-ENTMCNC: 32 % (ref 32–34)
MCV RBC AUTO: 69 FL (ref 84–94)
PLATELET # BLD: 427 K/MM3 (ref 140–440)
RBC # BLD AUTO: 3.28 M/MM3 (ref 3.65–5.03)

## 2019-07-15 PROCEDURE — 5A1D70Z PERFORMANCE OF URINARY FILTRATION, INTERMITTENT, LESS THAN 6 HOURS PER DAY: ICD-10-PCS | Performed by: INTERNAL MEDICINE

## 2019-07-15 RX ADMIN — METOPROLOL TARTRATE SCH MG: 50 TABLET, FILM COATED ORAL at 14:09

## 2019-07-15 RX ADMIN — Medication SCH ML: at 14:10

## 2019-07-15 RX ADMIN — FAMOTIDINE SCH: 10 TABLET ORAL at 22:23

## 2019-07-15 RX ADMIN — FAMOTIDINE SCH MG: 10 TABLET ORAL at 14:09

## 2019-07-15 RX ADMIN — Medication SCH ML: at 22:24

## 2019-07-15 RX ADMIN — CHLORPROMAZINE HYDROCHLORIDE PRN MG: 10 TABLET, FILM COATED ORAL at 20:31

## 2019-07-15 RX ADMIN — OXYCODONE AND ACETAMINOPHEN PRN TAB: 5; 325 TABLET ORAL at 04:10

## 2019-07-15 RX ADMIN — CHLORPROMAZINE HYDROCHLORIDE PRN MG: 10 TABLET, FILM COATED ORAL at 04:10

## 2019-07-15 RX ADMIN — FERROUS SULFATE TAB 325 MG (65 MG ELEMENTAL FE) SCH MG: 325 (65 FE) TAB at 14:09

## 2019-07-15 RX ADMIN — HYDROMORPHONE HYDROCHLORIDE PRN MG: 1 INJECTION, SOLUTION INTRAMUSCULAR; INTRAVENOUS; SUBCUTANEOUS at 20:31

## 2019-07-15 RX ADMIN — CHLORPROMAZINE HYDROCHLORIDE PRN MG: 10 TABLET, FILM COATED ORAL at 14:09

## 2019-07-15 RX ADMIN — ERYTHROPOIETIN PRN UNIT: 20000 INJECTION, SOLUTION INTRAVENOUS; SUBCUTANEOUS at 12:21

## 2019-07-15 RX ADMIN — HYDROMORPHONE HYDROCHLORIDE PRN MG: 1 INJECTION, SOLUTION INTRAMUSCULAR; INTRAVENOUS; SUBCUTANEOUS at 13:25

## 2019-07-15 RX ADMIN — OXYCODONE AND ACETAMINOPHEN PRN TAB: 5; 325 TABLET ORAL at 17:12

## 2019-07-15 RX ADMIN — METOPROLOL TARTRATE SCH: 50 TABLET, FILM COATED ORAL at 22:23

## 2019-07-15 RX ADMIN — Medication PRN ML: at 20:32

## 2019-07-15 RX ADMIN — FOLIC ACID SCH MG: 1 TABLET ORAL at 14:09

## 2019-07-15 RX ADMIN — FERROUS SULFATE TAB 325 MG (65 MG ELEMENTAL FE) SCH MG: 325 (65 FE) TAB at 22:23

## 2019-07-15 NOTE — PROGRESS NOTE
Assessment and Plan





60 yo M with 





1. metastatic rectal cancer


2. malignant ascites


3. CKD on HD


4. anemia





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 

cells and lymphovascular invasion


Path Ascites: malignant cells consistent with metastatic carcinoma





Plan:





1. oncology on board, recommend port placement. CEA 17.4


2. Patient agreeable to have port placed. No contraindication per vascular for 

having port and vascath concurrently. Will plan for OR tomorrow. All risks, 

benefits, alternatives discussed with patient and consent obtained


3. Currently no urgent need for surgical intervention for rectal mass. It is not

obstructing or actively bleeding. Patient will need neoadjuvant treatment and a

ny surgical intervention would be palliative due to the advanced stage of the 

cancer. 


4. DVT ppx


5. soft diet. NPO p MN tonight.


6. prn pain control





Thank you, please call with questions.





Subjective


Date of service: 07/15/19


Narrative: 





Pt seen and examined. No acute complaints.





Objective


                               Vital Signs - 12hr











  07/15/19 07/15/19 07/15/19





  04:10 06:07 09:00


 


Temperature  98.4 F 98.4 F


 


Pulse Rate  89 85


 


Respiratory 20 18 18





Rate   


 


Blood Pressure  135/71 95/58


 


O2 Sat by Pulse  100 





Oximetry   














  07/15/19 07/15/19 07/15/19





  09:25 09:30 09:45


 


Temperature   


 


Pulse Rate 88 85 93 H


 


Respiratory   





Rate   


 


Blood Pressure 119/73 112/71 114/69


 


O2 Sat by Pulse   





Oximetry   














  07/15/19 07/15/19 07/15/19





  10:00 10:15 10:30


 


Temperature   


 


Pulse Rate 93 H 97 H 96 H


 


Respiratory   





Rate   


 


Blood Pressure 107/66 113/73 108/68


 


O2 Sat by Pulse   





Oximetry   














  07/15/19 07/15/19 07/15/19





  10:45 11:00 11:15


 


Temperature   


 


Pulse Rate 96 H 97 H 95 H


 


Respiratory   





Rate   


 


Blood Pressure 110/70 102/63 106/72


 


O2 Sat by Pulse   





Oximetry   














  07/15/19 07/15/19 07/15/19





  11:30 11:45 12:00


 


Temperature   


 


Pulse Rate 92 H 95 H 93 H


 


Respiratory   





Rate   


 


Blood Pressure 107/72 117/69 114/72


 


O2 Sat by Pulse   





Oximetry   














  07/15/19 07/15/19





  12:15 12:30


 


Temperature  


 


Pulse Rate 90 89


 


Respiratory  





Rate  


 


Blood Pressure 121/74 116/73


 


O2 Sat by Pulse  





Oximetry  














- General physical appearance


Narrative Exam: 





Gen: AAOx3. NAD


ENT: trachea midline, no LAD


CV: s1, S2+


Resp: even and unlabored


Ext: no c/c/e





- Labs





                                 07/15/19 06:57





                                 07/15/19 06:57


                                 Diabetes panel











  07/15/19 Range/Units





  06:57 


 


Sodium  134 L  (137-145)  mmol/L


 


Potassium  5.6 H  (3.6-5.0)  mmol/L


 


Chloride  95.0 L  ()  mmol/L


 


Carbon Dioxide  21 L  (22-30)  mmol/L


 


BUN  42 H  (9-20)  mg/dL


 


Creatinine  13.8 H  (0.8-1.5)  mg/dL


 


Glucose  78  ()  mg/dL


 


Calcium  8.7  (8.4-10.2)  mg/dL








                                  Calcium panel











  07/15/19 Range/Units





  06:57 


 


Calcium  8.7  (8.4-10.2)  mg/dL


 


Phosphorus  6.50 H  (2.5-4.5)  mg/dL








                                 Pituitary panel











  07/15/19 Range/Units





  06:57 


 


Sodium  134 L  (137-145)  mmol/L


 


Potassium  5.6 H  (3.6-5.0)  mmol/L


 


Chloride  95.0 L  ()  mmol/L


 


Carbon Dioxide  21 L  (22-30)  mmol/L


 


BUN  42 H  (9-20)  mg/dL


 


Creatinine  13.8 H  (0.8-1.5)  mg/dL


 


Glucose  78  ()  mg/dL


 


Calcium  8.7  (8.4-10.2)  mg/dL








                                  Adrenal panel











  07/15/19 Range/Units





  06:57 


 


Sodium  134 L  (137-145)  mmol/L


 


Potassium  5.6 H  (3.6-5.0)  mmol/L


 


Chloride  95.0 L  ()  mmol/L


 


Carbon Dioxide  21 L  (22-30)  mmol/L


 


BUN  42 H  (9-20)  mg/dL


 


Creatinine  13.8 H  (0.8-1.5)  mg/dL


 


Glucose  78  ()  mg/dL


 


Calcium  8.7  (8.4-10.2)  mg/dL

## 2019-07-15 NOTE — PROGRESS NOTE
Assessment and Plan


Assessment and plan: 


Patient is a 60 yo man with a history of hypertension and Asthma who presented 

to [facility] ED with urinary frequency, back pains, n/v, diarrhea and abd pains with 

distension x 1 week. 





* Initial labs: wbc 13.0 to 10.0, hgb 6.3 to 7.0 (mcv 60), na 132, k 9.5, cr 

  34.8, bun 129, bg 164, lipase 88


* CT abd/pelvis without contrast IMPRESSION: 1. There is ascites. There is 

  increased density in the omentum. Findings are concerning for carcinomatosis. 

  There is some mild adenopathy in the pelvis a possible pericolonic mass, 

  series 2 image 139. There is mild bilateral hydronephrosis. The exact cause is

  not determined by this study. There is minimal nephrolithiasis on the left. No

  ureteral calculi are identified however.


*  There was a circumferential rectal tumor from the dentate line extending to 

  mid-proximal rectum.  There was severe stenosis related to circumferential 

  tumor which was unable to be traversed.  The tumor was friable to touch.   

  Multiple biopsies were obtained.


   * Impression:1. Rectal tumor from dentate line to mid/proximal rectum (extent

     of exam due to severe stenosis related to tumor).  Biopsies were obtained.


   * Recommendations:


   * follow-up pathology, -surgery consult, -heme/onc following, -screen first 

     degree relatives for colorectal cancer, -will need colonoscopy in 3-6 

     months to rule out proximal colon lesions.








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 

cells and lymphovascular invasion


Path Ascites: malignant cells consistent with metastatic carcinoma








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 

invasion 


* Chemotherapy port to be placed tomorrow.  On discharge patient will follow 

  hematology oncologist


Acute anemia with Drop in HCT s/p transfused 2 units of PRBC, ordered FOBT, GI 

is following, d/w Dr. Joel,


Findings of colonoscopy as noted above.


Discussed with surgery, will plan for port placement


ESRD: s/p permacath, had emergent hemodialysis due to severe hyperkalemia done 

on 7/4/19, place hernandez on 7/5/19 with very little output.


Bilateral hydronephrosis: Place hernandez, consulted Urology, input 

noted==>discharge with hernandez 


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 

signet ring cells. 


Malignant Ascites due to suspected Colon cancer with mets to 

Omentum/Carcinomatosis suspected, which would be a very poor prognostic sign: 

consulted GI, input noted, s/p Paracentesis on 7/5/19, await cytology, CEA 17 

ELEVATED, AFP ELEVATED, CA 19-9 - normal 11


Abx discontinued following ID eval as culture appears to be contaminant


Hyperkalemia: treated with HD, Nephrology is following


Acute Microcytic anemia, suspect cancer related until proven otherwise, I did 

inform patient my concerns after gaining his permission to do so: consult GI 

then consider Heme/Onc consult


Severe malnutrition: Dietitian consulted 


Hiccups- likely tumor related, will add Thorazine.


DVT prophylaxis: On heparin and GI prophylaxis


Discharge when Outpatient Dialysis ready. Patient will discuss with surgery 

about port placement for chemotherapy. Outpatient follow up with oncologist 


Once dialysis Center outpatient disapproved patient, discharged











5





History


Interval history: 


Patient seen and examined no new complaints.  Sitting up have late dinner at 

this time.  Was evaluated earlier in the morning





Hospitalist Physical





- Physical exam


Narrative exam: 


Gen: thin frial, ill appearing, NAD, Awake, Alert, Oriented


HEENT: NCAT, EOMI, PERRL, OP Clear 


Neck: supple, no adenopathy, no thyromegaly, no JVD 


CVS/Heart: RRR, normal S1S2, pulses present bilaterally 


Chest/Lungs: CTA B, Symmetrical chest expansion, good air entry bilaterally 


GI/Abdomen: soft, abdomen distended, good bowel sounds, no guarding or rebound 


/Bladder: no suprapubic tenderness, no CVA or paraspinal tenderness 


Extermity/Skin: no c/c/e, no obvious rash 


MSK: FROM x 4 


Neuro: CN 2-12 grossly intact, no new focal deficits 


Psych: calm 








- Constitutional


Vitals: 


                                        











Temp Pulse Resp BP Pulse Ox


 


 98.4 F   92 H  18   124/75   100 


 


 07/15/19 13:10  07/15/19 13:10  07/15/19 13:10  07/15/19 13:10  07/15/19 13:09











General appearance: Absent: mild distress, well-nourished





Results





- Labs


CBC & Chem 7: 


                                 07/15/19 06:57





                                 07/15/19 06:57


Labs: 


                             Laboratory Last Values











WBC  12.5 K/mm3 (4.5-11.0)  H  07/15/19  06:57    


 


RBC  3.28 M/mm3 (3.65-5.03)  L  07/15/19  06:57    


 


Hgb  7.2 gm/dl (11.8-15.2)  L  07/15/19  06:57    


 


Hct  22.7 % (35.5-45.6)  L  07/15/19  06:57    


 


MCV  69 fl (84-94)  L  07/15/19  06:57    


 


MCH  22 pg (28-32)  L  07/15/19  06:57    


 


MCHC  32 % (32-34)   07/15/19  06:57    


 


RDW  32.4 % (13.2-15.2)  H  07/15/19  06:57    


 


Plt Count  427 K/mm3 (140-440)   07/15/19  06:57    


 


Add Manual Diff  Complete   07/05/19  04:52    


 


Total Counted  100   07/05/19  04:52    


 


Seg Neuts % (Manual)  95.0 % (40.0-70.0)  H  07/05/19  04:52    


 


  0 %  07/05/19  04:52    


 


  3.0 % (13.4-35.0)  L  07/05/19  04:52    


 


Reactive Lymphs % (Man)  0 %  07/05/19  04:52    


 


  1.0 % (0.0-7.3)   07/05/19  04:52    


 


  1.0 % (0.0-4.3)   07/05/19  04:52    


 


  0 % (0.0-1.8)   07/05/19  04:52    


 


  0 %  07/05/19  04:52    


 


  0 %  07/05/19  04:52    


 


  0 %  07/05/19  04:52    


 


  0 %  07/05/19  04:52    


 


Nucleated RBC %  1.0 % (0.0-0.9)  H  07/05/19  04:52    


 


Seg Neutrophils # Man  9.5 K/mm3 (1.8-7.7)  H  07/05/19  04:52    


 


Band Neutrophils #  0.0 K/mm3  07/05/19  04:52    


 


  0.3 K/mm3 (1.2-5.4)  L  07/05/19  04:52    


 


Abs React Lymphs (Man)  0.0 K/mm3  07/05/19  04:52    


 


  0.1 K/mm3 (0.0-0.8)   07/05/19  04:52    


 


  0.1 K/mm3 (0.0-0.4)   07/05/19  04:52    


 


  0.0 K/mm3 (0.0-0.1)   07/05/19  04:52    


 


  0.0 K/mm3  07/05/19  04:52    


 


  0.0 K/mm3  07/05/19  04:52    


 


  0.0 K/mm3  07/05/19  04:52    


 


Blast Cells #  0.0 K/mm3  07/05/19  04:52    


 


WBC Morphology  Not Reportable   07/05/19  04:52    


 


Hypersegmented Neuts  Not Reportable   07/05/19  04:52    


 


Hyposegmented Neuts  Not Reportable   07/05/19  04:52    


 


Hypogranular Neuts  Not Reportable   07/05/19  04:52    


 


  Not Reportable   07/05/19  04:52    


 


  Not Reportable   07/05/19  04:52    


 


  Not Reportable   07/05/19  04:52    


 


  Not Reportable   07/05/19  04:52    


 


  Not Reportable   07/05/19  04:52    


 


  Not Reportable   07/05/19  04:52    


 


  Consistent w auto   07/05/19  04:52    


 


  Not Reportable   07/05/19  04:52    


 


Plt Clumps, EDTA  Not Reportable   07/05/19  04:52    


 


  Not Reportable   07/05/19  04:52    


 


  Not Reportable   07/05/19  04:52    


 


  Not Reportable   07/05/19  04:52    


 


Plt Morphology Comment  Not Reportable   07/05/19  04:52    


 


RBC Morphology  Not Reportable   07/05/19  04:52    


 


Dimorphic RBCs  Not Reportable   07/05/19  04:52    


 


  Not Reportable   07/05/19  04:52    


 


  3+   07/05/19  04:52    


 


  1+   07/05/19  04:52    


 


  3+   07/05/19  04:52    


 


  2+   07/05/19  04:52    


 


  Not Reportable   07/05/19  04:52    


 


  Not Reportable   07/05/19  04:52    


 


  Not Reportable   07/05/19  04:52    


 


  Not Reportable   07/05/19  04:52    


 


  Few   07/05/19  04:52    


 


  Not Reportable   07/05/19  04:52    


 


  Few   07/05/19  04:52    


 


  Not Reportable   07/05/19  04:52    


 


  Not Reportable   07/05/19  04:52    


 


  Not Reportable   07/05/19  04:52    


 


  Not Reportable   07/05/19  04:52    


 


  Not Reportable   07/05/19  04:52    


 


  Not Reportable   07/05/19  04:52    


 


  Few   07/05/19  04:52    


 


Acanthocytes (Spur)  Not Reportable   07/05/19  04:52    


 


Rouleaux  Not Reportable   07/05/19  04:52    


 


  Not Reportable   07/05/19  04:52    


 


  Not Reportable   07/05/19  04:52    


 


  Not Reportable   07/05/19  04:52    


 


Percent Retic  1.10 % (0.78-2.58)   07/05/19  04:52    


 


  Not Reportable   07/05/19  04:52    


 


Hem Pathologist Commnt  No   07/05/19  04:52    


 


PT  16.3 Sec. (12.2-14.9)  H  07/05/19  08:51    


 


INR  1.35  (0.87-1.13)  H  07/05/19  08:51    


 


Sodium  134 mmol/L (137-145)  L  07/15/19  06:57    


 


Potassium  5.6 mmol/L (3.6-5.0)  H  07/15/19  06:57    


 


Chloride  95.0 mmol/L ()  L  07/15/19  06:57    


 


Carbon Dioxide  21 mmol/L (22-30)  L  07/15/19  06:57    


 


  24 mmol/L  07/15/19  06:57    


 


BUN  42 mg/dL (9-20)  H  07/15/19  06:57    


 


  13.8 mg/dL (0.8-1.5)  H  07/15/19  06:57    


 


Estimated GFR  5 ml/min  07/15/19  06:57    


 


  3 %  07/15/19  06:57    


 


Glucose  78 mg/dL ()   07/15/19  06:57    


 


POC Glucose  164  ()  H  07/04/19  17:35    


 


Calcium  8.7 mg/dL (8.4-10.2)   07/15/19  06:57    


 


Phosphorus  6.50 mg/dL (2.5-4.5)  H  07/15/19  06:57    


 


Magnesium  2.00 mg/dL (1.7-2.3)   07/05/19  04:52    


 


Iron  20 ug/dL ()  L  07/05/19  08:51    


 


TIBC  219 mcg/dL (250-450)  L  07/05/19  08:51    


 


  63.8 ng/mL (13.0-400.0)   07/05/19  08:51    


 


  0.20 mg/dL (0.1-1.2)   07/10/19  04:43    


 


AST  11 units/L (5-40)   07/10/19  04:43    


 


ALT  12 units/L (7-56)   07/10/19  04:43    


 


  67 units/L ()   07/10/19  04:43    


 


  5.9 g/dL (6.1-8.1)  L  07/09/19  04:50    


 


  6.7 g/dL (6.3-8.2)   07/10/19  04:43    


 


  2.0 g/dL (3.9-5)  L  07/10/19  04:43    


 


  0.4 %  07/10/19  04:43    


 


  0.7 g/dL (0.2-0.3)  H  07/09/19  04:50    


 


  0.8 g/dL (0.5-0.9)   07/09/19  04:50    


 


  0.5 g/dL (0.2-0.5)   07/09/19  04:50    


 


  1.6 g/dL (0.8-1.7)   07/09/19  04:50    


 


Abnorm Protein Band 1  see below   07/09/19  04:50    


 


PEP Interpretation  see below  H  07/09/19  04:50    


 


  88 units/L (13-60)  H  07/04/19  12:47    


 


Tumor Marker AFP  See scanned result   07/05/19  08:51    


 


Carcinoembryonic Ag  17.4 ng/mL (0.0-2.4)  H  07/05/19  08:51    


 


  11 U/mL (<34)   07/05/19  08:51    


 


Vitamin B12  1768 pg/mL (211-911)  H  07/05/19  08:51    


 


  5.05 ng/mL (7.3-26.0)  L  07/05/19  08:51    


 


PTH Intact  238.5 pg/mL (15-65)  H  07/05/19  04:52    


 


  161.7 mg/dL (0.1-20.0)  H  07/05/19  08:51    


 


  72 mmol/L  07/05/19  08:51    


 


Fluid Type  Ascitic   07/05/19  Unknown


 


Fluid Color  Yellow   07/05/19  Unknown


 


Fluid Appearance  Hazy   07/05/19  Unknown


 


Fluid WBC  58 /mm3  07/05/19  Unknown


 


Fluid RBC  1850 /mm3  07/05/19  Unknown


 


Fluid Diff Comment     07/05/19  Unknown


 


Fluid Seg Neutrophils  35.0 %  07/05/19  Unknown


 


Fluid Lymphocytes  14.0 %  07/05/19  Unknown


 


Fluid Reactive Lymphs  0 %  07/05/19  Unknown


 


Fluid Monocytes  51.0 %  07/05/19  Unknown


 


Fluid Eosinophils  0 %  07/05/19  Unknown


 


Fluid Basophils  0 %  07/05/19  Unknown


 


Fluid Glucose  93 mg/dL (40-70)  H  07/05/19  Unknown


 


Fluid Total Protein  4.6  (15.0-45.0)  L  07/05/19  Unknown


 


Fluid Albumin  1.5 g/dL  07/05/19  Unknown


 


Fluid LDH  195   07/05/19  Unknown


 


Random Vancomycin  13.1 ug/mL (0-40.0)   07/09/19  04:50    


 


EVELYN Screen  Negative  (Negative)   07/09/19  04:50    


 


Proteinase 3 (PR3) Ab  <1.0 AI (<1.0)   07/09/19  04:50    


 


Myeloperoxidase Ab  <1.0 AI (<1.0)   07/09/19  04:50    


 


  158 mg/dL ()   07/09/19  04:50    


 


  32 mg/dL (15-53)   07/09/19  04:50    


 


Hepatitis A IgM Ab  Non-reactive  (NonReactive)   07/04/19  17:48    


 


Hep Bs Antigen  Non-reactive  (Negative)   07/04/19  17:48    


 


Hep B Core IgM Ab  Non-reactive  (NonReactive)   07/04/19  17:48    


 


  Non-reactive  (NonReactive)   07/04/19  17:48    


 


Blood Type  A POSITIVE   07/04/19  17:48    


 


Antibody Screen  Negative   07/04/19  17:48    


 


Crossmatch  See Detail   07/04/19  17:48    














Active Medications





- Current Medications


Current Medications: 














Generic Name Dose Route Start Last Admin





  Trade Name Freq  PRN Reason Stop Dose Admin


 


Acetaminophen  650 mg  07/04/19 17:22  07/09/19 11:32





  Tylenol  PO   650 mg





  Q4H PRN   Administration





  Pain MILD(1-3)/Fever >100.5/HA  


 


Albuterol  2.5 mg  07/04/19 17:22 





  Proventil  IH  





  Q4HRT PRN  





  Shortness Of Breath  


 


Bisacodyl  15 mg  07/07/19 14:51 





  Dulcolax  PO  





  QDAY PRN  





  Constipation  


 


Chlorpromazine HCl  10 mg  07/09/19 15:20  07/15/19 14:09





  Thorazine  PO   10 mg





  Q6H PRN   Administration





  Hiccups  


 


Epoetin Tye  20,000 unit  07/12/19 09:36  07/15/19 12:21





  Procrit  SUB-Q   20,000 unit





  VEE PRN   Administration





  hemodialysis  


 


Famotidine  10 mg  07/12/19 22:00  07/15/19 14:09





  Pepcid  PO   10 mg





  BID BOWEN   Administration


 


Ferrous Sulfate  325 mg  07/10/19 22:00  07/15/19 14:09





  Feosol  PO   325 mg





  BID BOWEN   Administration


 


Folic Acid  1 mg  07/06/19 10:00  07/15/19 14:09





  Folvite  PO   1 mg





  QDAY BOWEN   Administration


 


Heparin Sodium (Porcine)  3,000 unit  07/12/19 09:36 





  Heparin 10,000 Units/10 Ml  IV  





  VEE PRN  





  hemodialysis  


 


Hydralazine HCl  10 mg  07/04/19 19:14  07/04/19 22:17





  Apresoline  IV   10 mg





  Q3H PRN   Administration





  Blood Pressure  


 


Hydromorphone HCl  0.5 mg  07/04/19 17:22  07/15/19 13:25





  Dilaudid  IV   0.5 mg





  Q3H PRN   Administration





  Pain , Severe (7-10)  


 


Sodium Chloride  1,000 mls @ 50 mls/hr  07/08/19 09:00  07/08/19 09:45





  Nacl 0.9% 1000 Ml  IV   50 mls/hr





  AS DIRECT BOWEN   Administration


 


Chlorpromazine HCl 25 mg/  51 mls @ 100 mls/hr  07/13/19 12:26 





  Sodium Chloride  IV  





  Q4H PRN  





  Hiccups  


 


Sodium Chloride  100 mls @ 999 mls/hr  07/15/19 08:12 





  Nacl 0.9%  IV  





  VEE PRN  





  Hypotension  


 


Cefazolin Sodium  2 gm in 20 mls @ 80 mls/hr  07/16/19 06:00 





  Ancef/Sterile Water 2 Gm/20 Ml  IV  07/16/19 23:59 





  PREOP NR  


 


Metoclopramide HCl  10 mg  07/04/19 17:22 





  Reglan  IV  





  Q6H PRN  





  Nausea And Vomiting  


 


Metoprolol Tartrate  50 mg  07/04/19 23:00  07/15/19 14:09





  Lopressor  PO   50 mg





  BID BOWEN   Administration


 


Ondansetron HCl  4 mg  07/04/19 17:22 





  Zofran  IV  





  Q3H PRN  





  Nausea And Vomiting  


 


Oxycodone/Acetaminophen  1 tab  07/13/19 10:53  07/15/19 17:12





  Percocet 5/325  PO   1 tab





  Q6H PRN   Administration





  Pain, Moderate (4-6)  


 


Sodium Chloride  10 ml  07/04/19 22:00  07/15/19 14:10





  Sodium Chloride Flush Syringe 10 Ml  IV   10 ml





  BID BOWEN   Administration


 


Sodium Chloride  10 ml  07/04/19 17:22  07/11/19 20:41





  Sodium Chloride Flush Syringe 10 Ml  IV   10 ml





  PRN PRN   Administration





  LINE FLUSH  














Nutrition/Malnutrition Assess





- Dietary Evaluation


Nutrition/Malnutrition Findings: 


                                        





Nutrition Notes                                            Start:  07/11/19 

12:03


Freq:                                                      Status: Active       




Protocol:                                                                       




 Document     07/11/19 12:03  LP  (Rec: 07/11/19 12:06  LP  [id number])


 Nutrition Notes


     Need for Assessment generated from:         LOS


     Initial or Follow up                        Brief Note


     Subjective/Other Information                Screen for LOS. Pt states


                                                 eating well PTA and now.


                                                 Denies wt changes. Consuming


                                                 % of meals.


 Nutrition Intervention


     Revisit per MD consult or patient           Sign Off


      request:

## 2019-07-15 NOTE — PROGRESS NOTE
Assessment and Plan





1. ESRD:


Likely CKD has progressed to ESRD


CT abdomen showed mild bilateral hydronephosis.


No improvement in the renal function.


Monitor renal function.


Renal prognosis appears to be poor.


Avoid nephrotoxic agents.


Meds dosage based on GFR.


Due to persistent hyperkalemia patient was started on hemodialysis on 7/4/2019.


Hemodialysis: 7/4, 6/5, 7/6, 7/8, 7/10, 7/12, today.





2. FEN:


Hyperkalemia, K level is better.


Anion gap MA, improved.


Monitor lytes.





3. Mild bilateral hydronephosis:


S/p hernandez catheter.





4. Rectal adenocarcinoma with malignant Ascites.


Followed by Heme-onc.





5. Anemia:


S/p PRBC.


Monitor H/H.





6. Uncontrolled HTN:


BP is better.














Subjective


Date of service: 07/15/19


Principal diagnosis: ? carcinomatosis


Interval history: 


Patient was seen and examined at the bedside while on HD.


No new complaint.








Objective





- Vital Signs


Vital signs: 


                               Vital Signs - 12hr











  07/14/19 07/14/19 07/14/19





  21:53 21:57 22:39


 


Temperature 98.8 F  


 


Pulse Rate 89  89


 


Respiratory 18 18 





Rate   


 


Blood Pressure   112/72


 


Blood Pressure 112/73  





[Left]   


 


O2 Sat by Pulse   





Oximetry   














  07/14/19 07/15/19 07/15/19





  23:24 04:10 06:07


 


Temperature 97.6 F  98.4 F


 


Pulse Rate 91 H  89


 


Respiratory 16 20 18





Rate   


 


Blood Pressure 108/67  135/71


 


Blood Pressure   





[Left]   


 


O2 Sat by Pulse 98  100





Oximetry   














- General Appearance


General appearance: well-developed, appears stated age, other (no distress, R IJ

 tunnel catheter)


EENT: ATNC, PERRL, mucous membranes moist, hearing intact, vision intact


Neck: supple


Respiratory: Present: Clear to Ascultation


Cardiology: regular, S1S2, no murmurs


Gastrointestinal: normoactive bowel sounds, no tenderness, distended


Integumentary: no rash, warm and dry


Neurologic: no focal deficit, no asterixis, alert and oriented x3


Musculoskeletal: other (no edema)


Psychiatric: cooperative





- Lab





                                 07/15/19 06:57





                                 07/15/19 06:57


                             Most recent lab results











Calcium  8.7 mg/dL (8.4-10.2)   07/15/19  06:57    


 


Phosphorus  6.50 mg/dL (2.5-4.5)  H  07/15/19  06:57    


 


Magnesium  2.00 mg/dL (1.7-2.3)   07/05/19  04:52    


 


  161.7 mg/dL (0.1-20.0)  H  07/05/19  08:51    


 


  72 mmol/L  07/05/19  08:51    














Medications & Allergies





- Medications


Allergies/Adverse Reactions: 


                                    Allergies





No Known Allergies Allergy (Verified 07/04/19 12:02)


   








Home Medications: 


                                Home Medications











 Medication  Instructions  Recorded  Confirmed  Last Taken  Type


 


Prednisone [predniSONE 10 mg 10 mg PO .TAPER #1 tab.ds.pk 09/10/15 07/06/19 

Unknown Rx





(6-Day Pack, 21 Tabs)]     











Active Medications: 














Generic Name Dose Route Start Last Admin





  Trade Name Freq  PRN Reason Stop Dose Admin


 


Acetaminophen  650 mg  07/04/19 17:22  07/09/19 11:32





  Tylenol  PO   650 mg





  Q4H PRN   Administration





  Pain MILD(1-3)/Fever >100.5/HA  


 


Albuterol  2.5 mg  07/04/19 17:22 





  Proventil  IH  





  Q4HRT PRN  





  Shortness Of Breath  


 


Bisacodyl  15 mg  07/07/19 14:51 





  Dulcolax  PO  





  QDAY PRN  





  Constipation  


 


Chlorpromazine HCl  10 mg  07/09/19 15:20  07/15/19 04:10





  Thorazine  PO   10 mg





  Q6H PRN   Administration





  Hiccups  


 


Epoetin Tye  20,000 unit  07/12/19 09:36  07/12/19 16:19





  Procrit  SUB-Q   20,000 unit





  VEE PRN   Administration





  hemodialysis  


 


Famotidine  10 mg  07/12/19 22:00  07/14/19 22:37





  Pepcid  PO   Not Given





  BID BOWEN  


 


Ferrous Sulfate  325 mg  07/10/19 22:00  07/14/19 21:33





  Feosol  PO   325 mg





  BID BOWNE   Administration


 


Folic Acid  1 mg  07/06/19 10:00  07/14/19 10:46





  Folvite  PO   1 mg





  QDAY BOWEN   Administration


 


Heparin Sodium (Porcine)  3,000 unit  07/12/19 09:36 





  Heparin 10,000 Units/10 Ml  IV  





  VEE PRN  





  hemodialysis  


 


Hydralazine HCl  10 mg  07/04/19 19:14  07/04/19 22:17





  Apresoline  IV   10 mg





  Q3H PRN   Administration





  Blood Pressure  


 


Hydromorphone HCl  0.5 mg  07/04/19 17:22  07/14/19 21:57





  Dilaudid  IV   0.5 mg





  Q3H PRN   Administration





  Pain , Severe (7-10)  


 


Sodium Chloride  1,000 mls @ 50 mls/hr  07/08/19 09:00  07/08/19 09:45





  Nacl 0.9% 1000 Ml  IV   50 mls/hr





  AS DIRECT BOWEN   Administration


 


Chlorpromazine HCl 25 mg/  51 mls @ 100 mls/hr  07/13/19 12:26 





  Sodium Chloride  IV  





  Q4H PRN  





  Hiccups  


 


Sodium Chloride  100 mls @ 999 mls/hr  07/15/19 08:12 





  Nacl 0.9%  IV  





  VEE PRN  





  Hypotension  


 


Metoclopramide HCl  10 mg  07/04/19 17:22 





  Reglan  IV  





  Q6H PRN  





  Nausea And Vomiting  


 


Metoprolol Tartrate  50 mg  07/04/19 23:00  07/14/19 22:39





  Lopressor  PO   Not Given





  BID BOWEN  


 


Ondansetron HCl  4 mg  07/04/19 17:22 





  Zofran  IV  





  Q3H PRN  





  Nausea And Vomiting  


 


Oxycodone/Acetaminophen  1 tab  07/13/19 10:53  07/15/19 04:10





  Percocet 5/325  PO   1 tab





  Q6H PRN   Administration





  Pain, Moderate (4-6)  


 


Sodium Chloride  10 ml  07/04/19 22:00  07/14/19 21:33





  Sodium Chloride Flush Syringe 10 Ml  IV   10 ml





  BID BOWEN   Administration


 


Sodium Chloride  10 ml  07/04/19 17:22  07/11/19 20:41





  Sodium Chloride Flush Syringe 10 Ml  IV   10 ml





  PRN PRN   Administration





  LINE FLUSH

## 2019-07-16 LAB
BUN SERPL-MCNC: 24 MG/DL (ref 9–20)
BUN/CREAT SERPL: 3 %
CALCIUM SERPL-MCNC: 8.2 MG/DL (ref 8.4–10.2)
HCT VFR BLD CALC: 20.2 % (ref 35.5–45.6)
HEMOLYSIS INDEX: 8
HGB BLD-MCNC: 6.4 GM/DL (ref 11.8–15.2)
MCHC RBC AUTO-ENTMCNC: 32 % (ref 32–34)
MCV RBC AUTO: 71 FL (ref 84–94)
PLATELET # BLD: 372 K/MM3 (ref 140–440)
RBC # BLD AUTO: 2.87 M/MM3 (ref 3.65–5.03)

## 2019-07-16 PROCEDURE — B244YZZ ULTRASONOGRAPHY OF RIGHT HEART USING OTHER CONTRAST: ICD-10-PCS | Performed by: SURGERY

## 2019-07-16 PROCEDURE — B2141ZZ FLUOROSCOPY OF RIGHT HEART USING LOW OSMOLAR CONTRAST: ICD-10-PCS | Performed by: SURGERY

## 2019-07-16 PROCEDURE — 0JH63WZ INSERTION OF TOTALLY IMPLANTABLE VASCULAR ACCESS DEVICE INTO CHEST SUBCUTANEOUS TISSUE AND FASCIA, PERCUTANEOUS APPROACH: ICD-10-PCS | Performed by: SURGERY

## 2019-07-16 PROCEDURE — 02H633Z INSERTION OF INFUSION DEVICE INTO RIGHT ATRIUM, PERCUTANEOUS APPROACH: ICD-10-PCS | Performed by: SURGERY

## 2019-07-16 RX ADMIN — METOPROLOL TARTRATE SCH MG: 50 TABLET, FILM COATED ORAL at 13:32

## 2019-07-16 RX ADMIN — OXYCODONE AND ACETAMINOPHEN PRN TAB: 5; 325 TABLET ORAL at 13:31

## 2019-07-16 RX ADMIN — OXYCODONE AND ACETAMINOPHEN PRN TAB: 5; 325 TABLET ORAL at 06:21

## 2019-07-16 RX ADMIN — METOPROLOL TARTRATE SCH: 50 TABLET, FILM COATED ORAL at 22:56

## 2019-07-16 RX ADMIN — FOLIC ACID SCH MG: 1 TABLET ORAL at 13:32

## 2019-07-16 RX ADMIN — FAMOTIDINE SCH MG: 10 TABLET ORAL at 22:56

## 2019-07-16 RX ADMIN — FERROUS SULFATE TAB 325 MG (65 MG ELEMENTAL FE) SCH MG: 325 (65 FE) TAB at 22:56

## 2019-07-16 RX ADMIN — OXYCODONE AND ACETAMINOPHEN PRN TAB: 5; 325 TABLET ORAL at 18:55

## 2019-07-16 RX ADMIN — Medication SCH ML: at 22:59

## 2019-07-16 RX ADMIN — FAMOTIDINE SCH MG: 10 TABLET ORAL at 13:32

## 2019-07-16 RX ADMIN — HYDROMORPHONE HYDROCHLORIDE PRN MG: 1 INJECTION, SOLUTION INTRAMUSCULAR; INTRAVENOUS; SUBCUTANEOUS at 23:00

## 2019-07-16 RX ADMIN — Medication SCH ML: at 13:33

## 2019-07-16 RX ADMIN — CHLORPROMAZINE HYDROCHLORIDE PRN MG: 10 TABLET, FILM COATED ORAL at 18:55

## 2019-07-16 RX ADMIN — FERROUS SULFATE TAB 325 MG (65 MG ELEMENTAL FE) SCH MG: 325 (65 FE) TAB at 13:31

## 2019-07-16 NOTE — POST OPERATIVE NOTE
Date of procedure: 07/16/19


Pre-op diagnosis: metastatic rectal cancer


Post-op diagnosis: same


Findings: 





good placement of port on post op CXR without PTX


Sluggish blood flow from port but easy to flush


Procedure: 





left internal jugular port a cath placement using mindray ultrasound guidance


Anesthesia: MAC, local


Surgeon: MARILEE PACK


Estimated blood loss: minimal


Pathology: none


Condition: stable


Disposition: PACU

## 2019-07-16 NOTE — FLUOROSCOPY REPORT
FLUOROSCOPY CENTRAL VENOUS DEVICE PLACEMENT



INDICATION: 

RECTAL CANCER. [id number] placement



COMPARISON: 

7/11/2019



FINDINGS:

Single AP view of the chest is presented.

Support devices: A right IJ dual-lumen catheter has been inserted which terminates in the superior ri
ght atrium. A left IJ Ohurks-t-Ayes has been inserted which terminates in the mid right atrium.



Heart: Within normal limits. 

Lungs/Pleura: No acute air space or interstitial disease. No evidence for pneumothorax.



Additional findings: None.



IMPRESSION:

 Right IJ dual-lumen catheter and left IJ Ihklon-g-Rysk placement as described. No pneumothorax.



Signer Name: Mir Rooney Jr, MD 

Signed: 7/16/2019 12:16 PM

 Workstation Name: [id number]

## 2019-07-16 NOTE — PROGRESS NOTE
Assessment and Plan


Assessment and plan: 


Patient is a 58 yo man with a history of hypertension and Asthma who presented 

to [facility] ED with urinary frequency, back pains, n/v, diarrhea and abd pains with 

distension x 1 week. 





* Initial labs: wbc 13.0 to 10.0, hgb 6.3 to 7.0 (mcv 60), na 132, k 9.5, cr 

  34.8, bun 129, bg 164, lipase 88


* CT abd/pelvis without contrast IMPRESSION: 1. There is ascites. There is 

  increased density in the omentum. Findings are concerning for carcinomatosis. 

  There is some mild adenopathy in the pelvis a possible pericolonic mass, 

  series 2 image 139. There is mild bilateral hydronephrosis. The exact cause is

  not determined by this study. There is minimal nephrolithiasis on the left. No

  ureteral calculi are identified however.


*  There was a circumferential rectal tumor from the dentate line extending to 

  mid-proximal rectum.  There was severe stenosis related to circumferential 

  tumor which was unable to be traversed.  The tumor was friable to touch.   

  Multiple biopsies were obtained.


   * Impression:1. Rectal tumor from dentate line to mid/proximal rectum (extent

     of exam due to severe stenosis related to tumor).  Biopsies were obtained.


   * Recommendations:


   * follow-up pathology, -surgery consult, -heme/onc following, -screen first 

     degree relatives for colorectal cancer, -will need colonoscopy in 3-6 

     months to rule out proximal colon lesions.








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 

cells and lymphovascular invasion


Path Ascites: malignant cells consistent with metastatic carcinoma








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 

invasion 


* Chemotherapy port to be placed tomorrow.  On discharge patient will follow 

  hematology oncologist


Acute anemia with Drop in HCT s/p transfused 2 units of PRBC, ordered FOBT, GI 

is following, d/w Dr. Joel,


Findings of colonoscopy as noted above.


Discussed with surgery, will plan for port placement


ESRD: s/p permacath, had emergent hemodialysis due to severe hyperkalemia done 

on 7/4/19, place hernandez on 7/5/19 with very little output.


Bilateral hydronephrosis: Place hernandez, consulted Urology, input 

noted==>discharge with hernandez 


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 

signet ring cells. 


Malignant Ascites due to suspected Colon cancer with mets to 

Omentum/Carcinomatosis suspected, which would be a very poor prognostic sign: 

consulted GI, input noted, s/p Paracentesis on 7/5/19, await cytology, CEA 17 

ELEVATED, AFP ELEVATED, CA 19-9 - normal 11


Abx discontinued following ID eval as culture appears to be contaminant


Hyperkalemia: treated with HD, Nephrology is following


Acute Microcytic anemia, suspect cancer related until proven otherwise, I did 

inform patient my concerns after gaining his permission to do so: consult GI 

then consider Heme/Onc consult


Severe malnutrition: Dietitian consulted 


Hiccups- likely tumor related, will add Thorazine.


DVT prophylaxis: On heparin and GI prophylaxis


Discharge when Outpatient Dialysis ready. Patient will discuss with surgery 

about port placement for chemotherapy. Outpatient follow up with oncologist 


Once dialysis Center outpatient disapproved patient, discharged








Hemoglobin dropped to 6.4, getting another unit of PRBC, repeat h/h in am








History


Interval history: 


Patient was seen and examined. Follow-up on current diagnosis ARF, ascites. No 

overnight events reported to me. Patient denies any chest pain, shortness 

breath, or severe headaches. Imaging, nursing note, chart, labs and old chart 

reviewed. Discussed with patient. Going for c-scopy today





Hospitalist Physical





- Physical exam


Narrative exam: 


Gen: thin frial, ill appearing, NAD, Awake, Alert, Orientated 


HEENT: NCAT, EOMI, PERRL, OP Clear 


Neck: supple, no adenopathy, no thyromegaly, no JVD 


CVS/Heart: RRR, normal S1S2, pulses present bilaterally 


Chest/Lungs: CTA B, Symmetrical chest expansion, good air entry bilaterally 


GI/Abdomen: soft, abdomen distended, good bowel sounds, no guarding or rebound 


/Bladder: no suprapubic tenderness, no CVA or paraspinal tenderness 


Extermity/Skin: no c/c/e, no obvious rash 


MSK: FROM x 4 


Neuro: CN 2-12 grossly intact, no new focal deficits 


Psych: calm 








- Constitutional


Vitals: 


                                        











Temp Pulse Resp BP Pulse Ox


 


 98.1 F   88   16   121/80   100 


 


 07/16/19 12:51  07/16/19 12:51  07/16/19 12:51  07/16/19 12:51  07/16/19 12:51











General appearance: Absent: mild distress, well-nourished





Results





- Labs


CBC & Chem 7: 


                                 07/16/19 04:44





                                 07/16/19 04:44


Labs: 


                             Laboratory Last Values











WBC  10.3 K/mm3 (4.5-11.0)   07/16/19  04:44    


 


RBC  2.87 M/mm3 (3.65-5.03)  L  07/16/19  04:44    


 


Hgb  6.4 gm/dl (11.8-15.2)  L  07/16/19  04:44    


 


Hct  20.2 % (35.5-45.6)  L  07/16/19  04:44    


 


MCV  71 fl (84-94)  L  07/16/19  04:44    


 


MCH  22 pg (28-32)  L  07/16/19  04:44    


 


MCHC  32 % (32-34)   07/16/19  04:44    


 


RDW  32.3 % (13.2-15.2)  H  07/16/19  04:44    


 


Plt Count  372 K/mm3 (140-440)   07/16/19  04:44    


 


Add Manual Diff  Complete   07/05/19  04:52    


 


Total Counted  100   07/05/19  04:52    


 


Seg Neuts % (Manual)  95.0 % (40.0-70.0)  H  07/05/19  04:52    


 


  0 %  07/05/19  04:52    


 


  3.0 % (13.4-35.0)  L  07/05/19  04:52    


 


Reactive Lymphs % (Man)  0 %  07/05/19  04:52    


 


  1.0 % (0.0-7.3)   07/05/19  04:52    


 


  1.0 % (0.0-4.3)   07/05/19  04:52    


 


  0 % (0.0-1.8)   07/05/19  04:52    


 


  0 %  07/05/19  04:52    


 


  0 %  07/05/19  04:52    


 


  0 %  07/05/19  04:52    


 


  0 %  07/05/19  04:52    


 


Nucleated RBC %  1.0 % (0.0-0.9)  H  07/05/19  04:52    


 


Seg Neutrophils # Man  9.5 K/mm3 (1.8-7.7)  H  07/05/19  04:52    


 


Band Neutrophils #  0.0 K/mm3  07/05/19  04:52    


 


  0.3 K/mm3 (1.2-5.4)  L  07/05/19  04:52    


 


Abs React Lymphs (Man)  0.0 K/mm3  07/05/19  04:52    


 


  0.1 K/mm3 (0.0-0.8)   07/05/19  04:52    


 


  0.1 K/mm3 (0.0-0.4)   07/05/19  04:52    


 


  0.0 K/mm3 (0.0-0.1)   07/05/19  04:52    


 


  0.0 K/mm3  07/05/19  04:52    


 


  0.0 K/mm3  07/05/19  04:52    


 


  0.0 K/mm3  07/05/19  04:52    


 


Blast Cells #  0.0 K/mm3  07/05/19  04:52    


 


WBC Morphology  Not Reportable   07/05/19  04:52    


 


Hypersegmented Neuts  Not Reportable   07/05/19  04:52    


 


Hyposegmented Neuts  Not Reportable   07/05/19  04:52    


 


Hypogranular Neuts  Not Reportable   07/05/19  04:52    


 


  Not Reportable   07/05/19  04:52    


 


  Not Reportable   07/05/19  04:52    


 


  Not Reportable   07/05/19  04:52    


 


  Not Reportable   07/05/19  04:52    


 


  Not Reportable   07/05/19  04:52    


 


  Not Reportable   07/05/19  04:52    


 


  Consistent w auto   07/05/19  04:52    


 


  Not Reportable   07/05/19  04:52    


 


Plt Clumps, EDTA  Not Reportable   07/05/19  04:52    


 


  Not Reportable   07/05/19  04:52    


 


  Not Reportable   07/05/19  04:52    


 


  Not Reportable   07/05/19  04:52    


 


Plt Morphology Comment  Not Reportable   07/05/19  04:52    


 


RBC Morphology  Not Reportable   07/05/19  04:52    


 


Dimorphic RBCs  Not Reportable   07/05/19  04:52    


 


  Not Reportable   07/05/19  04:52    


 


  3+   07/05/19  04:52    


 


  1+   07/05/19  04:52    


 


  3+   07/05/19  04:52    


 


  2+   07/05/19  04:52    


 


  Not Reportable   07/05/19  04:52    


 


  Not Reportable   07/05/19  04:52    


 


  Not Reportable   07/05/19  04:52    


 


  Not Reportable   07/05/19  04:52    


 


  Few   07/05/19  04:52    


 


  Not Reportable   07/05/19  04:52    


 


  Few   07/05/19  04:52    


 


  Not Reportable   07/05/19  04:52    


 


  Not Reportable   07/05/19  04:52    


 


  Not Reportable   07/05/19  04:52    


 


  Not Reportable   07/05/19  04:52    


 


  Not Reportable   07/05/19  04:52    


 


  Not Reportable   07/05/19  04:52    


 


  Few   07/05/19  04:52    


 


Acanthocytes (Spur)  Not Reportable   07/05/19  04:52    


 


Rouleaux  Not Reportable   07/05/19  04:52    


 


  Not Reportable   07/05/19  04:52    


 


  Not Reportable   07/05/19  04:52    


 


  Not Reportable   07/05/19  04:52    


 


Percent Retic  1.10 % (0.78-2.58)   07/05/19  04:52    


 


  Not Reportable   07/05/19  04:52    


 


Hem Pathologist Commnt  No   07/05/19  04:52    


 


PT  16.3 Sec. (12.2-14.9)  H  07/05/19  08:51    


 


INR  1.35  (0.87-1.13)  H  07/05/19  08:51    


 


Sodium  140 mmol/L (137-145)   07/16/19  04:44    


 


Potassium  4.3 mmol/L (3.6-5.0)  D 07/16/19  04:44    


 


Chloride  98.4 mmol/L ()   07/16/19  04:44    


 


Carbon Dioxide  27 mmol/L (22-30)   07/16/19  04:44    


 


  19 mmol/L  07/16/19  04:44    


 


BUN  24 mg/dL (9-20)  H  07/16/19  04:44    


 


  9.0 mg/dL (0.8-1.5)  H  07/16/19  04:44    


 


Estimated GFR  7 ml/min  07/16/19  04:44    


 


  3 %  07/16/19  04:44    


 


Glucose  81 mg/dL ()   07/16/19  04:44    


 


POC Glucose  164  ()  H  07/04/19  17:35    


 


Calcium  8.2 mg/dL (8.4-10.2)  L  07/16/19  04:44    


 


Phosphorus  6.50 mg/dL (2.5-4.5)  H  07/15/19  06:57    


 


Magnesium  2.00 mg/dL (1.7-2.3)   07/05/19  04:52    


 


Iron  20 ug/dL ()  L  07/05/19  08:51    


 


TIBC  219 mcg/dL (250-450)  L  07/05/19  08:51    


 


  63.8 ng/mL (13.0-400.0)   07/05/19  08:51    


 


  0.20 mg/dL (0.1-1.2)   07/10/19  04:43    


 


AST  11 units/L (5-40)   07/10/19  04:43    


 


ALT  12 units/L (7-56)   07/10/19  04:43    


 


  67 units/L ()   07/10/19  04:43    


 


  5.9 g/dL (6.1-8.1)  L  07/09/19  04:50    


 


  6.7 g/dL (6.3-8.2)   07/10/19  04:43    


 


  2.0 g/dL (3.9-5)  L  07/10/19  04:43    


 


  0.4 %  07/10/19  04:43    


 


  0.7 g/dL (0.2-0.3)  H  07/09/19  04:50    


 


  0.8 g/dL (0.5-0.9)   07/09/19  04:50    


 


  0.5 g/dL (0.2-0.5)   07/09/19  04:50    


 


  1.6 g/dL (0.8-1.7)   07/09/19  04:50    


 


Abnorm Protein Band 1  see below   07/09/19  04:50    


 


PEP Interpretation  see below  H  07/09/19  04:50    


 


  88 units/L (13-60)  H  07/04/19  12:47    


 


Tumor Marker AFP  See scanned result   07/05/19  08:51    


 


Carcinoembryonic Ag  17.4 ng/mL (0.0-2.4)  H  07/05/19  08:51    


 


  11 U/mL (<34)   07/05/19  08:51    


 


Vitamin B12  1768 pg/mL (211-911)  H  07/05/19  08:51    


 


  5.05 ng/mL (7.3-26.0)  L  07/05/19  08:51    


 


PTH Intact  238.5 pg/mL (15-65)  H  07/05/19  04:52    


 


  161.7 mg/dL (0.1-20.0)  H  07/05/19  08:51    


 


  72 mmol/L  07/05/19  08:51    


 


Fluid Type  Ascitic   07/05/19  Unknown


 


Fluid Color  Yellow   07/05/19  Unknown


 


Fluid Appearance  Hazy   07/05/19  Unknown


 


Fluid WBC  58 /mm3  07/05/19  Unknown


 


Fluid RBC  1850 /mm3  07/05/19  Unknown


 


Fluid Diff Comment     07/05/19  Unknown


 


Fluid Seg Neutrophils  35.0 %  07/05/19  Unknown


 


Fluid Lymphocytes  14.0 %  07/05/19  Unknown


 


Fluid Reactive Lymphs  0 %  07/05/19  Unknown


 


Fluid Monocytes  51.0 %  07/05/19  Unknown


 


Fluid Eosinophils  0 %  07/05/19  Unknown


 


Fluid Basophils  0 %  07/05/19  Unknown


 


Fluid Glucose  93 mg/dL (40-70)  H  07/05/19  Unknown


 


Fluid Total Protein  4.6  (15.0-45.0)  L  07/05/19  Unknown


 


Fluid Albumin  1.5 g/dL  07/05/19  Unknown


 


Fluid LDH  195   07/05/19  Unknown


 


Random Vancomycin  13.1 ug/mL (0-40.0)   07/09/19  04:50    


 


EVELYN Screen  Negative  (Negative)   07/09/19  04:50    


 


Proteinase 3 (PR3) Ab  <1.0 AI (<1.0)   07/09/19  04:50    


 


Myeloperoxidase Ab  <1.0 AI (<1.0)   07/09/19  04:50    


 


  158 mg/dL ()   07/09/19  04:50    


 


  32 mg/dL (15-53)   07/09/19  04:50    


 


Hepatitis A IgM Ab  Non-reactive  (NonReactive)   07/04/19  17:48    


 


Hep Bs Antigen  Non-reactive  (Negative)   07/04/19  17:48    


 


Hep B Core IgM Ab  Non-reactive  (NonReactive)   07/04/19  17:48    


 


  Non-reactive  (NonReactive)   07/04/19  17:48    


 


Blood Type  A POSITIVE   07/16/19  08:18    


 


Antibody Screen  Negative   07/16/19  08:18    


 


Crossmatch  See Detail   07/16/19  08:18    














Active Medications





- Current Medications


Current Medications: 














Generic Name Dose Route Start Last Admin





  Trade Name Freq  PRN Reason Stop Dose Admin


 


Acetaminophen  650 mg  07/04/19 17:22  07/09/19 11:32





  Tylenol  PO   650 mg





  Q4H PRN   Administration





  Pain MILD(1-3)/Fever >100.5/HA  


 


Albuterol  2.5 mg  07/04/19 17:22 





  Proventil  IH  





  Q4HRT PRN  





  Shortness Of Breath  


 


Bisacodyl  15 mg  07/07/19 14:51 





  Dulcolax  PO  





  QDAY PRN  





  Constipation  


 


Chlorpromazine HCl  10 mg  07/09/19 15:20  07/15/19 20:31





  Thorazine  PO   10 mg





  Q6H PRN   Administration





  Hiccups  


 


Epoetin Tye  20,000 unit  07/12/19 09:36  07/15/19 12:21





  Procrit  SUB-Q   20,000 unit





  VEE PRN   Administration





  hemodialysis  


 


Famotidine  10 mg  07/12/19 22:00  07/16/19 13:32





  Pepcid  PO   10 mg





  BID BOWEN   Administration


 


Ferrous Sulfate  325 mg  07/10/19 22:00  07/16/19 13:31





  Feosol  PO   325 mg





  BID BOWEN   Administration


 


Folic Acid  1 mg  07/06/19 10:00  07/16/19 13:32





  Folvite  PO   1 mg





  QDAY BOWEN   Administration


 


Heparin Sodium (Porcine)  3,000 unit  07/12/19 09:36 





  Heparin 10,000 Units/10 Ml  IV  





  VEE PRN  





  hemodialysis  


 


Hydralazine HCl  10 mg  07/04/19 19:14  07/04/19 22:17





  Apresoline  IV   10 mg





  Q3H PRN   Administration





  Blood Pressure  


 


Hydromorphone HCl  0.5 mg  07/04/19 17:22  07/15/19 20:31





  Dilaudid  IV   0.5 mg





  Q3H PRN   Administration





  Pain , Severe (7-10)  


 


Chlorpromazine HCl 25 mg/  51 mls @ 100 mls/hr  07/13/19 12:26 





  Sodium Chloride  IV  





  Q4H PRN  





  Hiccups  


 


Sodium Chloride  100 mls @ 999 mls/hr  07/15/19 08:12 





  Nacl 0.9%  IV  





  VEE PRN  





  Hypotension  


 


Cefazolin Sodium  2 gm in 20 mls @ 80 mls/hr  07/16/19 06:00 





  Ancef/Sterile Water 2 Gm/20 Ml  IV  07/16/19 23:59 





  PREOP NR  


 


Sodium Chloride  500 mls @ 0 mls/hr  07/16/19 08:03 





  Nacl 0.9% 500 Ml  IV  07/17/19 08:02 





  ONCE NR  





  As Directed  


 


Sodium Chloride  1,000 mls @ 42 mls/hr  07/16/19 10:00  07/16/19 09:50





  Nacl 0.9% 1000 Ml  IV   42 mls/hr





  AS DIRECT BOWEN   Administration


 


Metoclopramide HCl  10 mg  07/04/19 17:22 





  Reglan  IV  





  Q6H PRN  





  Nausea And Vomiting  


 


Metoprolol Tartrate  50 mg  07/04/19 23:00  07/16/19 13:32





  Lopressor  PO   50 mg





  BID BOWEN   Administration


 


Midazolam HCl  2 mg  07/16/19 10:00  07/16/19 10:50





  Versed  IV  07/16/19 23:59  2 mg





  PREOP NR   Administration


 


Ondansetron HCl  4 mg  07/04/19 17:22 





  Zofran  IV  





  Q3H PRN  





  Nausea And Vomiting  


 


Ondansetron HCl  4 mg  07/16/19 10:00 





  Zofran  IV  





  ONCE PRN  





  Nausea And Vomiting  


 


Oxycodone/Acetaminophen  1 tab  07/13/19 10:53  07/16/19 13:31





  Percocet 5/325  PO   1 tab





  Q6H PRN   Administration





  Pain, Moderate (4-6)  


 


Sodium Chloride  10 ml  07/04/19 22:00  07/16/19 13:33





  Sodium Chloride Flush Syringe 10 Ml  IV   10 ml





  BID BOWEN   Administration


 


Sodium Chloride  10 ml  07/04/19 17:22  07/15/19 20:32





  Sodium Chloride Flush Syringe 10 Ml  IV   10 ml





  PRN PRN   Administration





  LINE FLUSH  














Nutrition/Malnutrition Assess





- Dietary Evaluation


Nutrition/Malnutrition Findings: 


                                        





Nutrition Notes                                            Start:  07/11/19 

12:03


Freq:                                                      Status: Active       




Protocol:                                                                       




 Document     07/11/19 12:03  LP  (Rec: 07/11/19 12:06  LP  [id number])


 Nutrition Notes


     Need for Assessment generated from:         LOS


     Initial or Follow up                        Brief Note


     Subjective/Other Information                Screen for LOS. Pt states


                                                 eating well PTA and now.


                                                 Denies wt changes. Consuming


                                                 % of meals.


 Nutrition Intervention


     Revisit per MD consult or patient           Sign Off


      request:

## 2019-07-16 NOTE — PROGRESS NOTE
Assessment and Plan





1. ESRD:


Likely CKD has progressed to ESRD


CT abdomen showed mild bilateral hydronephosis.


No improvement in the renal function.


Monitor renal function.


Renal prognosis appears to be poor.


Avoid nephrotoxic agents.


Meds dosage based on GFR.


Due to persistent hyperkalemia patient was started on hemodialysis on 7/4/2019.


Hemodialysis: 7/4, 6/5, 7/6, 7/8, 7/10, 7/12, 7/15.





2. FEN:


Hyperkalemia, K level is better.


Anion gap MA, improved.


Monitor lytes.





3. Mild bilateral hydronephosis:


S/p hernandez catheter.





4. Rectal adenocarcinoma with malignant Ascites.


Followed by Heme-onc.





5. Anemia:


PRBC.


Monitor H/H.





6. Uncontrolled HTN:


BP is better.














Subjective


Date of service: 07/16/19


Principal diagnosis: ? carcinomatosis


Interval history: 


Patient was seen and examined at the bedside.


No new complaint.








Objective





- Vital Signs


Vital signs: 


                               Vital Signs - 12hr











  07/15/19 07/15/19 07/15/19





  22:19 22:20 22:59


 


Temperature 98.1 F  


 


Pulse Rate 87 88 84


 


Respiratory 18  18





Rate   


 


Blood Pressure 78/51  83/49


 


Blood Pressure  79/45 





[Right]   


 


O2 Sat by Pulse 98  94





Oximetry   














  07/15/19 07/15/19 07/16/19





  23:00 23:44 01:17


 


Temperature  98.0 F 


 


Pulse Rate 83 85 82


 


Respiratory 18 16 18





Rate   


 


Blood Pressure 74/40 101/59 102/57


 


Blood Pressure   





[Right]   


 


O2 Sat by Pulse 100 98 98





Oximetry   














  07/16/19





  05:32


 


Temperature 98.8 F


 


Pulse Rate 92 H


 


Respiratory 16





Rate 


 


Blood Pressure 106/64


 


Blood Pressure 





[Right] 


 


O2 Sat by Pulse 100





Oximetry 














- General Appearance


General appearance: well-developed, well-nourished, appears stated age, other 

(no distress, R IJ tunnel catheter)


EENT: ATNC, PERRL, hearing intact, vision intact


Neck: supple


Respiratory: Present: Clear to Ascultation


Cardiology: regular, S1S2, no murmurs


Gastrointestinal: normoactive bowel sounds, no tenderness, distended


Integumentary: no rash, warm and dry


Neurologic: no focal deficit, no asterixis, alert and oriented x3


Musculoskeletal: other (no edema)





- Lab





                                 07/16/19 04:44





                                 07/16/19 04:44


                             Most recent lab results











Calcium  8.2 mg/dL (8.4-10.2)  L  07/16/19  04:44    


 


Phosphorus  6.50 mg/dL (2.5-4.5)  H  07/15/19  06:57    


 


Magnesium  2.00 mg/dL (1.7-2.3)   07/05/19  04:52    


 


  161.7 mg/dL (0.1-20.0)  H  07/05/19  08:51    


 


  72 mmol/L  07/05/19  08:51    














Medications & Allergies





- Medications


Allergies/Adverse Reactions: 


                                    Allergies





No Known Allergies Allergy (Verified 07/04/19 12:02)


   








Home Medications: 


                                Home Medications











 Medication  Instructions  Recorded  Confirmed  Last Taken  Type


 


Prednisone [predniSONE 10 mg 10 mg PO .TAPER #1 tab.ds.pk 09/10/15 07/06/19 

Unknown Rx





(6-Day Pack, 21 Tabs)]     











Active Medications: 














Generic Name Dose Route Start Last Admin





  Trade Name Freq  PRN Reason Stop Dose Admin


 


Acetaminophen  650 mg  07/04/19 17:22  07/09/19 11:32





  Tylenol  PO   650 mg





  Q4H PRN   Administration





  Pain MILD(1-3)/Fever >100.5/HA  


 


Albuterol  2.5 mg  07/04/19 17:22 





  Proventil  IH  





  Q4HRT PRN  





  Shortness Of Breath  


 


Bisacodyl  15 mg  07/07/19 14:51 





  Dulcolax  PO  





  QDAY PRN  





  Constipation  


 


Chlorpromazine HCl  10 mg  07/09/19 15:20  07/15/19 20:31





  Thorazine  PO   10 mg





  Q6H PRN   Administration





  Hiccups  


 


Epoetin Tye  20,000 unit  07/12/19 09:36  07/15/19 12:21





  Procrit  SUB-Q   20,000 unit





  VEE PRN   Administration





  hemodialysis  


 


Famotidine  10 mg  07/12/19 22:00  07/15/19 22:23





  Pepcid  PO   Not Given





  BID BOWEN  


 


Fentanyl  50 mcg  07/16/19 10:00 





  Sublimaze  IV  07/16/19 16:00 





  Q5MIN PRN  





  Pain , Severe (7-10)  


 


Ferrous Sulfate  325 mg  07/10/19 22:00  07/15/19 22:23





  Feosol  PO   325 mg





  BID BOWEN   Administration


 


Folic Acid  1 mg  07/06/19 10:00  07/15/19 14:09





  Folvite  PO   1 mg





  QDAY BOWEN   Administration


 


Heparin Sodium (Porcine)  3,000 unit  07/12/19 09:36 





  Heparin 10,000 Units/10 Ml  IV  





  VEE PRN  





  hemodialysis  


 


Hydralazine HCl  10 mg  07/04/19 19:14  07/04/19 22:17





  Apresoline  IV   10 mg





  Q3H PRN   Administration





  Blood Pressure  


 


Hydromorphone HCl  0.5 mg  07/04/19 17:22  07/15/19 20:31





  Dilaudid  IV   0.5 mg





  Q3H PRN   Administration





  Pain , Severe (7-10)  


 


Chlorpromazine HCl 25 mg/  51 mls @ 100 mls/hr  07/13/19 12:26 





  Sodium Chloride  IV  





  Q4H PRN  





  Hiccups  


 


Sodium Chloride  100 mls @ 999 mls/hr  07/15/19 08:12 





  Nacl 0.9%  IV  





  VEE PRN  





  Hypotension  


 


Cefazolin Sodium  2 gm in 20 mls @ 80 mls/hr  07/16/19 06:00 





  Ancef/Sterile Water 2 Gm/20 Ml  IV  07/16/19 23:59 





  PREOP NR  


 


Sodium Chloride  500 mls @ 0 mls/hr  07/16/19 08:03 





  Nacl 0.9% 500 Ml  IV  07/17/19 08:02 





  ONCE NR  





  As Directed  


 


Sodium Chloride  1,000 mls @ 42 mls/hr  07/16/19 10:00 





  Nacl 0.9% 1000 Ml  IV  





  AS DIRECT BOWEN  


 


Metoclopramide HCl  10 mg  07/04/19 17:22 





  Reglan  IV  





  Q6H PRN  





  Nausea And Vomiting  


 


Metoprolol Tartrate  50 mg  07/04/19 23:00  07/15/19 22:23





  Lopressor  PO   Not Given





  BID BOWEN  


 


Midazolam HCl  2 mg  07/16/19 10:00 





  Versed  IV  07/16/19 23:59 





  PREOP NR  


 


Morphine Sulfate  2 mg  07/16/19 10:15 





  Morphine  IV  07/16/19 10:16 





  ONCE ONE  


 


Ondansetron HCl  4 mg  07/04/19 17:22 





  Zofran  IV  





  Q3H PRN  





  Nausea And Vomiting  


 


Ondansetron HCl  4 mg  07/16/19 10:00 





  Zofran  IV  





  ONCE PRN  





  Nausea And Vomiting  


 


Oxycodone/Acetaminophen  1 tab  07/13/19 10:53  07/16/19 06:21





  Percocet 5/325  PO   1 tab





  Q6H PRN   Administration





  Pain, Moderate (4-6)  


 


Sodium Chloride  10 ml  07/04/19 22:00  07/15/19 22:24





  Sodium Chloride Flush Syringe 10 Ml  IV   10 ml





  BID BOWEN   Administration


 


Sodium Chloride  10 ml  07/04/19 17:22  07/15/19 20:32





  Sodium Chloride Flush Syringe 10 Ml  IV   10 ml





  PRN PRN   Administration





  LINE FLUSH

## 2019-07-16 NOTE — ANESTHESIA CONSULTATION
Anesthesia Consult and Med Hx


Date of service: 07/16/19





- Airway


Anesthetic Teeth Evaluation: Good


ROM Head & Neck: Adequate


Mental/Hyoid Distance: Adequate


Mallampati Class: Class II


Intubation Access Assessment: Good





- Pre-Operative Health Status


ASA Pre-Surgery Classification: ASA4


Proposed Anesthetic Plan: General (MAC; GA if needed), MAC





- Pulmonary


Hx Asthma: Yes





- Cardiovascular System


Hx Hypertension: Yes





- Endocrine


Hx Renal Disease: Yes (Hydronephrosis)


Hx End Stage Renal Disease: Yes (CKD now ESRD. Last HD yesterday)





- Hematic


Hx Anemia: Yes





- Other Systems


Hx Cancer: Yes (metastatic rectal cancer and malignant ascites)

## 2019-07-17 LAB
BUN SERPL-MCNC: 22 MG/DL (ref 9–20)
BUN/CREAT SERPL: 3 %
CALCIUM SERPL-MCNC: 8.2 MG/DL (ref 8.4–10.2)
HCT VFR BLD CALC: 24.6 % (ref 35.5–45.6)
HEMOLYSIS INDEX: 0
HGB BLD-MCNC: 7.9 GM/DL (ref 11.8–15.2)
MCHC RBC AUTO-ENTMCNC: 32 % (ref 32–34)
MCV RBC AUTO: 72 FL (ref 84–94)
PLATELET # BLD: 366 K/MM3 (ref 140–440)
RBC # BLD AUTO: 3.4 M/MM3 (ref 3.65–5.03)

## 2019-07-17 PROCEDURE — 5A1D70Z PERFORMANCE OF URINARY FILTRATION, INTERMITTENT, LESS THAN 6 HOURS PER DAY: ICD-10-PCS | Performed by: INTERNAL MEDICINE

## 2019-07-17 RX ADMIN — FERROUS SULFATE TAB 325 MG (65 MG ELEMENTAL FE) SCH MG: 325 (65 FE) TAB at 21:27

## 2019-07-17 RX ADMIN — FERROUS SULFATE TAB 325 MG (65 MG ELEMENTAL FE) SCH MG: 325 (65 FE) TAB at 13:51

## 2019-07-17 RX ADMIN — FAMOTIDINE SCH MG: 10 TABLET ORAL at 21:27

## 2019-07-17 RX ADMIN — METOPROLOL TARTRATE SCH MG: 50 TABLET, FILM COATED ORAL at 13:52

## 2019-07-17 RX ADMIN — HYDROMORPHONE HYDROCHLORIDE PRN MG: 1 INJECTION, SOLUTION INTRAMUSCULAR; INTRAVENOUS; SUBCUTANEOUS at 17:07

## 2019-07-17 RX ADMIN — CHLORPROMAZINE HYDROCHLORIDE PRN MG: 10 TABLET, FILM COATED ORAL at 00:40

## 2019-07-17 RX ADMIN — FAMOTIDINE SCH MG: 10 TABLET ORAL at 13:52

## 2019-07-17 RX ADMIN — OXYCODONE AND ACETAMINOPHEN PRN TAB: 5; 325 TABLET ORAL at 21:26

## 2019-07-17 RX ADMIN — HYDROMORPHONE HYDROCHLORIDE PRN MG: 1 INJECTION, SOLUTION INTRAMUSCULAR; INTRAVENOUS; SUBCUTANEOUS at 08:31

## 2019-07-17 RX ADMIN — FOLIC ACID SCH MG: 1 TABLET ORAL at 13:51

## 2019-07-17 RX ADMIN — Medication SCH ML: at 21:28

## 2019-07-17 RX ADMIN — Medication SCH ML: at 13:53

## 2019-07-17 RX ADMIN — HYDROMORPHONE HYDROCHLORIDE PRN MG: 1 INJECTION, SOLUTION INTRAMUSCULAR; INTRAVENOUS; SUBCUTANEOUS at 13:45

## 2019-07-17 RX ADMIN — METOPROLOL TARTRATE SCH MG: 50 TABLET, FILM COATED ORAL at 21:27

## 2019-07-17 NOTE — PROGRESS NOTE
Assessment and Plan





1. ESRD:


Likely CKD has progressed to ESRD


CT abdomen showed mild bilateral hydronephosis.


No improvement in the renal function.


Monitor renal function.


Renal prognosis appears to be poor.


Avoid nephrotoxic agents.


Meds dosage based on GFR.


Due to persistent hyperkalemia patient was started on hemodialysis on 7/4/2019.


Hemodialysis: 7/4, 7/5, 7/6, 7/8, 7/10, 7/12, 7/15, today.





2. FEN:


Hyperkalemia, K level is better.


Anion gap MA, improved.


Monitor lytes.





3. Mild bilateral hydronephosis:


S/p hernandez catheter.





4. Rectal adenocarcinoma with malignant Ascites.


S/p Medport.


Await Heme-onc recs.





5. Anemia:


PRBC.


Monitor H/H.





6. Uncontrolled HTN:


BP is better.














Subjective


Date of service: 07/17/19


Principal diagnosis: ? carcinomatosis


Interval history: 


Patient was seen and examined at the bedside.


No new complaint.








Objective





- Vital Signs


Vital signs: 


                               Vital Signs - 12hr











  07/16/19 07/16/19 07/16/19





  22:00 22:42 23:00


 


Temperature  98.3 F 


 


Pulse Rate  92 H 


 


Respiratory 18 20 20





Rate   


 


Blood Pressure  103/67 


 


O2 Sat by Pulse 98 100 





Oximetry   














  07/16/19 07/17/19





  23:30 04:57


 


Temperature  98.3 F


 


Pulse Rate  93 H


 


Respiratory 18 18





Rate  


 


Blood Pressure  115/72


 


O2 Sat by Pulse  100





Oximetry  














- General Appearance


General appearance: well-developed, well-nourished, appears stated age, other 

(no distress, R IJ tunnel catheter)


EENT: ATNC, PERRL, mucous membranes moist, hearing intact, vision intact


Neck: supple


Respiratory: Present: Clear to Ascultation


Cardiology: regular, S1S2, no murmurs


Gastrointestinal: normoactive bowel sounds, no tenderness, distended


Integumentary: no rash, warm and dry


Neurologic: no focal deficit, no asterixis, alert and oriented x3


Musculoskeletal: other (no edema)


Psychiatric: cooperative





- Lab





                                 07/17/19 10:00





                                 07/17/19 10:00


                             Most recent lab results











Calcium  8.2 mg/dL (8.4-10.2)  L  07/16/19  04:44    


 


Phosphorus  6.50 mg/dL (2.5-4.5)  H  07/15/19  06:57    


 


Magnesium  2.00 mg/dL (1.7-2.3)   07/05/19  04:52    


 


  161.7 mg/dL (0.1-20.0)  H  07/05/19  08:51    


 


  72 mmol/L  07/05/19  08:51    














Medications & Allergies





- Medications


Allergies/Adverse Reactions: 


                                    Allergies





No Known Allergies Allergy (Verified 07/04/19 12:02)


   








Home Medications: 


                                Home Medications











 Medication  Instructions  Recorded  Confirmed  Last Taken  Type


 


Prednisone [predniSONE 10 mg 10 mg PO .TAPER #1 tab.ds.pk 09/10/15 07/06/19 

Unknown Rx





(6-Day Pack, 21 Tabs)]     











Active Medications: 














Generic Name Dose Route Start Last Admin





  Trade Name Freq  PRN Reason Stop Dose Admin


 


Acetaminophen  650 mg  07/04/19 17:22  07/09/19 11:32





  Tylenol  PO   650 mg





  Q4H PRN   Administration





  Pain MILD(1-3)/Fever >100.5/HA  


 


Albuterol  2.5 mg  07/04/19 17:22 





  Proventil  IH  





  Q4HRT PRN  





  Shortness Of Breath  


 


Bisacodyl  15 mg  07/07/19 14:51 





  Dulcolax  PO  





  QDAY PRN  





  Constipation  


 


Chlorpromazine HCl  10 mg  07/09/19 15:20  07/17/19 00:40





  Thorazine  PO   10 mg





  Q6H PRN   Administration





  Hiccups  


 


Epoetin Tye  20,000 unit  07/12/19 09:36  07/15/19 12:21





  Procrit  SUB-Q   20,000 unit





  VEE PRN   Administration





  hemodialysis  


 


Famotidine  10 mg  07/12/19 22:00  07/16/19 22:56





  Pepcid  PO   10 mg





  BID BOWEN   Administration


 


Ferrous Sulfate  325 mg  07/10/19 22:00  07/16/19 22:56





  Feosol  PO   325 mg





  BID BOWEN   Administration


 


Folic Acid  1 mg  07/06/19 10:00  07/16/19 13:32





  Folvite  PO   1 mg





  QDAY BOWEN   Administration


 


Heparin Sodium (Porcine)  3,000 unit  07/12/19 09:36 





  Heparin 10,000 Units/10 Ml  IV  





  VEE PRN  





  hemodialysis  


 


Hydralazine HCl  10 mg  07/04/19 19:14  07/04/19 22:17





  Apresoline  IV   10 mg





  Q3H PRN   Administration





  Blood Pressure  


 


Hydromorphone HCl  0.5 mg  07/04/19 17:22  07/17/19 08:31





  Dilaudid  IV   0.5 mg





  Q3H PRN   Administration





  Pain , Severe (7-10)  


 


Chlorpromazine HCl 25 mg/  51 mls @ 100 mls/hr  07/13/19 12:26 





  Sodium Chloride  IV  





  Q4H PRN  





  Hiccups  


 


Sodium Chloride  100 mls @ 999 mls/hr  07/15/19 08:12 





  Nacl 0.9%  IV  





  VEE PRN  





  Hypotension  


 


Sodium Chloride  1,000 mls @ 42 mls/hr  07/16/19 10:00  07/16/19 09:50





  Nacl 0.9% 1000 Ml  IV   42 mls/hr





  AS DIRECT BOWEN   Administration


 


Metoclopramide HCl  10 mg  07/04/19 17:22 





  Reglan  IV  





  Q6H PRN  





  Nausea And Vomiting  


 


Metoprolol Tartrate  50 mg  07/04/19 23:00  07/16/19 22:56





  Lopressor  PO   Not Given





  BID BOWEN  


 


Ondansetron HCl  4 mg  07/04/19 17:22 





  Zofran  IV  





  Q3H PRN  





  Nausea And Vomiting  


 


Ondansetron HCl  4 mg  07/16/19 10:00 





  Zofran  IV  





  ONCE PRN  





  Nausea And Vomiting  


 


Oxycodone/Acetaminophen  1 tab  07/13/19 10:53  07/16/19 18:55





  Percocet 5/325  PO   1 tab





  Q6H PRN   Administration





  Pain, Moderate (4-6)  


 


Sodium Chloride  10 ml  07/04/19 22:00  07/16/19 22:59





  Sodium Chloride Flush Syringe 10 Ml  IV   10 ml





  BID BOWEN   Administration


 


Sodium Chloride  10 ml  07/04/19 17:22  07/15/19 20:32





  Sodium Chloride Flush Syringe 10 Ml  IV   10 ml





  PRN PRN   Administration





  LINE FLUSH

## 2019-07-17 NOTE — OPERATIVE REPORT
PREOPERATIVE DIAGNOSIS:  Metastatic rectal cancer.



POSTOPERATIVE DIAGNOSIS:  Metastatic rectal cancer.



PROCEDURE:  Left internal jugular Port-A-Cath placement using Mindray ultrasound

guidance.



FINDINGS:

1.  Good placement of port on postop chest x-ray without pneumothorax.

2.  Sluggish blood flow from the port, but easy to flush.



ANESTHESIA:  MAC local.



SURGEON:  Diana Britt DO



ESTIMATED BLOOD LOSS:  Minimal.



PATHOLOGY:  None.



CONDITION DISPOSITION:  The patient is stable to PACU.



HISTORY OF PRESENT ILLNESS AND INDICATIONS:  The patient is a 59-year-old male

who presents to the hospital with abdominal distention, weight loss and poor

appetite.  Workup was performed including labs, imaging, colonoscopy, which

showed a rectal mass with carcinomatosis and malignant ascites.  The patient was

seen by Oncology and deemed a candidate for chemotherapy.  Port-A-Cath was

recommended.  I discussed all risks, benefits and alternatives to surgery with

the patient.  He did eventually agree for port placement and consent was

obtained.



PROCEDURE IN DETAIL:  The patient was identified in the preoperative area, taken

back to the operating room, placed on the operating table in supine position. 

After anesthesia was induced, the left upper chest and midline were prepped and

draped in the usual sterile fashion and a timeout performed.  A shoulder roll

was placed across the shoulders.  Bilateral arms were tucked with all bony

prominences padded.  The right hand side was not prepped because the patient

already has an existing Vas-Cath.  Using ultrasound guidance, the left internal

jugular vein was identified and seen to be compressible.  Local anesthetic was

infiltrated at the intended puncture site and the left IJ vein was accessed on

the first stick.  There was return of dark red nonpulsatile blood, which was a

little sluggish.  The wire was then threaded without resistance and the

positioning was confirmed in the right atrium using fluoroscopy.  The wire was

affixed to the drapes and a local anesthetic was infiltrated into the left upper

chest at the intended incision site.  A 4-cm incision was made in the left upper

chest using a 15 blade.  Dissection was carried down through the skin and

subcutaneous tissue using electrocautery until the prepectoral fascia was

identified.  A subcutaneous pocket was then created using combination of blunt

dissection and electrocautery.  Once the pocket was deemed adequate, the

catheter was tunneled from the pocket to the wire.  The tract was then dilated

using a breakaway catheter dilator sheath, inserted over the wire.  This was

inserted using direct fluoroscopic guidance.  The dilator and wire were then

removed and the catheter inserted through the breakaway sheath in the usual

fashion.  The breakaway sheath was removed and the catheter was pulled back

under direct fluoroscopic guidance until the tip was seen in the right atrium

just past the tip of the Vas-Cath.  The port was then assembled in the usual

fashion and sutured to the prepectoral fascia using interrupted 2-0 Vicryl

sutures.  There was some very sluggish blood return from the port; however, the

port did flush easily.  The port was first tested with heparinized saline and

then flushed with 3000 units of heparin.  The pocket was irrigated and carefully

checked for hemostasis, which was ensured.  The skin incision was then closed in

a 2 layer fashion with a deep dermal layer closed with 3-0 Vicryl interrupted

sutures and the skin closed with 4-0 Monocryl subcuticular running stitch and

skin glue.  The small incision in the neck at the puncture site was also closed

with 4-0 Monocryl subcuticular stitch and skin glue.  At the end of the case,

all sponge, instrument, sharp counts were correct x 2.  Postoperative chest

x-ray showed good placement of the port without pneumothorax.  The patient was

awoken from anesthesia and taken to PACU in stable condition.





DD: 07/17/2019 13:06

DT: 07/17/2019 13:26

JOB# [id number]  [id number]

NITESH/ERNESTO

## 2019-07-17 NOTE — PROGRESS NOTE
Assessment and Plan





58 yo M s/p left internal jugular port placement POD 1





1. metastatic rectal cancer


2. malignant ascites


3. CKD on HD


4. anemia - multifactorial





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 

cells and lymphovascular invasion


Path Ascites: malignant cells consistent with metastatic carcinoma





Plan:





1. oncology on board, CEA 17.4


2. Currently no urgent need for surgical intervention for rectal mass. It is not

obstructing or actively bleeding. Patient will need neoadjuvant treatment and 

any surgical intervention would be palliative due to the advanced stage of the 

cancer. 


3. DVT ppx


4. soft diet


5. prn pain control


6. Pt instructed to only allow those at oncology center access port. 


7. transfuse as needed





Will s/o. Thank you, please call with questions.





Subjective


Date of service: 07/17/19


Narrative: 





Pt seen and examined. No acute complaints. No f/c





Objective


                               Vital Signs - 12hr











  07/17/19 07/17/19 07/17/19





  04:57 09:20 09:30


 


Temperature 98.3 F 98.3 F 


 


Pulse Rate 93 H 100 H 99 H


 


Respiratory 18 18 





Rate   


 


Blood Pressure 115/72 108/71 126/82


 


O2 Sat by Pulse 100  





Oximetry   














  07/17/19 07/17/19 07/17/19





  09:45 10:00 10:15


 


Temperature   


 


Pulse Rate 102 H 102 H 101 H


 


Respiratory   





Rate   


 


Blood Pressure 118/78 115/78 115/77


 


O2 Sat by Pulse   





Oximetry   














  07/17/19 07/17/19 07/17/19





  10:30 10:45 11:00


 


Temperature   


 


Pulse Rate 100 H 98 H 99 H


 


Respiratory   





Rate   


 


Blood Pressure 108/71 112/72 108/76


 


O2 Sat by Pulse   





Oximetry   














  07/17/19 07/17/19 07/17/19





  11:30 11:45 12:00


 


Temperature   


 


Pulse Rate 96 H 99 H 97 H


 


Respiratory   





Rate   


 


Blood Pressure 126/78 122/83 118/78


 


O2 Sat by Pulse   





Oximetry   














  07/17/19 07/17/19 07/17/19





  12:15 12:30 13:52


 


Temperature   


 


Pulse Rate 97 H 97 H 


 


Respiratory   





Rate   


 


Blood Pressure 118/78 119/83 129/82


 


O2 Sat by Pulse   





Oximetry   














- General physical appearance


Narrative Exam: 





Gen: AAOx3. NAD


ENT: no scleral icterus or conjunctival pallor. L neck incision c/d/i


Chest/CV: R upper chest vascath. L upper chest incision c/d/i. 


Resp: even and unlabored


Ext: no c/c/e





- Labs





                                 07/17/19 10:00





                                 07/17/19 10:00


                                 Diabetes panel











  07/17/19 Range/Units





  10:00 


 


Sodium  136 L  (137-145)  mmol/L


 


Potassium  3.5 L  (3.6-5.0)  mmol/L


 


Chloride  95.4 L  ()  mmol/L


 


Carbon Dioxide  28  (22-30)  mmol/L


 


BUN  22 H  (9-20)  mg/dL


 


Creatinine  7.9 H  (0.8-1.5)  mg/dL


 


Glucose  135 H  ()  mg/dL


 


Calcium  8.2 L  (8.4-10.2)  mg/dL








                                  Calcium panel











  07/17/19 Range/Units





  10:00 


 


Calcium  8.2 L  (8.4-10.2)  mg/dL








                                 Pituitary panel











  07/17/19 Range/Units





  10:00 


 


Sodium  136 L  (137-145)  mmol/L


 


Potassium  3.5 L  (3.6-5.0)  mmol/L


 


Chloride  95.4 L  ()  mmol/L


 


Carbon Dioxide  28  (22-30)  mmol/L


 


BUN  22 H  (9-20)  mg/dL


 


Creatinine  7.9 H  (0.8-1.5)  mg/dL


 


Glucose  135 H  ()  mg/dL


 


Calcium  8.2 L  (8.4-10.2)  mg/dL








                                  Adrenal panel











  07/17/19 Range/Units





  10:00 


 


Sodium  136 L  (137-145)  mmol/L


 


Potassium  3.5 L  (3.6-5.0)  mmol/L


 


Chloride  95.4 L  ()  mmol/L


 


Carbon Dioxide  28  (22-30)  mmol/L


 


BUN  22 H  (9-20)  mg/dL


 


Creatinine  7.9 H  (0.8-1.5)  mg/dL


 


Glucose  135 H  ()  mg/dL


 


Calcium  8.2 L  (8.4-10.2)  mg/dL

## 2019-07-17 NOTE — PROGRESS NOTE
Assessment and Plan


Assessment and plan: 


Patient is a 58 yo man with a history of hypertension and Asthma who presented 

to [facility] ED with urinary frequency, back pains, n/v, diarrhea and abd pains with 

distension x 1 week. He was diagnosis with Ascites and underwent a paracentesis.







* Initial labs: wbc 13.0 to 10.0, hgb 6.3 to 7.0 (mcv 60), na 132, k 9.5, cr 

  34.8, bun 129, bg 164, lipase 88


* CT abd/pelvis without contrast IMPRESSION: 1. There is ascites. There is 

  increased density in the omentum. Findings are concerning for carcinomatosis. 

  There is some mild adenopathy in the pelvis a possible pericolonic mass, 

  series 2 image 139. There is mild bilateral hydronephrosis. The exact cause is

  not determined by this study. There is minimal nephrolithiasis on the left. No

  ureteral calculi are identified however.


* Colonoscopy, There was a circumferential rectal tumor from the dentate line 

  extending to mid-proximal rectum.  There was severe stenosis related to 

  circumferential tumor which was unable to be traversed.  The tumor was friable

  to touch.   Multiple biopsies were obtained Impression:1. Rectal tumor from 

  dentate line to mid/proximal rectum (extent of exam due to severe stenosis 

  related to tumor).  Biopsies were obtained. Recommendations: follow-up 

  pathology, -surgery consult, -heme/onc following, -screen first degree 

  relatives for colorectal cancer, -will need colonoscopy in 3-6 months to rule 

  out proximal colon lesions.


* Path Rectal biopsy: poorly differentiated mucinous adenocarcinoma with signet 

  cells and lymphovascular invasion


* Path Ascites: malignant cells consistent with metastatic carcinoma





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 

lymphovascular invasion, Chemotherapy port to be placed 7/16/19;  On discharge 

patient will follow hematology oncologist, Dr. Meade


Acute anemia with Drop in HCT s/p transfused 2 units of PRBC, ordered FOBT, GI 

is following, d/w Dr. Joel, Drop in H/H Hemoglobin dropped to 6.4, transfused 

another unit of PRBC, on 7/16/19; Findings of colonoscopy as noted above. 

Discussed with surgery, will plan for port placement


ESRD: s/p permacath, had emergent hemodialysis due to severe hyperkalemia done 

on 7/4/19, place hernandez on 7/5/19 with very little output.


Bilateral hydronephrosis: Place hernandez, consulted Urology, input noted==

>discharge with hernandez 


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 

signet ring cells. 


Malignant Ascites due to suspected Colon cancer with mets to 

Omentum/Carcinomatosis suspected, which would be a very poor prognostic sign: 

consulted GI, input noted, s/p Paracentesis on 7/5/19, await cytology, CEA 17 

ELEVATED, AFP ELEVATED, CA 19-9 - normal 11


Positive blood cultures: Abx discontinued following ID eval as culture appears 

to be contaminant


Hyperkalemia: treated with HD, Nephrology is following


Acute Microcytic anemia, suspect cancer related until proven otherwise, I did 

inform patient my concerns after gaining his permission to do so: consult GI 

then consider Heme/Onc consult


Severe malnutrition: Dietitian consulted 


Hiccups- likely tumor related, will add Thorazine.


DVT prophylaxis: On heparin and GI prophylaxis


Discharge when Outpatient Dialysis ready. Patient will discuss with surgery 

about port placement for chemotherapy. Outpatient follow up with oncologist 








History


Interval history: 


Patient was seen and examined. Follow-up on current diagnosis ARF, ascites. No 

overnight events reported to me. Patient denies any chest pain, shortness 

breath, or severe headaches. Imaging, nursing note, chart, labs and old chart 

reviewed. Discussed with patient. 





Hospitalist Physical





- Physical exam


Narrative exam: 


Gen: thin frial, ill appearing, NAD, Awake, Alert, Orientated 


HEENT: NCAT, EOMI, PERRL, OP Clear 


Neck: supple, no adenopathy, no thyromegaly, no JVD 


CVS/Heart: RRR, normal S1S2, pulses present bilaterally 


Chest/Lungs: CTA B, Symmetrical chest expansion, good air entry bilaterally 


GI/Abdomen: soft, abdomen distended, good bowel sounds, no guarding or rebound 


/Bladder: no suprapubic tenderness, no CVA or paraspinal tenderness 


Extermity/Skin: no c/c/e, no obvious rash 


MSK: FROM x 4 


Neuro: CN 2-12 grossly intact, no new focal deficits 


Psych: calm 








- Constitutional


Vitals: 


                                        











Temp Pulse Resp BP Pulse Ox


 


 98.3 F   97 H  18   119/83   100 


 


 07/17/19 09:20  07/17/19 12:30  07/17/19 09:20  07/17/19 12:30  07/17/19 04:57











General appearance: Absent: mild distress, well-nourished





Results





- Labs


CBC & Chem 7: 


                                 07/17/19 10:00





                                 07/17/19 10:00


Labs: 


                             Laboratory Last Values











WBC  9.4 K/mm3 (4.5-11.0)   07/17/19  10:00    


 


RBC  3.40 M/mm3 (3.65-5.03)  L  07/17/19  10:00    


 


Hgb  7.9 gm/dl (11.8-15.2)  L  07/17/19  10:00    


 


Hct  24.6 % (35.5-45.6)  L  07/17/19  10:00    


 


MCV  72 fl (84-94)  L  07/17/19  10:00    


 


MCH  23 pg (28-32)  L  07/17/19  10:00    


 


MCHC  32 % (32-34)   07/17/19  10:00    


 


RDW  31.4 % (13.2-15.2)  H  07/17/19  10:00    


 


Plt Count  366 K/mm3 (140-440)   07/17/19  10:00    


 


Add Manual Diff  Complete   07/05/19  04:52    


 


Total Counted  100   07/05/19  04:52    


 


Seg Neuts % (Manual)  95.0 % (40.0-70.0)  H  07/05/19  04:52    


 


  0 %  07/05/19  04:52    


 


  3.0 % (13.4-35.0)  L  07/05/19  04:52    


 


Reactive Lymphs % (Man)  0 %  07/05/19  04:52    


 


  1.0 % (0.0-7.3)   07/05/19  04:52    


 


  1.0 % (0.0-4.3)   07/05/19  04:52    


 


  0 % (0.0-1.8)   07/05/19  04:52    


 


  0 %  07/05/19  04:52    


 


  0 %  07/05/19  04:52    


 


  0 %  07/05/19  04:52    


 


  0 %  07/05/19  04:52    


 


Nucleated RBC %  1.0 % (0.0-0.9)  H  07/05/19  04:52    


 


Seg Neutrophils # Man  9.5 K/mm3 (1.8-7.7)  H  07/05/19  04:52    


 


Band Neutrophils #  0.0 K/mm3  07/05/19  04:52    


 


  0.3 K/mm3 (1.2-5.4)  L  07/05/19  04:52    


 


Abs React Lymphs (Man)  0.0 K/mm3  07/05/19  04:52    


 


  0.1 K/mm3 (0.0-0.8)   07/05/19  04:52    


 


  0.1 K/mm3 (0.0-0.4)   07/05/19  04:52    


 


  0.0 K/mm3 (0.0-0.1)   07/05/19  04:52    


 


  0.0 K/mm3  07/05/19  04:52    


 


  0.0 K/mm3  07/05/19  04:52    


 


  0.0 K/mm3  07/05/19  04:52    


 


Blast Cells #  0.0 K/mm3  07/05/19  04:52    


 


WBC Morphology  Not Reportable   07/05/19  04:52    


 


Hypersegmented Neuts  Not Reportable   07/05/19  04:52    


 


Hyposegmented Neuts  Not Reportable   07/05/19  04:52    


 


Hypogranular Neuts  Not Reportable   07/05/19  04:52    


 


  Not Reportable   07/05/19  04:52    


 


  Not Reportable   07/05/19  04:52    


 


  Not Reportable   07/05/19  04:52    


 


  Not Reportable   07/05/19  04:52    


 


  Not Reportable   07/05/19  04:52    


 


  Not Reportable   07/05/19  04:52    


 


  Consistent w auto   07/05/19  04:52    


 


  Not Reportable   07/05/19  04:52    


 


Plt Clumps, EDTA  Not Reportable   07/05/19  04:52    


 


  Not Reportable   07/05/19  04:52    


 


  Not Reportable   07/05/19  04:52    


 


  Not Reportable   07/05/19  04:52    


 


Plt Morphology Comment  Not Reportable   07/05/19  04:52    


 


RBC Morphology  Not Reportable   07/05/19  04:52    


 


Dimorphic RBCs  Not Reportable   07/05/19  04:52    


 


  Not Reportable   07/05/19  04:52    


 


  3+   07/05/19  04:52    


 


  1+   07/05/19  04:52    


 


  3+   07/05/19  04:52    


 


  2+   07/05/19  04:52    


 


  Not Reportable   07/05/19  04:52    


 


  Not Reportable   07/05/19  04:52    


 


  Not Reportable   07/05/19  04:52    


 


  Not Reportable   07/05/19  04:52    


 


  Few   07/05/19  04:52    


 


  Not Reportable   07/05/19  04:52    


 


  Few   07/05/19  04:52    


 


  Not Reportable   07/05/19  04:52    


 


  Not Reportable   07/05/19  04:52    


 


  Not Reportable   07/05/19  04:52    


 


  Not Reportable   07/05/19  04:52    


 


  Not Reportable   07/05/19  04:52    


 


  Not Reportable   07/05/19  04:52    


 


  Few   07/05/19  04:52    


 


Acanthocytes (Spur)  Not Reportable   07/05/19  04:52    


 


Rouleaux  Not Reportable   07/05/19  04:52    


 


  Not Reportable   07/05/19  04:52    


 


  Not Reportable   07/05/19  04:52    


 


  Not Reportable   07/05/19  04:52    


 


Percent Retic  1.10 % (0.78-2.58)   07/05/19  04:52    


 


  Not Reportable   07/05/19  04:52    


 


Hem Pathologist Commnt  No   07/05/19  04:52    


 


PT  16.3 Sec. (12.2-14.9)  H  07/05/19  08:51    


 


INR  1.35  (0.87-1.13)  H  07/05/19  08:51    


 


Sodium  136 mmol/L (137-145)  L  07/17/19  10:00    


 


Potassium  3.5 mmol/L (3.6-5.0)  L  07/17/19  10:00    


 


Chloride  95.4 mmol/L ()  L  07/17/19  10:00    


 


Carbon Dioxide  28 mmol/L (22-30)   07/17/19  10:00    


 


  16 mmol/L  07/17/19  10:00    


 


BUN  22 mg/dL (9-20)  H  07/17/19  10:00    


 


  7.9 mg/dL (0.8-1.5)  H  07/17/19  10:00    


 


Estimated GFR  9 ml/min  07/17/19  10:00    


 


  3 %  07/17/19  10:00    


 


Glucose  135 mg/dL ()  H  07/17/19  10:00    


 


POC Glucose  164  ()  H  07/04/19  17:35    


 


Calcium  8.2 mg/dL (8.4-10.2)  L  07/17/19  10:00    


 


Phosphorus  6.50 mg/dL (2.5-4.5)  H  07/15/19  06:57    


 


Magnesium  2.00 mg/dL (1.7-2.3)   07/05/19  04:52    


 


Iron  20 ug/dL ()  L  07/05/19  08:51    


 


TIBC  219 mcg/dL (250-450)  L  07/05/19  08:51    


 


  63.8 ng/mL (13.0-400.0)   07/05/19  08:51    


 


  0.20 mg/dL (0.1-1.2)   07/10/19  04:43    


 


AST  11 units/L (5-40)   07/10/19  04:43    


 


ALT  12 units/L (7-56)   07/10/19  04:43    


 


  67 units/L ()   07/10/19  04:43    


 


  5.9 g/dL (6.1-8.1)  L  07/09/19  04:50    


 


  6.7 g/dL (6.3-8.2)   07/10/19  04:43    


 


  2.0 g/dL (3.9-5)  L  07/10/19  04:43    


 


  0.4 %  07/10/19  04:43    


 


  0.7 g/dL (0.2-0.3)  H  07/09/19  04:50    


 


  0.8 g/dL (0.5-0.9)   07/09/19  04:50    


 


  0.5 g/dL (0.2-0.5)   07/09/19  04:50    


 


  1.6 g/dL (0.8-1.7)   07/09/19  04:50    


 


Abnorm Protein Band 1  see below   07/09/19  04:50    


 


PEP Interpretation  see below  H  07/09/19  04:50    


 


  88 units/L (13-60)  H  07/04/19  12:47    


 


Tumor Marker AFP  See scanned result   07/05/19  08:51    


 


Carcinoembryonic Ag  17.4 ng/mL (0.0-2.4)  H  07/05/19  08:51    


 


  11 U/mL (<34)   07/05/19  08:51    


 


Vitamin B12  1768 pg/mL (211-911)  H  07/05/19  08:51    


 


  5.05 ng/mL (7.3-26.0)  L  07/05/19  08:51    


 


PTH Intact  238.5 pg/mL (15-65)  H  07/05/19  04:52    


 


  161.7 mg/dL (0.1-20.0)  H  07/05/19  08:51    


 


  72 mmol/L  07/05/19  08:51    


 


Fluid Type  Ascitic   07/05/19  Unknown


 


Fluid Color  Yellow   07/05/19  Unknown


 


Fluid Appearance  Hazy   07/05/19  Unknown


 


Fluid WBC  58 /mm3  07/05/19  Unknown


 


Fluid RBC  1850 /mm3  07/05/19  Unknown


 


Fluid Diff Comment     07/05/19  Unknown


 


Fluid Seg Neutrophils  35.0 %  07/05/19  Unknown


 


Fluid Lymphocytes  14.0 %  07/05/19  Unknown


 


Fluid Reactive Lymphs  0 %  07/05/19  Unknown


 


Fluid Monocytes  51.0 %  07/05/19  Unknown


 


Fluid Eosinophils  0 %  07/05/19  Unknown


 


Fluid Basophils  0 %  07/05/19  Unknown


 


Fluid Glucose  93 mg/dL (40-70)  H  07/05/19  Unknown


 


Fluid Total Protein  4.6  (15.0-45.0)  L  07/05/19  Unknown


 


Fluid Albumin  1.5 g/dL  07/05/19  Unknown


 


Fluid LDH  195   07/05/19  Unknown


 


Random Vancomycin  13.1 ug/mL (0-40.0)   07/09/19  04:50    


 


EVELYN Screen  Negative  (Negative)   07/09/19  04:50    


 


Proteinase 3 (PR3) Ab  <1.0 AI (<1.0)   07/09/19  04:50    


 


Myeloperoxidase Ab  <1.0 AI (<1.0)   07/09/19  04:50    


 


  158 mg/dL ()   07/09/19  04:50    


 


  32 mg/dL (15-53)   07/09/19  04:50    


 


Hepatitis A IgM Ab  Non-reactive  (NonReactive)   07/04/19  17:48    


 


Hep Bs Antigen  Non-reactive  (Negative)   07/04/19  17:48    


 


Hep B Core IgM Ab  Non-reactive  (NonReactive)   07/04/19  17:48    


 


  Non-reactive  (NonReactive)   07/04/19  17:48    


 


Blood Type  A POSITIVE   07/16/19  08:18    


 


Antibody Screen  Negative   07/16/19  08:18    


 


Crossmatch  See Detail   07/16/19  08:18    














Active Medications





- Current Medications


Current Medications: 














Generic Name Dose Route Start Last Admin





  Trade Name Freq  PRN Reason Stop Dose Admin


 


Acetaminophen  650 mg  07/04/19 17:22  07/09/19 11:32





  Tylenol  PO   650 mg





  Q4H PRN   Administration





  Pain MILD(1-3)/Fever >100.5/HA  


 


Albuterol  2.5 mg  07/04/19 17:22 





  Proventil  IH  





  Q4HRT PRN  





  Shortness Of Breath  


 


Bisacodyl  15 mg  07/07/19 14:51 





  Dulcolax  PO  





  QDAY PRN  





  Constipation  


 


Chlorpromazine HCl  10 mg  07/09/19 15:20  07/17/19 00:40





  Thorazine  PO   10 mg





  Q6H PRN   Administration





  Hiccups  


 


Epoetin Tye  20,000 unit  07/12/19 09:36  07/15/19 12:21





  Procrit  SUB-Q   20,000 unit





  VEE PRN   Administration





  hemodialysis  


 


Famotidine  10 mg  07/12/19 22:00  07/16/19 22:56





  Pepcid  PO   10 mg





  BID BOWEN   Administration


 


Ferrous Sulfate  325 mg  07/10/19 22:00  07/16/19 22:56





  Feosol  PO   325 mg





  BID BOWEN   Administration


 


Folic Acid  1 mg  07/06/19 10:00  07/16/19 13:32





  Folvite  PO   1 mg





  QDAY BOWEN   Administration


 


Heparin Sodium (Porcine)  3,000 unit  07/12/19 09:36 





  Heparin 10,000 Units/10 Ml  IV  





  VEE PRN  





  hemodialysis  


 


Hydralazine HCl  10 mg  07/04/19 19:14  07/04/19 22:17





  Apresoline  IV   10 mg





  Q3H PRN   Administration





  Blood Pressure  


 


Hydromorphone HCl  0.5 mg  07/04/19 17:22  07/17/19 08:31





  Dilaudid  IV   0.5 mg





  Q3H PRN   Administration





  Pain , Severe (7-10)  


 


Chlorpromazine HCl 25 mg/  51 mls @ 100 mls/hr  07/13/19 12:26 





  Sodium Chloride  IV  





  Q4H PRN  





  Hiccups  


 


Sodium Chloride  100 mls @ 999 mls/hr  07/15/19 08:12 





  Nacl 0.9%  IV  





  VEE PRN  





  Hypotension  


 


Sodium Chloride  1,000 mls @ 42 mls/hr  07/16/19 10:00  07/16/19 09:50





  Nacl 0.9% 1000 Ml  IV   42 mls/hr





  AS DIRECT BOWEN   Administration


 


Metoclopramide HCl  10 mg  07/04/19 17:22 





  Reglan  IV  





  Q6H PRN  





  Nausea And Vomiting  


 


Metoprolol Tartrate  50 mg  07/04/19 23:00  07/16/19 22:56





  Lopressor  PO   Not Given





  BID BOWEN  


 


Ondansetron HCl  4 mg  07/04/19 17:22 





  Zofran  IV  





  Q3H PRN  





  Nausea And Vomiting  


 


Ondansetron HCl  4 mg  07/16/19 10:00 





  Zofran  IV  





  ONCE PRN  





  Nausea And Vomiting  


 


Oxycodone/Acetaminophen  1 tab  07/13/19 10:53  07/16/19 18:55





  Percocet 5/325  PO   1 tab





  Q6H PRN   Administration





  Pain, Moderate (4-6)  


 


Sodium Chloride  10 ml  07/04/19 22:00  07/16/19 22:59





  Sodium Chloride Flush Syringe 10 Ml  IV   10 ml





  BID BOWEN   Administration


 


Sodium Chloride  10 ml  07/04/19 17:22  07/15/19 20:32





  Sodium Chloride Flush Syringe 10 Ml  IV   10 ml





  PRN PRN   Administration





  LINE FLUSH  














Nutrition/Malnutrition Assess





- Dietary Evaluation


Nutrition/Malnutrition Findings: 


                                        





Nutrition Notes                                            Start:  07/11/19 

12:03


Freq:                                                      Status: Active       




Protocol:                                                                       




 Document     07/11/19 12:03  LP  (Rec: 07/11/19 12:06  LP  [id number])


 Nutrition Notes


     Need for Assessment generated from:         LOS


     Initial or Follow up                        Brief Note


     Subjective/Other Information                Screen for LOS. Pt states


                                                 eating well PTA and now.


                                                 Denies wt changes. Consuming


                                                 % of meals.


 Nutrition Intervention


     Revisit per MD consult or patient           Sign Off


      request:

## 2019-07-18 RX ADMIN — Medication SCH ML: at 09:49

## 2019-07-18 RX ADMIN — HYDROMORPHONE HYDROCHLORIDE PRN MG: 1 INJECTION, SOLUTION INTRAMUSCULAR; INTRAVENOUS; SUBCUTANEOUS at 22:37

## 2019-07-18 RX ADMIN — CHLORPROMAZINE HYDROCHLORIDE PRN MG: 10 TABLET, FILM COATED ORAL at 17:04

## 2019-07-18 RX ADMIN — FERROUS SULFATE TAB 325 MG (65 MG ELEMENTAL FE) SCH MG: 325 (65 FE) TAB at 09:42

## 2019-07-18 RX ADMIN — Medication SCH ML: at 22:22

## 2019-07-18 RX ADMIN — HYDROMORPHONE HYDROCHLORIDE PRN MG: 1 INJECTION, SOLUTION INTRAMUSCULAR; INTRAVENOUS; SUBCUTANEOUS at 14:12

## 2019-07-18 RX ADMIN — FAMOTIDINE SCH MG: 10 TABLET ORAL at 22:21

## 2019-07-18 RX ADMIN — OXYCODONE AND ACETAMINOPHEN PRN TAB: 5; 325 TABLET ORAL at 17:05

## 2019-07-18 RX ADMIN — FERROUS SULFATE TAB 325 MG (65 MG ELEMENTAL FE) SCH MG: 325 (65 FE) TAB at 22:21

## 2019-07-18 RX ADMIN — FOLIC ACID SCH MG: 1 TABLET ORAL at 09:43

## 2019-07-18 RX ADMIN — METOPROLOL TARTRATE SCH MG: 50 TABLET, FILM COATED ORAL at 09:43

## 2019-07-18 RX ADMIN — METOPROLOL TARTRATE SCH MG: 50 TABLET, FILM COATED ORAL at 22:22

## 2019-07-18 RX ADMIN — OXYCODONE AND ACETAMINOPHEN PRN TAB: 5; 325 TABLET ORAL at 03:37

## 2019-07-18 RX ADMIN — OXYCODONE AND ACETAMINOPHEN PRN TAB: 5; 325 TABLET ORAL at 09:43

## 2019-07-18 RX ADMIN — Medication PRN ML: at 14:14

## 2019-07-18 RX ADMIN — FAMOTIDINE SCH MG: 10 TABLET ORAL at 09:42

## 2019-07-18 NOTE — PROGRESS NOTE
Assessment and Plan





1. ESRD:


Likely CKD has progressed to ESRD


CT abdomen showed mild bilateral hydronephosis.


No improvement in the renal function.


Monitor renal function.


Renal prognosis appears to be poor.


Avoid nephrotoxic agents.


Meds dosage based on GFR.


Due to persistent hyperkalemia patient was started on hemodialysis on 7/4/2019.


Hemodialysis: 7/4, 7/5, 7/6, 7/8, 7/10, 7/12, 7/15, 7/17.





2. FEN:


Hyperkalemia, K level is better.


Anion gap MA, improved.


Monitor lytes.





3. Mild bilateral hydronephosis:


S/p hernandez catheter.





4. Rectal adenocarcinoma with malignant Ascites.


S/p Medport.


Await Heme-onc recs.





5. Anemia:


PRBC.


Monitor H/H.





6. Uncontrolled HTN:


BP is better.





Await outpatient hemodialysis chair.














Subjective


Date of service: 07/18/19


Principal diagnosis: ? carcinomatosis


Interval history: 


Patient was seen and examined at the bedside.


No new complaint.








Objective





- Vital Signs


Vital signs: 


                               Vital Signs - 12hr











  07/17/19 07/18/19 07/18/19





  23:09 05:36 09:43


 


Temperature 98.6 F 98.5 F 


 


Pulse Rate 85 82 91 H


 


Respiratory 18 20 18





Rate   


 


Blood Pressure 114/66 113/69 104/68


 


O2 Sat by Pulse 99 91 





Oximetry   














- General Appearance


General appearance: well-developed, well-nourished, appears stated age, other (R

IJ tunnel catheter)


EENT: ATNC, PERRL, hearing intact, vision intact


Neck: supple


Respiratory: Present: Clear to Ascultation


Cardiology: regular, S1S2, no murmurs


Gastrointestinal: normoactive bowel sounds, no tenderness, distended, other 

(hernandez catheter)


Integumentary: no rash, warm and dry


Neurologic: no focal deficit, no asterixis, alert and oriented x3


Musculoskeletal: other (no edema)





- Lab





                                 07/17/19 10:00





                                 07/17/19 10:00


                             Most recent lab results











Calcium  8.2 mg/dL (8.4-10.2)  L  07/17/19  10:00    


 


Phosphorus  6.50 mg/dL (2.5-4.5)  H  07/15/19  06:57    


 


Magnesium  2.00 mg/dL (1.7-2.3)   07/05/19  04:52    


 


  161.7 mg/dL (0.1-20.0)  H  07/05/19  08:51    


 


  72 mmol/L  07/05/19  08:51    














Medications & Allergies





- Medications


Allergies/Adverse Reactions: 


                                    Allergies





No Known Allergies Allergy (Verified 07/04/19 12:02)


   








Home Medications: 


                                Home Medications











 Medication  Instructions  Recorded  Confirmed  Last Taken  Type


 


Prednisone [predniSONE 10 mg 10 mg PO .TAPER #1 tab.ds.pk 09/10/15 07/06/19 

Unknown Rx





(6-Day Pack, 21 Tabs)]     











Active Medications: 














Generic Name Dose Route Start Last Admin





  Trade Name Freq  PRN Reason Stop Dose Admin


 


Acetaminophen  650 mg  07/04/19 17:22  07/09/19 11:32





  Tylenol  PO   650 mg





  Q4H PRN   Administration





  Pain MILD(1-3)/Fever >100.5/HA  


 


Albuterol  2.5 mg  07/04/19 17:22 





  Proventil  IH  





  Q4HRT PRN  





  Shortness Of Breath  


 


Bisacodyl  15 mg  07/07/19 14:51 





  Dulcolax  PO  





  QDAY PRN  





  Constipation  


 


Chlorpromazine HCl  10 mg  07/09/19 15:20  07/17/19 00:40





  Thorazine  PO   10 mg





  Q6H PRN   Administration





  Hiccups  


 


Epoetin Tye  20,000 unit  07/12/19 09:36  07/15/19 12:21





  Procrit  SUB-Q   20,000 unit





  VEE PRN   Administration





  hemodialysis  


 


Famotidine  10 mg  07/12/19 22:00  07/18/19 09:42





  Pepcid  PO   10 mg





  BID BOWEN   Administration


 


Ferrous Sulfate  325 mg  07/10/19 22:00  07/18/19 09:42





  Feosol  PO   325 mg





  BID BOWEN   Administration


 


Folic Acid  1 mg  07/06/19 10:00  07/18/19 09:43





  Folvite  PO   1 mg





  QDAY BOWEN   Administration


 


Heparin Sodium (Porcine)  3,000 unit  07/12/19 09:36 





  Heparin 10,000 Units/10 Ml  IV  





  VEE PRN  





  hemodialysis  


 


Hydralazine HCl  10 mg  07/04/19 19:14  07/04/19 22:17





  Apresoline  IV   10 mg





  Q3H PRN   Administration





  Blood Pressure  


 


Hydromorphone HCl  0.5 mg  07/04/19 17:22  07/17/19 17:07





  Dilaudid  IV   0.5 mg





  Q3H PRN   Administration





  Pain , Severe (7-10)  


 


Chlorpromazine HCl 25 mg/  51 mls @ 100 mls/hr  07/13/19 12:26 





  Sodium Chloride  IV  





  Q4H PRN  





  Hiccups  


 


Sodium Chloride  100 mls @ 999 mls/hr  07/15/19 08:12 





  Nacl 0.9%  IV  





  VEE PRN  





  Hypotension  


 


Sodium Chloride  1,000 mls @ 42 mls/hr  07/16/19 10:00  07/16/19 09:50





  Nacl 0.9% 1000 Ml  IV   42 mls/hr





  AS DIRECT BOWEN   Administration


 


Metoclopramide HCl  5 mg  07/17/19 15:00 





  Reglan  IV  





  Q6H PRN  





  Nausea And Vomiting  


 


Metoprolol Tartrate  50 mg  07/04/19 23:00  07/18/19 09:43





  Lopressor  PO   50 mg





  BID BOWEN   Administration


 


Ondansetron HCl  4 mg  07/04/19 17:22 





  Zofran  IV  





  Q3H PRN  





  Nausea And Vomiting  


 


Oxycodone/Acetaminophen  1 tab  07/13/19 10:53  07/18/19 09:43





  Percocet 5/325  PO   1 tab





  Q6H PRN   Administration





  Pain, Moderate (4-6)  


 


Sodium Chloride  10 ml  07/04/19 22:00  07/18/19 09:49





  Sodium Chloride Flush Syringe 10 Ml  IV   10 ml





  BID BOWEN   Administration


 


Sodium Chloride  10 ml  07/04/19 17:22  07/15/19 20:32





  Sodium Chloride Flush Syringe 10 Ml  IV   10 ml





  PRN PRN   Administration





  LINE FLUSH

## 2019-07-18 NOTE — PROGRESS NOTE
Assessment and Plan


Assessment and plan: 


Patient is a 60 yo man with a history of hypertension and Asthma who presented 

to [facility] ED with urinary frequency, back pains, n/v, diarrhea and abd pains with 

distension x 1 week. He was diagnosis with Ascites and underwent a paracentesis.







* Initial labs: wbc 13.0 to 10.0, hgb 6.3 to 7.0 (mcv 60), na 132, k 9.5, cr 

  34.8, bun 129, bg 164, lipase 88


* CT abd/pelvis without contrast IMPRESSION: 1. There is ascites. There is 

  increased density in the omentum. Findings are concerning for carcinomatosis. 

  There is some mild adenopathy in the pelvis a possible pericolonic mass, 

  series 2 image 139. There is mild bilateral hydronephrosis. The exact cause is

  not determined by this study. There is minimal nephrolithiasis on the left. No

  ureteral calculi are identified however.


* Colonoscopy, There was a circumferential rectal tumor from the dentate line 

  extending to mid-proximal rectum.  There was severe stenosis related to 

  circumferential tumor which was unable to be traversed.  The tumor was friable

  to touch.   Multiple biopsies were obtained Impression:1. Rectal tumor from 

  dentate line to mid/proximal rectum (extent of exam due to severe stenosis 

  related to tumor).  Biopsies were obtained. Recommendations: follow-up 

  pathology, -surgery consult, -heme/onc following, -screen first degree 

  relatives for colorectal cancer, -will need colonoscopy in 3-6 months to rule 

  out proximal colon lesions.


* Path Rectal biopsy: poorly differentiated mucinous adenocarcinoma with signet 

  cells and lymphovascular invasion


* Path Ascites: malignant cells consistent with metastatic carcinoma





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 

lymphovascular invasion, Chemotherapy port to be placed 7/16/19;  On discharge 

patient will follow hematology oncologist, Dr. Meade


Acute anemia with Drop in HCT s/p transfused 2 units of PRBC, ordered FOBT, GI 

is following, d/w Dr. Joel, Drop in H/H Hemoglobin dropped to 6.4, transfused 

another unit of PRBC, on 7/16/19; Findings of colonoscopy as noted above. 

Discussed with surgery, will plan for port placement


ESRD: s/p permacath, had emergent hemodialysis due to severe hyperkalemia done 

on 7/4/19, place hernandez on 7/5/19 with very little output.


Bilateral hydronephrosis: Place hernandez, consulted Urology, input noted==

>discharge with hernandez 


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 

signet ring cells. 


Malignant Ascites due to suspected Colon cancer with mets to 

Omentum/Carcinomatosis suspected, which would be a very poor prognostic sign: 

consulted GI, input noted, s/p Paracentesis on 7/5/19, await cytology, CEA 17 

ELEVATED, AFP ELEVATED, CA 19-9 - normal 11


Positive blood cultures: Abx discontinued following ID eval as culture appears 

to be contaminant


Hyperkalemia: treated with HD, Nephrology is following


Acute Microcytic anemia, suspect cancer related until proven otherwise, I did 

inform patient my concerns after gaining his permission to do so: consult GI 

then consider Heme/Onc consult


Severe malnutrition: Dietitian consulted 


Hiccups- likely tumor related, will add Thorazine.


DVT prophylaxis: On heparin and GI prophylaxis


Discharge when Outpatient Dialysis ready. Patient will discuss with surgery 

about port placement for chemotherapy. Outpatient follow up with oncologist 








History


Interval history: 


Patient was seen and examined. Follow-up on current diagnosis ARF, ascites. No 

overnight events reported to me. Patient denies any chest pain, shortness 

breath, or severe headaches. Imaging, nursing note, chart, labs and old chart 

reviewed. Discussed with patient. 





Hospitalist Physical





- Physical exam


Narrative exam: 


Gen: thin frial, ill appearing, NAD, Awake, Alert, Orientated 


HEENT: NCAT, EOMI, PERRL, OP Clear 


Neck: supple, no adenopathy, no thyromegaly, no JVD 


CVS/Heart: RRR, normal S1S2, pulses present bilaterally 


Chest/Lungs: CTA B, Symmetrical chest expansion, good air entry bilaterally 


GI/Abdomen: soft, abdomen distended, good bowel sounds, no guarding or rebound 


/Bladder: no suprapubic tenderness, no CVA or paraspinal tenderness 


Extermity/Skin: no c/c/e, no obvious rash 


MSK: FROM x 4 


Neuro: CN 2-12 grossly intact, no new focal deficits 


Psych: calm 








- Constitutional


Vitals: 


                                        











Temp Pulse Resp BP Pulse Ox


 


 98.0 F   89   18   117/72   98 


 


 07/18/19 11:32  07/18/19 11:32  07/18/19 14:12  07/18/19 11:32  07/18/19 11:32











General appearance: Absent: mild distress, well-nourished





Results





- Labs


CBC & Chem 7: 


                                 07/17/19 10:00





                                 07/17/19 10:00


Labs: 


                             Laboratory Last Values











WBC  9.4 K/mm3 (4.5-11.0)   07/17/19  10:00    


 


RBC  3.40 M/mm3 (3.65-5.03)  L  07/17/19  10:00    


 


Hgb  7.9 gm/dl (11.8-15.2)  L  07/17/19  10:00    


 


Hct  24.6 % (35.5-45.6)  L  07/17/19  10:00    


 


MCV  72 fl (84-94)  L  07/17/19  10:00    


 


MCH  23 pg (28-32)  L  07/17/19  10:00    


 


MCHC  32 % (32-34)   07/17/19  10:00    


 


RDW  31.4 % (13.2-15.2)  H  07/17/19  10:00    


 


Plt Count  366 K/mm3 (140-440)   07/17/19  10:00    


 


Add Manual Diff  Complete   07/05/19  04:52    


 


Total Counted  100   07/05/19  04:52    


 


Seg Neuts % (Manual)  95.0 % (40.0-70.0)  H  07/05/19  04:52    


 


  0 %  07/05/19  04:52    


 


  3.0 % (13.4-35.0)  L  07/05/19  04:52    


 


Reactive Lymphs % (Man)  0 %  07/05/19  04:52    


 


  1.0 % (0.0-7.3)   07/05/19  04:52    


 


  1.0 % (0.0-4.3)   07/05/19  04:52    


 


  0 % (0.0-1.8)   07/05/19  04:52    


 


  0 %  07/05/19  04:52    


 


  0 %  07/05/19  04:52    


 


  0 %  07/05/19  04:52    


 


  0 %  07/05/19  04:52    


 


Nucleated RBC %  1.0 % (0.0-0.9)  H  07/05/19  04:52    


 


Seg Neutrophils # Man  9.5 K/mm3 (1.8-7.7)  H  07/05/19  04:52    


 


Band Neutrophils #  0.0 K/mm3  07/05/19  04:52    


 


  0.3 K/mm3 (1.2-5.4)  L  07/05/19  04:52    


 


Abs React Lymphs (Man)  0.0 K/mm3  07/05/19  04:52    


 


  0.1 K/mm3 (0.0-0.8)   07/05/19  04:52    


 


  0.1 K/mm3 (0.0-0.4)   07/05/19  04:52    


 


  0.0 K/mm3 (0.0-0.1)   07/05/19  04:52    


 


  0.0 K/mm3  07/05/19  04:52    


 


  0.0 K/mm3  07/05/19  04:52    


 


  0.0 K/mm3  07/05/19  04:52    


 


Blast Cells #  0.0 K/mm3  07/05/19  04:52    


 


WBC Morphology  Not Reportable   07/05/19  04:52    


 


Hypersegmented Neuts  Not Reportable   07/05/19  04:52    


 


Hyposegmented Neuts  Not Reportable   07/05/19  04:52    


 


Hypogranular Neuts  Not Reportable   07/05/19  04:52    


 


  Not Reportable   07/05/19  04:52    


 


  Not Reportable   07/05/19  04:52    


 


  Not Reportable   07/05/19  04:52    


 


  Not Reportable   07/05/19  04:52    


 


  Not Reportable   07/05/19  04:52    


 


  Not Reportable   07/05/19  04:52    


 


  Consistent w auto   07/05/19  04:52    


 


  Not Reportable   07/05/19  04:52    


 


Plt Clumps, EDTA  Not Reportable   07/05/19  04:52    


 


  Not Reportable   07/05/19  04:52    


 


  Not Reportable   07/05/19  04:52    


 


  Not Reportable   07/05/19  04:52    


 


Plt Morphology Comment  Not Reportable   07/05/19  04:52    


 


RBC Morphology  Not Reportable   07/05/19  04:52    


 


Dimorphic RBCs  Not Reportable   07/05/19  04:52    


 


  Not Reportable   07/05/19  04:52    


 


  3+   07/05/19  04:52    


 


  1+   07/05/19  04:52    


 


  3+   07/05/19  04:52    


 


  2+   07/05/19  04:52    


 


  Not Reportable   07/05/19  04:52    


 


  Not Reportable   07/05/19  04:52    


 


  Not Reportable   07/05/19  04:52    


 


  Not Reportable   07/05/19  04:52    


 


  Few   07/05/19  04:52    


 


  Not Reportable   07/05/19  04:52    


 


  Few   07/05/19  04:52    


 


  Not Reportable   07/05/19  04:52    


 


  Not Reportable   07/05/19  04:52    


 


  Not Reportable   07/05/19  04:52    


 


  Not Reportable   07/05/19  04:52    


 


  Not Reportable   07/05/19  04:52    


 


  Not Reportable   07/05/19  04:52    


 


  Few   07/05/19  04:52    


 


Acanthocytes (Spur)  Not Reportable   07/05/19  04:52    


 


Rouleaux  Not Reportable   07/05/19  04:52    


 


  Not Reportable   07/05/19  04:52    


 


  Not Reportable   07/05/19  04:52    


 


  Not Reportable   07/05/19  04:52    


 


Percent Retic  1.10 % (0.78-2.58)   07/05/19  04:52    


 


  Not Reportable   07/05/19  04:52    


 


Hem Pathologist Commnt  No   07/05/19  04:52    


 


PT  16.3 Sec. (12.2-14.9)  H  07/05/19  08:51    


 


INR  1.35  (0.87-1.13)  H  07/05/19  08:51    


 


Sodium  136 mmol/L (137-145)  L  07/17/19  10:00    


 


Potassium  3.5 mmol/L (3.6-5.0)  L  07/17/19  10:00    


 


Chloride  95.4 mmol/L ()  L  07/17/19  10:00    


 


Carbon Dioxide  28 mmol/L (22-30)   07/17/19  10:00    


 


  16 mmol/L  07/17/19  10:00    


 


BUN  22 mg/dL (9-20)  H  07/17/19  10:00    


 


  7.9 mg/dL (0.8-1.5)  H  07/17/19  10:00    


 


Estimated GFR  9 ml/min  07/17/19  10:00    


 


  3 %  07/17/19  10:00    


 


Glucose  135 mg/dL ()  H  07/17/19  10:00    


 


POC Glucose  164  ()  H  07/04/19  17:35    


 


Calcium  8.2 mg/dL (8.4-10.2)  L  07/17/19  10:00    


 


Phosphorus  6.50 mg/dL (2.5-4.5)  H  07/15/19  06:57    


 


Magnesium  2.00 mg/dL (1.7-2.3)   07/05/19  04:52    


 


Iron  20 ug/dL ()  L  07/05/19  08:51    


 


TIBC  219 mcg/dL (250-450)  L  07/05/19  08:51    


 


  63.8 ng/mL (13.0-400.0)   07/05/19  08:51    


 


  0.20 mg/dL (0.1-1.2)   07/10/19  04:43    


 


AST  11 units/L (5-40)   07/10/19  04:43    


 


ALT  12 units/L (7-56)   07/10/19  04:43    


 


  67 units/L ()   07/10/19  04:43    


 


  5.9 g/dL (6.1-8.1)  L  07/09/19  04:50    


 


  6.7 g/dL (6.3-8.2)   07/10/19  04:43    


 


  2.0 g/dL (3.9-5)  L  07/10/19  04:43    


 


  0.4 %  07/10/19  04:43    


 


  0.7 g/dL (0.2-0.3)  H  07/09/19  04:50    


 


  0.8 g/dL (0.5-0.9)   07/09/19  04:50    


 


  0.5 g/dL (0.2-0.5)   07/09/19  04:50    


 


  1.6 g/dL (0.8-1.7)   07/09/19  04:50    


 


Abnorm Protein Band 1  see below   07/09/19  04:50    


 


PEP Interpretation  see below  H  07/09/19  04:50    


 


  88 units/L (13-60)  H  07/04/19  12:47    


 


Tumor Marker AFP  See scanned result   07/05/19  08:51    


 


Carcinoembryonic Ag  17.4 ng/mL (0.0-2.4)  H  07/05/19  08:51    


 


  11 U/mL (<34)   07/05/19  08:51    


 


Vitamin B12  1768 pg/mL (211-911)  H  07/05/19  08:51    


 


  5.05 ng/mL (7.3-26.0)  L  07/05/19  08:51    


 


PTH Intact  238.5 pg/mL (15-65)  H  07/05/19  04:52    


 


  161.7 mg/dL (0.1-20.0)  H  07/05/19  08:51    


 


  72 mmol/L  07/05/19  08:51    


 


Fluid Type  Ascitic   07/05/19  Unknown


 


Fluid Color  Yellow   07/05/19  Unknown


 


Fluid Appearance  Hazy   07/05/19  Unknown


 


Fluid WBC  58 /mm3  07/05/19  Unknown


 


Fluid RBC  1850 /mm3  07/05/19  Unknown


 


Fluid Diff Comment     07/05/19  Unknown


 


Fluid Seg Neutrophils  35.0 %  07/05/19  Unknown


 


Fluid Lymphocytes  14.0 %  07/05/19  Unknown


 


Fluid Reactive Lymphs  0 %  07/05/19  Unknown


 


Fluid Monocytes  51.0 %  07/05/19  Unknown


 


Fluid Eosinophils  0 %  07/05/19  Unknown


 


Fluid Basophils  0 %  07/05/19  Unknown


 


Fluid Glucose  93 mg/dL (40-70)  H  07/05/19  Unknown


 


Fluid Total Protein  4.6  (15.0-45.0)  L  07/05/19  Unknown


 


Fluid Albumin  1.5 g/dL  07/05/19  Unknown


 


Fluid LDH  195   07/05/19  Unknown


 


Random Vancomycin  13.1 ug/mL (0-40.0)   07/09/19  04:50    


 


EVELYN Screen  Negative  (Negative)   07/09/19  04:50    


 


Proteinase 3 (PR3) Ab  <1.0 AI (<1.0)   07/09/19  04:50    


 


Myeloperoxidase Ab  <1.0 AI (<1.0)   07/09/19  04:50    


 


  158 mg/dL ()   07/09/19  04:50    


 


  32 mg/dL (15-53)   07/09/19  04:50    


 


Hepatitis A IgM Ab  Non-reactive  (NonReactive)   07/04/19  17:48    


 


Hep Bs Antigen  Non-reactive  (Negative)   07/04/19  17:48    


 


Hep B Core IgM Ab  Non-reactive  (NonReactive)   07/04/19  17:48    


 


  Non-reactive  (NonReactive)   07/04/19  17:48    


 


Blood Type  A POSITIVE   07/16/19  08:18    


 


Antibody Screen  Negative   07/16/19  08:18    


 


Crossmatch  See Detail   07/16/19  08:18    














Active Medications





- Current Medications


Current Medications: 














Generic Name Dose Route Start Last Admin





  Trade Name Freq  PRN Reason Stop Dose Admin


 


Acetaminophen  650 mg  07/04/19 17:22  07/09/19 11:32





  Tylenol  PO   650 mg





  Q4H PRN   Administration





  Pain MILD(1-3)/Fever >100.5/HA  


 


Albuterol  2.5 mg  07/04/19 17:22 





  Proventil  IH  





  Q4HRT PRN  





  Shortness Of Breath  


 


Bisacodyl  15 mg  07/07/19 14:51 





  Dulcolax  PO  





  QDAY PRN  





  Constipation  


 


Chlorpromazine HCl  10 mg  07/09/19 15:20  07/17/19 00:40





  Thorazine  PO   10 mg





  Q6H PRN   Administration





  Hiccups  


 


Epoetin Tye  20,000 unit  07/12/19 09:36  07/15/19 12:21





  Procrit  SUB-Q   20,000 unit





  VEE PRN   Administration





  hemodialysis  


 


Famotidine  10 mg  07/12/19 22:00  07/18/19 09:42





  Pepcid  PO   10 mg





  BID BOWEN   Administration


 


Ferrous Sulfate  325 mg  07/10/19 22:00  07/18/19 09:42





  Feosol  PO   325 mg





  BID BOWEN   Administration


 


Folic Acid  1 mg  07/06/19 10:00  07/18/19 09:43





  Folvite  PO   1 mg





  QDAY BOWEN   Administration


 


Heparin Sodium (Porcine)  3,000 unit  07/12/19 09:36 





  Heparin 10,000 Units/10 Ml  IV  





  VEE PRN  





  hemodialysis  


 


Hydralazine HCl  10 mg  07/04/19 19:14  07/04/19 22:17





  Apresoline  IV   10 mg





  Q3H PRN   Administration





  Blood Pressure  


 


Hydromorphone HCl  0.5 mg  07/04/19 17:22  07/18/19 14:12





  Dilaudid  IV   0.5 mg





  Q3H PRN   Administration





  Pain , Severe (7-10)  


 


Chlorpromazine HCl 25 mg/  51 mls @ 100 mls/hr  07/13/19 12:26 





  Sodium Chloride  IV  





  Q4H PRN  





  Hiccups  


 


Sodium Chloride  100 mls @ 999 mls/hr  07/15/19 08:12 





  Nacl 0.9%  IV  





  VEE PRN  





  Hypotension  


 


Sodium Chloride  1,000 mls @ 42 mls/hr  07/16/19 10:00  07/16/19 09:50





  Nacl 0.9% 1000 Ml  IV   42 mls/hr





  AS DIRECT BOWEN   Administration


 


Metoclopramide HCl  5 mg  07/17/19 15:00 





  Reglan  IV  





  Q6H PRN  





  Nausea And Vomiting  


 


Metoprolol Tartrate  50 mg  07/04/19 23:00  07/18/19 09:43





  Lopressor  PO   50 mg





  BID BOWEN   Administration


 


Ondansetron HCl  4 mg  07/04/19 17:22 





  Zofran  IV  





  Q3H PRN  





  Nausea And Vomiting  


 


Oxycodone/Acetaminophen  1 tab  07/13/19 10:53  07/18/19 09:43





  Percocet 5/325  PO   1 tab





  Q6H PRN   Administration





  Pain, Moderate (4-6)  


 


Sodium Chloride  10 ml  07/04/19 22:00  07/18/19 09:49





  Sodium Chloride Flush Syringe 10 Ml  IV   10 ml





  BID BOWEN   Administration


 


Sodium Chloride  10 ml  07/04/19 17:22  07/18/19 14:14





  Sodium Chloride Flush Syringe 10 Ml  IV   10 ml





  PRN PRN   Administration





  LINE FLUSH  














Nutrition/Malnutrition Assess





- Dietary Evaluation


Nutrition/Malnutrition Findings: 


                                        





Nutrition Notes                                            Start:  07/11/19 

12:03


Freq:                                                      Status: Active       




Protocol:                                                                       




 Document     07/11/19 12:03  LP  (Rec: 07/11/19 12:06  LP  [email or web address])


 Nutrition Notes


     Need for Assessment generated from:         LOS


     Initial or Follow up                        Brief Note


     Subjective/Other Information                Screen for LOS. Pt states


                                                 eating well PTA and now.


                                                 Denies wt changes. Consuming


                                                 % of meals.


 Nutrition Intervention


     Revisit per MD consult or patient           Sign Off


      request:

## 2019-07-19 PROCEDURE — 5A1D70Z PERFORMANCE OF URINARY FILTRATION, INTERMITTENT, LESS THAN 6 HOURS PER DAY: ICD-10-PCS | Performed by: INTERNAL MEDICINE

## 2019-07-19 RX ADMIN — HYDROMORPHONE HYDROCHLORIDE PRN MG: 1 INJECTION, SOLUTION INTRAMUSCULAR; INTRAVENOUS; SUBCUTANEOUS at 05:55

## 2019-07-19 RX ADMIN — FERROUS SULFATE TAB 325 MG (65 MG ELEMENTAL FE) SCH MG: 325 (65 FE) TAB at 21:45

## 2019-07-19 RX ADMIN — HYDROMORPHONE HYDROCHLORIDE PRN MG: 1 INJECTION, SOLUTION INTRAMUSCULAR; INTRAVENOUS; SUBCUTANEOUS at 09:47

## 2019-07-19 RX ADMIN — FAMOTIDINE SCH: 10 TABLET ORAL at 21:46

## 2019-07-19 RX ADMIN — FOLIC ACID SCH MG: 1 TABLET ORAL at 09:46

## 2019-07-19 RX ADMIN — HEPARIN SODIUM PRN UNIT: 1000 INJECTION, SOLUTION INTRAVENOUS; SUBCUTANEOUS at 11:05

## 2019-07-19 RX ADMIN — FAMOTIDINE SCH: 10 TABLET ORAL at 09:47

## 2019-07-19 RX ADMIN — ERYTHROPOIETIN PRN UNIT: 20000 INJECTION, SOLUTION INTRAVENOUS; SUBCUTANEOUS at 13:27

## 2019-07-19 RX ADMIN — FERROUS SULFATE TAB 325 MG (65 MG ELEMENTAL FE) SCH MG: 325 (65 FE) TAB at 09:46

## 2019-07-19 RX ADMIN — METOPROLOL TARTRATE SCH MG: 50 TABLET, FILM COATED ORAL at 21:45

## 2019-07-19 RX ADMIN — OXYCODONE AND ACETAMINOPHEN PRN TAB: 5; 325 TABLET ORAL at 21:50

## 2019-07-19 RX ADMIN — HYDROMORPHONE HYDROCHLORIDE PRN MG: 1 INJECTION, SOLUTION INTRAMUSCULAR; INTRAVENOUS; SUBCUTANEOUS at 19:15

## 2019-07-19 RX ADMIN — METOPROLOL TARTRATE SCH MG: 50 TABLET, FILM COATED ORAL at 09:46

## 2019-07-19 NOTE — PROGRESS NOTE
Assessment and Plan





1. ESRD:


Likely CKD has progressed to ESRD


CT abdomen showed mild bilateral hydronephosis.


No improvement in the renal function.


Monitor renal function.


Renal prognosis appears to be poor.


Avoid nephrotoxic agents.


Meds dosage based on GFR.


Due to persistent hyperkalemia patient was started on hemodialysis on 7/4/2019.


Hemodialysis: 7/4, 7/5, 7/6, 7/8, 7/10, 7/12, 7/15, 7/17, today.





2. FEN:


Hyperkalemia, improved.


Anion gap MA, improved.


Monitor lytes.





3. Mild bilateral hydronephosis:


S/p hernandez catheter.





4. Rectal adenocarcinoma with malignant Ascites.


S/p Medport.


Await Heme-onc recs.





5. Anemia:


PRBC.


Monitor H/H.





6. Uncontrolled HTN:


BP is better.





Await outpatient hemodialysis chair.














Subjective


Date of service: 07/19/19


Principal diagnosis: ? carcinomatosis


Interval history: 


Patient was seen and examined at the bedside.


No new complaint.








Objective





- Vital Signs


Vital signs: 


                               Vital Signs - 12hr











  07/18/19 07/18/19 07/19/19





  22:22 22:37 00:19


 


Temperature   98.4 F


 


Pulse Rate 93 H  82


 


Respiratory  16 14





Rate   


 


Blood Pressure 123/75  111/69


 


O2 Sat by Pulse   100





Oximetry   














  07/19/19 07/19/19





  05:21 05:55


 


Temperature 98.5 F 


 


Pulse Rate 82 


 


Respiratory 20 20





Rate  


 


Blood Pressure 119/73 


 


O2 Sat by Pulse 100 





Oximetry  














- General Appearance


General appearance: well-developed, well-nourished, appears stated age, other 

(no distress, R IJ tunnel catheter)


EENT: ATNC, PERRL, hearing intact, vision intact


Neck: supple


Respiratory: Present: Clear to Ascultation


Cardiology: regular, S1S2, no murmurs


Gastrointestinal: normoactive bowel sounds, no tenderness, no distended, other 

(hernandez catheter)


Integumentary: no rash, warm and dry


Neurologic: no focal deficit, no asterixis, alert and oriented x3


Musculoskeletal: other (no edema)





- Lab





                                 07/17/19 10:00





                                 07/17/19 10:00


                             Most recent lab results











Calcium  8.2 mg/dL (8.4-10.2)  L  07/17/19  10:00    


 


Phosphorus  6.50 mg/dL (2.5-4.5)  H  07/15/19  06:57    


 


Magnesium  2.00 mg/dL (1.7-2.3)   07/05/19  04:52    


 


  161.7 mg/dL (0.1-20.0)  H  07/05/19  08:51    


 


  72 mmol/L  07/05/19  08:51    














Medications & Allergies





- Medications


Allergies/Adverse Reactions: 


                                    Allergies





No Known Allergies Allergy (Verified 07/04/19 12:02)


   








Home Medications: 


                                Home Medications











 Medication  Instructions  Recorded  Confirmed  Last Taken  Type


 


Prednisone [predniSONE 10 mg 10 mg PO .TAPER #1 tab.ds.pk 09/10/15 07/06/19 

Unknown Rx





(6-Day Pack, 21 Tabs)]     











Active Medications: 














Generic Name Dose Route Start Last Admin





  Trade Name Freq  PRN Reason Stop Dose Admin


 


Acetaminophen  650 mg  07/04/19 17:22  07/09/19 11:32





  Tylenol  PO   650 mg





  Q4H PRN   Administration





  Pain MILD(1-3)/Fever >100.5/HA  


 


Albuterol  2.5 mg  07/04/19 17:22 





  Proventil  IH  





  Q4HRT PRN  





  Shortness Of Breath  


 


Bisacodyl  15 mg  07/07/19 14:51 





  Dulcolax  PO  





  QDAY PRN  





  Constipation  


 


Chlorpromazine HCl  10 mg  07/09/19 15:20  07/18/19 17:04





  Thorazine  PO   10 mg





  Q6H PRN   Administration





  Hiccups  


 


Epoetin Tye  20,000 unit  07/12/19 09:36  07/15/19 12:21





  Procrit  SUB-Q   20,000 unit





  VEE PRN   Administration





  hemodialysis  


 


Famotidine  10 mg  07/12/19 22:00  07/18/19 22:21





  Pepcid  PO   10 mg





  BID BOWEN   Administration


 


Ferrous Sulfate  325 mg  07/10/19 22:00  07/18/19 22:21





  Feosol  PO   325 mg





  BID BOWEN   Administration


 


Folic Acid  1 mg  07/06/19 10:00  07/18/19 09:43





  Folvite  PO   1 mg





  QDAY BOWEN   Administration


 


Heparin Sodium (Porcine)  3,000 unit  07/12/19 09:36 





  Heparin 10,000 Units/10 Ml  IV  





  VEE PRN  





  hemodialysis  


 


Hydralazine HCl  10 mg  07/04/19 19:14  07/04/19 22:17





  Apresoline  IV   10 mg





  Q3H PRN   Administration





  Blood Pressure  


 


Hydromorphone HCl  0.5 mg  07/04/19 17:22  07/19/19 05:55





  Dilaudid  IV   0.5 mg





  Q3H PRN   Administration





  Pain , Severe (7-10)  


 


Chlorpromazine HCl 25 mg/  51 mls @ 100 mls/hr  07/13/19 12:26 





  Sodium Chloride  IV  





  Q4H PRN  





  Hiccups  


 


Sodium Chloride  1,000 mls @ 42 mls/hr  07/16/19 10:00  07/16/19 09:50





  Nacl 0.9% 1000 Ml  IV   42 mls/hr





  AS DIRECT BOWEN   Administration


 


Sodium Chloride  100 mls @ 999 mls/hr  07/19/19 08:49 





  Nacl 0.9%  IV  





  VEE PRN  





  Hypotension  


 


Metoclopramide HCl  5 mg  07/17/19 15:00 





  Reglan  IV  





  Q6H PRN  





  Nausea And Vomiting  


 


Metoprolol Tartrate  50 mg  07/04/19 23:00  07/18/19 22:22





  Lopressor  PO   50 mg





  BID BOWEN   Administration


 


Ondansetron HCl  4 mg  07/04/19 17:22 





  Zofran  IV  





  Q3H PRN  





  Nausea And Vomiting  


 


Oxycodone/Acetaminophen  1 tab  07/13/19 10:53  07/18/19 17:05





  Percocet 5/325  PO   1 tab





  Q6H PRN   Administration





  Pain, Moderate (4-6)  


 


Sodium Chloride  10 ml  07/04/19 22:00  07/18/19 22:22





  Sodium Chloride Flush Syringe 10 Ml  IV   10 ml





  BID BOWEN   Administration


 


Sodium Chloride  10 ml  07/04/19 17:22  07/18/19 14:14





  Sodium Chloride Flush Syringe 10 Ml  IV   10 ml





  PRN PRN   Administration





  LINE FLUSH

## 2019-07-19 NOTE — PROGRESS NOTE
Assessment and Plan


Assessment and plan: 


Patient is a 58 yo man with a history of hypertension and Asthma who presented 

to [facility] ED with urinary frequency, back pains, n/v, diarrhea and abd pains with 

distension x 1 week. He was diagnosis with Ascites and underwent a paracentesis.







* Initial labs: wbc 13.0 to 10.0, hgb 6.3 to 7.0 (mcv 60), na 132, k 9.5, cr 

  34.8, bun 129, bg 164, lipase 88


* CT abd/pelvis without contrast IMPRESSION: 1. There is ascites. There is 

  increased density in the omentum. Findings are concerning for carcinomatosis. 

  There is some mild adenopathy in the pelvis a possible pericolonic mass, 

  series 2 image 139. There is mild bilateral hydronephrosis. The exact cause is

  not determined by this study. There is minimal nephrolithiasis on the left. No

  ureteral calculi are identified however.


* Colonoscopy, There was a circumferential rectal tumor from the dentate line 

  extending to mid-proximal rectum.  There was severe stenosis related to 

  circumferential tumor which was unable to be traversed.  The tumor was friable

  to touch.   Multiple biopsies were obtained Impression:1. Rectal tumor from 

  dentate line to mid/proximal rectum (extent of exam due to severe stenosis 

  related to tumor).  Biopsies were obtained. Recommendations: follow-up 

  pathology, -surgery consult, -heme/onc following, -screen first degree 

  relatives for colorectal cancer, -will need colonoscopy in 3-6 months to rule 

  out proximal colon lesions.


* Path Rectal biopsy: poorly differentiated mucinous adenocarcinoma with signet 

  cells and lymphovascular invasion


* Path Ascites: malignant cells consistent with metastatic carcinoma





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 

lymphovascular invasion, Chemotherapy port to be placed 7/16/19;  On discharge 

patient will follow hematology oncologist, Dr. Meade


Acute anemia with Drop in HCT s/p transfused 2 units of PRBC, ordered FOBT, GI 

is following, d/w Dr. Joel, Drop in H/H Hemoglobin dropped to 6.4, transfused 

another unit of PRBC, on 7/16/19; Findings of colonoscopy as noted above. 

Discussed with surgery, will plan for port placement


ESRD: s/p permacath, had emergent hemodialysis due to severe hyperkalemia done 

on 7/4/19, place hernandez on 7/5/19 with very little output.


Bilateral hydronephrosis: Place hernandez, consulted Urology, input noted==

>discharge with hernandez 


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 

signet ring cells. 


Malignant Ascites due to suspected Colon cancer with mets to 

Omentum/Carcinomatosis suspected, which would be a very poor prognostic sign: 

consulted GI, input noted, s/p Paracentesis on 7/5/19, await cytology, CEA 17 

ELEVATED, AFP ELEVATED, CA 19-9 - normal 11


Positive blood cultures: Abx discontinued following ID eval as culture appears 

to be contaminant


Hyperkalemia: treated with HD, Nephrology is following


Acute Microcytic anemia, suspect cancer related until proven otherwise, I did 

inform patient my concerns after gaining his permission to do so: consult GI 

then consider Heme/Onc consult


Severe malnutrition: Dietitian consulted 


Hiccups- likely tumor related, will add Thorazine.


DVT prophylaxis: On heparin and GI prophylaxis


Discharge when Outpatient Dialysis ready. Patient will discuss with surgery 

about port placement for chemotherapy. Outpatient follow up with oncologist 








History


Interval history: 


Patient was seen and examined. Follow-up on current diagnosis ARF, ascites. No 

overnight events reported to me. Patient denies any chest pain, shortness 

breath, or severe headaches. Imaging, nursing note, chart, labs and old chart 

reviewed. Discussed with patient. 





Hospitalist Physical





- Physical exam


Narrative exam: 


Gen: thin frial, ill appearing, NAD, Awake, Alert, Orientated 


HEENT: NCAT, EOMI, PERRL, OP Clear 


Neck: supple, no adenopathy, no thyromegaly, no JVD 


CVS/Heart: RRR, normal S1S2, pulses present bilaterally 


Chest/Lungs: CTA B, Symmetrical chest expansion, good air entry bilaterally 


GI/Abdomen: soft, abdomen distended, good bowel sounds, no guarding or rebound 


/Bladder: no suprapubic tenderness, no CVA or paraspinal tenderness 


Extermity/Skin: no c/c/e, no obvious rash 


MSK: FROM x 4 


Neuro: CN 2-12 grossly intact, no new focal deficits 


Psych: calm 








- Constitutional


Vitals: 


                                        











Temp Pulse Resp BP Pulse Ox


 


 98.4 F   88   100 H  124/78   100 


 


 07/19/19 14:30  07/19/19 14:30  07/19/19 14:30  07/19/19 14:30  07/19/19 05:21











General appearance: Absent: mild distress, well-nourished





Results





- Labs


CBC & Chem 7: 


                                 07/17/19 10:00





                                 07/17/19 10:00


Labs: 


                             Laboratory Last Values











WBC  9.4 K/mm3 (4.5-11.0)   07/17/19  10:00    


 


RBC  3.40 M/mm3 (3.65-5.03)  L  07/17/19  10:00    


 


Hgb  7.9 gm/dl (11.8-15.2)  L  07/17/19  10:00    


 


Hct  24.6 % (35.5-45.6)  L  07/17/19  10:00    


 


MCV  72 fl (84-94)  L  07/17/19  10:00    


 


MCH  23 pg (28-32)  L  07/17/19  10:00    


 


MCHC  32 % (32-34)   07/17/19  10:00    


 


RDW  31.4 % (13.2-15.2)  H  07/17/19  10:00    


 


Plt Count  366 K/mm3 (140-440)   07/17/19  10:00    


 


Add Manual Diff  Complete   07/05/19  04:52    


 


Total Counted  100   07/05/19  04:52    


 


Seg Neuts % (Manual)  95.0 % (40.0-70.0)  H  07/05/19  04:52    


 


  0 %  07/05/19  04:52    


 


  3.0 % (13.4-35.0)  L  07/05/19  04:52    


 


Reactive Lymphs % (Man)  0 %  07/05/19  04:52    


 


  1.0 % (0.0-7.3)   07/05/19  04:52    


 


  1.0 % (0.0-4.3)   07/05/19  04:52    


 


  0 % (0.0-1.8)   07/05/19  04:52    


 


  0 %  07/05/19  04:52    


 


  0 %  07/05/19  04:52    


 


  0 %  07/05/19  04:52    


 


  0 %  07/05/19  04:52    


 


Nucleated RBC %  1.0 % (0.0-0.9)  H  07/05/19  04:52    


 


Seg Neutrophils # Man  9.5 K/mm3 (1.8-7.7)  H  07/05/19  04:52    


 


Band Neutrophils #  0.0 K/mm3  07/05/19  04:52    


 


  0.3 K/mm3 (1.2-5.4)  L  07/05/19  04:52    


 


Abs React Lymphs (Man)  0.0 K/mm3  07/05/19  04:52    


 


  0.1 K/mm3 (0.0-0.8)   07/05/19  04:52    


 


  0.1 K/mm3 (0.0-0.4)   07/05/19  04:52    


 


  0.0 K/mm3 (0.0-0.1)   07/05/19  04:52    


 


  0.0 K/mm3  07/05/19  04:52    


 


  0.0 K/mm3  07/05/19  04:52    


 


  0.0 K/mm3  07/05/19  04:52    


 


Blast Cells #  0.0 K/mm3  07/05/19  04:52    


 


WBC Morphology  Not Reportable   07/05/19  04:52    


 


Hypersegmented Neuts  Not Reportable   07/05/19  04:52    


 


Hyposegmented Neuts  Not Reportable   07/05/19  04:52    


 


Hypogranular Neuts  Not Reportable   07/05/19  04:52    


 


  Not Reportable   07/05/19  04:52    


 


  Not Reportable   07/05/19  04:52    


 


  Not Reportable   07/05/19  04:52    


 


  Not Reportable   07/05/19  04:52    


 


  Not Reportable   07/05/19  04:52    


 


  Not Reportable   07/05/19  04:52    


 


  Consistent w auto   07/05/19  04:52    


 


  Not Reportable   07/05/19  04:52    


 


Plt Clumps, EDTA  Not Reportable   07/05/19  04:52    


 


  Not Reportable   07/05/19  04:52    


 


  Not Reportable   07/05/19  04:52    


 


  Not Reportable   07/05/19  04:52    


 


Plt Morphology Comment  Not Reportable   07/05/19  04:52    


 


RBC Morphology  Not Reportable   07/05/19  04:52    


 


Dimorphic RBCs  Not Reportable   07/05/19  04:52    


 


  Not Reportable   07/05/19  04:52    


 


  3+   07/05/19  04:52    


 


  1+   07/05/19  04:52    


 


  3+   07/05/19  04:52    


 


  2+   07/05/19  04:52    


 


  Not Reportable   07/05/19  04:52    


 


  Not Reportable   07/05/19  04:52    


 


  Not Reportable   07/05/19  04:52    


 


  Not Reportable   07/05/19  04:52    


 


  Few   07/05/19  04:52    


 


  Not Reportable   07/05/19  04:52    


 


  Few   07/05/19  04:52    


 


  Not Reportable   07/05/19  04:52    


 


  Not Reportable   07/05/19  04:52    


 


  Not Reportable   07/05/19  04:52    


 


  Not Reportable   07/05/19  04:52    


 


  Not Reportable   07/05/19  04:52    


 


  Not Reportable   07/05/19  04:52    


 


  Few   07/05/19  04:52    


 


Acanthocytes (Spur)  Not Reportable   07/05/19  04:52    


 


Rouleaux  Not Reportable   07/05/19  04:52    


 


  Not Reportable   07/05/19  04:52    


 


  Not Reportable   07/05/19  04:52    


 


  Not Reportable   07/05/19  04:52    


 


Percent Retic  1.10 % (0.78-2.58)   07/05/19  04:52    


 


  Not Reportable   07/05/19  04:52    


 


Hem Pathologist Commnt  No   07/05/19  04:52    


 


PT  16.3 Sec. (12.2-14.9)  H  07/05/19  08:51    


 


INR  1.35  (0.87-1.13)  H  07/05/19  08:51    


 


Sodium  136 mmol/L (137-145)  L  07/17/19  10:00    


 


Potassium  3.5 mmol/L (3.6-5.0)  L  07/17/19  10:00    


 


Chloride  95.4 mmol/L ()  L  07/17/19  10:00    


 


Carbon Dioxide  28 mmol/L (22-30)   07/17/19  10:00    


 


  16 mmol/L  07/17/19  10:00    


 


BUN  22 mg/dL (9-20)  H  07/17/19  10:00    


 


  7.9 mg/dL (0.8-1.5)  H  07/17/19  10:00    


 


Estimated GFR  9 ml/min  07/17/19  10:00    


 


  3 %  07/17/19  10:00    


 


Glucose  135 mg/dL ()  H  07/17/19  10:00    


 


POC Glucose  164  ()  H  07/04/19  17:35    


 


Calcium  8.2 mg/dL (8.4-10.2)  L  07/17/19  10:00    


 


Phosphorus  6.50 mg/dL (2.5-4.5)  H  07/15/19  06:57    


 


Magnesium  2.00 mg/dL (1.7-2.3)   07/05/19  04:52    


 


Iron  20 ug/dL ()  L  07/05/19  08:51    


 


TIBC  219 mcg/dL (250-450)  L  07/05/19  08:51    


 


  63.8 ng/mL (13.0-400.0)   07/05/19  08:51    


 


  0.20 mg/dL (0.1-1.2)   07/10/19  04:43    


 


AST  11 units/L (5-40)   07/10/19  04:43    


 


ALT  12 units/L (7-56)   07/10/19  04:43    


 


  67 units/L ()   07/10/19  04:43    


 


  5.9 g/dL (6.1-8.1)  L  07/09/19  04:50    


 


  6.7 g/dL (6.3-8.2)   07/10/19  04:43    


 


  2.0 g/dL (3.9-5)  L  07/10/19  04:43    


 


  0.4 %  07/10/19  04:43    


 


  0.7 g/dL (0.2-0.3)  H  07/09/19  04:50    


 


  0.8 g/dL (0.5-0.9)   07/09/19  04:50    


 


  0.5 g/dL (0.2-0.5)   07/09/19  04:50    


 


  1.6 g/dL (0.8-1.7)   07/09/19  04:50    


 


Abnorm Protein Band 1  see below   07/09/19  04:50    


 


PEP Interpretation  see below  H  07/09/19  04:50    


 


  88 units/L (13-60)  H  07/04/19  12:47    


 


Tumor Marker AFP  See scanned result   07/05/19  08:51    


 


Carcinoembryonic Ag  17.4 ng/mL (0.0-2.4)  H  07/05/19  08:51    


 


  11 U/mL (<34)   07/05/19  08:51    


 


Vitamin B12  1768 pg/mL (211-911)  H  07/05/19  08:51    


 


  5.05 ng/mL (7.3-26.0)  L  07/05/19  08:51    


 


PTH Intact  238.5 pg/mL (15-65)  H  07/05/19  04:52    


 


  161.7 mg/dL (0.1-20.0)  H  07/05/19  08:51    


 


  72 mmol/L  07/05/19  08:51    


 


Fluid Type  Ascitic   07/05/19  Unknown


 


Fluid Color  Yellow   07/05/19  Unknown


 


Fluid Appearance  Hazy   07/05/19  Unknown


 


Fluid WBC  58 /mm3  07/05/19  Unknown


 


Fluid RBC  1850 /mm3  07/05/19  Unknown


 


Fluid Diff Comment     07/05/19  Unknown


 


Fluid Seg Neutrophils  35.0 %  07/05/19  Unknown


 


Fluid Lymphocytes  14.0 %  07/05/19  Unknown


 


Fluid Reactive Lymphs  0 %  07/05/19  Unknown


 


Fluid Monocytes  51.0 %  07/05/19  Unknown


 


Fluid Eosinophils  0 %  07/05/19  Unknown


 


Fluid Basophils  0 %  07/05/19  Unknown


 


Fluid Glucose  93 mg/dL (40-70)  H  07/05/19  Unknown


 


Fluid Total Protein  4.6  (15.0-45.0)  L  07/05/19  Unknown


 


Fluid Albumin  1.5 g/dL  07/05/19  Unknown


 


Fluid LDH  195   07/05/19  Unknown


 


Random Vancomycin  13.1 ug/mL (0-40.0)   07/09/19  04:50    


 


EVELYN Screen  Negative  (Negative)   07/09/19  04:50    


 


Proteinase 3 (PR3) Ab  <1.0 AI (<1.0)   07/09/19  04:50    


 


Myeloperoxidase Ab  <1.0 AI (<1.0)   07/09/19  04:50    


 


  158 mg/dL ()   07/09/19  04:50    


 


  32 mg/dL (15-53)   07/09/19  04:50    


 


Hepatitis A IgM Ab  Non-reactive  (NonReactive)   07/04/19  17:48    


 


Hep Bs Antigen  Non-reactive  (Negative)   07/04/19  17:48    


 


Hep B Core IgM Ab  Non-reactive  (NonReactive)   07/04/19  17:48    


 


  Non-reactive  (NonReactive)   07/04/19  17:48    


 


Blood Type  A POSITIVE   07/16/19  08:18    


 


Antibody Screen  Negative   07/16/19  08:18    


 


Crossmatch  See Detail   07/16/19  08:18    














Active Medications





- Current Medications


Current Medications: 














Generic Name Dose Route Start Last Admin





  Trade Name Freq  PRN Reason Stop Dose Admin


 


Acetaminophen  650 mg  07/04/19 17:22  07/09/19 11:32





  Tylenol  PO   650 mg





  Q4H PRN   Administration





  Pain MILD(1-3)/Fever >100.5/HA  


 


Albuterol  2.5 mg  07/04/19 17:22 





  Proventil  IH  





  Q4HRT PRN  





  Shortness Of Breath  


 


Bisacodyl  15 mg  07/07/19 14:51 





  Dulcolax  PO  





  QDAY PRN  





  Constipation  


 


Chlorpromazine HCl  10 mg  07/09/19 15:20  07/18/19 17:04





  Thorazine  PO   10 mg





  Q6H PRN   Administration





  Hiccups  


 


Epoetin Tye  20,000 unit  07/12/19 09:36  07/19/19 13:27





  Procrit  SUB-Q   20,000 unit





  VEE PRN   Administration





  hemodialysis  


 


Famotidine  10 mg  07/12/19 22:00  07/19/19 09:47





  Pepcid  PO   Not Given





  BID BOWEN  


 


Ferrous Sulfate  325 mg  07/10/19 22:00  07/19/19 09:46





  Feosol  PO   325 mg





  BID BOWEN   Administration


 


Folic Acid  1 mg  07/06/19 10:00  07/19/19 09:46





  Folvite  PO   1 mg





  QDAY BOWEN   Administration


 


Heparin Sodium (Porcine)  3,000 unit  07/12/19 09:36  07/19/19 11:05





  Heparin 10,000 Units/10 Ml  IV   3,000 unit





  VEE PRN   Administration





  hemodialysis  


 


Hydralazine HCl  10 mg  07/04/19 19:14  07/04/19 22:17





  Apresoline  IV   10 mg





  Q3H PRN   Administration





  Blood Pressure  


 


Hydromorphone HCl  0.5 mg  07/04/19 17:22  07/19/19 09:47





  Dilaudid  IV   0.5 mg





  Q3H PRN   Administration





  Pain , Severe (7-10)  


 


Chlorpromazine HCl 25 mg/  51 mls @ 100 mls/hr  07/13/19 12:26 





  Sodium Chloride  IV  





  Q4H PRN  





  Hiccups  


 


Sodium Chloride  1,000 mls @ 42 mls/hr  07/16/19 10:00  07/16/19 09:50





  Nacl 0.9% 1000 Ml  IV   42 mls/hr





  AS DIRECT BOWEN   Administration


 


Sodium Chloride  100 mls @ 999 mls/hr  07/19/19 08:49 





  Nacl 0.9%  IV  





  VEE PRN  





  Hypotension  


 


Metoclopramide HCl  5 mg  07/17/19 15:00 





  Reglan  IV  





  Q6H PRN  





  Nausea And Vomiting  


 


Metoprolol Tartrate  50 mg  07/04/19 23:00  07/19/19 09:46





  Lopressor  PO   50 mg





  BID BOWEN   Administration


 


Ondansetron HCl  4 mg  07/04/19 17:22 





  Zofran  IV  





  Q3H PRN  





  Nausea And Vomiting  


 


Oxycodone/Acetaminophen  1 tab  07/13/19 10:53  07/18/19 17:05





  Percocet 5/325  PO   1 tab





  Q6H PRN   Administration





  Pain, Moderate (4-6)  


 


Sodium Chloride  10 ml  07/04/19 22:00  07/18/19 22:22





  Sodium Chloride Flush Syringe 10 Ml  IV   10 ml





  BID BOWEN   Administration


 


Sodium Chloride  10 ml  07/04/19 17:22  07/18/19 14:14





  Sodium Chloride Flush Syringe 10 Ml  IV   10 ml





  PRN PRN   Administration





  LINE FLUSH  














Nutrition/Malnutrition Assess





- Dietary Evaluation


Nutrition/Malnutrition Findings: 


                                        





Nutrition Notes                                            Start:  07/11/19 

12:03


Freq:                                                      Status: Active       




Protocol:                                                                       




 Document     07/11/19 12:03  LP  (Rec: 07/11/19 12:06  LP  [id number])


 Nutrition Notes


     Need for Assessment generated from:         LOS


     Initial or Follow up                        Brief Note


     Subjective/Other Information                Screen for LOS. Pt states


                                                 eating well PTA and now.


                                                 Denies wt changes. Consuming


                                                 % of meals.


 Nutrition Intervention


     Revisit per MD consult or patient           Sign Off


      request:

## 2019-07-20 RX ADMIN — HYDROMORPHONE HYDROCHLORIDE PRN MG: 1 INJECTION, SOLUTION INTRAMUSCULAR; INTRAVENOUS; SUBCUTANEOUS at 11:29

## 2019-07-20 RX ADMIN — FERROUS SULFATE TAB 325 MG (65 MG ELEMENTAL FE) SCH MG: 325 (65 FE) TAB at 11:39

## 2019-07-20 RX ADMIN — HYDROMORPHONE HYDROCHLORIDE PRN MG: 1 INJECTION, SOLUTION INTRAMUSCULAR; INTRAVENOUS; SUBCUTANEOUS at 18:04

## 2019-07-20 RX ADMIN — HYDROMORPHONE HYDROCHLORIDE PRN MG: 1 INJECTION, SOLUTION INTRAMUSCULAR; INTRAVENOUS; SUBCUTANEOUS at 02:15

## 2019-07-20 RX ADMIN — FERROUS SULFATE TAB 325 MG (65 MG ELEMENTAL FE) SCH MG: 325 (65 FE) TAB at 22:21

## 2019-07-20 RX ADMIN — METOPROLOL TARTRATE SCH: 50 TABLET, FILM COATED ORAL at 11:41

## 2019-07-20 RX ADMIN — Medication SCH: at 12:21

## 2019-07-20 RX ADMIN — FAMOTIDINE SCH: 10 TABLET ORAL at 22:23

## 2019-07-20 RX ADMIN — Medication SCH ML: at 11:40

## 2019-07-20 RX ADMIN — Medication SCH ML: at 22:23

## 2019-07-20 RX ADMIN — METOPROLOL TARTRATE SCH MG: 50 TABLET, FILM COATED ORAL at 22:22

## 2019-07-20 RX ADMIN — OXYCODONE AND ACETAMINOPHEN PRN TAB: 5; 325 TABLET ORAL at 06:34

## 2019-07-20 RX ADMIN — OXYCODONE AND ACETAMINOPHEN PRN TAB: 5; 325 TABLET ORAL at 22:30

## 2019-07-20 RX ADMIN — FAMOTIDINE SCH MG: 10 TABLET ORAL at 11:39

## 2019-07-20 RX ADMIN — OXYCODONE AND ACETAMINOPHEN PRN TAB: 5; 325 TABLET ORAL at 15:00

## 2019-07-20 RX ADMIN — FOLIC ACID SCH MG: 1 TABLET ORAL at 11:39

## 2019-07-20 NOTE — PROGRESS NOTE
Assessment and Plan





1. ESRD:


Likely CKD has progressed to ESRD


CT abdomen showed mild bilateral hydronephosis.


No improvement in the renal function.


Monitor renal function.


Renal prognosis appears to be poor.


Avoid nephrotoxic agents.


Meds dosage based on GFR.


Due to persistent hyperkalemia patient was started on hemodialysis on 7/4/2019.


Hemodialysis: 7/4, 7/5, 7/6, 7/8, 7/10, 7/12, 7/15, 7/17, 7/19.





2. FEN:


Hyperkalemia, improved.


Anion gap MA, improved.


Monitor lytes.





3. Mild bilateral hydronephosis:


S/p hernandez catheter.





4. Rectal adenocarcinoma with malignant Ascites.


S/p Medport.


Await Heme-onc recs.





5. Anemia:


PRBC.


Monitor H/H.





6. Uncontrolled HTN:


BP is better.





Await outpatient hemodialysis chair.














Subjective


Date of service: 07/20/19


Principal diagnosis: ? carcinomatosis


Interval history: 


Patient was seen and examined at the bedside.


No new complaint.








Objective





- Vital Signs


Vital signs: 


                               Vital Signs - 12hr











  07/20/19 07/20/19 07/20/19





  00:14 02:15 05:40


 


Temperature 98.6 F  98.2 F


 


Pulse Rate 82  77


 


Respiratory 17 17 18





Rate   


 


Blood Pressure 126/71  112/69


 


O2 Sat by Pulse 99  99





Oximetry   














  07/20/19





  06:34


 


Temperature 


 


Pulse Rate 


 


Respiratory 18





Rate 


 


Blood Pressure 


 


O2 Sat by Pulse 





Oximetry 














- General Appearance


General appearance: well-developed, well-nourished, appears stated age, other 

(no distress, R IJ  tunnel catheter)


EENT: ATNC, PERRL, hearing intact, vision intact


Neck: supple


Respiratory: Present: Clear to Ascultation


Cardiology: regular, S1S2, no murmurs


Gastrointestinal: normoactive bowel sounds, no tenderness, distended, other 

(hernandez catheter)


Integumentary: no rash, warm and dry


Neurologic: no focal deficit, no asterixis, alert and oriented x3


Musculoskeletal: other (no edema)


Psychiatric: cooperative





- Lab





                                 07/17/19 10:00





                                 07/17/19 10:00


                             Most recent lab results











Calcium  8.2 mg/dL (8.4-10.2)  L  07/17/19  10:00    


 


Phosphorus  6.50 mg/dL (2.5-4.5)  H  07/15/19  06:57    


 


Magnesium  2.00 mg/dL (1.7-2.3)   07/05/19  04:52    


 


  161.7 mg/dL (0.1-20.0)  H  07/05/19  08:51    


 


  72 mmol/L  07/05/19  08:51    














Medications & Allergies





- Medications


Allergies/Adverse Reactions: 


                                    Allergies





No Known Allergies Allergy (Verified 07/04/19 12:02)


   








Home Medications: 


                                Home Medications











 Medication  Instructions  Recorded  Confirmed  Last Taken  Type


 


Prednisone [predniSONE 10 mg 10 mg PO .TAPER #1 tab.ds.pk 09/10/15 07/06/19 

Unknown Rx





(6-Day Pack, 21 Tabs)]     











Active Medications: 














Generic Name Dose Route Start Last Admin





  Trade Name Freq  PRN Reason Stop Dose Admin


 


Acetaminophen  650 mg  07/04/19 17:22  07/09/19 11:32





  Tylenol  PO   650 mg





  Q4H PRN   Administration





  Pain MILD(1-3)/Fever >100.5/HA  


 


Albuterol  2.5 mg  07/04/19 17:22 





  Proventil  IH  





  Q4HRT PRN  





  Shortness Of Breath  


 


Bisacodyl  15 mg  07/07/19 14:51 





  Dulcolax  PO  





  QDAY PRN  





  Constipation  


 


Chlorpromazine HCl  10 mg  07/09/19 15:20  07/18/19 17:04





  Thorazine  PO   10 mg





  Q6H PRN   Administration





  Hiccups  


 


Epoetin Tye  20,000 unit  07/12/19 09:36  07/19/19 13:27





  Procrit  SUB-Q   20,000 unit





  VEE PRN   Administration





  hemodialysis  


 


Famotidine  10 mg  07/12/19 22:00  07/19/19 21:46





  Pepcid  PO   Not Given





  BID BOWEN  


 


Ferrous Sulfate  325 mg  07/10/19 22:00  07/19/19 21:45





  Feosol  PO   325 mg





  BID BOWEN   Administration


 


Folic Acid  1 mg  07/06/19 10:00  07/19/19 09:46





  Folvite  PO   1 mg





  QDAY BOWEN   Administration


 


Heparin Sodium (Porcine)  3,000 unit  07/12/19 09:36  07/19/19 11:05





  Heparin 10,000 Units/10 Ml  IV   3,000 unit





  VEE PRN   Administration





  hemodialysis  


 


Hydralazine HCl  10 mg  07/04/19 19:14  07/04/19 22:17





  Apresoline  IV   10 mg





  Q3H PRN   Administration





  Blood Pressure  


 


Hydromorphone HCl  0.5 mg  07/04/19 17:22  07/20/19 02:15





  Dilaudid  IV   0.5 mg





  Q3H PRN   Administration





  Pain , Severe (7-10)  


 


Chlorpromazine HCl 25 mg/  51 mls @ 100 mls/hr  07/13/19 12:26 





  Sodium Chloride  IV  





  Q4H PRN  





  Hiccups  


 


Sodium Chloride  1,000 mls @ 42 mls/hr  07/16/19 10:00  07/16/19 09:50





  Nacl 0.9% 1000 Ml  IV   42 mls/hr





  AS DIRECT BOWEN   Administration


 


Sodium Chloride  100 mls @ 999 mls/hr  07/19/19 08:49 





  Nacl 0.9%  IV  





  VEE PRN  





  Hypotension  


 


Metoclopramide HCl  5 mg  07/17/19 15:00 





  Reglan  IV  





  Q6H PRN  





  Nausea And Vomiting  


 


Metoprolol Tartrate  50 mg  07/04/19 23:00  07/19/19 21:45





  Lopressor  PO   50 mg





  BID BOWEN   Administration


 


Ondansetron HCl  4 mg  07/04/19 17:22 





  Zofran  IV  





  Q3H PRN  





  Nausea And Vomiting  


 


Oxycodone/Acetaminophen  1 tab  07/13/19 10:53  07/20/19 06:34





  Percocet 5/325  PO   1 tab





  Q6H PRN   Administration





  Pain, Moderate (4-6)  


 


Sodium Chloride  10 ml  07/04/19 22:00  07/18/19 22:22





  Sodium Chloride Flush Syringe 10 Ml  IV   10 ml





  BID BOWEN   Administration


 


Sodium Chloride  10 ml  07/04/19 17:22  07/18/19 14:14





  Sodium Chloride Flush Syringe 10 Ml  IV   10 ml





  PRN PRN   Administration





  LINE FLUSH

## 2019-07-20 NOTE — PROGRESS NOTE
Assessment and Plan


Assessment and plan: 


Patient is a 60 yo man with a history of hypertension and Asthma who presented 

to [facility] ED with urinary frequency, back pains, n/v, diarrhea and abd pains with 

distension x 1 week. He was diagnosis with Ascites and underwent a paracentesis.







* Initial labs: wbc 13.0 to 10.0, hgb 6.3 to 7.0 (mcv 60), na 132, k 9.5, cr 

  34.8, bun 129, bg 164, lipase 88


* CT abd/pelvis without contrast IMPRESSION: 1. There is ascites. There is 

  increased density in the omentum. Findings are concerning for carcinomatosis. 

  There is some mild adenopathy in the pelvis a possible pericolonic mass, 

  series 2 image 139. There is mild bilateral hydronephrosis. The exact cause is

  not determined by this study. There is minimal nephrolithiasis on the left. No

  ureteral calculi are identified however.


* Colonoscopy, There was a circumferential rectal tumor from the dentate line 

  extending to mid-proximal rectum.  There was severe stenosis related to 

  circumferential tumor which was unable to be traversed.  The tumor was friable

  to touch.   Multiple biopsies were obtained Impression:1. Rectal tumor from 

  dentate line to mid/proximal rectum (extent of exam due to severe stenosis 

  related to tumor).  Biopsies were obtained. Recommendations: follow-up 

  pathology, -surgery consult, -heme/onc following, -screen first degree 

  relatives for colorectal cancer, -will need colonoscopy in 3-6 months to rule 

  out proximal colon lesions.


* Path Rectal biopsy: poorly differentiated mucinous adenocarcinoma with signet 

  cells and lymphovascular invasion


* Path Ascites: malignant cells consistent with metastatic carcinoma





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 

lymphovascular invasion, Chemotherapy port to be placed 7/16/19;  On discharge 

patient will follow hematology oncologist, Dr. Meade


Acute anemia with Drop in HCT s/p transfused 2 units of PRBC, ordered FOBT, GI 

is following, d/w Dr. Joel, Drop in H/H Hemoglobin dropped to 6.4, transfused 

another unit of PRBC, on 7/16/19; Findings of colonoscopy as noted above. 

Discussed with surgery, will plan for port placement


ESRD: s/p permacath, had emergent hemodialysis due to severe hyperkalemia done 

on 7/4/19, place hernandez on 7/5/19 with very little output.


Bilateral hydronephrosis: Place hernandez, consulted Urology, input noted==

>discharge with hernandez 


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 

signet ring cells. 


Malignant Ascites due to suspected Colon cancer with mets to 

Omentum/Carcinomatosis suspected, which would be a very poor prognostic sign: 

consulted GI, input noted, s/p Paracentesis on 7/5/19, await cytology, CEA 17 

ELEVATED, AFP ELEVATED, CA 19-9 - normal 11


Positive blood cultures: Abx discontinued following ID eval as culture appears 

to be contaminant


Hyperkalemia: treated with HD, Nephrology is following


Acute Microcytic anemia, suspect cancer related until proven otherwise, I did 

inform patient my concerns after gaining his permission to do so: consult GI 

then consider Heme/Onc consult


Severe malnutrition: Dietitian consulted 


Hiccups- likely tumor related, will add Thorazine.


DVT prophylaxis: On heparin and GI prophylaxis


Discharge when Outpatient Dialysis ready. Patient will discuss with surgery 

about port placement for chemotherapy. Outpatient follow up with oncologist 








History


Interval history: 


Patient was seen and examined. Follow-up on current diagnosis ARF, ascites. No 

overnight events reported to me. Patient denies any chest pain, shortness 

breath, or severe headaches. Imaging, nursing note, chart, labs and old chart 

reviewed. Discussed with patient. 





Hospitalist Physical





- Physical exam


Narrative exam: 


Gen: thin frial, ill appearing, NAD, Awake, Alert, Orientated 


HEENT: NCAT, EOMI, PERRL, OP Clear 


Neck: supple, no adenopathy, no thyromegaly, no JVD 


CVS/Heart: RRR, normal S1S2, pulses present bilaterally 


Chest/Lungs: CTA B, Symmetrical chest expansion, good air entry bilaterally 


GI/Abdomen: soft, abdomen distended, good bowel sounds, no guarding or rebound 


/Bladder: no suprapubic tenderness, no CVA or paraspinal tenderness 


Extermity/Skin: no c/c/e, no obvious rash 


MSK: FROM x 4 


Neuro: CN 2-12 grossly intact, no new focal deficits 


Psych: calm 








- Constitutional


Vitals: 


                                        











Temp Pulse Resp BP Pulse Ox


 


 98.2 F   77   18   112/69   99 


 


 07/20/19 05:40  07/20/19 05:40  07/20/19 06:34  07/20/19 05:40  07/20/19 05:40











General appearance: Absent: mild distress, well-nourished





Results





- Labs


CBC & Chem 7: 


                                 07/17/19 10:00





                                 07/17/19 10:00


Labs: 


                             Laboratory Last Values











WBC  9.4 K/mm3 (4.5-11.0)   07/17/19  10:00    


 


RBC  3.40 M/mm3 (3.65-5.03)  L  07/17/19  10:00    


 


Hgb  7.9 gm/dl (11.8-15.2)  L  07/17/19  10:00    


 


Hct  24.6 % (35.5-45.6)  L  07/17/19  10:00    


 


MCV  72 fl (84-94)  L  07/17/19  10:00    


 


MCH  23 pg (28-32)  L  07/17/19  10:00    


 


MCHC  32 % (32-34)   07/17/19  10:00    


 


RDW  31.4 % (13.2-15.2)  H  07/17/19  10:00    


 


Plt Count  366 K/mm3 (140-440)   07/17/19  10:00    


 


Add Manual Diff  Complete   07/05/19  04:52    


 


Total Counted  100   07/05/19  04:52    


 


Seg Neuts % (Manual)  95.0 % (40.0-70.0)  H  07/05/19  04:52    


 


  0 %  07/05/19  04:52    


 


  3.0 % (13.4-35.0)  L  07/05/19  04:52    


 


Reactive Lymphs % (Man)  0 %  07/05/19  04:52    


 


  1.0 % (0.0-7.3)   07/05/19  04:52    


 


  1.0 % (0.0-4.3)   07/05/19  04:52    


 


  0 % (0.0-1.8)   07/05/19  04:52    


 


  0 %  07/05/19  04:52    


 


  0 %  07/05/19  04:52    


 


  0 %  07/05/19  04:52    


 


  0 %  07/05/19  04:52    


 


Nucleated RBC %  1.0 % (0.0-0.9)  H  07/05/19  04:52    


 


Seg Neutrophils # Man  9.5 K/mm3 (1.8-7.7)  H  07/05/19  04:52    


 


Band Neutrophils #  0.0 K/mm3  07/05/19  04:52    


 


  0.3 K/mm3 (1.2-5.4)  L  07/05/19  04:52    


 


Abs React Lymphs (Man)  0.0 K/mm3  07/05/19  04:52    


 


  0.1 K/mm3 (0.0-0.8)   07/05/19  04:52    


 


  0.1 K/mm3 (0.0-0.4)   07/05/19  04:52    


 


  0.0 K/mm3 (0.0-0.1)   07/05/19  04:52    


 


  0.0 K/mm3  07/05/19  04:52    


 


  0.0 K/mm3  07/05/19  04:52    


 


  0.0 K/mm3  07/05/19  04:52    


 


Blast Cells #  0.0 K/mm3  07/05/19  04:52    


 


WBC Morphology  Not Reportable   07/05/19  04:52    


 


Hypersegmented Neuts  Not Reportable   07/05/19  04:52    


 


Hyposegmented Neuts  Not Reportable   07/05/19  04:52    


 


Hypogranular Neuts  Not Reportable   07/05/19  04:52    


 


  Not Reportable   07/05/19  04:52    


 


  Not Reportable   07/05/19  04:52    


 


  Not Reportable   07/05/19  04:52    


 


  Not Reportable   07/05/19  04:52    


 


  Not Reportable   07/05/19  04:52    


 


  Not Reportable   07/05/19  04:52    


 


  Consistent w auto   07/05/19  04:52    


 


  Not Reportable   07/05/19  04:52    


 


Plt Clumps, EDTA  Not Reportable   07/05/19  04:52    


 


  Not Reportable   07/05/19  04:52    


 


  Not Reportable   07/05/19  04:52    


 


  Not Reportable   07/05/19  04:52    


 


Plt Morphology Comment  Not Reportable   07/05/19  04:52    


 


RBC Morphology  Not Reportable   07/05/19  04:52    


 


Dimorphic RBCs  Not Reportable   07/05/19  04:52    


 


  Not Reportable   07/05/19  04:52    


 


  3+   07/05/19  04:52    


 


  1+   07/05/19  04:52    


 


  3+   07/05/19  04:52    


 


  2+   07/05/19  04:52    


 


  Not Reportable   07/05/19  04:52    


 


  Not Reportable   07/05/19  04:52    


 


  Not Reportable   07/05/19  04:52    


 


  Not Reportable   07/05/19  04:52    


 


  Few   07/05/19  04:52    


 


  Not Reportable   07/05/19  04:52    


 


  Few   07/05/19  04:52    


 


  Not Reportable   07/05/19  04:52    


 


  Not Reportable   07/05/19  04:52    


 


  Not Reportable   07/05/19  04:52    


 


  Not Reportable   07/05/19  04:52    


 


  Not Reportable   07/05/19  04:52    


 


  Not Reportable   07/05/19  04:52    


 


  Few   07/05/19  04:52    


 


Acanthocytes (Spur)  Not Reportable   07/05/19  04:52    


 


Rouleaux  Not Reportable   07/05/19  04:52    


 


  Not Reportable   07/05/19  04:52    


 


  Not Reportable   07/05/19  04:52    


 


  Not Reportable   07/05/19  04:52    


 


Percent Retic  1.10 % (0.78-2.58)   07/05/19  04:52    


 


  Not Reportable   07/05/19  04:52    


 


Hem Pathologist Commnt  No   07/05/19  04:52    


 


PT  16.3 Sec. (12.2-14.9)  H  07/05/19  08:51    


 


INR  1.35  (0.87-1.13)  H  07/05/19  08:51    


 


Sodium  136 mmol/L (137-145)  L  07/17/19  10:00    


 


Potassium  3.5 mmol/L (3.6-5.0)  L  07/17/19  10:00    


 


Chloride  95.4 mmol/L ()  L  07/17/19  10:00    


 


Carbon Dioxide  28 mmol/L (22-30)   07/17/19  10:00    


 


  16 mmol/L  07/17/19  10:00    


 


BUN  22 mg/dL (9-20)  H  07/17/19  10:00    


 


  7.9 mg/dL (0.8-1.5)  H  07/17/19  10:00    


 


Estimated GFR  9 ml/min  07/17/19  10:00    


 


  3 %  07/17/19  10:00    


 


Glucose  135 mg/dL ()  H  07/17/19  10:00    


 


POC Glucose  164  ()  H  07/04/19  17:35    


 


Calcium  8.2 mg/dL (8.4-10.2)  L  07/17/19  10:00    


 


Phosphorus  6.50 mg/dL (2.5-4.5)  H  07/15/19  06:57    


 


Magnesium  2.00 mg/dL (1.7-2.3)   07/05/19  04:52    


 


Iron  20 ug/dL ()  L  07/05/19  08:51    


 


TIBC  219 mcg/dL (250-450)  L  07/05/19  08:51    


 


  63.8 ng/mL (13.0-400.0)   07/05/19  08:51    


 


  0.20 mg/dL (0.1-1.2)   07/10/19  04:43    


 


AST  11 units/L (5-40)   07/10/19  04:43    


 


ALT  12 units/L (7-56)   07/10/19  04:43    


 


  67 units/L ()   07/10/19  04:43    


 


  5.9 g/dL (6.1-8.1)  L  07/09/19  04:50    


 


  6.7 g/dL (6.3-8.2)   07/10/19  04:43    


 


  2.0 g/dL (3.9-5)  L  07/10/19  04:43    


 


  0.4 %  07/10/19  04:43    


 


  0.7 g/dL (0.2-0.3)  H  07/09/19  04:50    


 


  0.8 g/dL (0.5-0.9)   07/09/19  04:50    


 


  0.5 g/dL (0.2-0.5)   07/09/19  04:50    


 


  1.6 g/dL (0.8-1.7)   07/09/19  04:50    


 


Abnorm Protein Band 1  see below   07/09/19  04:50    


 


PEP Interpretation  see below  H  07/09/19  04:50    


 


  88 units/L (13-60)  H  07/04/19  12:47    


 


Tumor Marker AFP  See scanned result   07/05/19  08:51    


 


Carcinoembryonic Ag  17.4 ng/mL (0.0-2.4)  H  07/05/19  08:51    


 


  11 U/mL (<34)   07/05/19  08:51    


 


Vitamin B12  1768 pg/mL (211-911)  H  07/05/19  08:51    


 


  5.05 ng/mL (7.3-26.0)  L  07/05/19  08:51    


 


PTH Intact  238.5 pg/mL (15-65)  H  07/05/19  04:52    


 


  161.7 mg/dL (0.1-20.0)  H  07/05/19  08:51    


 


  72 mmol/L  07/05/19  08:51    


 


Fluid Type  Ascitic   07/05/19  Unknown


 


Fluid Color  Yellow   07/05/19  Unknown


 


Fluid Appearance  Hazy   07/05/19  Unknown


 


Fluid WBC  58 /mm3  07/05/19  Unknown


 


Fluid RBC  1850 /mm3  07/05/19  Unknown


 


Fluid Diff Comment     07/05/19  Unknown


 


Fluid Seg Neutrophils  35.0 %  07/05/19  Unknown


 


Fluid Lymphocytes  14.0 %  07/05/19  Unknown


 


Fluid Reactive Lymphs  0 %  07/05/19  Unknown


 


Fluid Monocytes  51.0 %  07/05/19  Unknown


 


Fluid Eosinophils  0 %  07/05/19  Unknown


 


Fluid Basophils  0 %  07/05/19  Unknown


 


Fluid Glucose  93 mg/dL (40-70)  H  07/05/19  Unknown


 


Fluid Total Protein  4.6  (15.0-45.0)  L  07/05/19  Unknown


 


Fluid Albumin  1.5 g/dL  07/05/19  Unknown


 


Fluid LDH  195   07/05/19  Unknown


 


Random Vancomycin  13.1 ug/mL (0-40.0)   07/09/19  04:50    


 


EVELYN Screen  Negative  (Negative)   07/09/19  04:50    


 


Proteinase 3 (PR3) Ab  <1.0 AI (<1.0)   07/09/19  04:50    


 


Myeloperoxidase Ab  <1.0 AI (<1.0)   07/09/19  04:50    


 


  158 mg/dL ()   07/09/19  04:50    


 


  32 mg/dL (15-53)   07/09/19  04:50    


 


Hepatitis A IgM Ab  Non-reactive  (NonReactive)   07/04/19  17:48    


 


Hep Bs Antigen  Non-reactive  (Negative)   07/04/19  17:48    


 


Hep B Core IgM Ab  Non-reactive  (NonReactive)   07/04/19  17:48    


 


  Non-reactive  (NonReactive)   07/04/19  17:48    


 


Blood Type  A POSITIVE   07/16/19  08:18    


 


Antibody Screen  Negative   07/16/19  08:18    


 


Crossmatch  See Detail   07/16/19  08:18    














Active Medications





- Current Medications


Current Medications: 














Generic Name Dose Route Start Last Admin





  Trade Name Freq  PRN Reason Stop Dose Admin


 


Acetaminophen  650 mg  07/04/19 17:22  07/09/19 11:32





  Tylenol  PO   650 mg





  Q4H PRN   Administration





  Pain MILD(1-3)/Fever >100.5/HA  


 


Albuterol  2.5 mg  07/04/19 17:22 





  Proventil  IH  





  Q4HRT PRN  





  Shortness Of Breath  


 


Bisacodyl  15 mg  07/07/19 14:51 





  Dulcolax  PO  





  QDAY PRN  





  Constipation  


 


Chlorpromazine HCl  10 mg  07/09/19 15:20  07/18/19 17:04





  Thorazine  PO   10 mg





  Q6H PRN   Administration





  Hiccups  


 


Epoetin Tye  20,000 unit  07/12/19 09:36  07/19/19 13:27





  Procrit  SUB-Q   20,000 unit





  VEE PRN   Administration





  hemodialysis  


 


Famotidine  10 mg  07/12/19 22:00  07/19/19 21:46





  Pepcid  PO   Not Given





  BID BOWEN  


 


Ferrous Sulfate  325 mg  07/10/19 22:00  07/19/19 21:45





  Feosol  PO   325 mg





  BID BOWEN   Administration


 


Folic Acid  1 mg  07/06/19 10:00  07/19/19 09:46





  Folvite  PO   1 mg





  QDAY BOWEN   Administration


 


Heparin Sodium (Porcine)  3,000 unit  07/12/19 09:36  07/19/19 11:05





  Heparin 10,000 Units/10 Ml  IV   3,000 unit





  VEE PRN   Administration





  hemodialysis  


 


Hydralazine HCl  10 mg  07/04/19 19:14  07/04/19 22:17





  Apresoline  IV   10 mg





  Q3H PRN   Administration





  Blood Pressure  


 


Hydromorphone HCl  0.5 mg  07/04/19 17:22  07/20/19 02:15





  Dilaudid  IV   0.5 mg





  Q3H PRN   Administration





  Pain , Severe (7-10)  


 


Chlorpromazine HCl 25 mg/  51 mls @ 100 mls/hr  07/13/19 12:26 





  Sodium Chloride  IV  





  Q4H PRN  





  Hiccups  


 


Sodium Chloride  1,000 mls @ 42 mls/hr  07/16/19 10:00  07/16/19 09:50





  Nacl 0.9% 1000 Ml  IV   42 mls/hr





  AS DIRECT BOWEN   Administration


 


Sodium Chloride  100 mls @ 999 mls/hr  07/19/19 08:49 





  Nacl 0.9%  IV  





  VEE PRN  





  Hypotension  


 


Metoclopramide HCl  5 mg  07/17/19 15:00 





  Reglan  IV  





  Q6H PRN  





  Nausea And Vomiting  


 


Metoprolol Tartrate  50 mg  07/04/19 23:00  07/19/19 21:45





  Lopressor  PO   50 mg





  BID BOWEN   Administration


 


Ondansetron HCl  4 mg  07/04/19 17:22 





  Zofran  IV  





  Q3H PRN  





  Nausea And Vomiting  


 


Oxycodone/Acetaminophen  1 tab  07/13/19 10:53  07/20/19 06:34





  Percocet 5/325  PO   1 tab





  Q6H PRN   Administration





  Pain, Moderate (4-6)  


 


Sodium Chloride  10 ml  07/04/19 22:00  07/18/19 22:22





  Sodium Chloride Flush Syringe 10 Ml  IV   10 ml





  BID BOWEN   Administration


 


Sodium Chloride  10 ml  07/04/19 17:22  07/18/19 14:14





  Sodium Chloride Flush Syringe 10 Ml  IV   10 ml





  PRN PRN   Administration





  LINE FLUSH  














Nutrition/Malnutrition Assess





- Dietary Evaluation


Nutrition/Malnutrition Findings: 


                                        





Nutrition Notes                                            Start:  07/11/19 

12:03


Freq:                                                      Status: Active       




Protocol:                                                                       




 Document     07/11/19 12:03  LP  (Rec: 07/11/19 12:06  LP  [id number])


 Nutrition Notes


     Need for Assessment generated from:         LOS


     Initial or Follow up                        Brief Note


     Subjective/Other Information                Screen for LOS. Pt states


                                                 eating well PTA and now.


                                                 Denies wt changes. Consuming


                                                 % of meals.


 Nutrition Intervention


     Revisit per MD consult or patient           Sign Off


      request:

## 2019-07-21 RX ADMIN — METOPROLOL TARTRATE SCH: 50 TABLET, FILM COATED ORAL at 09:31

## 2019-07-21 RX ADMIN — FAMOTIDINE SCH MG: 10 TABLET ORAL at 09:29

## 2019-07-21 RX ADMIN — FAMOTIDINE SCH MG: 10 TABLET ORAL at 23:26

## 2019-07-21 RX ADMIN — Medication SCH ML: at 23:27

## 2019-07-21 RX ADMIN — CHLORPROMAZINE HYDROCHLORIDE PRN MG: 10 TABLET, FILM COATED ORAL at 16:06

## 2019-07-21 RX ADMIN — HYDROMORPHONE HYDROCHLORIDE PRN MG: 1 INJECTION, SOLUTION INTRAMUSCULAR; INTRAVENOUS; SUBCUTANEOUS at 09:22

## 2019-07-21 RX ADMIN — OXYCODONE AND ACETAMINOPHEN PRN TAB: 5; 325 TABLET ORAL at 13:15

## 2019-07-21 RX ADMIN — Medication SCH ML: at 09:33

## 2019-07-21 RX ADMIN — FERROUS SULFATE TAB 325 MG (65 MG ELEMENTAL FE) SCH MG: 325 (65 FE) TAB at 09:29

## 2019-07-21 RX ADMIN — METOPROLOL TARTRATE SCH MG: 50 TABLET, FILM COATED ORAL at 23:26

## 2019-07-21 RX ADMIN — OXYCODONE AND ACETAMINOPHEN PRN TAB: 5; 325 TABLET ORAL at 05:47

## 2019-07-21 RX ADMIN — OXYCODONE AND ACETAMINOPHEN PRN TAB: 5; 325 TABLET ORAL at 23:27

## 2019-07-21 RX ADMIN — HYDROMORPHONE HYDROCHLORIDE PRN MG: 1 INJECTION, SOLUTION INTRAMUSCULAR; INTRAVENOUS; SUBCUTANEOUS at 20:53

## 2019-07-21 RX ADMIN — FERROUS SULFATE TAB 325 MG (65 MG ELEMENTAL FE) SCH MG: 325 (65 FE) TAB at 23:26

## 2019-07-21 RX ADMIN — FOLIC ACID SCH MG: 1 TABLET ORAL at 09:29

## 2019-07-21 NOTE — PROGRESS NOTE
Assessment and Plan


Assessment and plan: 


Patient is a 58 yo man with a history of hypertension and Asthma who presented 

to [facility] ED with urinary frequency, back pains, n/v, diarrhea and abd pains with 

distension x 1 week. He was diagnosis with Ascites and underwent a paracentesis.







* Initial labs: wbc 13.0 to 10.0, hgb 6.3 to 7.0 (mcv 60), na 132, k 9.5, cr 

  34.8, bun 129, bg 164, lipase 88


* CT abd/pelvis without contrast IMPRESSION: 1. There is ascites. There is 

  increased density in the omentum. Findings are concerning for carcinomatosis. 

  There is some mild adenopathy in the pelvis a possible pericolonic mass, 

  series 2 image 139. There is mild bilateral hydronephrosis. The exact cause is

  not determined by this study. There is minimal nephrolithiasis on the left. No

  ureteral calculi are identified however.


* Colonoscopy, There was a circumferential rectal tumor from the dentate line 

  extending to mid-proximal rectum.  There was severe stenosis related to 

  circumferential tumor which was unable to be traversed.  The tumor was friable

  to touch.   Multiple biopsies were obtained Impression:1. Rectal tumor from 

  dentate line to mid/proximal rectum (extent of exam due to severe stenosis 

  related to tumor).  Biopsies were obtained. Recommendations: follow-up 

  pathology, -surgery consult, -heme/onc following, -screen first degree 

  relatives for colorectal cancer, -will need colonoscopy in 3-6 months to rule 

  out proximal colon lesions.


* Path Rectal biopsy: poorly differentiated mucinous adenocarcinoma with signet 

  cells and lymphovascular invasion


* Path Ascites: malignant cells consistent with metastatic carcinoma





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 

lymphovascular invasion, Chemotherapy port to be placed 7/16/19;  On discharge 

patient will follow hematology oncologist, Dr. Meade


Acute anemia with Drop in HCT s/p transfused 2 units of PRBC, ordered FOBT, GI 

is following, d/w Dr. Joel, Drop in H/H Hemoglobin dropped to 6.4, transfused 

another unit of PRBC, on 7/16/19; Findings of colonoscopy as noted above. 

Discussed with surgery, will plan for port placement


ESRD: s/p permacath, had emergent hemodialysis due to severe hyperkalemia done 

on 7/4/19, place hernandez on 7/5/19 with very little output.


Bilateral hydronephrosis: Place hernandez, consulted Urology, input noted==

>discharge with hernandez 


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 

signet ring cells. 


Malignant Ascites due to suspected Colon cancer with mets to 

Omentum/Carcinomatosis suspected, which would be a very poor prognostic sign: 

consulted GI, input noted, s/p Paracentesis on 7/5/19, await cytology, CEA 17 

ELEVATED, AFP ELEVATED, CA 19-9 - normal 11


Positive blood cultures: Abx discontinued following ID eval as culture appears 

to be contaminant


Hyperkalemia: treated with HD, Nephrology is following


Acute Microcytic anemia, suspect cancer related until proven otherwise, I did 

inform patient my concerns after gaining his permission to do so: consult GI 

then consider Heme/Onc consult


Severe malnutrition: Dietitian consulted 


Hiccups- likely tumor related, will add Thorazine.


DVT prophylaxis: On heparin and GI prophylaxis


Discharge when Outpatient Dialysis ready. Patient will discuss with surgery 

about port placement for chemotherapy. Outpatient follow up with oncologist 








History


Interval history: 


Patient was seen and examined. Follow-up on current diagnosis ARF, ascites. No 

overnight events reported to me. Patient denies any chest pain, shortness 

breath, or severe headaches. Imaging, nursing note, chart, labs and old chart 

reviewed. Discussed with patient. 





Hospitalist Physical





- Physical exam


Narrative exam: 


Gen: thin frial, ill appearing, NAD, Awake, Alert, Orientated 


HEENT: NCAT, EOMI, PERRL, OP Clear 


Neck: supple, no adenopathy, no thyromegaly, no JVD 


CVS/Heart: RRR, normal S1S2, pulses present bilaterally 


Chest/Lungs: CTA B, Symmetrical chest expansion, good air entry bilaterally 


GI/Abdomen: soft, abdomen distended, good bowel sounds, no guarding or rebound 


/Bladder: no suprapubic tenderness, no CVA or paraspinal tenderness 


Extermity/Skin: no c/c/e, no obvious rash 


MSK: FROM x 4 


Neuro: CN 2-12 grossly intact, no new focal deficits 


Psych: calm 








- Constitutional


Vitals: 


                                        











Temp Pulse Resp BP Pulse Ox


 


 98.5 F   80   18   101/71   98 


 


 07/21/19 05:43  07/21/19 09:28  07/21/19 05:43  07/21/19 09:31  07/21/19 09:28











General appearance: Absent: mild distress, well-nourished





Results





- Labs


CBC & Chem 7: 


                                 07/17/19 10:00





                                 07/17/19 10:00


Labs: 


                             Laboratory Last Values











WBC  9.4 K/mm3 (4.5-11.0)   07/17/19  10:00    


 


RBC  3.40 M/mm3 (3.65-5.03)  L  07/17/19  10:00    


 


Hgb  7.9 gm/dl (11.8-15.2)  L  07/17/19  10:00    


 


Hct  24.6 % (35.5-45.6)  L  07/17/19  10:00    


 


MCV  72 fl (84-94)  L  07/17/19  10:00    


 


MCH  23 pg (28-32)  L  07/17/19  10:00    


 


MCHC  32 % (32-34)   07/17/19  10:00    


 


RDW  31.4 % (13.2-15.2)  H  07/17/19  10:00    


 


Plt Count  366 K/mm3 (140-440)   07/17/19  10:00    


 


Add Manual Diff  Complete   07/05/19  04:52    


 


Total Counted  100   07/05/19  04:52    


 


Seg Neuts % (Manual)  95.0 % (40.0-70.0)  H  07/05/19  04:52    


 


  0 %  07/05/19  04:52    


 


  3.0 % (13.4-35.0)  L  07/05/19  04:52    


 


Reactive Lymphs % (Man)  0 %  07/05/19  04:52    


 


  1.0 % (0.0-7.3)   07/05/19  04:52    


 


  1.0 % (0.0-4.3)   07/05/19  04:52    


 


  0 % (0.0-1.8)   07/05/19  04:52    


 


  0 %  07/05/19  04:52    


 


  0 %  07/05/19  04:52    


 


  0 %  07/05/19  04:52    


 


  0 %  07/05/19  04:52    


 


Nucleated RBC %  1.0 % (0.0-0.9)  H  07/05/19  04:52    


 


Seg Neutrophils # Man  9.5 K/mm3 (1.8-7.7)  H  07/05/19  04:52    


 


Band Neutrophils #  0.0 K/mm3  07/05/19  04:52    


 


  0.3 K/mm3 (1.2-5.4)  L  07/05/19  04:52    


 


Abs React Lymphs (Man)  0.0 K/mm3  07/05/19  04:52    


 


  0.1 K/mm3 (0.0-0.8)   07/05/19  04:52    


 


  0.1 K/mm3 (0.0-0.4)   07/05/19  04:52    


 


  0.0 K/mm3 (0.0-0.1)   07/05/19  04:52    


 


  0.0 K/mm3  07/05/19  04:52    


 


  0.0 K/mm3  07/05/19  04:52    


 


  0.0 K/mm3  07/05/19  04:52    


 


Blast Cells #  0.0 K/mm3  07/05/19  04:52    


 


WBC Morphology  Not Reportable   07/05/19  04:52    


 


Hypersegmented Neuts  Not Reportable   07/05/19  04:52    


 


Hyposegmented Neuts  Not Reportable   07/05/19  04:52    


 


Hypogranular Neuts  Not Reportable   07/05/19  04:52    


 


  Not Reportable   07/05/19  04:52    


 


  Not Reportable   07/05/19  04:52    


 


  Not Reportable   07/05/19  04:52    


 


  Not Reportable   07/05/19  04:52    


 


  Not Reportable   07/05/19  04:52    


 


  Not Reportable   07/05/19  04:52    


 


  Consistent w auto   07/05/19  04:52    


 


  Not Reportable   07/05/19  04:52    


 


Plt Clumps, EDTA  Not Reportable   07/05/19  04:52    


 


  Not Reportable   07/05/19  04:52    


 


  Not Reportable   07/05/19  04:52    


 


  Not Reportable   07/05/19  04:52    


 


Plt Morphology Comment  Not Reportable   07/05/19  04:52    


 


RBC Morphology  Not Reportable   07/05/19  04:52    


 


Dimorphic RBCs  Not Reportable   07/05/19  04:52    


 


  Not Reportable   07/05/19  04:52    


 


  3+   07/05/19  04:52    


 


  1+   07/05/19  04:52    


 


  3+   07/05/19  04:52    


 


  2+   07/05/19  04:52    


 


  Not Reportable   07/05/19  04:52    


 


  Not Reportable   07/05/19  04:52    


 


  Not Reportable   07/05/19  04:52    


 


  Not Reportable   07/05/19  04:52    


 


  Few   07/05/19  04:52    


 


  Not Reportable   07/05/19  04:52    


 


  Few   07/05/19  04:52    


 


  Not Reportable   07/05/19  04:52    


 


  Not Reportable   07/05/19  04:52    


 


  Not Reportable   07/05/19  04:52    


 


  Not Reportable   07/05/19  04:52    


 


  Not Reportable   07/05/19  04:52    


 


  Not Reportable   07/05/19  04:52    


 


  Few   07/05/19  04:52    


 


Acanthocytes (Spur)  Not Reportable   07/05/19  04:52    


 


Rouleaux  Not Reportable   07/05/19  04:52    


 


  Not Reportable   07/05/19  04:52    


 


  Not Reportable   07/05/19  04:52    


 


  Not Reportable   07/05/19  04:52    


 


Percent Retic  1.10 % (0.78-2.58)   07/05/19  04:52    


 


  Not Reportable   07/05/19  04:52    


 


Hem Pathologist Commnt  No   07/05/19  04:52    


 


PT  16.3 Sec. (12.2-14.9)  H  07/05/19  08:51    


 


INR  1.35  (0.87-1.13)  H  07/05/19  08:51    


 


Sodium  136 mmol/L (137-145)  L  07/17/19  10:00    


 


Potassium  3.5 mmol/L (3.6-5.0)  L  07/17/19  10:00    


 


Chloride  95.4 mmol/L ()  L  07/17/19  10:00    


 


Carbon Dioxide  28 mmol/L (22-30)   07/17/19  10:00    


 


  16 mmol/L  07/17/19  10:00    


 


BUN  22 mg/dL (9-20)  H  07/17/19  10:00    


 


  7.9 mg/dL (0.8-1.5)  H  07/17/19  10:00    


 


Estimated GFR  9 ml/min  07/17/19  10:00    


 


  3 %  07/17/19  10:00    


 


Glucose  135 mg/dL ()  H  07/17/19  10:00    


 


POC Glucose  103  ()   07/20/19  16:43    


 


Calcium  8.2 mg/dL (8.4-10.2)  L  07/17/19  10:00    


 


Phosphorus  6.50 mg/dL (2.5-4.5)  H  07/15/19  06:57    


 


Magnesium  2.00 mg/dL (1.7-2.3)   07/05/19  04:52    


 


Iron  20 ug/dL ()  L  07/05/19  08:51    


 


TIBC  219 mcg/dL (250-450)  L  07/05/19  08:51    


 


  63.8 ng/mL (13.0-400.0)   07/05/19  08:51    


 


  0.20 mg/dL (0.1-1.2)   07/10/19  04:43    


 


AST  11 units/L (5-40)   07/10/19  04:43    


 


ALT  12 units/L (7-56)   07/10/19  04:43    


 


  67 units/L ()   07/10/19  04:43    


 


  5.9 g/dL (6.1-8.1)  L  07/09/19  04:50    


 


  6.7 g/dL (6.3-8.2)   07/10/19  04:43    


 


  2.0 g/dL (3.9-5)  L  07/10/19  04:43    


 


  0.4 %  07/10/19  04:43    


 


  0.7 g/dL (0.2-0.3)  H  07/09/19  04:50    


 


  0.8 g/dL (0.5-0.9)   07/09/19  04:50    


 


  0.5 g/dL (0.2-0.5)   07/09/19  04:50    


 


  1.6 g/dL (0.8-1.7)   07/09/19  04:50    


 


Abnorm Protein Band 1  see below   07/09/19  04:50    


 


PEP Interpretation  see below  H  07/09/19  04:50    


 


  88 units/L (13-60)  H  07/04/19  12:47    


 


Tumor Marker AFP  See scanned result   07/05/19  08:51    


 


Carcinoembryonic Ag  17.4 ng/mL (0.0-2.4)  H  07/05/19  08:51    


 


  11 U/mL (<34)   07/05/19  08:51    


 


Vitamin B12  1768 pg/mL (211-911)  H  07/05/19  08:51    


 


  5.05 ng/mL (7.3-26.0)  L  07/05/19  08:51    


 


PTH Intact  238.5 pg/mL (15-65)  H  07/05/19  04:52    


 


  161.7 mg/dL (0.1-20.0)  H  07/05/19  08:51    


 


  72 mmol/L  07/05/19  08:51    


 


Fluid Type  Ascitic   07/05/19  Unknown


 


Fluid Color  Yellow   07/05/19  Unknown


 


Fluid Appearance  Hazy   07/05/19  Unknown


 


Fluid WBC  58 /mm3  07/05/19  Unknown


 


Fluid RBC  1850 /mm3  07/05/19  Unknown


 


Fluid Diff Comment     07/05/19  Unknown


 


Fluid Seg Neutrophils  35.0 %  07/05/19  Unknown


 


Fluid Lymphocytes  14.0 %  07/05/19  Unknown


 


Fluid Reactive Lymphs  0 %  07/05/19  Unknown


 


Fluid Monocytes  51.0 %  07/05/19  Unknown


 


Fluid Eosinophils  0 %  07/05/19  Unknown


 


Fluid Basophils  0 %  07/05/19  Unknown


 


Fluid Glucose  93 mg/dL (40-70)  H  07/05/19  Unknown


 


Fluid Total Protein  4.6  (15.0-45.0)  L  07/05/19  Unknown


 


Fluid Albumin  1.5 g/dL  07/05/19  Unknown


 


Fluid LDH  195   07/05/19  Unknown


 


Random Vancomycin  13.1 ug/mL (0-40.0)   07/09/19  04:50    


 


EVELYN Screen  Negative  (Negative)   07/09/19  04:50    


 


Proteinase 3 (PR3) Ab  <1.0 AI (<1.0)   07/09/19  04:50    


 


Myeloperoxidase Ab  <1.0 AI (<1.0)   07/09/19  04:50    


 


  158 mg/dL ()   07/09/19  04:50    


 


  32 mg/dL (15-53)   07/09/19  04:50    


 


Hepatitis A IgM Ab  Non-reactive  (NonReactive)   07/04/19  17:48    


 


Hep Bs Antigen  Non-reactive  (Negative)   07/04/19  17:48    


 


Hep B Core IgM Ab  Non-reactive  (NonReactive)   07/04/19  17:48    


 


  Non-reactive  (NonReactive)   07/04/19  17:48    


 


Blood Type  A POSITIVE   07/16/19  08:18    


 


Antibody Screen  Negative   07/16/19  08:18    


 


Crossmatch  See Detail   07/16/19  08:18    














Active Medications





- Current Medications


Current Medications: 














Generic Name Dose Route Start Last Admin





  Trade Name Freq  PRN Reason Stop Dose Admin


 


Acetaminophen  650 mg  07/04/19 17:22  07/09/19 11:32





  Tylenol  PO   650 mg





  Q4H PRN   Administration





  Pain MILD(1-3)/Fever >100.5/HA  


 


Albuterol  2.5 mg  07/04/19 17:22 





  Proventil  IH  





  Q4HRT PRN  





  Shortness Of Breath  


 


Bisacodyl  15 mg  07/07/19 14:51 





  Dulcolax  PO  





  QDAY PRN  





  Constipation  


 


Chlorpromazine HCl  10 mg  07/09/19 15:20  07/18/19 17:04





  Thorazine  PO   10 mg





  Q6H PRN   Administration





  Hiccups  


 


Epoetin Tye  20,000 unit  07/12/19 09:36  07/19/19 13:27





  Procrit  SUB-Q   20,000 unit





  VEE PRN   Administration





  hemodialysis  


 


Famotidine  10 mg  07/12/19 22:00  07/21/19 09:29





  Pepcid  PO   10 mg





  BID BOWEN   Administration


 


Ferrous Sulfate  325 mg  07/10/19 22:00  07/21/19 09:29





  Feosol  PO   325 mg





  BID BOWEN   Administration


 


Folic Acid  1 mg  07/06/19 10:00  07/21/19 09:29





  Folvite  PO   1 mg





  QDAY BOWEN   Administration


 


Heparin Sodium (Porcine)  3,000 unit  07/12/19 09:36  07/19/19 11:05





  Heparin 10,000 Units/10 Ml  IV   3,000 unit





  VEE PRN   Administration





  hemodialysis  


 


Hydralazine HCl  10 mg  07/04/19 19:14  07/04/19 22:17





  Apresoline  IV   10 mg





  Q3H PRN   Administration





  Blood Pressure  


 


Hydromorphone HCl  0.5 mg  07/04/19 17:22  07/21/19 09:22





  Dilaudid  IV   0.5 mg





  Q3H PRN   Administration





  Pain , Severe (7-10)  


 


Chlorpromazine HCl 25 mg/  51 mls @ 100 mls/hr  07/13/19 12:26 





  Sodium Chloride  IV  





  Q4H PRN  





  Hiccups  


 


Sodium Chloride  1,000 mls @ 42 mls/hr  07/16/19 10:00  07/16/19 09:50





  Nacl 0.9% 1000 Ml  IV   42 mls/hr





  AS DIRECT BOWEN   Administration


 


Sodium Chloride  100 mls @ 999 mls/hr  07/19/19 08:49 





  Nacl 0.9%  IV  





  VEE PRN  





  Hypotension  


 


Metoclopramide HCl  5 mg  07/17/19 15:00 





  Reglan  IV  





  Q6H PRN  





  Nausea And Vomiting  


 


Metoprolol Tartrate  50 mg  07/04/19 23:00  07/21/19 09:31





  Lopressor  PO   Not Given





  BID BOWEN  


 


Ondansetron HCl  4 mg  07/04/19 17:22 





  Zofran  IV  





  Q3H PRN  





  Nausea And Vomiting  


 


Oxycodone/Acetaminophen  1 tab  07/13/19 10:53  07/21/19 05:47





  Percocet 5/325  PO   1 tab





  Q6H PRN   Administration





  Pain, Moderate (4-6)  


 


Sodium Chloride  10 ml  07/04/19 22:00  07/21/19 09:33





  Sodium Chloride Flush Syringe 10 Ml  IV   10 ml





  BID BOWEN   Administration


 


Sodium Chloride  10 ml  07/04/19 17:22  07/18/19 14:14





  Sodium Chloride Flush Syringe 10 Ml  IV   10 ml





  PRN PRN   Administration





  LINE FLUSH  














Nutrition/Malnutrition Assess





- Dietary Evaluation


Nutrition/Malnutrition Findings: 


                                        





Nutrition Notes                                            Start:  07/11/19 

12:03


Freq:                                                      Status: Active       




Protocol:                                                                       




 Document     07/11/19 12:03  LP  (Rec: 07/11/19 12:06  LP  [id number])


 Nutrition Notes


     Need for Assessment generated from:         LOS


     Initial or Follow up                        Brief Note


     Subjective/Other Information                Screen for LOS. Pt states


                                                 eating well PTA and now.


                                                 Denies wt changes. Consuming


                                                 % of meals.


 Nutrition Intervention


     Revisit per MD consult or patient           Sign Off


      request:

## 2019-07-22 PROCEDURE — 5A1D70Z PERFORMANCE OF URINARY FILTRATION, INTERMITTENT, LESS THAN 6 HOURS PER DAY: ICD-10-PCS | Performed by: INTERNAL MEDICINE

## 2019-07-22 RX ADMIN — METOPROLOL TARTRATE SCH MG: 50 TABLET, FILM COATED ORAL at 21:42

## 2019-07-22 RX ADMIN — HEPARIN SODIUM PRN UNIT: 1000 INJECTION, SOLUTION INTRAVENOUS; SUBCUTANEOUS at 14:30

## 2019-07-22 RX ADMIN — Medication SCH ML: at 21:43

## 2019-07-22 RX ADMIN — FAMOTIDINE SCH MG: 10 TABLET ORAL at 21:43

## 2019-07-22 RX ADMIN — OXYCODONE AND ACETAMINOPHEN PRN TAB: 5; 325 TABLET ORAL at 08:02

## 2019-07-22 RX ADMIN — METOPROLOL TARTRATE SCH MG: 50 TABLET, FILM COATED ORAL at 09:52

## 2019-07-22 RX ADMIN — HYDROMORPHONE HYDROCHLORIDE PRN MG: 1 INJECTION, SOLUTION INTRAMUSCULAR; INTRAVENOUS; SUBCUTANEOUS at 11:08

## 2019-07-22 RX ADMIN — Medication SCH ML: at 09:52

## 2019-07-22 RX ADMIN — FAMOTIDINE SCH MG: 10 TABLET ORAL at 09:52

## 2019-07-22 RX ADMIN — ERYTHROPOIETIN PRN UNIT: 20000 INJECTION, SOLUTION INTRAVENOUS; SUBCUTANEOUS at 14:26

## 2019-07-22 RX ADMIN — OXYCODONE AND ACETAMINOPHEN PRN TAB: 5; 325 TABLET ORAL at 20:05

## 2019-07-22 RX ADMIN — HYDROMORPHONE HYDROCHLORIDE PRN MG: 1 INJECTION, SOLUTION INTRAMUSCULAR; INTRAVENOUS; SUBCUTANEOUS at 22:37

## 2019-07-22 RX ADMIN — HYDROMORPHONE HYDROCHLORIDE PRN MG: 1 INJECTION, SOLUTION INTRAMUSCULAR; INTRAVENOUS; SUBCUTANEOUS at 16:34

## 2019-07-22 RX ADMIN — FOLIC ACID SCH MG: 1 TABLET ORAL at 09:52

## 2019-07-22 RX ADMIN — FERROUS SULFATE TAB 325 MG (65 MG ELEMENTAL FE) SCH MG: 325 (65 FE) TAB at 21:43

## 2019-07-22 RX ADMIN — HYDROMORPHONE HYDROCHLORIDE PRN MG: 1 INJECTION, SOLUTION INTRAMUSCULAR; INTRAVENOUS; SUBCUTANEOUS at 05:16

## 2019-07-22 RX ADMIN — FERROUS SULFATE TAB 325 MG (65 MG ELEMENTAL FE) SCH MG: 325 (65 FE) TAB at 09:52

## 2019-07-22 NOTE — PROGRESS NOTE
Assessment and Plan





1. ESRD:


CKD has progressed to ESRD


CT abdomen showed mild bilateral hydronephosis.


No improvement in the renal function.


Monitor renal function.


Renal prognosis appears to be poor.


Avoid nephrotoxic agents.


Meds dosage based on GFR.


Due to persistent hyperkalemia patient was started on hemodialysis on 7/4/2019.


Hemodialysis: 7/4, 7/5, 7/6, 7/8, 7/10, 7/12, 7/15, 7/17, 7/19, today.





2. FEN:


Hyperkalemia, improved.


Anion gap MA, improved.


Monitor lytes.





3. Mild bilateral hydronephosis:


S/p hernandez catheter.


Evaluated by Urologist.





4. Rectal adenocarcinoma with malignant Ascites.


S/p Medport.


Await Heme-onc recs.





5. Anemia:


PRBC.


Monitor H/H.





6. Uncontrolled HTN:


BP is better.





Await outpatient hemodialysis chair.














Subjective


Date of service: 07/22/19


Principal diagnosis: ? carcinomatosis


Interval history: 


Patient was seen and examined at the bedside.


No new complaint.








Objective





- Vital Signs


Vital signs: 


                               Vital Signs - 12hr











  07/22/19





  05:20


 


Temperature 98.5 F


 


Pulse Rate 78


 


Respiratory 18





Rate 


 


Blood Pressure 114/79


 


O2 Sat by Pulse 95





Oximetry 














- General Appearance


General appearance: well-developed, well-nourished, appears stated age, other 

(no distress, R IJ tunnel catheter)


EENT: ATNC, PERRL, mucous membranes moist, hearing intact, vision intact


Neck: supple


Respiratory: Present: Clear to Ascultation


Cardiology: regular, S1S2, no murmurs


Gastrointestinal: normoactive bowel sounds, no tenderness, distended, other 

(hernandez catheter)


Integumentary: no rash, warm and dry


Neurologic: no focal deficit, no asterixis, alert and oriented x3


Musculoskeletal: other (no edema)





- Lab





                                 07/17/19 10:00





                                 07/17/19 10:00


                             Most recent lab results











Calcium  8.2 mg/dL (8.4-10.2)  L  07/17/19  10:00    


 


Phosphorus  6.50 mg/dL (2.5-4.5)  H  07/15/19  06:57    


 


Magnesium  2.00 mg/dL (1.7-2.3)   07/05/19  04:52    


 


  161.7 mg/dL (0.1-20.0)  H  07/05/19  08:51    


 


  72 mmol/L  07/05/19  08:51    














Medications & Allergies





- Medications


Allergies/Adverse Reactions: 


                                    Allergies





No Known Allergies Allergy (Verified 07/04/19 12:02)


   








Home Medications: 


                                Home Medications











 Medication  Instructions  Recorded  Confirmed  Last Taken  Type


 


Prednisone [predniSONE 10 mg 10 mg PO .TAPER #1 tab.ds.pk 09/10/15 07/06/19 

Unknown Rx





(6-Day Pack, 21 Tabs)]     











Active Medications: 














Generic Name Dose Route Start Last Admin





  Trade Name Freq  PRN Reason Stop Dose Admin


 


Acetaminophen  650 mg  07/04/19 17:22  07/09/19 11:32





  Tylenol  PO   650 mg





  Q4H PRN   Administration





  Pain MILD(1-3)/Fever >100.5/HA  


 


Albuterol  2.5 mg  07/04/19 17:22 





  Proventil  IH  





  Q4HRT PRN  





  Shortness Of Breath  


 


Bisacodyl  15 mg  07/07/19 14:51 





  Dulcolax  PO  





  QDAY PRN  





  Constipation  


 


Chlorpromazine HCl  10 mg  07/09/19 15:20  07/21/19 16:06





  Thorazine  PO   10 mg





  Q6H PRN   Administration





  Hiccups  


 


Epoetin Tye  20,000 unit  07/12/19 09:36  07/19/19 13:27





  Procrit  SUB-Q   20,000 unit





  VEE PRN   Administration





  hemodialysis  


 


Famotidine  10 mg  07/12/19 22:00  07/22/19 09:52





  Pepcid  PO   10 mg





  BID BOWEN   Administration


 


Ferrous Sulfate  325 mg  07/10/19 22:00  07/22/19 09:52





  Feosol  PO   325 mg





  BID BOWEN   Administration


 


Folic Acid  1 mg  07/06/19 10:00  07/22/19 09:52





  Folvite  PO   1 mg





  QDAY BOWEN   Administration


 


Heparin Sodium (Porcine)  3,000 unit  07/12/19 09:36  07/19/19 11:05





  Heparin 10,000 Units/10 Ml  IV   3,000 unit





  VEE PRN   Administration





  hemodialysis  


 


Hydralazine HCl  10 mg  07/04/19 19:14  07/04/19 22:17





  Apresoline  IV   10 mg





  Q3H PRN   Administration





  Blood Pressure  


 


Hydromorphone HCl  0.5 mg  07/04/19 17:22  07/22/19 11:08





  Dilaudid  IV   0.5 mg





  Q3H PRN   Administration





  Pain , Severe (7-10)  


 


Chlorpromazine HCl 25 mg/  51 mls @ 100 mls/hr  07/13/19 12:26 





  Sodium Chloride  IV  





  Q4H PRN  





  Hiccups  


 


Sodium Chloride  1,000 mls @ 42 mls/hr  07/16/19 10:00  07/16/19 09:50





  Nacl 0.9% 1000 Ml  IV   42 mls/hr





  AS DIRECT BOWEN   Administration


 


Sodium Chloride  100 mls @ 999 mls/hr  07/22/19 10:10 





  Nacl 0.9%  IV  





  VEE PRN  





  Hypotension  


 


Metoclopramide HCl  5 mg  07/17/19 15:00 





  Reglan  IV  





  Q6H PRN  





  Nausea And Vomiting  


 


Metoprolol Tartrate  50 mg  07/04/19 23:00  07/22/19 09:52





  Lopressor  PO   50 mg





  BID BOWEN   Administration


 


Ondansetron HCl  4 mg  07/04/19 17:22 





  Zofran  IV  





  Q3H PRN  





  Nausea And Vomiting  


 


Oxycodone/Acetaminophen  1 tab  07/13/19 10:53  07/22/19 08:02





  Percocet 5/325  PO   1 tab





  Q6H PRN   Administration





  Pain, Moderate (4-6)  


 


Sodium Chloride  10 ml  07/04/19 22:00  07/22/19 09:52





  Sodium Chloride Flush Syringe 10 Ml  IV   10 ml





  BID BOWEN   Administration


 


Sodium Chloride  10 ml  07/04/19 17:22  07/18/19 14:14





  Sodium Chloride Flush Syringe 10 Ml  IV   10 ml





  PRN PRN   Administration





  LINE FLUSH

## 2019-07-22 NOTE — PROGRESS NOTE
Assessment and Plan


Assessment and plan: 


Patient is a 60 yo man with a history of hypertension and Asthma who presented 

to [facility] ED with urinary frequency, back pains, n/v, diarrhea and abd pains with 

distension x 1 week. He was diagnosis with Ascites and underwent a paracentesis.

he was found to have Rectal cancer with Mets. [facility] paperwork filled out.





* Initial labs: wbc 13.0 to 10.0, hgb 6.3 to 7.0 (mcv 60), na 132, k 9.5, cr 

  34.8, bun 129, bg 164, lipase 88


* CT abd/pelvis without contrast IMPRESSION: 1. There is ascites. There is 

  increased density in the omentum. Findings are concerning for carcinomatosis. 

  There is some mild adenopathy in the pelvis a possible pericolonic mass, 

  series 2 image 139. There is mild bilateral hydronephrosis. The exact cause is

  not determined by this study. There is minimal nephrolithiasis on the left. No

  ureteral calculi are identified however.


* Colonoscopy, There was a circumferential rectal tumor from the dentate line 

  extending to mid-proximal rectum.  There was severe stenosis related to 

  circumferential tumor which was unable to be traversed.  The tumor was friable

  to touch.   Multiple biopsies were obtained Impression:1. Rectal tumor from 

  dentate line to mid/proximal rectum (extent of exam due to severe stenosis 

  related to tumor).  Biopsies were obtained. Recommendations: follow-up 

  pathology, -surgery consult, -heme/onc following, -screen first degree 

  relatives for colorectal cancer, -will need colonoscopy in 3-6 months to rule 

  out proximal colon lesions.


* Path Rectal biopsy: poorly differentiated mucinous adenocarcinoma with signet 

  cells and lymphovascular invasion


* Path Ascites: malignant cells consistent with metastatic carcinoma





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 

lymphovascular invasion, Chemotherapy port to be placed 7/16/19;  On discharge 

patient will follow hematology oncologist, Dr. Meade


Acute anemia with Drop in HCT s/p transfused 2 units of PRBC, ordered FOBT, GI 

is following, d/w Dr. Joel, Drop in H/H Hemoglobin dropped to 6.4, transfused 

another unit of PRBC, on 7/16/19; Findings of colonoscopy as noted above. 

Discussed with surgery, will plan for port placement


ESRD: s/p permacath, had emergent hemodialysis due to severe hyperkalemia done 

on 7/4/19, place hernandez on 7/5/19 with very little output.


Bilateral hydronephrosis: Place hernandez, consulted Urology, input 

noted==>discharge with hernandez 


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 

signet ring cells. 


Malignant Ascites due to suspected Colon cancer with mets to 

Omentum/Carcinomatosis suspected, which would be a very poor prognostic sign: 

consulted GI, input noted, s/p Paracentesis on 7/5/19, await cytology, CEA 17 

ELEVATED, AFP ELEVATED, CA 19-9 - normal 11


Positive blood cultures: Abx discontinued following ID eval as culture appears 

to be contaminant


Hyperkalemia: treated with HD, Nephrology is following


Acute Microcytic anemia, suspect cancer related until proven otherwise, I did 

inform patient my concerns after gaining his permission to do so: consult GI 

then consider Heme/Onc consult


Severe malnutrition: Dietitian consulted 


Hiccups- likely tumor related, will add Thorazine.


DVT prophylaxis: On heparin and GI prophylaxis


Discharge when Outpatient Dialysis ready. Patient will discuss with surgery 

about port placement for chemotherapy. Outpatient follow up with oncologist 








History


Interval history: 


Patient was seen and examined. Follow-up on current diagnosis ARF, ascites. No 

overnight events reported to me. Patient denies any chest pain, shortness 

breath, or severe headaches. Imaging, nursing note, chart, labs and old chart 

reviewed. Discussed with patient. 





Hospitalist Physical





- Physical exam


Narrative exam: 


Gen: thin frial, ill appearing, NAD, Awake, Alert, Orientated 


HEENT: NCAT, EOMI, PERRL, OP Clear 


Neck: supple, no adenopathy, no thyromegaly, no JVD 


CVS/Heart: RRR, normal S1S2, pulses present bilaterally 


Chest/Lungs: CTA B, Symmetrical chest expansion, good air entry bilaterally 


GI/Abdomen: soft, abdomen distended, good bowel sounds, no guarding or rebound 


/Bladder: no suprapubic tenderness, no CVA or paraspinal tenderness 


Extermity/Skin: no c/c/e, no obvious rash 


MSK: FROM x 4 


Neuro: CN 2-12 grossly intact, no new focal deficits 


Psych: calm 








- Constitutional


Vitals: 


                                        











Temp Pulse Resp BP Pulse Ox


 


 98.5 F   81   18   113/82   95 


 


 07/22/19 12:20  07/22/19 13:45  07/22/19 12:20  07/22/19 13:45  07/22/19 05:20











General appearance: Absent: mild distress, well-nourished





Results





- Labs


CBC & Chem 7: 


                                 07/17/19 10:00





                                 07/17/19 10:00


Labs: 


                             Laboratory Last Values











WBC  9.4 K/mm3 (4.5-11.0)   07/17/19  10:00    


 


RBC  3.40 M/mm3 (3.65-5.03)  L  07/17/19  10:00    


 


Hgb  7.9 gm/dl (11.8-15.2)  L  07/17/19  10:00    


 


Hct  24.6 % (35.5-45.6)  L  07/17/19  10:00    


 


MCV  72 fl (84-94)  L  07/17/19  10:00    


 


MCH  23 pg (28-32)  L  07/17/19  10:00    


 


MCHC  32 % (32-34)   07/17/19  10:00    


 


RDW  31.4 % (13.2-15.2)  H  07/17/19  10:00    


 


Plt Count  366 K/mm3 (140-440)   07/17/19  10:00    


 


Add Manual Diff  Complete   07/05/19  04:52    


 


Total Counted  100   07/05/19  04:52    


 


Seg Neuts % (Manual)  95.0 % (40.0-70.0)  H  07/05/19  04:52    


 


  0 %  07/05/19  04:52    


 


  3.0 % (13.4-35.0)  L  07/05/19  04:52    


 


Reactive Lymphs % (Man)  0 %  07/05/19  04:52    


 


  1.0 % (0.0-7.3)   07/05/19  04:52    


 


  1.0 % (0.0-4.3)   07/05/19  04:52    


 


  0 % (0.0-1.8)   07/05/19  04:52    


 


  0 %  07/05/19  04:52    


 


  0 %  07/05/19  04:52    


 


  0 %  07/05/19  04:52    


 


  0 %  07/05/19  04:52    


 


Nucleated RBC %  1.0 % (0.0-0.9)  H  07/05/19  04:52    


 


Seg Neutrophils # Man  9.5 K/mm3 (1.8-7.7)  H  07/05/19  04:52    


 


Band Neutrophils #  0.0 K/mm3  07/05/19  04:52    


 


  0.3 K/mm3 (1.2-5.4)  L  07/05/19  04:52    


 


Abs React Lymphs (Man)  0.0 K/mm3  07/05/19  04:52    


 


  0.1 K/mm3 (0.0-0.8)   07/05/19  04:52    


 


  0.1 K/mm3 (0.0-0.4)   07/05/19  04:52    


 


  0.0 K/mm3 (0.0-0.1)   07/05/19  04:52    


 


  0.0 K/mm3  07/05/19  04:52    


 


  0.0 K/mm3  07/05/19  04:52    


 


  0.0 K/mm3  07/05/19  04:52    


 


Blast Cells #  0.0 K/mm3  07/05/19  04:52    


 


WBC Morphology  Not Reportable   07/05/19  04:52    


 


Hypersegmented Neuts  Not Reportable   07/05/19  04:52    


 


Hyposegmented Neuts  Not Reportable   07/05/19  04:52    


 


Hypogranular Neuts  Not Reportable   07/05/19  04:52    


 


  Not Reportable   07/05/19  04:52    


 


  Not Reportable   07/05/19  04:52    


 


  Not Reportable   07/05/19  04:52    


 


  Not Reportable   07/05/19  04:52    


 


  Not Reportable   07/05/19  04:52    


 


  Not Reportable   07/05/19  04:52    


 


  Consistent w auto   07/05/19  04:52    


 


  Not Reportable   07/05/19  04:52    


 


Plt Clumps, EDTA  Not Reportable   07/05/19  04:52    


 


  Not Reportable   07/05/19  04:52    


 


  Not Reportable   07/05/19  04:52    


 


  Not Reportable   07/05/19  04:52    


 


Plt Morphology Comment  Not Reportable   07/05/19  04:52    


 


RBC Morphology  Not Reportable   07/05/19  04:52    


 


Dimorphic RBCs  Not Reportable   07/05/19  04:52    


 


  Not Reportable   07/05/19  04:52    


 


  3+   07/05/19  04:52    


 


  1+   07/05/19  04:52    


 


  3+   07/05/19  04:52    


 


  2+   07/05/19  04:52    


 


  Not Reportable   07/05/19  04:52    


 


  Not Reportable   07/05/19  04:52    


 


  Not Reportable   07/05/19  04:52    


 


  Not Reportable   07/05/19  04:52    


 


  Few   07/05/19  04:52    


 


  Not Reportable   07/05/19  04:52    


 


  Few   07/05/19  04:52    


 


  Not Reportable   07/05/19  04:52    


 


  Not Reportable   07/05/19  04:52    


 


  Not Reportable   07/05/19  04:52    


 


  Not Reportable   07/05/19  04:52    


 


  Not Reportable   07/05/19  04:52    


 


  Not Reportable   07/05/19  04:52    


 


  Few   07/05/19  04:52    


 


Acanthocytes (Spur)  Not Reportable   07/05/19  04:52    


 


Rouleaux  Not Reportable   07/05/19  04:52    


 


  Not Reportable   07/05/19  04:52    


 


  Not Reportable   07/05/19  04:52    


 


  Not Reportable   07/05/19  04:52    


 


Percent Retic  1.10 % (0.78-2.58)   07/05/19  04:52    


 


  Not Reportable   07/05/19  04:52    


 


Hem Pathologist Commnt  No   07/05/19  04:52    


 


PT  16.3 Sec. (12.2-14.9)  H  07/05/19  08:51    


 


INR  1.35  (0.87-1.13)  H  07/05/19  08:51    


 


Sodium  136 mmol/L (137-145)  L  07/17/19  10:00    


 


Potassium  3.5 mmol/L (3.6-5.0)  L  07/17/19  10:00    


 


Chloride  95.4 mmol/L ()  L  07/17/19  10:00    


 


Carbon Dioxide  28 mmol/L (22-30)   07/17/19  10:00    


 


  16 mmol/L  07/17/19  10:00    


 


BUN  22 mg/dL (9-20)  H  07/17/19  10:00    


 


  7.9 mg/dL (0.8-1.5)  H  07/17/19  10:00    


 


Estimated GFR  9 ml/min  07/17/19  10:00    


 


  3 %  07/17/19  10:00    


 


Glucose  135 mg/dL ()  H  07/17/19  10:00    


 


POC Glucose  103  ()   07/20/19  16:43    


 


Calcium  8.2 mg/dL (8.4-10.2)  L  07/17/19  10:00    


 


Phosphorus  6.50 mg/dL (2.5-4.5)  H  07/15/19  06:57    


 


Magnesium  2.00 mg/dL (1.7-2.3)   07/05/19  04:52    


 


Iron  20 ug/dL ()  L  07/05/19  08:51    


 


TIBC  219 mcg/dL (250-450)  L  07/05/19  08:51    


 


  63.8 ng/mL (13.0-400.0)   07/05/19  08:51    


 


  0.20 mg/dL (0.1-1.2)   07/10/19  04:43    


 


AST  11 units/L (5-40)   07/10/19  04:43    


 


ALT  12 units/L (7-56)   07/10/19  04:43    


 


  67 units/L ()   07/10/19  04:43    


 


  5.9 g/dL (6.1-8.1)  L  07/09/19  04:50    


 


  6.7 g/dL (6.3-8.2)   07/10/19  04:43    


 


  2.0 g/dL (3.9-5)  L  07/10/19  04:43    


 


  0.4 %  07/10/19  04:43    


 


  0.7 g/dL (0.2-0.3)  H  07/09/19  04:50    


 


  0.8 g/dL (0.5-0.9)   07/09/19  04:50    


 


  0.5 g/dL (0.2-0.5)   07/09/19  04:50    


 


  1.6 g/dL (0.8-1.7)   07/09/19  04:50    


 


Abnorm Protein Band 1  see below   07/09/19  04:50    


 


PEP Interpretation  see below  H  07/09/19  04:50    


 


  88 units/L (13-60)  H  07/04/19  12:47    


 


Tumor Marker AFP  See scanned result   07/05/19  08:51    


 


Carcinoembryonic Ag  17.4 ng/mL (0.0-2.4)  H  07/05/19  08:51    


 


  11 U/mL (<34)   07/05/19  08:51    


 


Vitamin B12  1768 pg/mL (211-911)  H  07/05/19  08:51    


 


  5.05 ng/mL (7.3-26.0)  L  07/05/19  08:51    


 


PTH Intact  238.5 pg/mL (15-65)  H  07/05/19  04:52    


 


  161.7 mg/dL (0.1-20.0)  H  07/05/19  08:51    


 


  72 mmol/L  07/05/19  08:51    


 


Fluid Type  Ascitic   07/05/19  Unknown


 


Fluid Color  Yellow   07/05/19  Unknown


 


Fluid Appearance  Hazy   07/05/19  Unknown


 


Fluid WBC  58 /mm3  07/05/19  Unknown


 


Fluid RBC  1850 /mm3  07/05/19  Unknown


 


Fluid Diff Comment     07/05/19  Unknown


 


Fluid Seg Neutrophils  35.0 %  07/05/19  Unknown


 


Fluid Lymphocytes  14.0 %  07/05/19  Unknown


 


Fluid Reactive Lymphs  0 %  07/05/19  Unknown


 


Fluid Monocytes  51.0 %  07/05/19  Unknown


 


Fluid Eosinophils  0 %  07/05/19  Unknown


 


Fluid Basophils  0 %  07/05/19  Unknown


 


Fluid Glucose  93 mg/dL (40-70)  H  07/05/19  Unknown


 


Fluid Total Protein  4.6  (15.0-45.0)  L  07/05/19  Unknown


 


Fluid Albumin  1.5 g/dL  07/05/19  Unknown


 


Fluid LDH  195   07/05/19  Unknown


 


Random Vancomycin  13.1 ug/mL (0-40.0)   07/09/19  04:50    


 


EVELYN Screen  Negative  (Negative)   07/09/19  04:50    


 


Proteinase 3 (PR3) Ab  <1.0 AI (<1.0)   07/09/19  04:50    


 


Myeloperoxidase Ab  <1.0 AI (<1.0)   07/09/19  04:50    


 


  158 mg/dL ()   07/09/19  04:50    


 


  32 mg/dL (15-53)   07/09/19  04:50    


 


Hepatitis A IgM Ab  Non-reactive  (NonReactive)   07/04/19  17:48    


 


Hep Bs Antigen  Non-reactive  (Negative)   07/04/19  17:48    


 


Hep B Core IgM Ab  Non-reactive  (NonReactive)   07/04/19  17:48    


 


  Non-reactive  (NonReactive)   07/04/19  17:48    


 


Blood Type  A POSITIVE   07/16/19  08:18    


 


Antibody Screen  Negative   07/16/19  08:18    


 


Crossmatch  See Detail   07/16/19  08:18    














Active Medications





- Current Medications


Current Medications: 














Generic Name Dose Route Start Last Admin





  Trade Name Freq  PRN Reason Stop Dose Admin


 


Acetaminophen  650 mg  07/04/19 17:22  07/09/19 11:32





  Tylenol  PO   650 mg





  Q4H PRN   Administration





  Pain MILD(1-3)/Fever >100.5/HA  


 


Albuterol  2.5 mg  07/04/19 17:22 





  Proventil  IH  





  Q4HRT PRN  





  Shortness Of Breath  


 


Bisacodyl  15 mg  07/07/19 14:51 





  Dulcolax  PO  





  QDAY PRN  





  Constipation  


 


Chlorpromazine HCl  10 mg  07/09/19 15:20  07/21/19 16:06





  Thorazine  PO   10 mg





  Q6H PRN   Administration





  Hiccups  


 


Epoetin Tye  20,000 unit  07/12/19 09:36  07/22/19 14:26





  Procrit  SUB-Q   20,000 unit





  VEE PRN   Administration





  hemodialysis  


 


Famotidine  10 mg  07/12/19 22:00  07/22/19 09:52





  Pepcid  PO   10 mg





  BID BOWEN   Administration


 


Ferrous Sulfate  325 mg  07/10/19 22:00  07/22/19 09:52





  Feosol  PO   325 mg





  BID BOWEN   Administration


 


Folic Acid  1 mg  07/06/19 10:00  07/22/19 09:52





  Folvite  PO   1 mg





  QDAY BOWEN   Administration


 


Heparin Sodium (Porcine)  3,000 unit  07/12/19 09:36  07/19/19 11:05





  Heparin 10,000 Units/10 Ml  IV   3,000 unit





  VEE PRN   Administration





  hemodialysis  


 


Hydralazine HCl  10 mg  07/04/19 19:14  07/04/19 22:17





  Apresoline  IV   10 mg





  Q3H PRN   Administration





  Blood Pressure  


 


Hydromorphone HCl  0.5 mg  07/04/19 17:22  07/22/19 11:08





  Dilaudid  IV   0.5 mg





  Q3H PRN   Administration





  Pain , Severe (7-10)  


 


Chlorpromazine HCl 25 mg/  51 mls @ 100 mls/hr  07/13/19 12:26 





  Sodium Chloride  IV  





  Q4H PRN  





  Hiccups  


 


Sodium Chloride  1,000 mls @ 42 mls/hr  07/16/19 10:00  07/16/19 09:50





  Nacl 0.9% 1000 Ml  IV   42 mls/hr





  AS DIRECT BOWEN   Administration


 


Sodium Chloride  100 mls @ 999 mls/hr  07/22/19 10:10 





  Nacl 0.9%  IV  





  VEE PRN  





  Hypotension  


 


Metoclopramide HCl  5 mg  07/17/19 15:00 





  Reglan  IV  





  Q6H PRN  





  Nausea And Vomiting  


 


Metoprolol Tartrate  50 mg  07/04/19 23:00  07/22/19 09:52





  Lopressor  PO   50 mg





  BID BOWEN   Administration


 


Ondansetron HCl  4 mg  07/04/19 17:22 





  Zofran  IV  





  Q3H PRN  





  Nausea And Vomiting  


 


Oxycodone/Acetaminophen  1 tab  07/13/19 10:53  07/22/19 08:02





  Percocet 5/325  PO   1 tab





  Q6H PRN   Administration





  Pain, Moderate (4-6)  


 


Sodium Chloride  10 ml  07/04/19 22:00  07/22/19 09:52





  Sodium Chloride Flush Syringe 10 Ml  IV   10 ml





  BID BOWEN   Administration


 


Sodium Chloride  10 ml  07/04/19 17:22  07/18/19 14:14





  Sodium Chloride Flush Syringe 10 Ml  IV   10 ml





  PRN PRN   Administration





  LINE FLUSH  














Nutrition/Malnutrition Assess





- Dietary Evaluation


Nutrition/Malnutrition Findings: 


                                        





Nutrition Notes                                            Start:  07/11/19 

12:03


Freq:                                                      Status: Active       




Protocol:                                                                       




 Document     07/11/19 12:03  LP  (Rec: 07/11/19 12:06  LP  [id number])


 Nutrition Notes


     Need for Assessment generated from:         LOS


     Initial or Follow up                        Brief Note


     Subjective/Other Information                Screen for LOS. Pt states


                                                 eating well PTA and now.


                                                 Denies wt changes. Consuming


                                                 % of meals.


 Nutrition Intervention


     Revisit per MD consult or patient           Sign Off


      request:

## 2019-07-23 LAB
BACTERIA #/AREA URNS HPF: (no result) /HPF
BILIRUB UR QL STRIP: (no result)
BLOOD UR QL VISUAL: (no result)
MUCOUS THREADS #/AREA URNS HPF: (no result) /HPF
PH UR STRIP: 5 [PH] (ref 5–7)
RBC #/AREA URNS HPF: > 182 /HPF (ref 0–6)
UROBILINOGEN UR-MCNC: < 2 MG/DL (ref ?–2)
WBC #/AREA URNS HPF: > 182 /HPF (ref 0–6)

## 2019-07-23 RX ADMIN — Medication SCH ML: at 10:08

## 2019-07-23 RX ADMIN — FAMOTIDINE SCH MG: 10 TABLET ORAL at 22:03

## 2019-07-23 RX ADMIN — FAMOTIDINE SCH MG: 10 TABLET ORAL at 10:08

## 2019-07-23 RX ADMIN — OXYCODONE AND ACETAMINOPHEN PRN TAB: 5; 325 TABLET ORAL at 11:27

## 2019-07-23 RX ADMIN — METOPROLOL TARTRATE SCH MG: 50 TABLET, FILM COATED ORAL at 10:08

## 2019-07-23 RX ADMIN — OXYCODONE AND ACETAMINOPHEN PRN TAB: 5; 325 TABLET ORAL at 03:38

## 2019-07-23 RX ADMIN — FOLIC ACID SCH MG: 1 TABLET ORAL at 10:08

## 2019-07-23 RX ADMIN — FERROUS SULFATE TAB 325 MG (65 MG ELEMENTAL FE) SCH MG: 325 (65 FE) TAB at 22:03

## 2019-07-23 RX ADMIN — HYDROMORPHONE HYDROCHLORIDE PRN MG: 1 INJECTION, SOLUTION INTRAMUSCULAR; INTRAVENOUS; SUBCUTANEOUS at 08:44

## 2019-07-23 RX ADMIN — HYDROMORPHONE HYDROCHLORIDE PRN MG: 1 INJECTION, SOLUTION INTRAMUSCULAR; INTRAVENOUS; SUBCUTANEOUS at 17:49

## 2019-07-23 RX ADMIN — CHLORPROMAZINE HYDROCHLORIDE PRN MG: 10 TABLET, FILM COATED ORAL at 17:49

## 2019-07-23 RX ADMIN — CHLORPROMAZINE HYDROCHLORIDE PRN MG: 10 TABLET, FILM COATED ORAL at 11:28

## 2019-07-23 RX ADMIN — Medication SCH ML: at 22:08

## 2019-07-23 RX ADMIN — METOPROLOL TARTRATE SCH MG: 50 TABLET, FILM COATED ORAL at 22:05

## 2019-07-23 RX ADMIN — FERROUS SULFATE TAB 325 MG (65 MG ELEMENTAL FE) SCH MG: 325 (65 FE) TAB at 10:08

## 2019-07-23 NOTE — HEM/ONC PROGRESS NOTE
Assessment and Plan





1.  Radiology shows carcinomatosis.  The patient has anemia.  MCV is low.  


2.  Anemia, low iron, B12 normal, folate low.  renal impairement may have a role


3.  h/o Thrombocytosis, likely reactive.


4.  History of hiccups.  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 

involvement.


5.  Renal failure.  Nephrology following.


6.  Hyperkalemia status post treatment.


7.  History of hypertension.





I will follow the patient during inpatient stay.    GI team has seen the 

patient.





PRBC


hiccough - likely tumor related





CEA - 17


CA 19-9 - normal 11








colonoscopy - Rectal tumor from dentate line to mid/proximal rectum (extent of 

exam due to severe stenosis related to tumor)





based on ascites and omental lesions -this is likely metastatic ca


as per Dr Fung - he said no ascites fluid is available in the lab


d/w pt reg stage IV - chemo 


d/w pt reg non curative nature 








port placed





d/w pt reg OP chemo - FMLA -  - HD


pt aware that this is aggresive tumor - FOLFOX q 2 weeks as OP - dose 

modification for HD





- Patient Problems


(1) Ascites


Current Visit: Yes   Status: Chronic   





Subjective


Date of service: 07/23/19


Principal diagnosis: rectal ca


Interval history: 





port placed





Objective





- Exam


Narrative Exam: 





Pain  - none


General appearance  no acute distress


Performance status limited self care


Eyes - no icterus


ENT  no thrush





LNs  cervical  not palpable


Neck  - normal ROM


Respiratory  Normal


Breath sounds - CTA





CVS  S1 S2 +


Extremities  normal temperature


General GI  Soft - distended


Rectal  deferred


male  - deferred


Skin  warm -PORT +


Musculoskeletal  moving normal


Neurologically  no focal deficit





- Constitutional


Vitals: 


                                Last Vital Signs











Temp  98.4 F   07/23/19 04:48


 


Pulse  80   07/23/19 04:48


 


Resp  18   07/23/19 04:48


 


BP  122/79   07/23/19 04:48


 


Pulse Ox  97   07/23/19 04:48














Medications & Allergies





- Medications


Allergies/Adverse Reactions: 


                                    Allergies





No Known Allergies Allergy (Verified 07/04/19 12:02)


   








Home Medications: 


                                Home Medications











 Medication  Instructions  Recorded  Confirmed  Last Taken  Type


 


Prednisone [predniSONE 10 mg 10 mg PO .TAPER #1 tab.ds.pk 09/10/15 07/06/19 

Unknown Rx





(6-Day Pack, 21 Tabs)]     











Active Medications: 














Generic Name Dose Route Start Last Admin





  Trade Name Freq  PRN Reason Stop Dose Admin


 


Acetaminophen  650 mg  07/04/19 17:22  07/09/19 11:32





  Tylenol  PO   650 mg





  Q4H PRN   Administration





  Pain MILD(1-3)/Fever >100.5/HA  


 


Albuterol  2.5 mg  07/04/19 17:22 





  Proventil  IH  





  Q4HRT PRN  





  Shortness Of Breath  


 


Bisacodyl  15 mg  07/07/19 14:51 





  Dulcolax  PO  





  QDAY PRN  





  Constipation  


 


Chlorpromazine HCl  10 mg  07/09/19 15:20  07/21/19 16:06





  Thorazine  PO   10 mg





  Q6H PRN   Administration





  Hiccups  


 


Epoetin Tye  20,000 unit  07/12/19 09:36  07/22/19 14:26





  Procrit  SUB-Q   20,000 unit





  VEE PRN   Administration





  hemodialysis  


 


Famotidine  10 mg  07/12/19 22:00  07/22/19 21:43





  Pepcid  PO   10 mg





  BID BOWEN   Administration


 


Ferrous Sulfate  325 mg  07/10/19 22:00  07/22/19 21:43





  Feosol  PO   325 mg





  BID BOWEN   Administration


 


Folic Acid  1 mg  07/06/19 10:00  07/22/19 09:52





  Folvite  PO   1 mg





  QDAY BOWEN   Administration


 


Heparin Sodium (Porcine)  3,000 unit  07/12/19 09:36  07/22/19 14:30





  Heparin 10,000 Units/10 Ml  IV   3,000 unit





  VEE PRN   Administration





  hemodialysis  


 


Hydralazine HCl  10 mg  07/04/19 19:14  07/04/19 22:17





  Apresoline  IV   10 mg





  Q3H PRN   Administration





  Blood Pressure  


 


Hydromorphone HCl  0.5 mg  07/04/19 17:22  07/22/19 22:37





  Dilaudid  IV   0.5 mg





  Q3H PRN   Administration





  Pain , Severe (7-10)  


 


Chlorpromazine HCl 25 mg/  51 mls @ 100 mls/hr  07/13/19 12:26 





  Sodium Chloride  IV  





  Q4H PRN  





  Hiccups  


 


Sodium Chloride  1,000 mls @ 42 mls/hr  07/16/19 10:00  07/16/19 09:50





  Nacl 0.9% 1000 Ml  IV   42 mls/hr





  AS DIRECT BOWEN   Administration


 


Sodium Chloride  100 mls @ 999 mls/hr  07/22/19 10:10 





  Nacl 0.9%  IV  





  VEE PRN  





  Hypotension  


 


Metoclopramide HCl  5 mg  07/17/19 15:00 





  Reglan  IV  





  Q6H PRN  





  Nausea And Vomiting  


 


Metoprolol Tartrate  50 mg  07/04/19 23:00  07/22/19 21:42





  Lopressor  PO   50 mg





  BID BOWEN   Administration


 


Ondansetron HCl  4 mg  07/04/19 17:22 





  Zofran  IV  





  Q3H PRN  





  Nausea And Vomiting  


 


Oxycodone/Acetaminophen  1 tab  07/13/19 10:53  07/23/19 03:38





  Percocet 5/325  PO   1 tab





  Q6H PRN   Administration





  Pain, Moderate (4-6)  


 


Sodium Chloride  10 ml  07/04/19 22:00  07/22/19 21:43





  Sodium Chloride Flush Syringe 10 Ml  IV   10 ml





  BID BOWEN   Administration


 


Sodium Chloride  10 ml  07/04/19 17:22  07/18/19 14:14





  Sodium Chloride Flush Syringe 10 Ml  IV   10 ml





  PRN PRN   Administration





  LINE FLUSH

## 2019-07-23 NOTE — PROGRESS NOTE
Assessment and Plan





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 

lymphovascular invasion, 


- Chemotherapy port to be placed 7/16/19;  On discharge patient will follow 

hematology oncologist, Dr. Meade





Acute anemia with Drop in HCT 


- s/p transfused 3 units of PRBC, ordered FOBT, GI is following, d/w Dr. Joel, 

s/p colonoscopy





ESRD: s/p permacath, had emergent hemodialysis due to severe hyperkalemia 

started on on 7/4/19, place hernandez on 7/5/19 with very little output. Need outpt 

HD set up





Bilateral hydronephrosis: Placed on hernandez, consulted Urology, input 

noted==>discharge with hernandez 





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 

signet ring cells. outpt f/u





Malignant Ascites 


- due to suspected Colon cancer with mets to Omentum/Carcinomatosis suspected, 

which would be a very poor prognostic sign: consulted GI, input noted, s/p 

Paracentesis on 7/5/19, await cytology, CEA 17 ELEVATED, AFP ELEVATED, CA 19-9 -

normal 11





Positive blood cultures: Abx discontinued following ID eval as culture appears 

to be contaminant





Hyperkalemia: treated with HD, Nephrology is following





Acute Microcytic anemia, suspect cancer related until proven otherwise, follow 

h/h





Severe malnutrition: Dietitian consulted 





Hiccups- likely tumor related, on Thorazine.





DVT prophylaxis: On heparin and GI prophylaxis





Discharge when Outpatient Dialysis ready. Patient will discuss with surgery 

about port placement for chemotherapy. Outpatient follow up with oncologist 








Brief History


Patient is a 60 yo man with a history of hypertension and Asthma who presented 

to [facility] ED with urinary frequency, back pains, n/v, diarrhea and abd pains with 

distension x 1 week. He was diagnosis with Ascites and underwent a paracentesis.

he was found to have Rectal cancer with Mets. [facility] paperwork filled out.





* Initial labs: wbc 13.0 to 10.0, hgb 6.3 to 7.0 (mcv 60), na 132, k 9.5, cr 

  34.8, bun 129, bg 164, lipase 88


* CT abd/pelvis without contrast IMPRESSION: 1. There is ascites. There is i

  ncreased density in the omentum. Findings are concerning for carcinomatosis. 

  There is some mild adenopathy in the pelvis a possible pericolonic mass, 

  series 2 image 139. There is mild bilateral hydronephrosis. The exact cause is

  not determined by this study. There is minimal nephrolithiasis on the left. No

  ureteral calculi are identified however.


* Colonoscopy, There was a circumferential rectal tumor from the dentate line 

  extending to mid-proximal rectum.  There was severe stenosis related to ci

  rcumferential tumor which was unable to be traversed.  The tumor was friable 

  to touch.   Multiple biopsies were obtained Impression:1. Rectal tumor from 

  dentate line to mid/proximal rectum (extent of exam due to severe stenosis 

  related to tumor).  Biopsies were obtained. Recommendations: follow-up p

  athology, -surgery consult, -heme/onc following, -screen first degree 

  relatives for colorectal cancer, -will need colonoscopy in 3-6 months to rule 

  out proximal colon lesions.


* Path Rectal biopsy: poorly differentiated mucinous adenocarcinoma with signet 

  cells and lymphovascular invasion


* Path Ascites: malignant cells consistent with metastatic carcinoma





Hospitalist Physical


Gen: thin frial, ill appearing, NAD, Awake, Alert, Orientated 


HEENT: NCAT, EOMI, PERRL, OP Clear 


Neck: supple, no adenopathy, no thyromegaly, no JVD 


CVS/Heart: RRR, normal S1S2, pulses present bilaterally 


Chest/Lungs: CTA B, Symmetrical chest expansion, good air entry bilaterally 


GI/Abdomen: soft, abdomen distended, good bowel sounds, no guarding or rebound 


/Bladder: no suprapubic tenderness, no CVA or paraspinal tenderness 


Extermity/Skin: no c/c/e, no obvious rash 


MSK: FROM x 4 


Neuro: CN 2-12 grossly intact, no new focal deficits 


Psych: calm 








Subjective


Date of service: 07/23/19


Principal diagnosis: rectal ca


Interval history: 





Patient seen and examined.  Medical records and medication list reviewed.  


No acute event overnight noted by the RN.  


Patient denies any chest pain or difficulty breathing.  Patient is tolerating 

diet.  


Discussed plan of care at bedside with patient.


waiting for outpt Hd setup





Objective





- Constitutional


Vitals: 


                               Vital Signs - 12hr











  07/23/19 07/23/19 07/23/19





  00:34 04:48 08:44


 


Temperature 98.6 F 98.4 F 


 


Pulse Rate 81 80 


 


Respiratory 18 18 20





Rate   


 


Blood Pressure 104/68 122/79 


 


O2 Sat by Pulse 97 97 





Oximetry   














  07/23/19 07/23/19





  10:08 11:27


 


Temperature  


 


Pulse Rate 80 


 


Respiratory  20





Rate  


 


Blood Pressure 122/79 


 


O2 Sat by Pulse  





Oximetry  














- Labs


CBC & Chem 7: 


                                 07/24/19 06:30





                                 07/24/19 06:30

## 2019-07-23 NOTE — PROGRESS NOTE
Assessment and Plan





1. ESRD:


CKD has progressed to ESRD


CT abdomen showed mild bilateral hydronephosis.


No improvement in the renal function.


Monitor renal function.


Renal prognosis is poor.


Avoid nephrotoxic agents.


Meds dosage based on GFR.


Due to persistent hyperkalemia patient was started on hemodialysis on 7/4/2019.


Hemodialysis: 7/4, 7/5, 7/6, 7/8, 7/10, 7/12, 7/15, 7/17, 7/19, 7/22.





2. FEN:


Hyperkalemia, improved.


Anion gap MA, improved.


Monitor lytes.





3. Mild bilateral hydronephosis:


S/p hernandez catheter.


Evaluated by Urologist.





4. Rectal adenocarcinoma with malignant Ascites.


S/p Medport.


Followed by Heme-onc.





5. Anemia:


PRBC.


Monitor H/H.





6. Uncontrolled HTN:


BP is better.





Await outpatient hemodialysis chair.














Subjective


Date of service: 07/23/19


Principal diagnosis: rectal ca


Interval history: 


Patient was seen and examined at the bedside.


No new complaint.








Objective





- Vital Signs


Vital signs: 


                               Vital Signs - 12hr











  07/22/19 07/22/19 07/23/19





  21:34 21:42 00:34


 


Temperature   98.6 F


 


Pulse Rate 90 98 H 81


 


Respiratory   18





Rate   


 


Blood Pressure 101/71 101/71 104/68


 


O2 Sat by Pulse 98  97





Oximetry   














  07/23/19 07/23/19





  04:48 08:44


 


Temperature 98.4 F 


 


Pulse Rate 80 


 


Respiratory 18 20





Rate  


 


Blood Pressure 122/79 


 


O2 Sat by Pulse 97 





Oximetry  














- General Appearance


General appearance: well-developed, well-nourished, appears stated age, other 

(no distress R IJ tunnel catheter)


EENT: ATNC, PERRL, hearing intact, vision intact


Neck: supple


Respiratory: Present: Clear to Ascultation


Cardiology: regular, S1S2, no murmurs


Gastrointestinal: normoactive bowel sounds, no tenderness, distended


Integumentary: no rash, warm and dry


Neurologic: no focal deficit, no asterixis, alert and oriented x3


Musculoskeletal: other (no edema)


Psychiatric: cooperative





- Lab





                                 07/17/19 10:00





                                 07/17/19 10:00


                             Most recent lab results











Calcium  8.2 mg/dL (8.4-10.2)  L  07/17/19  10:00    


 


Phosphorus  6.50 mg/dL (2.5-4.5)  H  07/15/19  06:57    


 


Magnesium  2.00 mg/dL (1.7-2.3)   07/05/19  04:52    


 


  161.7 mg/dL (0.1-20.0)  H  07/05/19  08:51    


 


  72 mmol/L  07/05/19  08:51    














Medications & Allergies





- Medications


Allergies/Adverse Reactions: 


                                    Allergies





No Known Allergies Allergy (Verified 07/04/19 12:02)


   








Home Medications: 


                                Home Medications











 Medication  Instructions  Recorded  Confirmed  Last Taken  Type


 


Prednisone [predniSONE 10 mg 10 mg PO .TAPER #1 tab.ds.pk 09/10/15 07/06/19 

Unknown Rx





(6-Day Pack, 21 Tabs)]     











Active Medications: 














Generic Name Dose Route Start Last Admin





  Trade Name Freq  PRN Reason Stop Dose Admin


 


Acetaminophen  650 mg  07/04/19 17:22  07/09/19 11:32





  Tylenol  PO   650 mg





  Q4H PRN   Administration





  Pain MILD(1-3)/Fever >100.5/HA  


 


Albuterol  2.5 mg  07/04/19 17:22 





  Proventil  IH  





  Q4HRT PRN  





  Shortness Of Breath  


 


Bisacodyl  15 mg  07/07/19 14:51 





  Dulcolax  PO  





  QDAY PRN  





  Constipation  


 


Chlorpromazine HCl  10 mg  07/09/19 15:20  07/21/19 16:06





  Thorazine  PO   10 mg





  Q6H PRN   Administration





  Hiccups  


 


Epoetin Tye  20,000 unit  07/12/19 09:36  07/22/19 14:26





  Procrit  SUB-Q   20,000 unit





  VEE PRN   Administration





  hemodialysis  


 


Famotidine  10 mg  07/12/19 22:00  07/22/19 21:43





  Pepcid  PO   10 mg





  BID BOWEN   Administration


 


Ferrous Sulfate  325 mg  07/10/19 22:00  07/22/19 21:43





  Feosol  PO   325 mg





  BID BOWEN   Administration


 


Folic Acid  1 mg  07/06/19 10:00  07/22/19 09:52





  Folvite  PO   1 mg





  QDAY BOWEN   Administration


 


Heparin Sodium (Porcine)  3,000 unit  07/12/19 09:36  07/22/19 14:30





  Heparin 10,000 Units/10 Ml  IV   3,000 unit





  VEE PRN   Administration





  hemodialysis  


 


Hydralazine HCl  10 mg  07/04/19 19:14  07/04/19 22:17





  Apresoline  IV   10 mg





  Q3H PRN   Administration





  Blood Pressure  


 


Hydromorphone HCl  0.5 mg  07/04/19 17:22  07/23/19 08:44





  Dilaudid  IV   0.5 mg





  Q3H PRN   Administration





  Pain , Severe (7-10)  


 


Chlorpromazine HCl 25 mg/  51 mls @ 100 mls/hr  07/13/19 12:26 





  Sodium Chloride  IV  





  Q4H PRN  





  Hiccups  


 


Sodium Chloride  1,000 mls @ 42 mls/hr  07/16/19 10:00  07/16/19 09:50





  Nacl 0.9% 1000 Ml  IV   42 mls/hr





  AS DIRECT BOWEN   Administration


 


Sodium Chloride  100 mls @ 999 mls/hr  07/22/19 10:10 





  Nacl 0.9%  IV  





  VEE PRN  





  Hypotension  


 


Metoclopramide HCl  5 mg  07/17/19 15:00 





  Reglan  IV  





  Q6H PRN  





  Nausea And Vomiting  


 


Metoprolol Tartrate  50 mg  07/04/19 23:00  07/22/19 21:42





  Lopressor  PO   50 mg





  BID BOWEN   Administration


 


Ondansetron HCl  4 mg  07/04/19 17:22 





  Zofran  IV  





  Q3H PRN  





  Nausea And Vomiting  


 


Oxycodone/Acetaminophen  1 tab  07/13/19 10:53  07/23/19 03:38





  Percocet 5/325  PO   1 tab





  Q6H PRN   Administration





  Pain, Moderate (4-6)  


 


Sodium Chloride  10 ml  07/04/19 22:00  07/22/19 21:43





  Sodium Chloride Flush Syringe 10 Ml  IV   10 ml





  BID BOWEN   Administration


 


Sodium Chloride  10 ml  07/04/19 17:22  07/18/19 14:14





  Sodium Chloride Flush Syringe 10 Ml  IV   10 ml





  PRN PRN   Administration





  LINE FLUSH

## 2019-07-24 LAB
%HYPO/RBC NFR BLD AUTO: (no result) %
ANISOCYTOSIS BLD QL SMEAR: (no result)
BAND NEUTROPHILS # (MANUAL): 0 K/MM3
BUN SERPL-MCNC: 33 MG/DL (ref 9–20)
BUN/CREAT SERPL: 2 %
CALCIUM SERPL-MCNC: 9 MG/DL (ref 8.4–10.2)
HCT VFR BLD CALC: 24.9 % (ref 35.5–45.6)
HEMOLYSIS INDEX: 50
HGB BLD-MCNC: 7.6 GM/DL (ref 11.8–15.2)
MCHC RBC AUTO-ENTMCNC: 31 % (ref 32–34)
MCV RBC AUTO: 75 FL (ref 84–94)
MYELOCYTES # (MANUAL): 0 K/MM3
OVALOCYTES BLD QL SMEAR: (no result)
PLATELET # BLD: 442 K/MM3 (ref 140–440)
POIKILOCYTOSIS BLD QL SMEAR: (no result)
PROMYELOCYTES # (MANUAL): 0 K/MM3
RBC # BLD AUTO: 3.33 M/MM3 (ref 3.65–5.03)
TARGETS BLD QL SMEAR: (no result)
TOTAL CELLS COUNTED BLD: 100

## 2019-07-24 PROCEDURE — 5A1D70Z PERFORMANCE OF URINARY FILTRATION, INTERMITTENT, LESS THAN 6 HOURS PER DAY: ICD-10-PCS | Performed by: INTERNAL MEDICINE

## 2019-07-24 RX ADMIN — OXYCODONE AND ACETAMINOPHEN PRN TAB: 5; 325 TABLET ORAL at 20:07

## 2019-07-24 RX ADMIN — OXYCODONE AND ACETAMINOPHEN PRN TAB: 5; 325 TABLET ORAL at 09:10

## 2019-07-24 RX ADMIN — FAMOTIDINE SCH MG: 10 TABLET ORAL at 22:27

## 2019-07-24 RX ADMIN — Medication SCH ML: at 22:33

## 2019-07-24 RX ADMIN — Medication SCH ML: at 09:13

## 2019-07-24 RX ADMIN — HYDROMORPHONE HYDROCHLORIDE PRN MG: 1 INJECTION, SOLUTION INTRAMUSCULAR; INTRAVENOUS; SUBCUTANEOUS at 14:57

## 2019-07-24 RX ADMIN — FERROUS SULFATE TAB 325 MG (65 MG ELEMENTAL FE) SCH MG: 325 (65 FE) TAB at 09:10

## 2019-07-24 RX ADMIN — HEPARIN SODIUM PRN UNIT: 1000 INJECTION, SOLUTION INTRAVENOUS; SUBCUTANEOUS at 11:43

## 2019-07-24 RX ADMIN — FERROUS SULFATE TAB 325 MG (65 MG ELEMENTAL FE) SCH MG: 325 (65 FE) TAB at 22:27

## 2019-07-24 RX ADMIN — METOPROLOL TARTRATE SCH: 50 TABLET, FILM COATED ORAL at 22:31

## 2019-07-24 RX ADMIN — FOLIC ACID SCH MG: 1 TABLET ORAL at 09:10

## 2019-07-24 RX ADMIN — METOPROLOL TARTRATE SCH MG: 50 TABLET, FILM COATED ORAL at 09:12

## 2019-07-24 RX ADMIN — CHLORPROMAZINE HYDROCHLORIDE PRN MG: 10 TABLET, FILM COATED ORAL at 18:39

## 2019-07-24 RX ADMIN — ERYTHROPOIETIN PRN UNIT: 20000 INJECTION, SOLUTION INTRAVENOUS; SUBCUTANEOUS at 13:41

## 2019-07-24 RX ADMIN — OXYCODONE AND ACETAMINOPHEN PRN TAB: 5; 325 TABLET ORAL at 00:47

## 2019-07-24 RX ADMIN — FAMOTIDINE SCH MG: 10 TABLET ORAL at 09:10

## 2019-07-24 NOTE — PROGRESS NOTE
Assessment and Plan





1. ESRD:


CKD has progressed to ESRD


CT abdomen showed mild bilateral hydronephosis.


No improvement in the renal function.


Monitor renal function.


Renal prognosis is poor.


Avoid nephrotoxic agents.


Meds dosage based on GFR.


Hemodialysis: 7/4, 7/5, 7/6, 7/8, 7/10, 7/12, 7/15, 7/17, 7/19, 7/22, today.





2. FEN:


Hyperkalemia, HD.


Anion gap MA, improved.


Monitor lytes.





3. Mild bilateral hydronephosis:


S/p hernandez catheter.


Evaluated by Urologist.





4. Rectal adenocarcinoma with malignant Ascites.


S/p Medport.


Followed by Heme-onc.





5. Anemia:


PRBC.


Monitor H/H.





6. Uncontrolled HTN:


BP is better.





Await outpatient hemodialysis chair.














Subjective


Date of service: 07/24/19


Principal diagnosis: rectal ca


Interval history: 


Patient was seen and examined at the bedside.


No new complaint.








Objective





- Vital Signs


Vital signs: 


                               Vital Signs - 12hr











  07/24/19 07/24/19 07/24/19





  00:01 04:52 09:10


 


Temperature 98.1 F 98.3 F 


 


Pulse Rate 85 81 


 


Respiratory 16 16 20





Rate   


 


Blood Pressure 112/78 117/77 


 


O2 Sat by Pulse 98 98 





Oximetry   














  07/24/19 07/24/19 07/24/19





  09:12 10:00 10:40


 


Temperature   98.1 F


 


Pulse Rate 81  81


 


Respiratory  20 16





Rate   


 


Blood Pressure   121/81


 


O2 Sat by Pulse   





Oximetry   














- General Appearance


General appearance: well-developed, well-nourished, appears stated age, other 

(no distress, R IJ tunnel catheter)


EENT: ATNC, PERRL, hearing intact, vision intact


Neck: supple


Respiratory: Present: Clear to Ascultation


Cardiology: regular, S1S2, no murmurs


Gastrointestinal: normoactive bowel sounds, no tenderness, distended, other 

(hernandez catheter)


Integumentary: no rash, warm and dry


Neurologic: no focal deficit, no asterixis, alert and oriented x3


Musculoskeletal: other (no edema)


Psychiatric: cooperative





- Lab





                                 07/24/19 06:30





                                 07/24/19 06:30


                             Most recent lab results











Calcium  9.0 mg/dL (8.4-10.2)   07/24/19  06:30    


 


Phosphorus  6.50 mg/dL (2.5-4.5)  H  07/15/19  06:57    


 


Magnesium  2.00 mg/dL (1.7-2.3)   07/05/19  04:52    


 


  161.7 mg/dL (0.1-20.0)  H  07/05/19  08:51    


 


  72 mmol/L  07/05/19  08:51    














Medications & Allergies





- Medications


Allergies/Adverse Reactions: 


                                    Allergies





No Known Allergies Allergy (Verified 07/04/19 12:02)


   








Home Medications: 


                                Home Medications











 Medication  Instructions  Recorded  Confirmed  Last Taken  Type


 


Prednisone [predniSONE 10 mg 10 mg PO .TAPER #1 tab.ds.pk 09/10/15 07/06/19 

Unknown Rx





(6-Day Pack, 21 Tabs)]     











Active Medications: 














Generic Name Dose Route Start Last Admin





  Trade Name Freq  PRN Reason Stop Dose Admin


 


Acetaminophen  650 mg  07/04/19 17:22  07/09/19 11:32





  Tylenol  PO   650 mg





  Q4H PRN   Administration





  Pain MILD(1-3)/Fever >100.5/HA  


 


Albuterol  2.5 mg  07/04/19 17:22 





  Proventil  IH  





  Q4HRT PRN  





  Shortness Of Breath  


 


Bisacodyl  15 mg  07/07/19 14:51 





  Dulcolax  PO  





  QDAY PRN  





  Constipation  


 


Chlorpromazine HCl  10 mg  07/09/19 15:20  07/23/19 17:49





  Thorazine  PO   10 mg





  Q6H PRN   Administration





  Hiccups  


 


Epoetin Tye  20,000 unit  07/12/19 09:36  07/22/19 14:26





  Procrit  SUB-Q   20,000 unit





  VEE PRN   Administration





  hemodialysis  


 


Famotidine  10 mg  07/12/19 22:00  07/24/19 09:10





  Pepcid  PO   10 mg





  BID BOWEN   Administration


 


Ferrous Sulfate  325 mg  07/10/19 22:00  07/24/19 09:10





  Feosol  PO   325 mg





  BID BOWEN   Administration


 


Folic Acid  1 mg  07/06/19 10:00  07/24/19 09:10





  Folvite  PO   1 mg





  QDAY BOWEN   Administration


 


Heparin Sodium (Porcine)  3,000 unit  07/12/19 09:36  07/22/19 14:30





  Heparin 10,000 Units/10 Ml  IV   3,000 unit





  VEE PRN   Administration





  hemodialysis  


 


Hydralazine HCl  10 mg  07/04/19 19:14  07/04/19 22:17





  Apresoline  IV   10 mg





  Q3H PRN   Administration





  Blood Pressure  


 


Hydromorphone HCl  0.5 mg  07/04/19 17:22  07/23/19 17:49





  Dilaudid  IV   0.5 mg





  Q3H PRN   Administration





  Pain , Severe (7-10)  


 


Chlorpromazine HCl 25 mg/  51 mls @ 100 mls/hr  07/13/19 12:26 





  Sodium Chloride  IV  





  Q4H PRN  





  Hiccups  


 


Sodium Chloride  1,000 mls @ 42 mls/hr  07/16/19 10:00  07/16/19 09:50





  Nacl 0.9% 1000 Ml  IV   42 mls/hr





  AS DIRECT BOWEN   Administration


 


Sodium Chloride  100 mls @ 999 mls/hr  07/24/19 09:12 





  Nacl 0.9%  IV  





  VEE PRN  





  Hypotension  


 


Metoclopramide HCl  5 mg  07/17/19 15:00 





  Reglan  IV  





  Q6H PRN  





  Nausea And Vomiting  


 


Metoprolol Tartrate  50 mg  07/04/19 23:00  07/24/19 09:12





  Lopressor  PO   50 mg





  BID BOWEN   Administration


 


Ondansetron HCl  4 mg  07/04/19 17:22 





  Zofran  IV  





  Q3H PRN  





  Nausea And Vomiting  


 


Oxycodone/Acetaminophen  1 tab  07/13/19 10:53  07/24/19 09:10





  Percocet 5/325  PO   1 tab





  Q6H PRN   Administration





  Pain, Moderate (4-6)  


 


Sodium Chloride  10 ml  07/04/19 22:00  07/24/19 09:13





  Sodium Chloride Flush Syringe 10 Ml  IV   10 ml





  BID BOWEN   Administration


 


Sodium Chloride  10 ml  07/04/19 17:22  07/18/19 14:14





  Sodium Chloride Flush Syringe 10 Ml  IV   10 ml





  PRN PRN   Administration





  LINE FLUSH

## 2019-07-24 NOTE — PROGRESS NOTE
Assessment and Plan








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 

lymphovascular invasion, 


- Chemotherapy port to be placed 7/16/19;  On discharge patient will follow 

hematology oncologist, Dr. Meade





Acute anemia with Drop in HCT 


- s/p transfused 3 units of PRBC, ordered FOBT, GI is following, d/w Dr. Joel, 

s/p colonoscopy





ESRD: s/p permacath, had emergent hemodialysis due to severe hyperkalemia 

started on on 7/4/19, place hernandez on 7/5/19 with very little output. Need outpt 

HD set up





Bilateral hydronephrosis: Placed on hernandez, consulted Urology, input 

noted==>discharge with hernandez 





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 

signet ring cells. outpt f/u





Malignant Ascites 


- due to suspected Colon cancer with mets to Omentum/Carcinomatosis suspected, 

which would be a very poor prognostic sign: consulted GI, input noted, s/p 

Paracentesis on 7/5/19, await cytology, CEA 17 ELEVATED, AFP ELEVATED, CA 19-9 -

normal 11





Positive blood cultures: Abx discontinued following ID eval as culture appears 

to be contaminant





Hyperkalemia: treated with HD, Nephrology is following





Acute Microcytic anemia, suspect cancer related until proven otherwise, follow 

h/h





Severe malnutrition: Dietitian consulted 





Hiccups- likely tumor related, on Thorazine.





DVT prophylaxis: On heparin and GI prophylaxis





Discharge when Outpatient Dialysis ready. Patient will discuss with surgery 

about port placement for chemotherapy. Outpatient follow up with oncologist 








Brief History


Patient is a 60 yo man with a history of hypertension and Asthma who presented 

to [facility] ED with urinary frequency, back pains, n/v, diarrhea and abd pains with 

distension x 1 week. He was diagnosis with Ascites and underwent a paracentesis.

he was found to have Rectal cancer with Mets. [facility] paperwork filled out.





* Initial labs: wbc 13.0 to 10.0, hgb 6.3 to 7.0 (mcv 60), na 132, k 9.5, cr 

  34.8, bun 129, bg 164, lipase 88


* CT abd/pelvis without contrast IMPRESSION: 1. There is ascites. There is 

  increased density in the omentum. Findings are concerning for carcinomatosis. 

  There is some mild adenopathy in the pelvis a possible pericolonic mass, 

  series 2 image 139. There is mild bilateral hydronephrosis. The exact cause is

  not determined by this study. There is minimal nephrolithiasis on the left. No

  ureteral calculi are identified however.


* Colonoscopy, There was a circumferential rectal tumor from the dentate line 

  extending to mid-proximal rectum.  There was severe stenosis related to c

  ircumferential tumor which was unable to be traversed.  The tumor was friable 

  to touch.   Multiple biopsies were obtained Impression:1. Rectal tumor from 

  dentate line to mid/proximal rectum (extent of exam due to severe stenosis 

  related to tumor).  Biopsies were obtained. Recommendations: follow-up 

  pathology, -surgery consult, -heme/onc following, -screen first degree 

  relatives for colorectal cancer, -will need colonoscopy in 3-6 months to rule 

  out proximal colon lesions.


* Path Rectal biopsy: poorly differentiated mucinous adenocarcinoma with signet 

  cells and lymphovascular invasion


* Path Ascites: malignant cells consistent with metastatic carcinoma





Hospitalist Physical


Gen: thin frial, ill appearing, NAD, Awake, Alert, Orientated 


HEENT: NCAT, EOMI, PERRL, OP Clear 


Neck: supple, no adenopathy, no thyromegaly, no JVD 


CVS/Heart: RRR, normal S1S2, pulses present bilaterally 


Chest/Lungs: CTA B, Symmetrical chest expansion, good air entry bilaterally 


GI/Abdomen: soft, abdomen distended, good bowel sounds, no guarding or rebound 


/Bladder: no suprapubic tenderness, no CVA or paraspinal tenderness 


Extermity/Skin: no c/c/e, no obvious rash 


MSK: FROM x 4 


Neuro: CN 2-12 grossly intact, no new focal deficits 


Psych: calm 








Subjective


Date of service: 07/24/19


Principal diagnosis: rectal ca


Interval history: 





Patient seen and examined.  Medical records and medication list reviewed.  


No acute event overnight noted by the RN.  


Patient denies any chest pain or difficulty breathing.  Patient is tolerating 

diet.  


Discussed plan of care at bedside with patient.


waiting for outpt Hd setup





Objective





- Constitutional


Vitals: 


                               Vital Signs - 12hr











  07/24/19 07/24/19 07/24/19





  04:52 09:10 09:12


 


Temperature 98.3 F  


 


Pulse Rate 81  81


 


Respiratory 16 20 





Rate   


 


Blood Pressure 117/77  


 


O2 Sat by Pulse 98  





Oximetry   














  07/24/19 07/24/19 07/24/19





  10:00 10:40 10:45


 


Temperature  98.1 F 


 


Pulse Rate  81 82


 


Respiratory 20 16 





Rate   


 


Blood Pressure  121/81 128/80


 


O2 Sat by Pulse   





Oximetry   














  07/24/19 07/24/19 07/24/19





  11:00 11:15 11:30


 


Temperature   


 


Pulse Rate 85 86 87


 


Respiratory   





Rate   


 


Blood Pressure 115/78 119/81 124/80


 


O2 Sat by Pulse   





Oximetry   














  07/24/19 07/24/19 07/24/19





  11:45 12:00 12:15


 


Temperature   


 


Pulse Rate 87 89 87


 


Respiratory   





Rate   


 


Blood Pressure 122/78 112/77 117/77


 


O2 Sat by Pulse   





Oximetry   














  07/24/19 07/24/19 07/24/19





  12:30 12:45 13:00


 


Temperature   


 


Pulse Rate 82 80 80


 


Respiratory   





Rate   


 


Blood Pressure 103/63 104/70 112/76


 


O2 Sat by Pulse   





Oximetry   














  07/24/19 07/24/19 07/24/19





  13:15 13:30 13:45


 


Temperature   98.6 F


 


Pulse Rate 81 83 88


 


Respiratory   16





Rate   


 


Blood Pressure 109/79 104/68 127/81


 


O2 Sat by Pulse   





Oximetry   














  07/24/19





  14:57


 


Temperature 


 


Pulse Rate 


 


Respiratory 20





Rate 


 


Blood Pressure 


 


O2 Sat by Pulse 





Oximetry 














- Labs


CBC & Chem 7: 


                                 07/24/19 06:30





                                 07/24/19 06:30


Labs: 


                              Abnormal lab results











  07/23/19 07/24/19 07/24/19 Range/Units





  16:35 06:30 06:30 


 


WBC   11.4 H   (4.5-11.0)  K/mm3


 


RBC   3.33 L   (3.65-5.03)  M/mm3


 


Hgb   7.6 L   (11.8-15.2)  gm/dl


 


Hct   24.9 L   (35.5-45.6)  %


 


MCV   75 L   (84-94)  fl


 


MCH   23 L   (28-32)  pg


 


MCHC   31 L   (32-34)  %


 


RDW   31.8 H   (13.2-15.2)  %


 


Plt Count   442 H   (140-440)  K/mm3


 


Seg Neuts % (Manual)   74.0 H   (40.0-70.0)  %


 


Basophils % (Manual)   3.0 H   (0.0-1.8)  %


 


Seg Neutrophils # Man   8.4 H   (1.8-7.7)  K/mm3


 


Basophils # (Manual)   0.3 H   (0.0-0.1)  K/mm3


 


Sodium    136 L  (137-145)  mmol/L


 


Potassium    5.7 H  (3.6-5.0)  mmol/L


 


Chloride    94.4 L  ()  mmol/L


 


BUN    33 H  (9-20)  mg/dL


 


Creatinine    13.5 H  (0.8-1.5)  mg/dL


 


Urine WBC (Auto)  > 182.0 H    (0.0-6.0)  /HPF

## 2019-07-24 NOTE — HEM/ONC PROGRESS NOTE
Assessment and Plan





1.  Radiology shows carcinomatosis.  The patient has anemia.  MCV is low.  


2.  Anemia, low iron, B12 normal, folate low.  renal impairement may have a role


3.  h/o Thrombocytosis, likely reactive.


4.  History of hiccups.  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 

involvement.


5.  Renal failure.  Nephrology following.


6.  Hyperkalemia status post treatment.


7.  History of hypertension.





I will follow the patient during inpatient stay.    GI team has seen the 

patient.





PRBC


hiccough - likely tumor related





CEA - 17


CA 19-9 - normal 11








colonoscopy - Rectal tumor from dentate line to mid/proximal rectum (extent of 

exam due to severe stenosis related to tumor)





based on ascites and omental lesions -this is likely metastatic ca


as per Dr Fung - he said no ascites fluid is available in the lab


d/w pt reg stage IV - chemo 


d/w pt reg non curative nature 








port placed





d/w pt reg OP chemo - FMLA -  - HD


pt aware that this is aggresive tumor - FOLFOX q 2 weeks as OP - dose 

modification for HD








d/w dr johnson - reg pt - 7/24





- Patient Problems


(1) Ascites


Current Visit: Yes   Status: Chronic   





Subjective


Date of service: 07/24/19


Principal diagnosis: colon ca


Interval history: 





waiting for HD





Objective





- Exam


Narrative Exam: 





Pain  - none


General appearance  no acute distress


Performance status limited self care


Eyes - no icterus


ENT  no thrush





LNs  cervical  not palpable


Neck  - normal ROM


Respiratory  Normal


Breath sounds - CTA





CVS  S1 S2 +


Extremities  normal temperature


General GI  Soft - distended


Rectal  deferred


male  - deferred


Skin  warm -PORT +


Musculoskeletal  moving normal


Neurologically  no focal deficit





- Constitutional


Vitals: 


                                Last Vital Signs











Temp  98.3 F   07/24/19 04:52


 


Pulse  81   07/24/19 04:52


 


Resp  16   07/24/19 04:52


 


BP  117/77   07/24/19 04:52


 


Pulse Ox  98   07/24/19 04:52














- Labs


Lab Results: 


                         Laboratory Results - last 24 hr











  07/23/19 07/24/19 07/24/19





  16:35 06:30 06:30


 


WBC   11.4 H 


 


RBC   3.33 L 


 


Hgb   7.6 L 


 


Hct   24.9 L 


 


MCV   75 L 


 


MCH   23 L 


 


MCHC   31 L 


 


RDW   31.8 H 


 


Sodium    136 L


 


Potassium    5.7 H


 


Chloride    94.4 L


 


Carbon Dioxide    22


 


Anion Gap    25


 


BUN    33 H


 


Creatinine    13.5 H


 


Estimated GFR    5


 


BUN/Creatinine Ratio    2


 


Glucose    75


 


Calcium    9.0


 


Urine Color  Yellow  


 


Urine Turbidity  Turbid  


 


Urine pH  5.0  


 


Ur Specific Gravity  1.024  


 


Urine Protein  100 mg/dl  


 


Urine Glucose (UA)  Neg  


 


Urine Ketones  Tr  


 


Urine Blood  Lg  


 


Urine Nitrite  Neg  


 


Urine Bilirubin  Neg  


 


Urine Urobilinogen  < 2.0  


 


Ur Leukocyte Esterase  Mod  


 


Urine WBC (Auto)  > 182.0 H  


 


Urine RBC (Auto)  > 182.0  


 


Urine Bacteria (Auto)  2+  


 


Urine Mucus  2+  














Medications & Allergies





- Medications


Allergies/Adverse Reactions: 


                                    Allergies





No Known Allergies Allergy (Verified 07/04/19 12:02)


   








Home Medications: 


                                Home Medications











 Medication  Instructions  Recorded  Confirmed  Last Taken  Type


 


Prednisone [predniSONE 10 mg 10 mg PO .TAPER #1 tab.ds.pk 09/10/15 07/06/19 

Unknown Rx





(6-Day Pack, 21 Tabs)]     











Active Medications: 














Generic Name Dose Route Start Last Admin





  Trade Name Freq  PRN Reason Stop Dose Admin


 


Acetaminophen  650 mg  07/04/19 17:22  07/09/19 11:32





  Tylenol  PO   650 mg





  Q4H PRN   Administration





  Pain MILD(1-3)/Fever >100.5/HA  


 


Albuterol  2.5 mg  07/04/19 17:22 





  Proventil  IH  





  Q4HRT PRN  





  Shortness Of Breath  


 


Bisacodyl  15 mg  07/07/19 14:51 





  Dulcolax  PO  





  QDAY PRN  





  Constipation  


 


Chlorpromazine HCl  10 mg  07/09/19 15:20  07/23/19 17:49





  Thorazine  PO   10 mg





  Q6H PRN   Administration





  Hiccups  


 


Epoetin Tye  20,000 unit  07/12/19 09:36  07/22/19 14:26





  Procrit  SUB-Q   20,000 unit





  VEE PRN   Administration





  hemodialysis  


 


Famotidine  10 mg  07/12/19 22:00  07/23/19 22:03





  Pepcid  PO   10 mg





  BID BOWEN   Administration


 


Ferrous Sulfate  325 mg  07/10/19 22:00  07/23/19 22:03





  Feosol  PO   325 mg





  BID BOWEN   Administration


 


Folic Acid  1 mg  07/06/19 10:00  07/23/19 10:08





  Folvite  PO   1 mg





  QDAY BOWEN   Administration


 


Heparin Sodium (Porcine)  3,000 unit  07/12/19 09:36  07/22/19 14:30





  Heparin 10,000 Units/10 Ml  IV   3,000 unit





  VEE PRN   Administration





  hemodialysis  


 


Hydralazine HCl  10 mg  07/04/19 19:14  07/04/19 22:17





  Apresoline  IV   10 mg





  Q3H PRN   Administration





  Blood Pressure  


 


Hydromorphone HCl  0.5 mg  07/04/19 17:22  07/23/19 17:49





  Dilaudid  IV   0.5 mg





  Q3H PRN   Administration





  Pain , Severe (7-10)  


 


Chlorpromazine HCl 25 mg/  51 mls @ 100 mls/hr  07/13/19 12:26 





  Sodium Chloride  IV  





  Q4H PRN  





  Hiccups  


 


Sodium Chloride  1,000 mls @ 42 mls/hr  07/16/19 10:00  07/16/19 09:50





  Nacl 0.9% 1000 Ml  IV   42 mls/hr





  AS DIRECT BOWEN   Administration


 


Sodium Chloride  100 mls @ 999 mls/hr  07/22/19 10:10 





  Nacl 0.9%  IV  





  VEE PRN  





  Hypotension  


 


Metoclopramide HCl  5 mg  07/17/19 15:00 





  Reglan  IV  





  Q6H PRN  





  Nausea And Vomiting  


 


Metoprolol Tartrate  50 mg  07/04/19 23:00  07/23/19 22:05





  Lopressor  PO   50 mg





  BID BOWEN   Administration


 


Ondansetron HCl  4 mg  07/04/19 17:22 





  Zofran  IV  





  Q3H PRN  





  Nausea And Vomiting  


 


Oxycodone/Acetaminophen  1 tab  07/13/19 10:53  07/24/19 00:47





  Percocet 5/325  PO   1 tab





  Q6H PRN   Administration





  Pain, Moderate (4-6)  


 


Sodium Chloride  10 ml  07/04/19 22:00  07/23/19 22:08





  Sodium Chloride Flush Syringe 10 Ml  IV   10 ml





  BID BOWEN   Administration


 


Sodium Chloride  10 ml  07/04/19 17:22  07/18/19 14:14





  Sodium Chloride Flush Syringe 10 Ml  IV   10 ml





  PRN PRN   Administration





  LINE FLUSH

## 2019-07-25 RX ADMIN — FERROUS SULFATE TAB 325 MG (65 MG ELEMENTAL FE) SCH MG: 325 (65 FE) TAB at 22:14

## 2019-07-25 RX ADMIN — OXYCODONE AND ACETAMINOPHEN PRN TAB: 5; 325 TABLET ORAL at 09:54

## 2019-07-25 RX ADMIN — FOLIC ACID SCH MG: 1 TABLET ORAL at 09:48

## 2019-07-25 RX ADMIN — OXYCODONE AND ACETAMINOPHEN PRN TAB: 5; 325 TABLET ORAL at 22:58

## 2019-07-25 RX ADMIN — METOPROLOL TARTRATE SCH MG: 50 TABLET, FILM COATED ORAL at 09:48

## 2019-07-25 RX ADMIN — HYDROMORPHONE HYDROCHLORIDE PRN MG: 1 INJECTION, SOLUTION INTRAMUSCULAR; INTRAVENOUS; SUBCUTANEOUS at 20:48

## 2019-07-25 RX ADMIN — FERROUS SULFATE TAB 325 MG (65 MG ELEMENTAL FE) SCH MG: 325 (65 FE) TAB at 09:48

## 2019-07-25 RX ADMIN — Medication SCH ML: at 09:49

## 2019-07-25 RX ADMIN — FAMOTIDINE SCH MG: 10 TABLET ORAL at 09:49

## 2019-07-25 RX ADMIN — Medication SCH ML: at 22:14

## 2019-07-25 RX ADMIN — HYDROMORPHONE HYDROCHLORIDE PRN MG: 1 INJECTION, SOLUTION INTRAMUSCULAR; INTRAVENOUS; SUBCUTANEOUS at 13:59

## 2019-07-25 RX ADMIN — METOPROLOL TARTRATE SCH MG: 50 TABLET, FILM COATED ORAL at 22:14

## 2019-07-25 RX ADMIN — FAMOTIDINE SCH MG: 10 TABLET ORAL at 22:14

## 2019-07-25 RX ADMIN — HYDROMORPHONE HYDROCHLORIDE PRN MG: 1 INJECTION, SOLUTION INTRAMUSCULAR; INTRAVENOUS; SUBCUTANEOUS at 05:18

## 2019-07-25 RX ADMIN — CHLORPROMAZINE HYDROCHLORIDE PRN MG: 10 TABLET, FILM COATED ORAL at 18:55

## 2019-07-25 RX ADMIN — OXYCODONE AND ACETAMINOPHEN PRN TAB: 5; 325 TABLET ORAL at 02:00

## 2019-07-25 NOTE — PROGRESS NOTE
Assessment and Plan








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 

lymphovascular invasion, 


- Chemotherapy port to be placed 7/16/19;  On discharge patient will follow 

hematology oncologist, Dr. Meade





Acute anemia with Drop in HCT 


- s/p transfused 3 units of PRBC, ordered FOBT, GI is following, d/w Dr. Joel, 

s/p colonoscopy





ESRD: s/p permacath, had emergent hemodialysis due to severe hyperkalemia 

started on on 7/4/19, place hernandez on 7/5/19 with very little output. Need outpt 

HD set up





Bilateral hydronephrosis: Placed on hernandez, consulted Urology, input 

noted==>discharge with hernandez 





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 

signet ring cells. outpt f/u





Malignant Ascites 


- due to suspected Colon cancer with mets to Omentum/Carcinomatosis suspected, 

which would be a very poor prognostic sign: consulted GI, input noted, s/p 

Paracentesis on 7/5/19, await cytology, CEA 17 ELEVATED, AFP ELEVATED, CA 19-9 -

normal 11





Positive blood cultures: Abx discontinued following ID eval as culture appears 

to be contaminant





Hyperkalemia: treated with HD, Nephrology is following





Acute Microcytic anemia, suspect cancer related until proven otherwise, follow 

h/h





Severe malnutrition: Dietitian consulted 





Hiccups- likely tumor related, on Thorazine.





DVT prophylaxis: On heparin and GI prophylaxis





Discharge when Outpatient Dialysis ready. Patient will discuss with surgery 

about port placement for chemotherapy. Outpatient follow up with oncologist 








Brief History


Patient is a 60 yo man with a history of hypertension and Asthma who presented 

to [facility] ED with urinary frequency, back pains, n/v, diarrhea and abd pains with 

distension x 1 week. He was diagnosis with Ascites and underwent a paracentesis.

he was found to have Rectal cancer with Mets. [facility] paperwork filled out.





* Initial labs: wbc 13.0 to 10.0, hgb 6.3 to 7.0 (mcv 60), na 132, k 9.5, cr 

  34.8, bun 129, bg 164, lipase 88


* CT abd/pelvis without contrast IMPRESSION: 1. There is ascites. There is 

  increased density in the omentum. Findings are concerning for carcinomatosis. 

  There is some mild adenopathy in the pelvis a possible pericolonic mass, 

  series 2 image 139. There is mild bilateral hydronephrosis. The exact cause is

  not determined by this study. There is minimal nephrolithiasis on the left. No

  ureteral calculi are identified however.


* Colonoscopy, There was a circumferential rectal tumor from the dentate line 

  extending to mid-proximal rectum.  There was severe stenosis related to c

  ircumferential tumor which was unable to be traversed.  The tumor was friable 

  to touch.   Multiple biopsies were obtained Impression:1. Rectal tumor from 

  dentate line to mid/proximal rectum (extent of exam due to severe stenosis 

  related to tumor).  Biopsies were obtained. Recommendations: follow-up 

  pathology, -surgery consult, -heme/onc following, -screen first degree 

  relatives for colorectal cancer, -will need colonoscopy in 3-6 months to rule 

  out proximal colon lesions.


* Path Rectal biopsy: poorly differentiated mucinous adenocarcinoma with signet 

  cells and lymphovascular invasion


* Path Ascites: malignant cells consistent with metastatic carcinoma





Hospitalist Physical


Gen: thin frial, ill appearing, NAD, Awake, Alert, Orientated 


HEENT: NCAT, EOMI, PERRL, OP Clear 


Neck: supple, no adenopathy, no thyromegaly, no JVD 


CVS/Heart: RRR, normal S1S2, pulses present bilaterally 


Chest/Lungs: CTA B, Symmetrical chest expansion, good air entry bilaterally 


GI/Abdomen: soft, abdomen distended, good bowel sounds, no guarding or rebound 


/Bladder: no suprapubic tenderness, no CVA or paraspinal tenderness 


Extermity/Skin: no c/c/e, no obvious rash 


MSK: FROM x 4 


Neuro: CN 2-12 grossly intact, no new focal deficits 


Psych: calm 








Subjective


Date of service: 07/25/19


Principal diagnosis: rectal ca


Interval history: 





Patient seen and examined.  Medical records and medication list reviewed.  


No acute event overnight noted by the RN.  


Patient denies any chest pain or difficulty breathing.  Patient is tolerating 

diet.  


Discussed plan of care at bedside with patient.


waiting for outpt Hd setup - but having difficulty to setup for multiple issues


Patient wants to pursue HD and doesnot want hospice 





Objective





- Constitutional


Vitals: 


                               Vital Signs - 12hr











  07/25/19





  05:51


 


Temperature 98.0 F


 


Pulse Rate 95 H


 


Respiratory 16





Rate 


 


Blood Pressure 105/72


 


O2 Sat by Pulse 98





Oximetry 














- Labs


CBC & Chem 7: 


                                 07/24/19 06:30





                                 07/24/19 06:30

## 2019-07-25 NOTE — HEM/ONC PROGRESS NOTE
Assessment and Plan





1.  Radiology shows carcinomatosis.  The patient has anemia.  MCV is low.  


2.  Anemia, low iron, B12 normal, folate low.  renal impairement may have a role


3.  h/o Thrombocytosis, likely reactive.


4.  History of hiccups.  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 

involvement.


5.  Renal failure.  Nephrology following.


6.  Hyperkalemia status post treatment.


7.  History of hypertension.





I will follow the patient during inpatient stay.    GI team has seen the 

patient.





PRBC


hiccough - likely tumor related





CEA - 17


CA 19-9 - normal 11








colonoscopy - Rectal tumor from dentate line to mid/proximal rectum (extent of 

exam due to severe stenosis related to tumor)





based on ascites and omental lesions -this is likely metastatic ca


as per Dr Fung - he said no ascites fluid is available in the lab


d/w pt reg stage IV - chemo 


d/w pt reg non curative nature 








port placed





d/w pt reg OP chemo - FMLA -  - HD


pt aware that this is aggresive tumor - FOLFOX q 2 weeks as OP - dose 

modification for HD








d/w dr johnson - reg pt on 7/24 7/25/2019 - d/w pt that I will f/u in clinic - however if there is a delay of 

insurance then [facility] is an option








- Patient Problems


(1) Ascites


Current Visit: Yes   Status: Chronic   





Subjective


Date of service: 07/25/19


Principal diagnosis: colon ca


Interval history: 





abdo distension





Objective





- Exam


Narrative Exam: 





Pain  - none


General appearance  no acute distress


Performance status limited self care


Eyes - no icterus


ENT  no thrush





LNs  cervical  not palpable


Neck  - normal ROM


Respiratory  Normal


Breath sounds - CTA





CVS  S1 S2 +


Extremities  normal temperature


General GI  Soft - distended


Rectal  deferred


male  - deferred


Skin  warm -PORT +


Musculoskeletal  moving normal


Neurologically  no focal deficit





- Constitutional


Vitals: 


                                Last Vital Signs











Temp  98.0 F   07/25/19 05:51


 


Pulse  95 H  07/25/19 05:51


 


Resp  16   07/25/19 05:51


 


BP  105/72   07/25/19 05:51


 


Pulse Ox  98   07/25/19 05:51














- Labs


Lab Results: 


                         Laboratory Results - last 24 hr











  07/24/19 07/24/19





  06:30 06:30


 


WBC  11.4 H 


 


RBC  3.33 L 


 


Hgb  7.6 L 


 


Hct  24.9 L 


 


MCV  75 L 


 


MCH  23 L 


 


MCHC  31 L 


 


RDW  31.8 H 


 


Plt Count  442 H 


 


Add Manual Diff  Complete 


 


Total Counted  100 


 


Seg Neuts % (Manual)  74.0 H 


 


Band Neutrophils %  0 


 


Lymphocytes % (Manual)  14.0 


 


Reactive Lymphs % (Man)  0 


 


Monocytes % (Manual)  5.0 


 


Eosinophils % (Manual)  3.0 


 


Basophils % (Manual)  3.0 H 


 


Metamyelocytes %  1.0 


 


Myelocytes %  0 


 


Promyelocytes %  0 


 


Blast Cells %  0 


 


Nucleated RBC %  Not Reportable 


 


Seg Neutrophils # Man  8.4 H 


 


Band Neutrophils #  0.0 


 


Lymphocytes # (Manual)  1.6 


 


Abs React Lymphs (Man)  0.0 


 


Monocytes # (Manual)  0.6 


 


Eosinophils # (Manual)  0.3 


 


Basophils # (Manual)  0.3 H 


 


Metamyelocytes #  0.1 


 


Myelocytes #  0.0 


 


Promyelocytes #  0.0 


 


Blast Cells #  0.0 


 


WBC Morphology  Not Reportable 


 


Hypersegmented Neuts  Not Reportable 


 


Hyposegmented Neuts  Not Reportable 


 


Hypogranular Neuts  Not Reportable 


 


Smudge Cells  Not Reportable 


 


Toxic Granulation  Not Reportable 


 


Toxic Vacuolation  Not Reportable 


 


Dohle Bodies  Not Reportable 


 


Pelger-Huet Anomaly  Not Reportable 


 


Veena Rods  Not Reportable 


 


Platelet Estimate  Consistent w auto 


 


Clumped Platelets  Not Reportable 


 


Plt Clumps, EDTA  Not Reportable 


 


Large Platelets  Not Reportable 


 


Giant Platelets  Not Reportable 


 


Platelet Satelliting  Not Reportable 


 


Plt Morphology Comment  Not Reportable 


 


RBC Morphology  Not Reportable 


 


Dimorphic RBCs  Not Reportable 


 


Polychromasia  Not Reportable 


 


Hypochromasia  1+ 


 


Poikilocytosis  1+ 


 


Anisocytosis  2+ 


 


Microcytosis  Not Reportable 


 


Macrocytosis  Not Reportable 


 


Spherocytes  Not Reportable 


 


Pappenheimer Bodies  Not Reportable 


 


Sickle Cells  Not Reportable 


 


Target Cells  1+ 


 


Tear Drop Cells  Not Reportable 


 


Ovalocytes  1+ 


 


Helmet Cells  Not Reportable 


 


[place] Bodies  Not Reportable 


 


Cabot Rings  Not Reportable 


 


[place] Cells  Not Reportable 


 


Bite Cells  Not Reportable 


 


Crenated Cell  Not Reportable 


 


Elliptocytes  Not Reportable 


 


Acanthocytes (Spur)  Not Reportable 


 


Rouleaux  Not Reportable 


 


Hemoglobin C Crystals  Not Reportable 


 


Schistocytes  Not Reportable 


 


Malaria parasites  Not Reportable 


 


Reynaldo Bodies  Not Reportable 


 


Hem Pathologist Commnt  No 


 


Sodium   136 L


 


Potassium   5.7 H


 


Chloride   94.4 L


 


Carbon Dioxide   22


 


Anion Gap   25


 


BUN   33 H


 


Creatinine   13.5 H


 


Estimated GFR   5


 


BUN/Creatinine Ratio   2


 


Glucose   75


 


Calcium   9.0














Medications & Allergies





- Medications


Allergies/Adverse Reactions: 


                                    Allergies





No Known Allergies Allergy (Verified 07/04/19 12:02)


   








Home Medications: 


                                Home Medications











 Medication  Instructions  Recorded  Confirmed  Last Taken  Type


 


Prednisone [predniSONE 10 mg 10 mg PO .TAPER #1 tab.ds.pk 09/10/15 07/06/19 

Unknown Rx





(6-Day Pack, 21 Tabs)]     











Active Medications: 














Generic Name Dose Route Start Last Admin





  Trade Name Freq  PRN Reason Stop Dose Admin


 


Acetaminophen  650 mg  07/04/19 17:22  07/09/19 11:32





  Tylenol  PO   650 mg





  Q4H PRN   Administration





  Pain MILD(1-3)/Fever >100.5/HA  


 


Albuterol  2.5 mg  07/04/19 17:22 





  Proventil  IH  





  Q4HRT PRN  





  Shortness Of Breath  


 


Bisacodyl  15 mg  07/07/19 14:51 





  Dulcolax  PO  





  QDAY PRN  





  Constipation  


 


Chlorpromazine HCl  10 mg  07/09/19 15:20  07/24/19 18:39





  Thorazine  PO   10 mg





  Q6H PRN   Administration





  Hiccups  


 


Epoetin Tye  20,000 unit  07/12/19 09:36  07/24/19 13:41





  Procrit  SUB-Q   20,000 unit





  VEE PRN   Administration





  hemodialysis  


 


Famotidine  10 mg  07/12/19 22:00  07/24/19 22:27





  Pepcid  PO   10 mg





  BID BOWEN   Administration


 


Ferrous Sulfate  325 mg  07/10/19 22:00  07/24/19 22:27





  Feosol  PO   325 mg





  BID BOWEN   Administration


 


Folic Acid  1 mg  07/06/19 10:00  07/24/19 09:10





  Folvite  PO   1 mg





  QDAY BOWEN   Administration


 


Heparin Sodium (Porcine)  3,000 unit  07/12/19 09:36  07/24/19 11:43





  Heparin 10,000 Units/10 Ml  IV   3,000 unit





  VEE PRN   Administration





  hemodialysis  


 


Hydralazine HCl  10 mg  07/04/19 19:14  07/04/19 22:17





  Apresoline  IV   10 mg





  Q3H PRN   Administration





  Blood Pressure  


 


Hydromorphone HCl  0.5 mg  07/04/19 17:22  07/25/19 05:18





  Dilaudid  IV   0.5 mg





  Q3H PRN   Administration





  Pain , Severe (7-10)  


 


Chlorpromazine HCl 25 mg/  51 mls @ 100 mls/hr  07/13/19 12:26 





  Sodium Chloride  IV  





  Q4H PRN  





  Hiccups  


 


Sodium Chloride  1,000 mls @ 42 mls/hr  07/16/19 10:00  07/16/19 09:50





  Nacl 0.9% 1000 Ml  IV   42 mls/hr





  AS DIRECT BOWEN   Administration


 


Sodium Chloride  100 mls @ 999 mls/hr  07/24/19 09:12 





  Nacl 0.9%  IV  





  VEE PRN  





  Hypotension  


 


Metoclopramide HCl  5 mg  07/17/19 15:00 





  Reglan  IV  





  Q6H PRN  





  Nausea And Vomiting  


 


Metoprolol Tartrate  50 mg  07/04/19 23:00  07/24/19 22:31





  Lopressor  PO   Not Given





  BID BOWEN  


 


Ondansetron HCl  4 mg  07/04/19 17:22 





  Zofran  IV  





  Q3H PRN  





  Nausea And Vomiting  


 


Oxycodone/Acetaminophen  1 tab  07/13/19 10:53  07/25/19 02:00





  Percocet 5/325  PO   1 tab





  Q6H PRN   Administration





  Pain, Moderate (4-6)  


 


Sodium Chloride  10 ml  07/04/19 22:00  07/24/19 22:33





  Sodium Chloride Flush Syringe 10 Ml  IV   10 ml





  BID BOWEN   Administration


 


Sodium Chloride  10 ml  07/04/19 17:22  07/18/19 14:14





  Sodium Chloride Flush Syringe 10 Ml  IV   10 ml





  PRN PRN   Administration





  LINE FLUSH

## 2019-07-25 NOTE — PROGRESS NOTE
Assessment and Plan





1. ESRD:


CKD has progressed to ESRD


CT abdomen showed mild bilateral hydronephosis.


No improvement in the renal function.


Monitor renal function.


Renal prognosis is poor.


Avoid nephrotoxic agents.


Meds dosage based on GFR.


Hemodialysis: 7/4, 7/5, 7/6, 7/8, 7/10, 7/12, 7/15, 7/17, 7/19, 7/22, 7/24.





2. FEN:


Hyperkalemia, HD.


Anion gap MA, improved.


Monitor lytes.





3. Mild bilateral hydronephosis:


S/p hernandez catheter.


Evaluated by Urologist.





4. Rectal adenocarcinoma with malignant Ascites.


S/p Medport.


Followed by Heme-onc.





5. Anemia:


PRBC.


Monitor H/H.





6. HTN:


BP is controlled.





Await outpatient hemodialysis chair.














Subjective


Date of service: 07/25/19


Principal diagnosis: colon ca


Interval history: 


Patient was seen and examined at the bedside.


No new complaint.








Objective





- Vital Signs


Vital signs: 


                               Vital Signs - 12hr











  07/24/19 07/25/19 07/25/19





  22:31 00:24 05:51


 


Temperature  98.7 F 98.0 F


 


Pulse Rate 100 H 102 H 95 H


 


Respiratory  18 16





Rate   


 


Blood Pressure 97/65 122/88 105/72


 


O2 Sat by Pulse  99 98





Oximetry   














- General Appearance


General appearance: well-developed, well-nourished, appears stated age, other 

(no distress, R IJ tunnel catheter)


EENT: ATNC, PERRL, mucous membranes moist, hearing intact, vision intact


Neck: supple


Respiratory: Present: Clear to Ascultation


Cardiology: regular, S1S2, no murmurs


Gastrointestinal: normoactive bowel sounds, no tenderness, distended


Integumentary: no rash, warm and dry


Neurologic: no focal deficit, no asterixis, alert and oriented x3


Musculoskeletal: other (no edema)


Psychiatric: cooperative





- Lab





                                 07/24/19 06:30





                                 07/24/19 06:30


                             Most recent lab results











Calcium  9.0 mg/dL (8.4-10.2)   07/24/19  06:30    


 


Phosphorus  6.50 mg/dL (2.5-4.5)  H  07/15/19  06:57    


 


Magnesium  2.00 mg/dL (1.7-2.3)   07/05/19  04:52    


 


  161.7 mg/dL (0.1-20.0)  H  07/05/19  08:51    


 


  72 mmol/L  07/05/19  08:51    














Medications & Allergies





- Medications


Allergies/Adverse Reactions: 


                                    Allergies





No Known Allergies Allergy (Verified 07/04/19 12:02)


   








Home Medications: 


                                Home Medications











 Medication  Instructions  Recorded  Confirmed  Last Taken  Type


 


Prednisone [predniSONE 10 mg 10 mg PO .TAPER #1 tab.ds.pk 09/10/15 07/06/19 

Unknown Rx





(6-Day Pack, 21 Tabs)]     











Active Medications: 














Generic Name Dose Route Start Last Admin





  Trade Name Freq  PRN Reason Stop Dose Admin


 


Acetaminophen  650 mg  07/04/19 17:22  07/09/19 11:32





  Tylenol  PO   650 mg





  Q4H PRN   Administration





  Pain MILD(1-3)/Fever >100.5/HA  


 


Albuterol  2.5 mg  07/04/19 17:22 





  Proventil  IH  





  Q4HRT PRN  





  Shortness Of Breath  


 


Bisacodyl  15 mg  07/07/19 14:51 





  Dulcolax  PO  





  QDAY PRN  





  Constipation  


 


Chlorpromazine HCl  10 mg  07/09/19 15:20  07/24/19 18:39





  Thorazine  PO   10 mg





  Q6H PRN   Administration





  Hiccups  


 


Epoetin Tye  20,000 unit  07/12/19 09:36  07/24/19 13:41





  Procrit  SUB-Q   20,000 unit





  VEE PRN   Administration





  hemodialysis  


 


Famotidine  10 mg  07/12/19 22:00  07/24/19 22:27





  Pepcid  PO   10 mg





  BID BOWEN   Administration


 


Ferrous Sulfate  325 mg  07/10/19 22:00  07/24/19 22:27





  Feosol  PO   325 mg





  BID BOWEN   Administration


 


Folic Acid  1 mg  07/06/19 10:00  07/24/19 09:10





  Folvite  PO   1 mg





  QDAY BOWEN   Administration


 


Heparin Sodium (Porcine)  3,000 unit  07/12/19 09:36  07/24/19 11:43





  Heparin 10,000 Units/10 Ml  IV   3,000 unit





  VEE PRN   Administration





  hemodialysis  


 


Hydralazine HCl  10 mg  07/04/19 19:14  07/04/19 22:17





  Apresoline  IV   10 mg





  Q3H PRN   Administration





  Blood Pressure  


 


Hydromorphone HCl  0.5 mg  07/04/19 17:22  07/25/19 05:18





  Dilaudid  IV   0.5 mg





  Q3H PRN   Administration





  Pain , Severe (7-10)  


 


Chlorpromazine HCl 25 mg/  51 mls @ 100 mls/hr  07/13/19 12:26 





  Sodium Chloride  IV  





  Q4H PRN  





  Hiccups  


 


Sodium Chloride  1,000 mls @ 42 mls/hr  07/16/19 10:00  07/16/19 09:50





  Nacl 0.9% 1000 Ml  IV   42 mls/hr





  AS DIRECT BOWEN   Administration


 


Sodium Chloride  100 mls @ 999 mls/hr  07/24/19 09:12 





  Nacl 0.9%  IV  





  VEE PRN  





  Hypotension  


 


Metoclopramide HCl  5 mg  07/17/19 15:00 





  Reglan  IV  





  Q6H PRN  





  Nausea And Vomiting  


 


Metoprolol Tartrate  50 mg  07/04/19 23:00  07/24/19 22:31





  Lopressor  PO   Not Given





  BID BOWEN  


 


Ondansetron HCl  4 mg  07/04/19 17:22 





  Zofran  IV  





  Q3H PRN  





  Nausea And Vomiting  


 


Oxycodone/Acetaminophen  1 tab  07/13/19 10:53  07/25/19 02:00





  Percocet 5/325  PO   1 tab





  Q6H PRN   Administration





  Pain, Moderate (4-6)  


 


Sodium Chloride  10 ml  07/04/19 22:00  07/24/19 22:33





  Sodium Chloride Flush Syringe 10 Ml  IV   10 ml





  BID BOWEN   Administration


 


Sodium Chloride  10 ml  07/04/19 17:22  07/18/19 14:14





  Sodium Chloride Flush Syringe 10 Ml  IV   10 ml





  PRN PRN   Administration





  LINE FLUSH

## 2019-07-26 PROCEDURE — 5A1D70Z PERFORMANCE OF URINARY FILTRATION, INTERMITTENT, LESS THAN 6 HOURS PER DAY: ICD-10-PCS | Performed by: INTERNAL MEDICINE

## 2019-07-26 RX ADMIN — OXYCODONE AND ACETAMINOPHEN PRN TAB: 5; 325 TABLET ORAL at 20:11

## 2019-07-26 RX ADMIN — FAMOTIDINE SCH MG: 10 TABLET ORAL at 10:13

## 2019-07-26 RX ADMIN — FAMOTIDINE SCH MG: 10 TABLET ORAL at 21:31

## 2019-07-26 RX ADMIN — ERYTHROPOIETIN PRN UNIT: 20000 INJECTION, SOLUTION INTRAVENOUS; SUBCUTANEOUS at 14:54

## 2019-07-26 RX ADMIN — METOPROLOL TARTRATE SCH MG: 50 TABLET, FILM COATED ORAL at 16:42

## 2019-07-26 RX ADMIN — Medication SCH ML: at 21:32

## 2019-07-26 RX ADMIN — OXYCODONE AND ACETAMINOPHEN PRN TAB: 5; 325 TABLET ORAL at 13:31

## 2019-07-26 RX ADMIN — METOPROLOL TARTRATE SCH: 50 TABLET, FILM COATED ORAL at 21:32

## 2019-07-26 RX ADMIN — HEPARIN SODIUM PRN UNIT: 1000 INJECTION, SOLUTION INTRAVENOUS; SUBCUTANEOUS at 14:15

## 2019-07-26 RX ADMIN — HYDROMORPHONE HYDROCHLORIDE PRN MG: 1 INJECTION, SOLUTION INTRAMUSCULAR; INTRAVENOUS; SUBCUTANEOUS at 23:25

## 2019-07-26 RX ADMIN — HYDROMORPHONE HYDROCHLORIDE PRN MG: 1 INJECTION, SOLUTION INTRAMUSCULAR; INTRAVENOUS; SUBCUTANEOUS at 03:37

## 2019-07-26 RX ADMIN — FERROUS SULFATE TAB 325 MG (65 MG ELEMENTAL FE) SCH MG: 325 (65 FE) TAB at 21:31

## 2019-07-26 RX ADMIN — FAMOTIDINE SCH: 10 TABLET ORAL at 21:36

## 2019-07-26 RX ADMIN — Medication SCH ML: at 10:20

## 2019-07-26 RX ADMIN — FERROUS SULFATE TAB 325 MG (65 MG ELEMENTAL FE) SCH: 325 (65 FE) TAB at 21:35

## 2019-07-26 RX ADMIN — HYDROMORPHONE HYDROCHLORIDE PRN MG: 1 INJECTION, SOLUTION INTRAMUSCULAR; INTRAVENOUS; SUBCUTANEOUS at 16:41

## 2019-07-26 RX ADMIN — OXYCODONE AND ACETAMINOPHEN PRN TAB: 5; 325 TABLET ORAL at 06:52

## 2019-07-26 RX ADMIN — FOLIC ACID SCH MG: 1 TABLET ORAL at 10:13

## 2019-07-26 RX ADMIN — FERROUS SULFATE TAB 325 MG (65 MG ELEMENTAL FE) SCH MG: 325 (65 FE) TAB at 10:20

## 2019-07-26 NOTE — HEM/ONC PROGRESS NOTE
Assessment and Plan





1.  Radiology shows carcinomatosis.  The patient has anemia.  MCV is low.  


2.  Anemia, low iron, B12 normal, folate low.  renal impairement may have a role


3.  h/o Thrombocytosis, likely reactive.


4.  History of hiccups.  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 

involvement.


5.  Renal failure.  Nephrology following.


6.  Hyperkalemia status post treatment.


7.  History of hypertension.





I will follow the patient during inpatient stay.    GI team has seen the 

patient.





CEA - 17


CA 19-9 - normal 11





colonoscopy - Rectal tumor from dentate line to mid/proximal rectum (extent of 

exam due to severe stenosis related to tumor)





based on ascites and omental lesions -this is likely metastatic ca


as per Dr Fung - he said no ascites fluid is available in the lab


d/w pt reg stage IV - chemo 


d/w pt reg non curative nature 








port placed





d/w pt reg OP chemo - [employer] -  - HD


pt aware that this is aggresive tumor - FOLFOX q 2 weeks as OP - dose 

modification for HD








d/w dr johnson - reg pt on 7/24 7/26/2019 - d/w pt that I will f/u in clinic - however if there is a delay of in

surance then [facility] is an option








- Patient Problems


(1) Ascites


Current Visit: Yes   Status: Chronic   





Subjective


Date of service: 07/26/19


Principal diagnosis: colon ca


Interval history: 





abdo distension





Objective





- Exam


Narrative Exam: 





Pain  - none


General appearance  no acute distress


Performance status limited self care


Eyes - no icterus


ENT  no thrush





LNs  cervical  not palpable


Neck  - normal ROM


Respiratory  Normal


Breath sounds - CTA





CVS  S1 S2 +


Extremities  normal temperature


General GI  Soft - distended


Rectal  deferred


male  - deferred


Skin  warm -PORT +


Musculoskeletal  moving normal


Neurologically  no focal deficit





- Constitutional


Vitals: 


                                Last Vital Signs











Temp  98.0 F   07/26/19 05:04


 


Pulse  82   07/26/19 05:04


 


Resp  18   07/26/19 06:52


 


BP  112/79   07/26/19 05:04


 


Pulse Ox  99   07/26/19 05:04














Medications & Allergies





- Medications


Allergies/Adverse Reactions: 


                                    Allergies





No Known Allergies Allergy (Verified 07/04/19 12:02)


   








Home Medications: 


                                Home Medications











 Medication  Instructions  Recorded  Confirmed  Last Taken  Type


 


Prednisone [predniSONE 10 mg 10 mg PO .TAPER #1 tab.ds.pk 09/10/15 07/06/19 

Unknown Rx





(6-Day Pack, 21 Tabs)]     











Active Medications: 














Generic Name Dose Route Start Last Admin





  Trade Name Freq  PRN Reason Stop Dose Admin


 


Acetaminophen  650 mg  07/04/19 17:22  07/09/19 11:32





  Tylenol  PO   650 mg





  Q4H PRN   Administration





  Pain MILD(1-3)/Fever >100.5/HA  


 


Albuterol  2.5 mg  07/04/19 17:22 





  Proventil  IH  





  Q4HRT PRN  





  Shortness Of Breath  


 


Bisacodyl  15 mg  07/07/19 14:51 





  Dulcolax  PO  





  QDAY PRN  





  Constipation  


 


Chlorpromazine HCl  10 mg  07/09/19 15:20  07/25/19 18:55





  Thorazine  PO   10 mg





  Q6H PRN   Administration





  Hiccups  


 


Epoetin Tye  20,000 unit  07/12/19 09:36  07/24/19 13:41





  Procrit  SUB-Q   20,000 unit





  VEE PRN   Administration





  hemodialysis  


 


Famotidine  10 mg  07/12/19 22:00  07/25/19 22:14





  Pepcid  PO   10 mg





  BID BOWEN   Administration


 


Ferrous Sulfate  325 mg  07/10/19 22:00  07/25/19 22:14





  Feosol  PO   325 mg





  BID BOWEN   Administration


 


Folic Acid  1 mg  07/06/19 10:00  07/25/19 09:48





  Folvite  PO   1 mg





  QDAY BOWEN   Administration


 


Heparin Sodium (Porcine)  3,000 unit  07/12/19 09:36  07/24/19 11:43





  Heparin 10,000 Units/10 Ml  IV   3,000 unit





  VEE PRN   Administration





  hemodialysis  


 


Hydralazine HCl  10 mg  07/04/19 19:14  07/04/19 22:17





  Apresoline  IV   10 mg





  Q3H PRN   Administration





  Blood Pressure  


 


Hydromorphone HCl  0.5 mg  07/04/19 17:22  07/26/19 03:37





  Dilaudid  IV   0.5 mg





  Q3H PRN   Administration





  Pain , Severe (7-10)  


 


Chlorpromazine HCl 25 mg/  51 mls @ 100 mls/hr  07/13/19 12:26 





  Sodium Chloride  IV  





  Q4H PRN  





  Hiccups  


 


Sodium Chloride  1,000 mls @ 42 mls/hr  07/16/19 10:00  07/16/19 09:50





  Nacl 0.9% 1000 Ml  IV   42 mls/hr





  AS DIRECT BOWEN   Administration


 


Sodium Chloride  100 mls @ 999 mls/hr  07/24/19 09:12 





  Nacl 0.9%  IV  





  VEE PRN  





  Hypotension  


 


Metoclopramide HCl  5 mg  07/17/19 15:00 





  Reglan  IV  





  Q6H PRN  





  Nausea And Vomiting  


 


Metoprolol Tartrate  50 mg  07/04/19 23:00  07/25/19 22:14





  Lopressor  PO   50 mg





  BID BOWEN   Administration


 


Ondansetron HCl  4 mg  07/04/19 17:22 





  Zofran  IV  





  Q3H PRN  





  Nausea And Vomiting  


 


Oxycodone/Acetaminophen  1 tab  07/13/19 10:53  07/26/19 06:52





  Percocet 5/325  PO   1 tab





  Q6H PRN   Administration





  Pain, Moderate (4-6)  


 


Sodium Chloride  10 ml  07/04/19 22:00  07/25/19 22:14





  Sodium Chloride Flush Syringe 10 Ml  IV   10 ml





  BID BOWEN   Administration


 


Sodium Chloride  10 ml  07/04/19 17:22  07/18/19 14:14





  Sodium Chloride Flush Syringe 10 Ml  IV   10 ml





  PRN PRN   Administration





  LINE FLUSH

## 2019-07-26 NOTE — PROGRESS NOTE
Assessment and Plan








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 

lymphovascular invasion, 


- Chemotherapy port to be placed 7/16/19;  On discharge patient will follow 

hematology oncologist, Dr. Meade





Acute anemia with Drop in HCT 


- s/p transfused 3 units of PRBC, ordered FOBT, GI is following, d/w Dr. Joel, 

s/p colonoscopy





ESRD: s/p permacath, had emergent hemodialysis due to severe hyperkalemia 

started on on 7/4/19, place hernandez on 7/5/19 with very little output. Need outpt 

HD set up





Bilateral hydronephrosis: Placed on hernandez, consulted Urology, input 

noted==>discharge with hernandez 





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 

signet ring cells. outpt f/u





Malignant Ascites 


- due to suspected Colon cancer with mets to Omentum/Carcinomatosis suspected, 

which would be a very poor prognostic sign: consulted GI, input noted, s/p 

Paracentesis on 7/5/19, await cytology, CEA 17 ELEVATED, AFP ELEVATED, CA 19-9 -

normal 11





Positive blood cultures: Abx discontinued following ID eval as culture appears 

to be contaminant





Hyperkalemia: treated with HD, Nephrology is following





Acute Microcytic anemia, suspect cancer related until proven otherwise, follow 

h/h





Severe malnutrition: Dietitian consulted 





Hiccups- likely tumor related, on Thorazine.





DVT prophylaxis: On heparin and GI prophylaxis





Discharge when Outpatient Dialysis ready. Patient will discuss with surgery 

about port placement for chemotherapy. Outpatient follow up with oncologist 








Brief History


Patient is a 58 yo man with a history of hypertension and Asthma who presented 

to [facility] ED with urinary frequency, back pains, n/v, diarrhea and abd pains with 

distension x 1 week. He was diagnosis with Ascites and underwent a paracentesis.

he was found to have Rectal cancer with Mets. [facility] paperwork filled out.





* Initial labs: wbc 13.0 to 10.0, hgb 6.3 to 7.0 (mcv 60), na 132, k 9.5, cr 

  34.8, bun 129, bg 164, lipase 88


* CT abd/pelvis without contrast IMPRESSION: 1. There is ascites. There is 

  increased density in the omentum. Findings are concerning for carcinomatosis. 

  There is some mild adenopathy in the pelvis a possible pericolonic mass, 

  series 2 image 139. There is mild bilateral hydronephrosis. The exact cause is

  not determined by this study. There is minimal nephrolithiasis on the left. No

  ureteral calculi are identified however.


* Colonoscopy, There was a circumferential rectal tumor from the dentate line 

  extending to mid-proximal rectum.  There was severe stenosis related to c

  ircumferential tumor which was unable to be traversed.  The tumor was friable 

  to touch.   Multiple biopsies were obtained Impression:1. Rectal tumor from 

  dentate line to mid/proximal rectum (extent of exam due to severe stenosis 

  related to tumor).  Biopsies were obtained. Recommendations: follow-up 

  pathology, -surgery consult, -heme/onc following, -screen first degree 

  relatives for colorectal cancer, -will need colonoscopy in 3-6 months to rule 

  out proximal colon lesions.


* Path Rectal biopsy: poorly differentiated mucinous adenocarcinoma with signet 

  cells and lymphovascular invasion


* Path Ascites: malignant cells consistent with metastatic carcinoma





Hospitalist Physical


Gen: thin frial, ill appearing, NAD, Awake, Alert, Orientated 


HEENT: NCAT, EOMI, PERRL, OP Clear 


Neck: supple, no adenopathy, no thyromegaly, no JVD 


CVS/Heart: RRR, normal S1S2, pulses present bilaterally 


Chest/Lungs: CTA B, Symmetrical chest expansion, good air entry bilaterally 


GI/Abdomen: soft, abdomen distended, good bowel sounds, no guarding or rebound 


/Bladder: no suprapubic tenderness, no CVA or paraspinal tenderness 


Extermity/Skin: no c/c/e, no obvious rash 


MSK: FROM x 4 


Neuro: CN 2-12 grossly intact, no new focal deficits 


Psych: calm 














Subjective


Date of service: 07/26/19


Principal diagnosis: rectal ca


Interval history: 





Patient seen and examined.  Medical records and medication list reviewed.  


No acute event overnight noted by the RN.  


Patient denies any chest pain or difficulty breathing.  Patient is tolerating 

diet.  


Discussed plan of care at bedside with patient.


waiting for outpt Hd setup - but having difficulty to setup for multiple issues


Patient wants to pursue HD and doesnot want hospice 











Objective





- Constitutional


Vitals: 


                               Vital Signs - 12hr











  07/26/19 07/26/19 07/26/19





  03:37 04:07 05:04


 


Temperature   98.0 F


 


Pulse Rate   82


 


Respiratory 18 18 16





Rate   


 


Blood Pressure   112/79


 


O2 Sat by Pulse   99





Oximetry   














  07/26/19 07/26/19





  06:52 07:52


 


Temperature  


 


Pulse Rate  


 


Respiratory 18 16





Rate  


 


Blood Pressure  


 


O2 Sat by Pulse  





Oximetry  














- Labs


CBC & Chem 7: 


                                 07/24/19 06:30





                                 07/24/19 06:30

## 2019-07-26 NOTE — PROGRESS NOTE
Assessment and Plan





1. ESRD:


CKD has progressed to ESRD


CT abdomen showed mild bilateral hydronephosis.


Monitor renal function.


Renal prognosis is poor.


Avoid nephrotoxic agents.


Meds dosage based on GFR.


Hemodialysis: 7/4, 7/5, 7/6, 7/8, 7/10, 7/12, 7/15, 7/17, 7/19, 7/22, 7/24, 

today.





2. FEN:


Hyperkalemia, HD.


Anion gap MA, improved.


Monitor lytes.





3. Mild bilateral hydronephosis:


S/p hernandez catheter.


Evaluated by Urologist.





4. Rectal adenocarcinoma with malignant Ascites.


S/p Medport.


Followed by Heme-onc.





5. Anemia:


PRBC.


Monitor H/H.





6. HTN:


BP is controlled.





Difficulty in securing outpatient hemodialysis chair due to multiple issues.


Patient may have to come to the ER intermittently for dialysis.














Subjective


Date of service: 07/26/19


Principal diagnosis: colon ca


Interval history: 


Patient was seen and examined at the bedside.


No new complaint.








Objective





- Vital Signs


Vital signs: 


                               Vital Signs - 12hr











  07/25/19 07/25/19 07/25/19





  22:00 22:14 22:58


 


Temperature   


 


Pulse Rate  99 H 


 


Respiratory 19  19





Rate   


 


Respiratory 19  





Rate [Abdomen]   


 


Blood Pressure  132/80 


 


O2 Sat by Pulse   





Oximetry   














  07/25/19 07/25/19 07/26/19





  23:35 23:58 03:37


 


Temperature 97.7 F  


 


Pulse Rate 82  


 


Respiratory 18 17 18





Rate   


 


Respiratory   





Rate [Abdomen]   


 


Blood Pressure 126/84  


 


O2 Sat by Pulse 97  





Oximetry   














  07/26/19 07/26/19 07/26/19





  04:07 05:04 06:52


 


Temperature  98.0 F 


 


Pulse Rate  82 


 


Respiratory 18 16 18





Rate   


 


Respiratory   





Rate [Abdomen]   


 


Blood Pressure  112/79 


 


O2 Sat by Pulse  99 





Oximetry   














- General Appearance


General appearance: well-developed, appears stated age, other (no distress, R IJ

 tunnel catheter)


EENT: ATNC, PERRL, mucous membranes moist, hearing intact, vision intact


Neck: supple


Respiratory: Present: Clear to Ascultation


Cardiology: regular, S1S2, no murmurs


Gastrointestinal: normoactive bowel sounds, no tenderness, distended


Integumentary: no rash, warm and dry


Neurologic: no focal deficit, no asterixis, alert and oriented x3


Musculoskeletal: other (no edema)


Psychiatric: cooperative





- Lab





                                 07/24/19 06:30





                                 07/24/19 06:30


                             Most recent lab results











Calcium  9.0 mg/dL (8.4-10.2)   07/24/19  06:30    


 


Phosphorus  6.50 mg/dL (2.5-4.5)  H  07/15/19  06:57    


 


Magnesium  2.00 mg/dL (1.7-2.3)   07/05/19  04:52    


 


  161.7 mg/dL (0.1-20.0)  H  07/05/19  08:51    


 


  72 mmol/L  07/05/19  08:51    














Medications & Allergies





- Medications


Allergies/Adverse Reactions: 


                                    Allergies





No Known Allergies Allergy (Verified 07/04/19 12:02)


   








Home Medications: 


                                Home Medications











 Medication  Instructions  Recorded  Confirmed  Last Taken  Type


 


Prednisone [predniSONE 10 mg 10 mg PO .TAPER #1 tab.ds.pk 09/10/15 07/06/19 

Unknown Rx





(6-Day Pack, 21 Tabs)]     











Active Medications: 














Generic Name Dose Route Start Last Admin





  Trade Name Freq  PRN Reason Stop Dose Admin


 


Acetaminophen  650 mg  07/04/19 17:22  07/09/19 11:32





  Tylenol  PO   650 mg





  Q4H PRN   Administration





  Pain MILD(1-3)/Fever >100.5/HA  


 


Albuterol  2.5 mg  07/04/19 17:22 





  Proventil  IH  





  Q4HRT PRN  





  Shortness Of Breath  


 


Bisacodyl  15 mg  07/07/19 14:51 





  Dulcolax  PO  





  QDAY PRN  





  Constipation  


 


Chlorpromazine HCl  10 mg  07/09/19 15:20  07/25/19 18:55





  Thorazine  PO   10 mg





  Q6H PRN   Administration





  Hiccups  


 


Epoetin Tye  20,000 unit  07/12/19 09:36  07/24/19 13:41





  Procrit  SUB-Q   20,000 unit





  VEE PRN   Administration





  hemodialysis  


 


Famotidine  10 mg  07/12/19 22:00  07/25/19 22:14





  Pepcid  PO   10 mg





  BID BOWEN   Administration


 


Ferrous Sulfate  325 mg  07/10/19 22:00  07/25/19 22:14





  Feosol  PO   325 mg





  BID BOWNE   Administration


 


Folic Acid  1 mg  07/06/19 10:00  07/25/19 09:48





  Folvite  PO   1 mg





  QDAY BOWEN   Administration


 


Heparin Sodium (Porcine)  3,000 unit  07/12/19 09:36  07/24/19 11:43





  Heparin 10,000 Units/10 Ml  IV   3,000 unit





  VEE PRN   Administration





  hemodialysis  


 


Hydralazine HCl  10 mg  07/04/19 19:14  07/04/19 22:17





  Apresoline  IV   10 mg





  Q3H PRN   Administration





  Blood Pressure  


 


Hydromorphone HCl  0.5 mg  07/04/19 17:22  07/26/19 03:37





  Dilaudid  IV   0.5 mg





  Q3H PRN   Administration





  Pain , Severe (7-10)  


 


Chlorpromazine HCl 25 mg/  51 mls @ 100 mls/hr  07/13/19 12:26 





  Sodium Chloride  IV  





  Q4H PRN  





  Hiccups  


 


Sodium Chloride  1,000 mls @ 42 mls/hr  07/16/19 10:00  07/16/19 09:50





  Nacl 0.9% 1000 Ml  IV   42 mls/hr





  AS DIRECT BOWEN   Administration


 


Sodium Chloride  100 mls @ 999 mls/hr  07/26/19 09:28 





  Nacl 0.9%  IV  





  VEE PRN  





  Hypotension  


 


Metoclopramide HCl  5 mg  07/17/19 15:00 





  Reglan  IV  





  Q6H PRN  





  Nausea And Vomiting  


 


Metoprolol Tartrate  50 mg  07/04/19 23:00  07/25/19 22:14





  Lopressor  PO   50 mg





  BID BOWEN   Administration


 


Ondansetron HCl  4 mg  07/04/19 17:22 





  Zofran  IV  





  Q3H PRN  





  Nausea And Vomiting  


 


Oxycodone/Acetaminophen  1 tab  07/13/19 10:53  07/26/19 06:52





  Percocet 5/325  PO   1 tab





  Q6H PRN   Administration





  Pain, Moderate (4-6)  


 


Sodium Chloride  10 ml  07/04/19 22:00  07/25/19 22:14





  Sodium Chloride Flush Syringe 10 Ml  IV   10 ml





  BID BOWEN   Administration


 


Sodium Chloride  10 ml  07/04/19 17:22  07/18/19 14:14





  Sodium Chloride Flush Syringe 10 Ml  IV   10 ml





  PRN PRN   Administration





  LINE FLUSH

## 2019-07-27 LAB
BUN SERPL-MCNC: 27 MG/DL (ref 9–20)
BUN/CREAT SERPL: 2 %
CALCIUM SERPL-MCNC: 8.8 MG/DL (ref 8.4–10.2)
HEMOLYSIS INDEX: 16

## 2019-07-27 RX ADMIN — FOLIC ACID SCH MG: 1 TABLET ORAL at 10:50

## 2019-07-27 RX ADMIN — FERROUS SULFATE TAB 325 MG (65 MG ELEMENTAL FE) SCH MG: 325 (65 FE) TAB at 22:02

## 2019-07-27 RX ADMIN — Medication SCH ML: at 10:50

## 2019-07-27 RX ADMIN — METOPROLOL TARTRATE SCH: 50 TABLET, FILM COATED ORAL at 22:04

## 2019-07-27 RX ADMIN — METOPROLOL TARTRATE SCH MG: 50 TABLET, FILM COATED ORAL at 10:50

## 2019-07-27 RX ADMIN — HYDROMORPHONE HYDROCHLORIDE PRN MG: 1 INJECTION, SOLUTION INTRAMUSCULAR; INTRAVENOUS; SUBCUTANEOUS at 05:36

## 2019-07-27 RX ADMIN — FAMOTIDINE SCH MG: 10 TABLET ORAL at 22:02

## 2019-07-27 RX ADMIN — FERROUS SULFATE TAB 325 MG (65 MG ELEMENTAL FE) SCH MG: 325 (65 FE) TAB at 10:49

## 2019-07-27 RX ADMIN — Medication SCH ML: at 22:03

## 2019-07-27 RX ADMIN — FAMOTIDINE SCH MG: 10 TABLET ORAL at 10:49

## 2019-07-27 RX ADMIN — HYDROMORPHONE HYDROCHLORIDE PRN MG: 1 INJECTION, SOLUTION INTRAMUSCULAR; INTRAVENOUS; SUBCUTANEOUS at 18:38

## 2019-07-27 RX ADMIN — HYDROMORPHONE HYDROCHLORIDE PRN MG: 1 INJECTION, SOLUTION INTRAMUSCULAR; INTRAVENOUS; SUBCUTANEOUS at 13:26

## 2019-07-27 RX ADMIN — HYDROMORPHONE HYDROCHLORIDE PRN MG: 1 INJECTION, SOLUTION INTRAMUSCULAR; INTRAVENOUS; SUBCUTANEOUS at 22:15

## 2019-07-27 RX ADMIN — HYDROMORPHONE HYDROCHLORIDE PRN MG: 1 INJECTION, SOLUTION INTRAMUSCULAR; INTRAVENOUS; SUBCUTANEOUS at 10:17

## 2019-07-27 NOTE — PROGRESS NOTE
Assessment and Plan





1. ESRD:


CKD has progressed to ESRD.


Continue hemodialysis three times a week.


Monitor renal function.


Renal prognosis is poor.


Avoid nephrotoxic agents.


Meds dosage based on GFR.


Hemodialysis: 7/4, 7/5, 7/6, 7/8, 7/10, 7/12, 7/15, 7/17, 7/19, 7/22, 7/24, 

7/26.





2. FEN:


Hyperkalemia, HD.


Anion gap MA, improved.


Monitor lytes.





3. Mild bilateral hydronephosis:


S/p hernandez catheter.


Evaluated by Urologist.





4. Rectal adenocarcinoma with malignant Ascites.


S/p Medport.


Followed by Heme-onc.





5. Anemia:


PRBC.


Monitor H/H.





6. HTN:


BP is controlled.





Difficulty in securing outpatient hemodialysis chair due to multiple issues.


Patient may have to come to the ER intermittently for dialysis.














Subjective


Date of service: 07/27/19


Principal diagnosis: rectal ca


Interval history: 


Patient was seen and examined at the bedside.


No new complaint.








Objective





- Vital Signs


Vital signs: 


                               Vital Signs - 12hr











  07/27/19 07/27/19 07/27/19





  05:36 06:06 06:21


 


Temperature   97.8 F


 


Pulse Rate   82


 


Respiratory 18 18 18





Rate   


 


Blood Pressure   117/77


 


O2 Sat by Pulse   99





Oximetry   














  07/27/19





  10:50


 


Temperature 


 


Pulse Rate 89


 


Respiratory 





Rate 


 


Blood Pressure 106/75


 


O2 Sat by Pulse 





Oximetry 














- General Appearance


General appearance: well-developed, appears stated age, other (no distress, R IJ

tunnel catheter)


EENT: ATNC, PERRL, hearing intact, vision intact


Neck: supple


Respiratory: Present: Clear to Ascultation


Cardiology: regular, S1S2, no murmurs


Gastrointestinal: normoactive bowel sounds, no tenderness, distended, other 

(hernandez catheter)


Integumentary: no rash, warm and dry


Neurologic: no focal deficit, no asterixis, alert and oriented x3


Musculoskeletal: other (no edema)





- Lab





                                 07/24/19 06:30





                                 07/24/19 06:30


                             Most recent lab results











Calcium  9.0 mg/dL (8.4-10.2)   07/24/19  06:30    


 


Phosphorus  6.50 mg/dL (2.5-4.5)  H  07/15/19  06:57    


 


Magnesium  2.00 mg/dL (1.7-2.3)   07/05/19  04:52    


 


  161.7 mg/dL (0.1-20.0)  H  07/05/19  08:51    


 


  72 mmol/L  07/05/19  08:51    














Medications & Allergies





- Medications


Allergies/Adverse Reactions: 


                                    Allergies





No Known Allergies Allergy (Verified 07/04/19 12:02)


   








Home Medications: 


                                Home Medications











 Medication  Instructions  Recorded  Confirmed  Last Taken  Type


 


Prednisone [predniSONE 10 mg 10 mg PO .TAPER #1 tab.ds.pk 09/10/15 07/06/19 

Unknown Rx





(6-Day Pack, 21 Tabs)]     











Active Medications: 














Generic Name Dose Route Start Last Admin





  Trade Name Freq  PRN Reason Stop Dose Admin


 


Acetaminophen  650 mg  07/04/19 17:22  07/09/19 11:32





  Tylenol  PO   650 mg





  Q4H PRN   Administration





  Pain MILD(1-3)/Fever >100.5/HA  


 


Albuterol  2.5 mg  07/04/19 17:22 





  Proventil  IH  





  Q4HRT PRN  





  Shortness Of Breath  


 


Bisacodyl  15 mg  07/07/19 14:51 





  Dulcolax  PO  





  QDAY PRN  





  Constipation  


 


Chlorpromazine HCl  10 mg  07/09/19 15:20  07/25/19 18:55





  Thorazine  PO   10 mg





  Q6H PRN   Administration





  Hiccups  


 


Epoetin Tye  20,000 unit  07/12/19 09:36  07/26/19 14:54





  Procrit  SUB-Q   20,000 unit





  VEE PRN   Administration





  hemodialysis  


 


Famotidine  10 mg  07/12/19 22:00  07/27/19 10:49





  Pepcid  PO   10 mg





  BID BOWEN   Administration


 


Ferrous Sulfate  325 mg  07/10/19 22:00  07/27/19 10:49





  Feosol  PO   325 mg





  BID BOWEN   Administration


 


Folic Acid  1 mg  07/06/19 10:00  07/27/19 10:50





  Folvite  PO   1 mg





  QDAY BOWEN   Administration


 


Heparin Sodium (Porcine)  3,000 unit  07/12/19 09:36  07/26/19 14:15





  Heparin 10,000 Units/10 Ml  IV   3,000 unit





  VEE PRN   Administration





  hemodialysis  


 


Hydralazine HCl  10 mg  07/04/19 19:14  07/04/19 22:17





  Apresoline  IV   10 mg





  Q3H PRN   Administration





  Blood Pressure  


 


Hydromorphone HCl  0.5 mg  07/04/19 17:22  07/27/19 10:17





  Dilaudid  IV   0.5 mg





  Q3H PRN   Administration





  Pain , Severe (7-10)  


 


Chlorpromazine HCl 25 mg/  51 mls @ 100 mls/hr  07/13/19 12:26 





  Sodium Chloride  IV  





  Q4H PRN  





  Hiccups  


 


Sodium Chloride  1,000 mls @ 42 mls/hr  07/16/19 10:00  07/16/19 09:50





  Nacl 0.9% 1000 Ml  IV   42 mls/hr





  AS DIRECT BOWEN   Administration


 


Sodium Chloride  100 mls @ 999 mls/hr  07/26/19 09:28 





  Nacl 0.9%  IV  





  VEE PRN  





  Hypotension  


 


Metoclopramide HCl  5 mg  07/17/19 15:00 





  Reglan  IV  





  Q6H PRN  





  Nausea And Vomiting  


 


Metoprolol Tartrate  50 mg  07/04/19 23:00  07/27/19 10:50





  Lopressor  PO   50 mg





  BID BOWEN   Administration


 


Ondansetron HCl  4 mg  07/04/19 17:22 





  Zofran  IV  





  Q3H PRN  





  Nausea And Vomiting  


 


Oxycodone/Acetaminophen  1 tab  07/13/19 10:53  07/26/19 20:11





  Percocet 5/325  PO   1 tab





  Q6H PRN   Administration





  Pain, Moderate (4-6)  


 


Sodium Chloride  10 ml  07/04/19 22:00  07/27/19 10:50





  Sodium Chloride Flush Syringe 10 Ml  IV   10 ml





  BID BOWEN   Administration


 


Sodium Chloride  10 ml  07/04/19 17:22  07/18/19 14:14





  Sodium Chloride Flush Syringe 10 Ml  IV   10 ml





  PRN PRN   Administration





  LINE FLUSH

## 2019-07-27 NOTE — PROGRESS NOTE
Assessment and Plan








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 

lymphovascular invasion, 


- Chemotherapy port to be placed 7/16/19;  On discharge patient will follow 

hematology oncologist, Dr. Meade





Acute anemia with Drop in HCT 


- s/p transfused 3 units of PRBC, ordered FOBT, GI is following, d/w Dr. Joel, 

s/p colonoscopy





ESRD: s/p permacath, had emergent hemodialysis due to severe hyperkalemia 

started on on 7/4/19, place hernandez on 7/5/19 with very little output. Need outpt 

HD set up





Bilateral hydronephrosis: Placed on hernandez, consulted Urology, input 

noted==>discharge with hernandez 





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 

signet ring cells. outpt f/u





Malignant Ascites 


- due to suspected Colon cancer with mets to Omentum/Carcinomatosis suspected, 

which would be a very poor prognostic sign: consulted GI, input noted, s/p 

Paracentesis on 7/5/19, await cytology, CEA 17 ELEVATED, AFP ELEVATED, CA 19-9 -

normal 11





Positive blood cultures: Abx discontinued following ID eval as culture appears 

to be contaminant





Hyperkalemia: treated with HD, Nephrology is following





Acute Microcytic anemia, suspect cancer related until proven otherwise, follow 

h/h





Severe malnutrition: Dietitian consulted 





Hiccups- likely tumor related, on Thorazine.





DVT prophylaxis: On heparin and GI prophylaxis





Discharge when Outpatient Dialysis ready. Patient will discuss with surgery 

about port placement for chemotherapy. Outpatient follow up with oncologist. If 

no place for HD could be confirmed patient may needs to come to hospital for HD 

- currently he is refusing that option and wants to wait till Monday 








Brief History


Patient is a 58 yo man with a history of hypertension and Asthma who presented 

to [facility] ED with urinary frequency, back pains, n/v, diarrhea and abd pains with 

distension x 1 week. He was diagnosis with Ascites and underwent a paracentesis.

he was found to have Rectal cancer with Mets. [facility] paperwork filled out.





* Initial labs: wbc 13.0 to 10.0, hgb 6.3 to 7.0 (mcv 60), na 132, k 9.5, cr 

  34.8, bun 129, bg 164, lipase 88


* CT abd/pelvis without contrast IMPRESSION: 1. There is ascites. There is 

  increased density in the omentum. Findings are concerning for carcinomatosis. 

  There is some mild adenopathy in the pelvis a possible pericolonic mass, 

  series 2 image 139. There is mild bilateral hydronephrosis. The exact cause is

  not determined by this study. There is minimal nephrolithiasis on the left. No

  ureteral calculi are identified however.


* Colonoscopy, There was a circumferential rectal tumor from the dentate line 

  extending to mid-proximal rectum.  There was severe stenosis related to 

  circumferential tumor which was unable to be traversed.  The tumor was friable

  to touch.   Multiple biopsies were obtained Impression:1. Rectal tumor from 

  dentate line to mid/proximal rectum (extent of exam due to severe stenosis 

  related to tumor).  Biopsies were obtained. Recommendations: follow-up 

  pathology, -surgery consult, -heme/onc following, -screen first degree 

  relatives for colorectal cancer, -will need colonoscopy in 3-6 months to rule 

  out proximal colon lesions.


* Path Rectal biopsy: poorly differentiated mucinous adenocarcinoma with signet 

  cells and lymphovascular invasion


* Path Ascites: malignant cells consistent with metastatic carcinoma





Hospitalist Physical


Gen: thin frial, ill appearing, NAD, Awake, Alert, Orientated 


HEENT: NCAT, EOMI, PERRL, OP Clear 


Neck: supple, no adenopathy, no thyromegaly, no JVD 


CVS/Heart: RRR, normal S1S2, pulses present bilaterally 


Chest/Lungs: CTA B, Symmetrical chest expansion, good air entry bilaterally 


GI/Abdomen: soft, abdomen distended, good bowel sounds, no guarding or rebound 


/Bladder: no suprapubic tenderness, no CVA or paraspinal tenderness 


Extermity/Skin: no c/c/e, no obvious rash 


MSK: FROM x 4 


Neuro: CN 2-12 grossly intact, no new focal deficits 


Psych: calm 














Subjective


Date of service: 07/27/19


Principal diagnosis: rectal ca


Interval history: 





Patient seen and examined.  Medical records and medication list reviewed.  


No acute event overnight noted by the RN.  


Patient denies any chest pain or difficulty breathing.  Patient is tolerating 

diet.  


Discussed plan of care at bedside with patient.


waiting for outpt Hd setup - but having difficulty to setup for multiple issues


Patient wants to pursue HD and doesnot want hospice 











Objective





- Constitutional


Vitals: 


                               Vital Signs - 12hr











  07/26/19 07/26/19 07/26/19





  22:00 23:24 23:25


 


Temperature  97.5 F L 


 


Pulse Rate  87 


 


Respiratory  18 19





Rate   


 


Respiratory 20  





Rate [Abdomen]   


 


Blood Pressure  113/66 


 


O2 Sat by Pulse  98 





Oximetry   














  07/26/19 07/27/19 07/27/19





  23:55 05:36 06:06


 


Temperature   


 


Pulse Rate   


 


Respiratory 18 18 18





Rate   


 


Respiratory   





Rate [Abdomen]   


 


Blood Pressure   


 


O2 Sat by Pulse   





Oximetry   














  07/27/19





  06:21


 


Temperature 97.8 F


 


Pulse Rate 82


 


Respiratory 18





Rate 


 


Respiratory 





Rate [Abdomen] 


 


Blood Pressure 117/77


 


O2 Sat by Pulse 99





Oximetry 














- Labs


CBC & Chem 7: 


                                 07/24/19 06:30





                                 07/28/19 07:18

## 2019-07-28 LAB
ANISOCYTOSIS BLD QL SMEAR: (no result)
BAND NEUTROPHILS # (MANUAL): 0 K/MM3
BUN SERPL-MCNC: 31 MG/DL (ref 9–20)
BUN/CREAT SERPL: 2 %
CALCIUM SERPL-MCNC: 9.2 MG/DL (ref 8.4–10.2)
HCT VFR BLD CALC: 29.8 % (ref 35.5–45.6)
HEMOLYSIS INDEX: 15
HGB BLD-MCNC: 8.9 GM/DL (ref 11.8–15.2)
MCHC RBC AUTO-ENTMCNC: 30 % (ref 32–34)
MCV RBC AUTO: 77 FL (ref 84–94)
MYELOCYTES # (MANUAL): 0 K/MM3
PLATELET # BLD: 399 K/MM3 (ref 140–440)
POIKILOCYTOSIS BLD QL SMEAR: (no result)
PROMYELOCYTES # (MANUAL): 0 K/MM3
RBC # BLD AUTO: 3.89 M/MM3 (ref 3.65–5.03)
TARGETS BLD QL SMEAR: (no result)
TOTAL CELLS COUNTED BLD: 100

## 2019-07-28 RX ADMIN — HYDROMORPHONE HYDROCHLORIDE PRN MG: 1 INJECTION, SOLUTION INTRAMUSCULAR; INTRAVENOUS; SUBCUTANEOUS at 18:01

## 2019-07-28 RX ADMIN — OXYCODONE AND ACETAMINOPHEN PRN TAB: 5; 325 TABLET ORAL at 14:04

## 2019-07-28 RX ADMIN — FOLIC ACID SCH MG: 1 TABLET ORAL at 09:00

## 2019-07-28 RX ADMIN — Medication SCH ML: at 21:21

## 2019-07-28 RX ADMIN — METOPROLOL TARTRATE SCH: 50 TABLET, FILM COATED ORAL at 21:20

## 2019-07-28 RX ADMIN — FERROUS SULFATE TAB 325 MG (65 MG ELEMENTAL FE) SCH MG: 325 (65 FE) TAB at 09:00

## 2019-07-28 RX ADMIN — FAMOTIDINE SCH MG: 10 TABLET ORAL at 09:00

## 2019-07-28 RX ADMIN — FAMOTIDINE SCH: 10 TABLET ORAL at 21:21

## 2019-07-28 RX ADMIN — FERROUS SULFATE TAB 325 MG (65 MG ELEMENTAL FE) SCH: 325 (65 FE) TAB at 21:20

## 2019-07-28 RX ADMIN — OXYCODONE AND ACETAMINOPHEN PRN TAB: 5; 325 TABLET ORAL at 08:39

## 2019-07-28 RX ADMIN — METOPROLOL TARTRATE SCH MG: 50 TABLET, FILM COATED ORAL at 09:05

## 2019-07-28 RX ADMIN — HYDROMORPHONE HYDROCHLORIDE PRN MG: 1 INJECTION, SOLUTION INTRAMUSCULAR; INTRAVENOUS; SUBCUTANEOUS at 21:16

## 2019-07-28 RX ADMIN — Medication SCH ML: at 14:05

## 2019-07-28 NOTE — HEM/ONC PROGRESS NOTE
Assessment and Plan





1.  Radiology shows carcinomatosis.  The patient has anemia.  MCV is low.  


2.  Anemia, low iron, B12 normal, folate low.  renal impairement may have a role


3.  h/o Thrombocytosis, likely reactive.


4.  History of hiccups.  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 

involvement.


5.  Renal failure.  Nephrology following.


6.  Hyperkalemia status post treatment.


7.  History of hypertension.





I will follow the patient during inpatient stay.    GI team has seen the 

patient.





CEA - 17


CA 19-9 - normal 11





colonoscopy - Rectal tumor from dentate line to mid/proximal rectum (extent of 

exam due to severe stenosis related to tumor)





based on ascites and omental lesions -this is likely metastatic ca


as per Dr Fung - he said no ascites fluid is available in the lab


d/w pt reg stage IV - chemo 


d/w pt reg non curative nature 








port placed





d/w pt reg OP chemo - [employer]ML[employer] -  - HD


pt aware that this is aggresive tumor - FOLFOX q 2 weeks as OP - dose 

modification for HD








7/28/2019 - d/w pt that I will f/u in clinic - however if there is a delay of 

insurance then [facility] is an option


pt aware of clinical suspicion of stage IV cancer due to hiccough - ascites - CT

showing peritoneal nodules and signet ring features of bx


pt will call [facility] for apt as well as see me in clinic


iv iron 7/28





- Patient Problems


(1) Ascites


Current Visit: Yes   Status: Chronic   





Subjective


Date of service: 07/28/19


Principal diagnosis: rectal ca


Interval history: 





aiting for op hd





Objective





- Exam


Narrative Exam: 





Pain  - none


General appearance  no acute distress


Performance status limited self care


Eyes - no icterus


ENT  no thrush





LNs  cervical  not palpable


Neck  - normal ROM


Respiratory  Normal


Breath sounds - CTA





CVS  S1 S2 +


Extremities  normal temperature


General GI  Soft - distended


Rectal  deferred


male  - deferred


Skin  warm -PORT +


Musculoskeletal  moving normal


Neurologically  no focal deficit





- Constitutional


Vitals: 


                                Last Vital Signs











Temp  98.2 F   07/28/19 05:40


 


Pulse  84   07/28/19 05:40


 


Resp  17   07/28/19 05:40


 


BP  104/73   07/28/19 05:40


 


Pulse Ox  100   07/28/19 05:40














- Labs


Lab Results: 


                         Laboratory Results - last 24 hr











  07/27/19





  20:25


 


Sodium  136 L


 


Potassium  5.3 H


 


Chloride  92.7 L


 


Carbon Dioxide  23


 


Anion Gap  26


 


BUN  27 H


 


Creatinine  11.6 H


 


Estimated GFR  6


 


BUN/Creatinine Ratio  2


 


Glucose  103 H


 


Calcium  8.8














Medications & Allergies





- Medications


Allergies/Adverse Reactions: 


                                    Allergies





No Known Allergies Allergy (Verified 07/04/19 12:02)


   








Home Medications: 


                                Home Medications











 Medication  Instructions  Recorded  Confirmed  Last Taken  Type


 


Prednisone [predniSONE 10 mg 10 mg PO .TAPER #1 tab.ds.pk 09/10/15 07/06/19 

Unknown Rx





(6-Day Pack, 21 Tabs)]     











Active Medications: 














Generic Name Dose Route Start Last Admin





  Trade Name Freq  PRN Reason Stop Dose Admin


 


Acetaminophen  650 mg  07/04/19 17:22  07/09/19 11:32





  Tylenol  PO   650 mg





  Q4H PRN   Administration





  Pain MILD(1-3)/Fever >100.5/HA  


 


Albuterol  2.5 mg  07/04/19 17:22 





  Proventil  IH  





  Q4HRT PRN  





  Shortness Of Breath  


 


Bisacodyl  15 mg  07/07/19 14:51 





  Dulcolax  PO  





  QDAY PRN  





  Constipation  


 


Chlorpromazine HCl  10 mg  07/09/19 15:20  07/25/19 18:55





  Thorazine  PO   10 mg





  Q6H PRN   Administration





  Hiccups  


 


Epoetin Tye  20,000 unit  07/12/19 09:36  07/26/19 14:54





  Procrit  SUB-Q   20,000 unit





  VEE PRN   Administration





  hemodialysis  


 


Famotidine  10 mg  07/12/19 22:00  07/27/19 22:02





  Pepcid  PO   10 mg





  BID BOWEN   Administration


 


Ferrous Sulfate  325 mg  07/10/19 22:00  07/27/19 22:02





  Feosol  PO   325 mg





  BID BOWEN   Administration


 


Folic Acid  1 mg  07/06/19 10:00  07/27/19 10:50





  Folvite  PO   1 mg





  QDAY BOWEN   Administration


 


Heparin Sodium (Porcine)  3,000 unit  07/12/19 09:36  07/26/19 14:15





  Heparin 10,000 Units/10 Ml  IV   3,000 unit





  VEE PRN   Administration





  hemodialysis  


 


Hydralazine HCl  10 mg  07/04/19 19:14  07/04/19 22:17





  Apresoline  IV   10 mg





  Q3H PRN   Administration





  Blood Pressure  


 


Hydromorphone HCl  0.5 mg  07/04/19 17:22  07/27/19 22:15





  Dilaudid  IV   0.5 mg





  Q3H PRN   Administration





  Pain , Severe (7-10)  


 


Chlorpromazine HCl 25 mg/  51 mls @ 100 mls/hr  07/13/19 12:26 





  Sodium Chloride  IV  





  Q4H PRN  





  Hiccups  


 


Sodium Chloride  100 mls @ 999 mls/hr  07/26/19 09:28 





  Nacl 0.9%  IV  





  VEE PRN  





  Hypotension  


 


Metoclopramide HCl  5 mg  07/17/19 15:00 





  Reglan  IV  





  Q6H PRN  





  Nausea And Vomiting  


 


Metoprolol Tartrate  50 mg  07/04/19 23:00  07/27/19 22:04





  Lopressor  PO   Not Given





  BID BOWEN  


 


Ondansetron HCl  4 mg  07/04/19 17:22 





  Zofran  IV  





  Q3H PRN  





  Nausea And Vomiting  


 


Oxycodone/Acetaminophen  1 tab  07/13/19 10:53  07/26/19 20:11





  Percocet 5/325  PO   1 tab





  Q6H PRN   Administration





  Pain, Moderate (4-6)  


 


Sodium Chloride  10 ml  07/04/19 22:00  07/27/19 22:03





  Sodium Chloride Flush Syringe 10 Ml  IV   10 ml





  BID BOWEN   Administration


 


Sodium Chloride  10 ml  07/04/19 17:22  07/18/19 14:14





  Sodium Chloride Flush Syringe 10 Ml  IV   10 ml





  PRN PRN   Administration





  LINE FLUSH

## 2019-07-28 NOTE — PROGRESS NOTE
Assessment and Plan








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 

lymphovascular invasion, 


- Chemotherapy port to be placed 7/16/19;  On discharge patient will follow 

hematology oncologist, Dr. Meade





Acute anemia with Drop in HCT 


- s/p transfused 3 units of PRBC, ordered FOBT, GI is following, d/w Dr. Joel, 

s/p colonoscopy





ESRD: s/p permacath, had emergent hemodialysis due to severe hyperkalemia 

started on on 7/4/19, place hernandez on 7/5/19 with very little output. Need outpt 

HD set up





Bilateral hydronephrosis: Placed on hernandez, consulted Urology, input 

noted==>discharge with hernandez 





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 

signet ring cells. outpt f/u





Malignant Ascites 


- due to suspected Colon cancer with mets to Omentum/Carcinomatosis suspected, 

which would be a very poor prognostic sign: consulted GI, input noted, s/p 

Paracentesis on 7/5/19, await cytology, CEA 17 ELEVATED, AFP ELEVATED, CA 19-9 -

normal 11





Positive blood cultures: Abx discontinued following ID eval as culture appears 

to be contaminant





Hyperkalemia: treated with HD, Nephrology is following





Acute Microcytic anemia, suspect cancer related until proven otherwise, follow 

h/h





Severe malnutrition: Dietitian consulted 





Hiccups- likely tumor related, on Thorazine.





DVT prophylaxis: On heparin and GI prophylaxis





Discharge when Outpatient Dialysis ready. Patient will discuss with surgery 

about port placement for chemotherapy. Outpatient follow up with oncologist. If 

no place for HD could be confirmed patient may needs to come to hospital for HD 

- currently he is refusing that option and wants to wait till Monday 








Brief History


Patient is a 58 yo man with a history of hypertension and Asthma who presented 

to [facility] ED with urinary frequency, back pains, n/v, diarrhea and abd pains with 

distension x 1 week. He was diagnosis with Ascites and underwent a paracentesis.

he was found to have Rectal cancer with Mets. [facility] paperwork filled out.





* Initial labs: wbc 13.0 to 10.0, hgb 6.3 to 7.0 (mcv 60), na 132, k 9.5, cr 

  34.8, bun 129, bg 164, lipase 88


* CT abd/pelvis without contrast IMPRESSION: 1. There is ascites. There is 

  increased density in the omentum. Findings are concerning for carcinomatosis. 

  There is some mild adenopathy in the pelvis a possible pericolonic mass, 

  series 2 image 139. There is mild bilateral hydronephrosis. The exact cause is

  not determined by this study. There is minimal nephrolithiasis on the left. No

  ureteral calculi are identified however.


* Colonoscopy, There was a circumferential rectal tumor from the dentate line 

  extending to mid-proximal rectum.  There was severe stenosis related to 

  circumferential tumor which was unable to be traversed.  The tumor was friable

  to touch.   Multiple biopsies were obtained Impression:1. Rectal tumor from 

  dentate line to mid/proximal rectum (extent of exam due to severe stenosis 

  related to tumor).  Biopsies were obtained. Recommendations: follow-up 

  pathology, -surgery consult, -heme/onc following, -screen first degree 

  relatives for colorectal cancer, -will need colonoscopy in 3-6 months to rule 

  out proximal colon lesions.


* Path Rectal biopsy: poorly differentiated mucinous adenocarcinoma with signet 

  cells and lymphovascular invasion


* Path Ascites: malignant cells consistent with metastatic carcinoma





Hospitalist Physical


Gen: thin frial, ill appearing, NAD, Awake, Alert, Orientated 


HEENT: NCAT, EOMI, PERRL, OP Clear 


Neck: supple, no adenopathy, no thyromegaly, no JVD 


CVS/Heart: RRR, normal S1S2, pulses present bilaterally 


Chest/Lungs: CTA B, Symmetrical chest expansion, good air entry bilaterally 


GI/Abdomen: soft, abdomen distended, good bowel sounds, no guarding or rebound 


/Bladder: no suprapubic tenderness, no CVA or paraspinal tenderness 


Extermity/Skin: no c/c/e, no obvious rash 


MSK: FROM x 4 


Neuro: CN 2-12 grossly intact, no new focal deficits 


Psych: calm 














Subjective


Date of service: 07/28/19


Principal diagnosis: rectal ca


Interval history: 





Patient seen and examined.  Medical records and medication list reviewed.  


No acute event overnight noted by the RN.  


Patient denies any chest pain or difficulty breathing.  Patient is tolerating 

diet.  


Discussed plan of care at bedside with patient.


waiting for outpt Hd setup - but having difficulty to setup for multiple issues


Patient wants to pursue HD and doesnot want hospice 











Objective





- Constitutional


Vitals: 


                               Vital Signs - 12hr











  07/28/19 07/28/19 07/28/19





  05:40 09:05 11:07


 


Temperature 98.2 F  98.5 F


 


Pulse Rate 84  75


 


Respiratory 17  18





Rate   


 


Blood Pressure 104/73 109/76 112/73


 


O2 Sat by Pulse 100  100





Oximetry   














- Labs


CBC & Chem 7: 


                                 07/28/19 15:03





                                 07/28/19 07:18


Labs: 


                              Abnormal lab results











  07/27/19 07/28/19 Range/Units





  20:25 07:18 


 


Sodium  136 L  136 L  (137-145)  mmol/L


 


Potassium  5.3 H   (3.6-5.0)  mmol/L


 


Chloride  92.7 L  92.7 L  ()  mmol/L


 


BUN  27 H  31 H  (9-20)  mg/dL


 


Creatinine  11.6 H  13.1 H  (0.8-1.5)  mg/dL


 


Glucose  103 H   ()  mg/dL

## 2019-07-28 NOTE — PROGRESS NOTE
Assessment and Plan





1. ESRD:


CKD has progressed to ESRD.


Continue hemodialysis three times a week.


Monitor renal function.


Renal prognosis is poor.


Avoid nephrotoxic agents.


Meds dosage based on GFR.


Hemodialysis: 7/4, 7/5, 7/6, 7/8, 7/10, 7/12, 7/15, 7/17, 7/19, 7/22, 7/24, 

7/26.





2. FEN:


Hyperkalemia, HD.


Anion gap MA, improved.


Monitor lytes.





3. Mild bilateral hydronephosis:


S/p hernandez catheter.


Evaluated by Urologist.





4. Rectal adenocarcinoma with malignant Ascites.


S/p Medport.


Followed by Heme-onc.





5. Anemia:


PRBC.


Monitor H/H.





6. HTN:


BP is controlled.





Difficulty in securing outpatient hemodialysis chair due to multiple issues.


Patient may have to come to the ER intermittently for dialysis.














Subjective


Date of service: 07/28/19


Principal diagnosis: rectal ca


Interval history: 


Patient was seen and examined at the bedside.


No new complaint.








Objective





- Vital Signs


Vital signs: 


                               Vital Signs - 12hr











  07/28/19 07/28/19





  05:40 09:05


 


Temperature 98.2 F 


 


Pulse Rate 84 


 


Respiratory 17 





Rate  


 


Blood Pressure 104/73 109/76


 


O2 Sat by Pulse 100 





Oximetry  














- General Appearance


General appearance: well-developed, appears stated age, other (no distress, R IJ

Tunnel catheter)


EENT: ATNC, PERRL, hearing intact, vision intact


Neck: supple


Respiratory: Present: Clear to Ascultation


Cardiology: regular, S1S2, no murmurs


Gastrointestinal: normoactive bowel sounds, no tenderness, distended, other 

(hernandez catheter)


Integumentary: no rash, warm and dry


Neurologic: no focal deficit, no asterixis, alert and oriented x3


Musculoskeletal: other (no edema)





- Lab





                                 07/28/19 15:03





                                 07/28/19 07:18


                             Most recent lab results











Calcium  9.2 mg/dL (8.4-10.2)   07/28/19  07:18    


 


Phosphorus  6.50 mg/dL (2.5-4.5)  H  07/15/19  06:57    


 


Magnesium  2.00 mg/dL (1.7-2.3)   07/05/19  04:52    


 


  161.7 mg/dL (0.1-20.0)  H  07/05/19  08:51    


 


  72 mmol/L  07/05/19  08:51    














Medications & Allergies





- Medications


Allergies/Adverse Reactions: 


                                    Allergies





No Known Allergies Allergy (Verified 07/04/19 12:02)


   








Home Medications: 


                                Home Medications











 Medication  Instructions  Recorded  Confirmed  Last Taken  Type


 


Prednisone [predniSONE 10 mg 10 mg PO .TAPER #1 tab.ds.pk 09/10/15 07/06/19 

Unknown Rx





(6-Day Pack, 21 Tabs)]     











Active Medications: 














Generic Name Dose Route Start Last Admin





  Trade Name Freq  PRN Reason Stop Dose Admin


 


Acetaminophen  650 mg  07/04/19 17:22  07/09/19 11:32





  Tylenol  PO   650 mg





  Q4H PRN   Administration





  Pain MILD(1-3)/Fever >100.5/HA  


 


Albuterol  2.5 mg  07/04/19 17:22 





  Proventil  IH  





  Q4HRT PRN  





  Shortness Of Breath  


 


Bisacodyl  15 mg  07/07/19 14:51 





  Dulcolax  PO  





  QDAY PRN  





  Constipation  


 


Chlorpromazine HCl  10 mg  07/09/19 15:20  07/25/19 18:55





  Thorazine  PO   10 mg





  Q6H PRN   Administration





  Hiccups  


 


Epoetin Tye  20,000 unit  07/12/19 09:36  07/26/19 14:54





  Procrit  SUB-Q   20,000 unit





  VEE PRN   Administration





  hemodialysis  


 


Famotidine  10 mg  07/12/19 22:00  07/28/19 09:00





  Pepcid  PO   10 mg





  BID BOWEN   Administration


 


Ferrous Sulfate  325 mg  07/10/19 22:00  07/28/19 09:00





  Feosol  PO   325 mg





  BID BOWEN   Administration


 


Folic Acid  1 mg  07/06/19 10:00  07/28/19 09:00





  Folvite  PO   1 mg





  QDAY BOWEN   Administration


 


Heparin Sodium (Porcine)  3,000 unit  07/12/19 09:36  07/26/19 14:15





  Heparin 10,000 Units/10 Ml  IV   3,000 unit





  VEE PRN   Administration





  hemodialysis  


 


Hydralazine HCl  10 mg  07/04/19 19:14  07/04/19 22:17





  Apresoline  IV   10 mg





  Q3H PRN   Administration





  Blood Pressure  


 


Hydromorphone HCl  0.5 mg  07/04/19 17:22  07/27/19 22:15





  Dilaudid  IV   0.5 mg





  Q3H PRN   Administration





  Pain , Severe (7-10)  


 


Chlorpromazine HCl 25 mg/  51 mls @ 100 mls/hr  07/13/19 12:26 





  Sodium Chloride  IV  





  Q4H PRN  





  Hiccups  


 


Sodium Chloride  100 mls @ 999 mls/hr  07/26/19 09:28 





  Nacl 0.9%  IV  





  VEE PRN  





  Hypotension  


 


Metoclopramide HCl  5 mg  07/17/19 15:00 





  Reglan  IV  





  Q6H PRN  





  Nausea And Vomiting  


 


Metoprolol Tartrate  50 mg  07/04/19 23:00  07/28/19 09:05





  Lopressor  PO   50 mg





  BID BOWEN   Administration


 


Ondansetron HCl  4 mg  07/04/19 17:22 





  Zofran  IV  





  Q3H PRN  





  Nausea And Vomiting  


 


Oxycodone/Acetaminophen  1 tab  07/13/19 10:53  07/28/19 08:39





  Percocet 5/325  PO   1 tab





  Q6H PRN   Administration





  Pain, Moderate (4-6)  


 


Sodium Chloride  10 ml  07/04/19 22:00  07/27/19 22:03





  Sodium Chloride Flush Syringe 10 Ml  IV   10 ml





  BID BOWEN   Administration


 


Sodium Chloride  10 ml  07/04/19 17:22  07/18/19 14:14





  Sodium Chloride Flush Syringe 10 Ml  IV   10 ml





  PRN PRN   Administration





  LINE FLUSH

## 2019-07-29 PROCEDURE — 5A1D70Z PERFORMANCE OF URINARY FILTRATION, INTERMITTENT, LESS THAN 6 HOURS PER DAY: ICD-10-PCS | Performed by: INTERNAL MEDICINE

## 2019-07-29 RX ADMIN — METOPROLOL TARTRATE SCH MG: 50 TABLET, FILM COATED ORAL at 22:16

## 2019-07-29 RX ADMIN — FOLIC ACID SCH MG: 1 TABLET ORAL at 10:50

## 2019-07-29 RX ADMIN — HYDROMORPHONE HYDROCHLORIDE PRN MG: 1 INJECTION, SOLUTION INTRAMUSCULAR; INTRAVENOUS; SUBCUTANEOUS at 17:41

## 2019-07-29 RX ADMIN — Medication SCH ML: at 10:00

## 2019-07-29 RX ADMIN — OXYCODONE AND ACETAMINOPHEN PRN TAB: 5; 325 TABLET ORAL at 09:46

## 2019-07-29 RX ADMIN — FERROUS SULFATE TAB 325 MG (65 MG ELEMENTAL FE) SCH MG: 325 (65 FE) TAB at 22:16

## 2019-07-29 RX ADMIN — METOPROLOL TARTRATE SCH: 50 TABLET, FILM COATED ORAL at 10:00

## 2019-07-29 RX ADMIN — ERYTHROPOIETIN PRN UNIT: 20000 INJECTION, SOLUTION INTRAVENOUS; SUBCUTANEOUS at 15:14

## 2019-07-29 RX ADMIN — FERROUS SULFATE TAB 325 MG (65 MG ELEMENTAL FE) SCH MG: 325 (65 FE) TAB at 10:50

## 2019-07-29 RX ADMIN — FAMOTIDINE SCH MG: 10 TABLET ORAL at 10:49

## 2019-07-29 RX ADMIN — CHLORPROMAZINE HYDROCHLORIDE PRN MG: 10 TABLET, FILM COATED ORAL at 18:19

## 2019-07-29 RX ADMIN — HYDROMORPHONE HYDROCHLORIDE PRN MG: 1 INJECTION, SOLUTION INTRAMUSCULAR; INTRAVENOUS; SUBCUTANEOUS at 22:17

## 2019-07-29 RX ADMIN — Medication SCH ML: at 22:16

## 2019-07-29 RX ADMIN — HYDROMORPHONE HYDROCHLORIDE PRN MG: 1 INJECTION, SOLUTION INTRAMUSCULAR; INTRAVENOUS; SUBCUTANEOUS at 03:15

## 2019-07-29 RX ADMIN — FAMOTIDINE SCH MG: 10 TABLET ORAL at 22:15

## 2019-07-29 RX ADMIN — HEPARIN SODIUM PRN UNIT: 1000 INJECTION, SOLUTION INTRAVENOUS; SUBCUTANEOUS at 12:43

## 2019-07-29 NOTE — PROGRESS NOTE
Assessment and Plan








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 

lymphovascular invasion, 


- Chemotherapy port to be placed 7/16/19;  On discharge patient will follow 

hematology oncologist, Dr. Meade





Acute anemia with Drop in HCT 


- s/p transfused 3 units of PRBC, ordered FOBT, GI is following, d/w Dr. Joel, 

s/p colonoscopy





ESRD: s/p permacath, had emergent hemodialysis due to severe hyperkalemia 

started on on 7/4/19, place hernandez on 7/5/19 with very little output. Need outpt 

HD set up





Bilateral hydronephrosis: Placed on hernandez, consulted Urology, input 

noted==>discharge with hernandez 





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 

signet ring cells. outpt f/u





Malignant Ascites 


- due to suspected Colon cancer with mets to Omentum/Carcinomatosis suspected, 

which would be a very poor prognostic sign: consulted GI, input noted, s/p 

Paracentesis on 7/5/19, await cytology, CEA 17 ELEVATED, AFP ELEVATED, CA 19-9 -

normal 11





Positive blood cultures: Abx discontinued following ID eval as culture appears 

to be contaminant





Hyperkalemia: treated with HD, Nephrology is following





Acute Microcytic anemia, suspect cancer related until proven otherwise, follow 

h/h





Severe malnutrition: Dietitian consulted 





Hiccups- likely tumor related, on Thorazine.





DVT prophylaxis: On heparin and GI prophylaxis





Discharge when Outpatient Dialysis ready. Patient will discuss with surgery 

about port placement for chemotherapy. Outpatient follow up with oncologist. If 

no place for HD could be confirmed patient may needs to come to hospital for HD 

- currently he is refusing that option and wants to wait till Monday 








Brief History


Patient is a 58 yo man with a history of hypertension and Asthma who presented 

to [facility] ED with urinary frequency, back pains, n/v, diarrhea and abd pains with 

distension x 1 week. He was diagnosis with Ascites and underwent a paracentesis.

he was found to have Rectal cancer with Mets. [facility] paperwork filled out.





* Initial labs: wbc 13.0 to 10.0, hgb 6.3 to 7.0 (mcv 60), na 132, k 9.5, cr 

  34.8, bun 129, bg 164, lipase 88


* CT abd/pelvis without contrast IMPRESSION: 1. There is ascites. There is 

  increased density in the omentum. Findings are concerning for carcinomatosis. 

  There is some mild adenopathy in the pelvis a possible pericolonic mass, 

  series 2 image 139. There is mild bilateral hydronephrosis. The exact cause is

  not determined by this study. There is minimal nephrolithiasis on the left. No

  ureteral calculi are identified however.


* Colonoscopy, There was a circumferential rectal tumor from the dentate line 

  extending to mid-proximal rectum.  There was severe stenosis related to 

  circumferential tumor which was unable to be traversed.  The tumor was friable

  to touch.   Multiple biopsies were obtained Impression:1. Rectal tumor from 

  dentate line to mid/proximal rectum (extent of exam due to severe stenosis 

  related to tumor).  Biopsies were obtained. Recommendations: follow-up 

  pathology, -surgery consult, -heme/onc following, -screen first degree 

  relatives for colorectal cancer, -will need colonoscopy in 3-6 months to rule 

  out proximal colon lesions.


* Path Rectal biopsy: poorly differentiated mucinous adenocarcinoma with signet 

  cells and lymphovascular invasion


* Path Ascites: malignant cells consistent with metastatic carcinoma





Hospitalist Physical


Gen: thin frial, ill appearing, NAD, Awake, Alert, Orientated 


HEENT: NCAT, EOMI, PERRL, OP Clear 


Neck: supple, no adenopathy, no thyromegaly, no JVD 


CVS/Heart: RRR, normal S1S2, pulses present bilaterally 


Chest/Lungs: CTA B, Symmetrical chest expansion, good air entry bilaterally 


GI/Abdomen: soft, abdomen distended, good bowel sounds, no guarding or rebound 


/Bladder: no suprapubic tenderness, no CVA or paraspinal tenderness 


Extermity/Skin: no c/c/e, no obvious rash 


MSK: FROM x 4 


Neuro: CN 2-12 grossly intact, no new focal deficits 


Psych: calm 














Subjective


Date of service: 07/29/19


Principal diagnosis: rectal ca


Interval history: 





Patient seen and examined.  Medical records and medication list reviewed.  


No acute event overnight noted by the RN.  


Patient denies any chest pain or difficulty breathing.  Patient is tolerating 

diet.  


Discussed plan of care at bedside with patient.


waiting for outpt Hd setup - but having difficulty to setup for multiple issues


Patient wants to pursue HD and doesnot want hospice 











Objective





- Constitutional


Vitals: 


                               Vital Signs - 12hr











  07/29/19 07/29/19 07/29/19





  05:46 12:00 12:09


 


Temperature 97.8 F 98.8 F 98.8 F


 


Pulse Rate 88 82 84


 


Respiratory 16 16 19





Rate   


 


Blood Pressure 126/84 159/78 


 


Blood Pressure   115/78





[Left]   


 


O2 Sat by Pulse 98  98





Oximetry   














  07/29/19 07/29/19 07/29/19





  12:15 12:30 12:45


 


Temperature   


 


Pulse Rate 78 83 85


 


Respiratory   





Rate   


 


Blood Pressure 106/76 112/85 100/70


 


Blood Pressure   





[Left]   


 


O2 Sat by Pulse   





Oximetry   














  07/29/19 07/29/19





  13:00 13:15


 


Temperature  


 


Pulse Rate 81 82


 


Respiratory  





Rate  


 


Blood Pressure 121/81 121/58


 


Blood Pressure  





[Left]  


 


O2 Sat by Pulse  





Oximetry  














- Labs


CBC & Chem 7: 


                                 07/28/19 15:03





                                 07/28/19 07:18


Labs: 


                              Abnormal lab results











  07/28/19 Range/Units





  15:03 


 


WBC  12.5 H  (4.5-11.0)  K/mm3


 


Hgb  8.9 L  (11.8-15.2)  gm/dl


 


Hct  29.8 L  (35.5-45.6)  %


 


MCV  77 L  (84-94)  fl


 


MCH  23 L  (28-32)  pg


 


MCHC  30 L  (32-34)  %


 


RDW  30.9 H  (13.2-15.2)  %


 


Seg Neuts % (Manual)  87.0 H  (40.0-70.0)  %


 


Lymphocytes % (Manual)  9.0 L  (13.4-35.0)  %


 


Seg Neutrophils # Man  10.9 H  (1.8-7.7)  K/mm3


 


Lymphocytes # (Manual)  1.1 L  (1.2-5.4)  K/mm3

## 2019-07-29 NOTE — HEM/ONC PROGRESS NOTE
Assessment and Plan





1.  Rectal ca  - signet cell features - Radiology shows carcinomatosis.  The 

patient has anemia.  MCV is low.  


2.  Anemia, low iron, B12 normal, folate low.  renal impairement may have a role


3.  h/o Thrombocytosis, likely reactive.


4.  History of hiccups.  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 

involvement.


5.  Renal failure.  Nephrology following.


6.  Hyperkalemia status post treatment.


7.  History of hypertension.





I will follow the patient during inpatient stay.    GI team has seen the 

patient.





CEA - 17


CA 19-9 - normal 11





colonoscopy - Rectal tumor from dentate line to mid/proximal rectum (extent of 

exam due to severe stenosis related to tumor)





based on ascites and omental lesions -this is likely metastatic ca


as per Dr Fung - he said no ascites fluid is available in the lab


d/w pt reg stage IV - chemo 


d/w pt reg non curative nature 








port placed





d/w pt reg OP chemo - [employer]ML[employer] -  - HD


pt aware that this is aggresive tumor - FOLFOX q 2 weeks as OP - dose 

modification for HD








7/29/2019 - d/w pt that I will f/u in clinic - however if there is a delay of 

insurance then [facility] is an option


pt aware of clinical suspicion of stage IV cancer due to hiccough - ascites - CT

showing peritoneal nodules and signet ring features of bx


pt will call [facility] for apt as well as see me in clinic


iv iron 7/28





d/w brother, sister in law and girlfriend





- Patient Problems


(1) Ascites


Current Visit: Yes   Status: Chronic   





Subjective


Date of service: 07/29/19


Principal diagnosis: rectal ca


Interval history: 





ambulating





Objective





- Exam


Narrative Exam: 





Pain  - none


General appearance  no acute distress


Performance status limited self care


Eyes - no icterus


ENT  no thrush





LNs  cervical  not palpable


Neck  - normal ROM


Respiratory  Normal


Breath sounds - CTA





CVS  S1 S2 +


Extremities  normal temperature


General GI  Soft - distended


Rectal  deferred


male  - deferred


Skin  warm -PORT +


Musculoskeletal  moving normal


Neurologically  no focal deficit





- Constitutional


Vitals: 


                                Last Vital Signs











Temp  97.8 F   07/29/19 15:15


 


Pulse  81   07/29/19 15:15


 


Resp  16   07/29/19 15:15


 


BP  130/85   07/29/19 15:15


 


Pulse Ox  98   07/29/19 12:09














Medications & Allergies





- Medications


Allergies/Adverse Reactions: 


                                    Allergies





No Known Allergies Allergy (Verified 07/04/19 12:02)


   








Home Medications: 


                                Home Medications











 Medication  Instructions  Recorded  Confirmed  Last Taken  Type


 


Prednisone [predniSONE 10 mg 10 mg PO .TAPER #1 tab.ds.pk 09/10/15 07/06/19 

Unknown Rx





(6-Day Pack, 21 Tabs)]     











Active Medications: 














Generic Name Dose Route Start Last Admin





  Trade Name Freq  PRN Reason Stop Dose Admin


 


Acetaminophen  650 mg  07/04/19 17:22  07/09/19 11:32





  Tylenol  PO   650 mg





  Q4H PRN   Administration





  Pain MILD(1-3)/Fever >100.5/HA  


 


Albuterol  2.5 mg  07/04/19 17:22 





  Proventil  IH  





  Q4HRT PRN  





  Shortness Of Breath  


 


Bisacodyl  15 mg  07/07/19 14:51 





  Dulcolax  PO  





  QDAY PRN  





  Constipation  


 


Chlorpromazine HCl  10 mg  07/09/19 15:20  07/25/19 18:55





  Thorazine  PO   10 mg





  Q6H PRN   Administration





  Hiccups  


 


Epoetin Tye  20,000 unit  07/12/19 09:36  07/29/19 15:14





  Procrit  SUB-Q   20,000 unit





  VEE PRN   Administration





  hemodialysis  


 


Famotidine  10 mg  07/12/19 22:00  07/29/19 10:49





  Pepcid  PO   10 mg





  BID BOWEN   Administration


 


Ferrous Sulfate  325 mg  07/10/19 22:00  07/29/19 10:50





  Feosol  PO   325 mg





  BID BOWEN   Administration


 


Folic Acid  1 mg  07/06/19 10:00  07/29/19 10:50





  Folvite  PO   1 mg





  QDAY BOWEN   Administration


 


Heparin Sodium (Porcine)  3,000 unit  07/12/19 09:36  07/29/19 12:43





  Heparin 10,000 Units/10 Ml  IV   3,000 unit





  VEE PRN   Administration





  hemodialysis  


 


Hydralazine HCl  10 mg  07/04/19 19:14  07/04/19 22:17





  Apresoline  IV   10 mg





  Q3H PRN   Administration





  Blood Pressure  


 


Hydromorphone HCl  0.5 mg  07/04/19 17:22  07/29/19 03:15





  Dilaudid  IV   0.5 mg





  Q3H PRN   Administration





  Pain , Severe (7-10)  


 


Chlorpromazine HCl 25 mg/  51 mls @ 100 mls/hr  07/13/19 12:26 





  Sodium Chloride  IV  





  Q4H PRN  





  Hiccups  


 


Sodium Chloride  100 mls @ 999 mls/hr  07/26/19 09:28 





  Nacl 0.9%  IV  





  VEE PRN  





  Hypotension  


 


Metoclopramide HCl  5 mg  07/17/19 15:00 





  Reglan  IV  





  Q6H PRN  





  Nausea And Vomiting  


 


Metoprolol Tartrate  50 mg  07/04/19 23:00  07/28/19 21:20





  Lopressor  PO   Not Given





  BID BOWEN  


 


Ondansetron HCl  4 mg  07/04/19 17:22 





  Zofran  IV  





  Q3H PRN  





  Nausea And Vomiting  


 


Oxycodone/Acetaminophen  1 tab  07/13/19 10:53  07/29/19 09:46





  Percocet 5/325  PO   1 tab





  Q6H PRN   Administration





  Pain, Moderate (4-6)  


 


Sodium Chloride  10 ml  07/04/19 22:00  07/28/19 21:21





  Sodium Chloride Flush Syringe 10 Ml  IV   10 ml





  BID BOWEN   Administration


 


Sodium Chloride  10 ml  07/04/19 17:22  07/18/19 14:14





  Sodium Chloride Flush Syringe 10 Ml  IV   10 ml





  PRN PRN   Administration





  LINE FLUSH

## 2019-07-29 NOTE — PROGRESS NOTE
Assessment and Plan





1. ESRD:


CKD has progressed to ESRD.


Continue hemodialysis three times a week.


Monitor renal function.


Renal prognosis is poor.


Avoid nephrotoxic agents.


Meds dosage based on GFR.


Hemodialysis: 7/4, 7/5, 7/6, 7/8, 7/10, 7/12, 7/15, 7/17, 7/19, 7/22, 7/24, 

7/26, today.





2. FEN:


Hyperkalemia, HD.


Anion gap MA, improved.


Monitor lytes.





3. Mild bilateral hydronephosis:


S/p hernandez catheter.


Evaluated by Urologist.





4. Rectal adenocarcinoma with malignant Ascites.


S/p Medport.


Followed by Heme-onc.





5. Anemia:


PRBC.


On Epogen, limited choice other than transfusion.


Monitor H/H.





6. HTN:


BP is controlled.





Difficulty in securing outpatient hemodialysis chair due to multiple issues.


Patient may have to come to the ER intermittently for dialysis.














Subjective


Date of service: 07/29/19


Principal diagnosis: rectal ca


Interval history: 


Patient was seen and examined at the bedside.


No new complaint.








Objective





- Vital Signs


Vital signs: 


                               Vital Signs - 12hr











  07/28/19 07/28/19 07/29/19





  20:55 23:28 05:46


 


Temperature 98.5 F 98.6 F 97.8 F


 


Pulse Rate 77 80 88


 


Respiratory 18 18 16





Rate   


 


Blood Pressure 114/76 113/78 126/84


 


O2 Sat by Pulse 95 97 98





Oximetry   














- General Appearance


General appearance: well-developed, appears stated age, other (no distress, R IJ

tunnel catheter)


EENT: ATNC, PERRL, hearing intact, vision intact


Neck: supple


Respiratory: Present: Clear to Ascultation


Cardiology: regular, S1S2, no murmurs


Gastrointestinal: normoactive bowel sounds, no tenderness, no distended


Integumentary: no rash, warm and dry


Neurologic: no focal deficit, no asterixis, alert and oriented x3


Musculoskeletal: other (no edema)





- Lab





                                 07/28/19 15:03





                                 07/28/19 07:18


                             Most recent lab results











Calcium  9.2 mg/dL (8.4-10.2)   07/28/19  07:18    


 


Phosphorus  6.50 mg/dL (2.5-4.5)  H  07/15/19  06:57    


 


Magnesium  2.00 mg/dL (1.7-2.3)   07/05/19  04:52    


 


  161.7 mg/dL (0.1-20.0)  H  07/05/19  08:51    


 


  72 mmol/L  07/05/19  08:51    














Medications & Allergies





- Medications


Allergies/Adverse Reactions: 


                                    Allergies





No Known Allergies Allergy (Verified 07/04/19 12:02)


   








Home Medications: 


                                Home Medications











 Medication  Instructions  Recorded  Confirmed  Last Taken  Type


 


Prednisone [predniSONE 10 mg 10 mg PO .TAPER #1 tab.ds.pk 09/10/15 07/06/19 Unk

nown Rx





(6-Day Pack, 21 Tabs)]     











Active Medications: 














Generic Name Dose Route Start Last Admin





  Trade Name Freq  PRN Reason Stop Dose Admin


 


Acetaminophen  650 mg  07/04/19 17:22  07/09/19 11:32





  Tylenol  PO   650 mg





  Q4H PRN   Administration





  Pain MILD(1-3)/Fever >100.5/HA  


 


Albuterol  2.5 mg  07/04/19 17:22 





  Proventil  IH  





  Q4HRT PRN  





  Shortness Of Breath  


 


Bisacodyl  15 mg  07/07/19 14:51 





  Dulcolax  PO  





  QDAY PRN  





  Constipation  


 


Chlorpromazine HCl  10 mg  07/09/19 15:20  07/25/19 18:55





  Thorazine  PO   10 mg





  Q6H PRN   Administration





  Hiccups  


 


Epoetin Tye  20,000 unit  07/12/19 09:36  07/26/19 14:54





  Procrit  SUB-Q   20,000 unit





  VEE PRN   Administration





  hemodialysis  


 


Famotidine  10 mg  07/12/19 22:00  07/28/19 21:21





  Pepcid  PO   Not Given





  BID [facility]  


 


Ferrous Sulfate  325 mg  07/10/19 22:00  07/28/19 21:20





  Feosol  PO   Not Given





  BID BOWEN  


 


Folic Acid  1 mg  07/06/19 10:00  07/28/19 09:00





  Folvite  PO   1 mg





  QDAY BOWEN   Administration


 


Heparin Sodium (Porcine)  3,000 unit  07/12/19 09:36  07/26/19 14:15





  Heparin 10,000 Units/10 Ml  IV   3,000 unit





  VEE PRN   Administration





  hemodialysis  


 


Hydralazine HCl  10 mg  07/04/19 19:14  07/04/19 22:17





  Apresoline  IV   10 mg





  Q3H PRN   Administration





  Blood Pressure  


 


Hydromorphone HCl  0.5 mg  07/04/19 17:22  07/29/19 03:15





  Dilaudid  IV   0.5 mg





  Q3H PRN   Administration





  Pain , Severe (7-10)  


 


Chlorpromazine HCl 25 mg/  51 mls @ 100 mls/hr  07/13/19 12:26 





  Sodium Chloride  IV  





  Q4H PRN  





  Hiccups  


 


Sodium Chloride  100 mls @ 999 mls/hr  07/26/19 09:28 





  Nacl 0.9%  IV  





  VEE PRN  





  Hypotension  


 


Metoclopramide HCl  5 mg  07/17/19 15:00 





  Reglan  IV  





  Q6H PRN  





  Nausea And Vomiting  


 


Metoprolol Tartrate  50 mg  07/04/19 23:00  07/28/19 21:20





  Lopressor  PO   Not Given





  BID BOWEN  


 


Ondansetron HCl  4 mg  07/04/19 17:22 





  Zofran  IV  





  Q3H PRN  





  Nausea And Vomiting  


 


Oxycodone/Acetaminophen  1 tab  07/13/19 10:53  07/28/19 14:04





  Percocet 5/325  PO   1 tab





  Q6H PRN   Administration





  Pain, Moderate (4-6)  


 


Sodium Chloride  10 ml  07/04/19 22:00  07/28/19 21:21





  Sodium Chloride Flush Syringe 10 Ml  IV   10 ml





  BID BOWEN   Administration


 


Sodium Chloride  10 ml  07/04/19 17:22  07/18/19 14:14





  Sodium Chloride Flush Syringe 10 Ml  IV   10 ml





  PRN PRN   Administration





  LINE FLUSH

## 2019-07-30 RX ADMIN — HYDROMORPHONE HYDROCHLORIDE PRN MG: 1 INJECTION, SOLUTION INTRAMUSCULAR; INTRAVENOUS; SUBCUTANEOUS at 15:10

## 2019-07-30 RX ADMIN — FERROUS SULFATE TAB 325 MG (65 MG ELEMENTAL FE) SCH MG: 325 (65 FE) TAB at 10:04

## 2019-07-30 RX ADMIN — HYDROMORPHONE HYDROCHLORIDE PRN MG: 1 INJECTION, SOLUTION INTRAMUSCULAR; INTRAVENOUS; SUBCUTANEOUS at 03:43

## 2019-07-30 RX ADMIN — METOPROLOL TARTRATE SCH MG: 50 TABLET, FILM COATED ORAL at 22:30

## 2019-07-30 RX ADMIN — CHLORPROMAZINE HYDROCHLORIDE PRN MG: 10 TABLET, FILM COATED ORAL at 17:04

## 2019-07-30 RX ADMIN — FAMOTIDINE SCH MG: 10 TABLET ORAL at 10:04

## 2019-07-30 RX ADMIN — FAMOTIDINE SCH MG: 10 TABLET ORAL at 22:30

## 2019-07-30 RX ADMIN — FOLIC ACID SCH MG: 1 TABLET ORAL at 10:04

## 2019-07-30 RX ADMIN — HYDROMORPHONE HYDROCHLORIDE PRN MG: 1 INJECTION, SOLUTION INTRAMUSCULAR; INTRAVENOUS; SUBCUTANEOUS at 09:57

## 2019-07-30 RX ADMIN — METOPROLOL TARTRATE SCH: 50 TABLET, FILM COATED ORAL at 10:04

## 2019-07-30 RX ADMIN — Medication SCH ML: at 09:59

## 2019-07-30 RX ADMIN — FERROUS SULFATE TAB 325 MG (65 MG ELEMENTAL FE) SCH MG: 325 (65 FE) TAB at 22:30

## 2019-07-30 RX ADMIN — Medication SCH ML: at 22:32

## 2019-07-30 RX ADMIN — HYDROMORPHONE HYDROCHLORIDE PRN MG: 1 INJECTION, SOLUTION INTRAMUSCULAR; INTRAVENOUS; SUBCUTANEOUS at 22:50

## 2019-07-30 NOTE — PROGRESS NOTE
Assessment and Plan


Assessment and plan: 


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 

lymphovascular invasion, 


- Chemotherapy port to be placed 7/16/19;  On discharge patient will follow 

hematology oncologist, Dr. Medae





--Acute anemia with Drop in HCT 


Received total 3 units of PRBC, ordered FOBT, GI is following, d/w Dr. Joel, 

s/p colonoscopy





--ESRD: s/p permacath, had emergent hemodialysis due to severe hyperkalemia 

started on on 7/4/19, 


place [facility] on 7/5/19 with very little output. Need outpt HD set up





--Bilateral hydronephrosis: Placed on hernandez, consulted Urology, input 

noted,discharge with hernandez 





--Metastatic Rectal Cancer: Poorly differentiated mucinous adenocarcinoma with 

signet ring cells.


Oncology following. outpt f/u





--Malignant Ascites 


due to suspected Colon cancer with mets to Omentum/Carcinomatosis suspected, 


which would be a very poor prognostic sign: consulted GI, input noted, s/p 

Paracentesis on 7/5/19, 


await cytology, CEA 17 ELEVATED, AFP ELEVATED, CA 19-9 - normal 11





--Positive blood cultures: Abx discontinued following ID eval as culture appears

to be contaminant





--Hyperkalemia: treated with HD, Nephrology is following





--Acute Microcytic anemia, suspect cancer related until proven otherwise, follow

h/h





--Severe malnutrition: Dietitian consulted 





--Hiccups- likely tumor related, on Thorazine.





--DVT prophylaxis: On heparin and GI prophylaxis





Discharge when Outpatient Dialysis ready. Patient will discuss with surgery 

about port placement for chemotherapy. Outpatient follow up with oncologist. If 

no place for HD could be confirmed patient may needs to come to hospital for HD 

- currently he is refusing that option and wants to wait till Monday 








Brief History


Patient is a 58 yo man with a history of hypertension and Asthma who presented 

to [facility] ED with urinary frequency, back pains, n/v, diarrhea and abd pains with 

distension x 1 week. He was diagnosis with Ascites and underwent a paracentesis.

he was found to have Rectal cancer with Mets. [place] paperwork filled out.





* Initial labs: wbc 13.0 to 10.0, hgb 6.3 to 7.0 (mcv 60), na 132, k 9.5, cr 

  34.8, bun 129, bg 164, lipase 88


* CT abd/pelvis without contrast IMPRESSION: 1. There is ascites. There is 

  increased density in the omentum. Findings are concerning for carcinomatosis. 

  There is some mild adenopathy in the pelvis a possible pericolonic mass, 

  series 2 image 139. There is mild bilateral hydronephrosis. The exact cause is

  not determined by this study. There is minimal nephrolithiasis on the left. No

  ureteral calculi are identified however.


* Colonoscopy, There was a circumferential rectal tumor from the dentate line 

  extending to mid-proximal rectum.  There was severe stenosis related to 

  circumferential tumor which was unable to be traversed.  The tumor was friable

  to touch.   Multiple biopsies were obtained Impression:1. Rectal tumor from 

  dentate line to mid/proximal rectum (extent of exam due to severe stenosis 

  related to tumor).  Biopsies were obtained. Recommendations: follow-up 

  pathology, -surgery consult, -heme/onc following, -screen first degree re

  latives for colorectal cancer, -will need colonoscopy in 3-6 months to rule 

  out proximal colon lesions.


* Path Rectal biopsy: poorly differentiated mucinous adenocarcinoma with signet 

  cells and lymphovascular invasion


* Path Ascites: malignant cells consistent with metastatic carcinoma








History


Interval history: 


Patient seen and examined this afternoon in his room


Medical records reviewed


Patient complains of generalized weakness


Vital signs noted








Hospitalist Physical





- Constitutional


Vitals: 


                                        











Temp Pulse Resp BP Pulse Ox


 


 98.1 F   102 H  18   104/67   94 


 


 07/30/19 11:48  07/30/19 11:48  07/30/19 11:48  07/30/19 11:48  07/30/19 11:48











General appearance: Present: mild distress, cachectic, disheveled





- EENT


Eyes: Present: PERRL, EOM intact





- Respiratory


Respiratory effort: normal, labored


Respiratory: bilateral: diminished, negative: rales, rhonchi, wheezing





- Cardiovascular


Rhythm: regular


Heart Sounds: Present: S1 & S2





- Extremities


Extremities: no ischemia, No edema





- Abdominal


General gastrointestinal: soft, non-tender





- Integumentary


Integumentary: Present: clear, warm





- Psychiatric


Psychiatric: appropriate mood/affect





- Neurologic


Neurologic: moves all extremities





Results





- Labs


CBC & Chem 7: 


                                 07/28/19 15:03





                                 07/28/19 07:18


Labs: 


                             Laboratory Last Values











WBC  12.5 K/mm3 (4.5-11.0)  H  07/28/19  15:03    


 


RBC  3.89 M/mm3 (3.65-5.03)   07/28/19  15:03    


 


Hgb  8.9 gm/dl (11.8-15.2)  L  07/28/19  15:03    


 


Hct  29.8 % (35.5-45.6)  L  07/28/19  15:03    


 


MCV  77 fl (84-94)  L  07/28/19  15:03    


 


MCH  23 pg (28-32)  L  07/28/19  15:03    


 


MCHC  30 % (32-34)  L  07/28/19  15:03    


 


RDW  30.9 % (13.2-15.2)  H  07/28/19  15:03    


 


Plt Count  399 K/mm3 (140-440)   07/28/19  15:03    


 


Add Manual Diff  Complete   07/28/19  15:03    


 


Total Counted  100   07/28/19  15:03    


 


Seg Neuts % (Manual)  87.0 % (40.0-70.0)  H  07/28/19  15:03    


 


  0 %  07/28/19  15:03    


 


  9.0 % (13.4-35.0)  L  07/28/19  15:03    


 


Reactive Lymphs % (Man)  0 %  07/28/19  15:03    


 


  4.0 % (0.0-7.3)   07/28/19  15:03    


 


  0 % (0.0-4.3)   07/28/19  15:03    


 


  0 % (0.0-1.8)   07/28/19  15:03    


 


  0 %  07/28/19  15:03    


 


  0 %  07/28/19  15:03    


 


  0 %  07/28/19  15:03    


 


  0 %  07/28/19  15:03    


 


Nucleated RBC %  Not Reportable   07/28/19  15:03    


 


Seg Neutrophils # Man  10.9 K/mm3 (1.8-7.7)  H  07/28/19  15:03    


 


Band Neutrophils #  0.0 K/mm3  07/28/19  15:03    


 


  1.1 K/mm3 (1.2-5.4)  L  07/28/19  15:03    


 


Abs React Lymphs (Man)  0.0 K/mm3  07/28/19  15:03    


 


  0.5 K/mm3 (0.0-0.8)   07/28/19  15:03    


 


  0.0 K/mm3 (0.0-0.4)   07/28/19  15:03    


 


  0.0 K/mm3 (0.0-0.1)   07/28/19  15:03    


 


  0.0 K/mm3  07/28/19  15:03    


 


  0.0 K/mm3  07/28/19  15:03    


 


  0.0 K/mm3  07/28/19  15:03    


 


Blast Cells #  0.0 K/mm3  07/28/19  15:03    


 


WBC Morphology  Not Reportable   07/28/19  15:03    


 


Hypersegmented Neuts  Not Reportable   07/28/19  15:03    


 


Hyposegmented Neuts  Not Reportable   07/28/19  15:03    


 


Hypogranular Neuts  Not Reportable   07/28/19  15:03    


 


  Not Reportable   07/28/19  15:03    


 


  Not Reportable   07/28/19  15:03    


 


  Not Reportable   07/28/19  15:03    


 


  Not Reportable   07/28/19  15:03    


 


  Not Reportable   07/28/19  15:03    


 


  Not Reportable   07/28/19  15:03    


 


  Appears normal   07/28/19  15:03    


 


  Not Reportable   07/28/19  15:03    


 


Plt Clumps, EDTA  Not Reportable   07/28/19  15:03    


 


  Not Reportable   07/28/19  15:03    


 


  Not Reportable   07/28/19  15:03    


 


  Not Reportable   07/28/19  15:03    


 


Plt Morphology Comment  Not Reportable   07/28/19  15:03    


 


RBC Morphology  Not Reportable   07/28/19  15:03    


 


Dimorphic RBCs  Not Reportable   07/28/19  15:03    


 


  Not Reportable   07/28/19  15:03    


 


  Few   07/28/19  15:03    


 


  1+   07/28/19  15:03    


 


  2+   07/28/19  15:03    


 


  Not Reportable   07/28/19  15:03    


 


  Not Reportable   07/28/19  15:03    


 


  Not Reportable   07/28/19  15:03    


 


  Not Reportable   07/28/19  15:03    


 


  Not Reportable   07/28/19  15:03    


 


  1+   07/28/19  15:03    


 


  Not Reportable   07/28/19  15:03    


 


  Few   07/28/19  15:03    


 


  Not Reportable   07/28/19  15:03    


 


  Not Reportable   07/28/19  15:03    


 


  Not Reportable   07/28/19  15:03    


 


  Not Reportable   07/28/19  15:03    


 


  Not Reportable   07/28/19  15:03    


 


  Not Reportable   07/28/19  15:03    


 


  Few   07/28/19  15:03    


 


Acanthocytes (Spur)  Not Reportable   07/28/19  15:03    


 


Rouleaux  Not Reportable   07/28/19  15:03    


 


  Not Reportable   07/28/19  15:03    


 


  Not Reportable   07/28/19  15:03    


 


  Not Reportable   07/28/19  15:03    


 


Percent Retic  1.10 % (0.78-2.58)   07/05/19  04:52    


 


  Not Reportable   07/28/19  15:03    


 


Hem Pathologist Commnt  No   07/28/19  15:03    


 


PT  16.3 Sec. (12.2-14.9)  H  07/05/19  08:51    


 


INR  1.35  (0.87-1.13)  H  07/05/19  08:51    


 


Sodium  136 mmol/L (137-145)  L  07/28/19  07:18    


 


Potassium  4.9 mmol/L (3.6-5.0)   07/28/19  07:18    


 


Chloride  92.7 mmol/L ()  L  07/28/19  07:18    


 


Carbon Dioxide  22 mmol/L (22-30)   07/28/19  07:18    


 


  26 mmol/L  07/28/19  07:18    


 


BUN  31 mg/dL (9-20)  H  07/28/19  07:18    


 


  13.1 mg/dL (0.8-1.5)  H  07/28/19  07:18    


 


Estimated GFR  5 ml/min  07/28/19  07:18    


 


  2 %  07/28/19  07:18    


 


Glucose  86 mg/dL ()   07/28/19  07:18    


 


POC Glucose  103  ()   07/20/19  16:43    


 


Calcium  9.2 mg/dL (8.4-10.2)   07/28/19  07:18    


 


Phosphorus  6.50 mg/dL (2.5-4.5)  H  07/15/19  06:57    


 


Magnesium  2.00 mg/dL (1.7-2.3)   07/05/19  04:52    


 


Iron  20 ug/dL ()  L  07/05/19  08:51    


 


TIBC  219 mcg/dL (250-450)  L  07/05/19  08:51    


 


  63.8 ng/mL (13.0-400.0)   07/05/19  08:51    


 


  0.20 mg/dL (0.1-1.2)   07/10/19  04:43    


 


AST  11 units/L (5-40)   07/10/19  04:43    


 


ALT  12 units/L (7-56)   07/10/19  04:43    


 


  67 units/L ()   07/10/19  04:43    


 


  5.9 g/dL (6.1-8.1)  L  07/09/19  04:50    


 


  6.7 g/dL (6.3-8.2)   07/10/19  04:43    


 


  2.0 g/dL (3.9-5)  L  07/10/19  04:43    


 


  0.4 %  07/10/19  04:43    


 


  0.7 g/dL (0.2-0.3)  H  07/09/19  04:50    


 


  0.8 g/dL (0.5-0.9)   07/09/19  04:50    


 


  0.5 g/dL (0.2-0.5)   07/09/19  04:50    


 


  1.6 g/dL (0.8-1.7)   07/09/19  04:50    


 


Abnorm Protein Band 1  see below   07/09/19  04:50    


 


PEP Interpretation  see below  H  07/09/19  04:50    


 


  88 units/L (13-60)  H  07/04/19  12:47    


 


Tumor Marker AFP  See scanned result   07/05/19  08:51    


 


Carcinoembryonic Ag  17.4 ng/mL (0.0-2.4)  H  07/05/19  08:51    


 


  11 U/mL (<34)   07/05/19  08:51    


 


Vitamin B12  1768 pg/mL (211-911)  H  07/05/19  08:51    


 


  5.05 ng/mL (7.3-26.0)  L  07/05/19  08:51    


 


PTH Intact  238.5 pg/mL (15-65)  H  07/05/19  04:52    


 


  Yellow  (Yellow)   07/23/19  16:35    


 


  Turbid  (Clear)   07/23/19  16:35    


 


  5.0  (5.0-7.0)   07/23/19  16:35    


 


Ur Specific Gravity  1.024  (1.003-1.030)   07/23/19  16:35    


 


  100 mg/dl mg/dL (Negative)   07/23/19  16:35    


 


  Neg mg/dL (Negative)   07/23/19  16:35    


 


  Tr mg/dL (Negative)   07/23/19  16:35    


 


  Lg  (Negative)   07/23/19  16:35    


 


  Neg  (Negative)   07/23/19  16:35    


 


  Neg  (Negative)   07/23/19  16:35    


 


  < 2.0 mg/dL (<2.0)   07/23/19  16:35    


 


Ur Leukocyte Esterase  Mod  (Negative)   07/23/19  16:35    


 


  > 182.0 /HPF (0.0-6.0)  H  07/23/19  16:35    


 


  > 182.0 /HPF (0.0-6.0)   07/23/19  16:35    


 


  2+ /HPF (Negative)   07/23/19  16:35    


 


  2+ /HPF  07/23/19  16:35    


 


  161.7 mg/dL (0.1-20.0)  H  07/05/19  08:51    


 


  72 mmol/L  07/05/19  08:51    


 


Fluid Type  Ascitic   07/05/19  Unknown


 


Fluid Color  Yellow   07/05/19  Unknown


 


Fluid Appearance  Hazy   07/05/19  Unknown


 


Fluid WBC  58 /mm3  07/05/19  Unknown


 


Fluid RBC  1850 /mm3  07/05/19  Unknown


 


Fluid Diff Comment     07/05/19  Unknown


 


Fluid Seg Neutrophils  35.0 %  07/05/19  Unknown


 


Fluid Lymphocytes  14.0 %  07/05/19  Unknown


 


Fluid Reactive Lymphs  0 %  07/05/19  Unknown


 


Fluid Monocytes  51.0 %  07/05/19  Unknown


 


Fluid Eosinophils  0 %  07/05/19  Unknown


 


Fluid Basophils  0 %  07/05/19  Unknown


 


Fluid Glucose  93 mg/dL (40-70)  H  07/05/19  Unknown


 


Fluid Total Protein  4.6  (15.0-45.0)  L  07/05/19  Unknown


 


Fluid Albumin  1.5 g/dL  07/05/19  Unknown


 


Fluid LDH  195   07/05/19  Unknown


 


Random Vancomycin  13.1 ug/mL (0-40.0)   07/09/19  04:50    


 


EVELYN Screen  Negative  (Negative)   07/09/19  04:50    


 


Proteinase 3 (PR3) Ab  <1.0 AI (<1.0)   07/09/19  04:50    


 


Myeloperoxidase Ab  <1.0 AI (<1.0)   07/09/19  04:50    


 


  158 mg/dL ()   07/09/19  04:50    


 


  32 mg/dL (15-53)   07/09/19  04:50    


 


Hepatitis A IgM Ab  Non-reactive  (NonReactive)   07/04/19  17:48    


 


Hep Bs Antigen  Non-reactive  (Negative)   07/04/19  17:48    


 


Hep B Core IgM Ab  Non-reactive  (NonReactive)   07/04/19  17:48    


 


  Non-reactive  (NonReactive)   07/04/19  17:48    


 


Blood Type  A POSITIVE   07/16/19  08:18    


 


Antibody Screen  Negative   07/16/19  08:18    


 


Crossmatch  See Detail   07/16/19  08:18    














Active Medications





- Current Medications


Current Medications: 














Generic Name Dose Route Start Last Admin





  Trade Name Freq  PRN Reason Stop Dose Admin


 


Acetaminophen  650 mg  07/04/19 17:22  07/09/19 11:32





  Tylenol  PO   650 mg





  Q4H PRN   Administration





  Pain MILD(1-3)/Fever >100.5/HA  


 


Albuterol  2.5 mg  07/04/19 17:22 





  Proventil  IH  





  Q4HRT PRN  





  Shortness Of Breath  


 


Bisacodyl  15 mg  07/07/19 14:51 





  Dulcolax  PO  





  QDAY PRN  





  Constipation  


 


Chlorpromazine HCl  10 mg  07/09/19 15:20  07/30/19 17:04





  Thorazine  PO   10 mg





  Q6H PRN   Administration





  Hiccups  


 


Epoetin Tye  20,000 unit  07/12/19 09:36  07/29/19 15:14





  Procrit  SUB-Q   20,000 unit





  VEE PRN   Administration





  hemodialysis  


 


Famotidine  10 mg  07/12/19 22:00  07/30/19 10:04





  Pepcid  PO   10 mg





  BID BOWEN   Administration


 


Ferrous Sulfate  325 mg  07/10/19 22:00  07/30/19 10:04





  Feosol  PO   325 mg





  BID BOWEN   Administration


 


Folic Acid  1 mg  07/06/19 10:00  07/30/19 10:04





  Folvite  PO   1 mg





  QDAY BOWEN   Administration


 


Heparin Sodium (Porcine)  3,000 unit  07/12/19 09:36  07/29/19 12:43





  Heparin 10,000 Units/10 Ml  IV   3,000 unit





  VEE PRN   Administration





  hemodialysis  


 


Hydralazine HCl  10 mg  07/04/19 19:14  07/04/19 22:17





  Apresoline  IV   10 mg





  Q3H PRN   Administration





  Blood Pressure  


 


Hydromorphone HCl  0.5 mg  07/04/19 17:22  07/30/19 15:10





  Dilaudid  IV   0.5 mg





  Q3H PRN   Administration





  Pain , Severe (7-10)  


 


Chlorpromazine HCl 25 mg/  51 mls @ 100 mls/hr  07/13/19 12:26 





  Sodium Chloride  IV  





  Q4H PRN  





  Hiccups  


 


Sodium Chloride  100 mls @ 999 mls/hr  07/26/19 09:28 





  Nacl 0.9%  IV  





  VEE PRN  





  Hypotension  


 


Metoclopramide HCl  5 mg  07/17/19 15:00 





  Reglan  IV  





  Q6H PRN  





  Nausea And Vomiting  


 


Metoprolol Tartrate  50 mg  07/04/19 23:00  07/30/19 10:04





  Lopressor  PO   Not Given





  BID BOWEN  


 


Ondansetron HCl  4 mg  07/04/19 17:22 





  Zofran  IV  





  Q3H PRN  





  Nausea And Vomiting  


 


Oxycodone/Acetaminophen  1 tab  07/13/19 10:53  07/29/19 09:46





  Percocet 5/325  PO   1 tab





  Q6H PRN   Administration





  Pain, Moderate (4-6)  


 


Sodium Chloride  10 ml  07/04/19 22:00  07/30/19 09:59





  Sodium Chloride Flush Syringe 10 Ml  IV   10 ml





  BID BOWEN   Administration


 


Sodium Chloride  10 ml  07/04/19 17:22  07/18/19 14:14





  Sodium Chloride Flush Syringe 10 Ml  IV   10 ml





  PRN PRN   Administration





  LINE FLUSH  














Nutrition/Malnutrition Assess





- Dietary Evaluation


Nutrition/Malnutrition Findings: 


                                        





Nutrition Notes                                            Start:  07/11/19 

12:03


Freq:                                                      Status: Active       




Protocol:                                                                       




 Document     07/29/19 14:54  ERASTO  (Rec: 07/29/19 15:05  ERASTO  [id number]

[email or web address])


 Nutrition Notes


     Need for Assessment generated from:         Low BMI


     Initial or Follow up                        Assessment


     Current Diagnosis                           CKD (stage V CKD),Hypertension


     Other Pertinent Diagnosis                   Metastatic rectal CA,


                                                 malignant ascites, anemia


     Current Diet                                Renal


     Labs/Tests                                  reviewed


     Pertinent Medications                       Feosol, Folic acid


     Height                                      5 ft 7 in


     Weight                                      51.7 kg


     Usual Body Weight                           63.6 kg


     Ideal Body Weight (kg)                      67.27


     BMI                                         17.8


     Intake Prior to Admission                   Fair


     Weight change and time frame                Pt reports unintentional 18.7%


                                                 wt loss (no time frame given)


     Weight Status                               Underweight


     Subjective/Other Information                Pt screened for low BMI.  He


                                                 is receiving HD at time of


                                                 visit (12:22).  He reports a


                                                 poor appetite right now and is


                                                 amenable to trying ONS.  He c


                                                 /o ongoing hiccups.  Reports


                                                 no food allergies or chewing/


                                                 swallowing difficulties.


     Burn                                        Absent


     Trauma                                      Absent


     #1


      Nutrition Diagnosis                        Malnutrition


      Etiology                                   metastatic CA, poor appetite


      As Evidenced by Signs and Symptoms         BMI <18.5, subcutaneous fat


                                                 loss, muscle loss, poor PO


                                                 intake


     Is patient on ventilator?                   No


     Is Patient Ambulatory and/or Out of Bed     No


     [facility]-([facility]-confined to bed)      [phone]


     Kcal/Kg value to use for calculation        35


     Approximate Energy Requirements Using       1810


      kcal/Kg                                    


     Calculation Used for Recommendations        Kcal/kg


     Additional Notes                            Pro needs >1.2g/kg: >62g/day


                                                 Fluid needs 1-1.5L/day


 Nutrition Intervention


     Change Diet Order:                          Continue current diet order


     Add Supplement/Snack (indicate name/kcal    Nepro BID (vanilla)


      /protein )                                 


     Provides kCal:                              850


     Provides Protein (gm)                       38


     Goal #1                                     PO intake of meals plus ONS to


                                                 meet at least 75% energy and


                                                 pro needs


     Goal #2                                     Wt maintenance and/or gain


     Anticipated Discharge Needs:                Continue ONS 1-2 times daily


                                                 for wt maintenance


     Follow-Up By:                               07/31/19


     Additional Comments                         F/U: intakes (meals, ONS)

## 2019-07-30 NOTE — PROGRESS NOTE
Assessment and Plan





1. ESRD:


CKD has progressed to ESRD.


Continue hemodialysis three times a week.


Monitor renal function.


Renal prognosis is poor.


Avoid nephrotoxic agents.


Meds dosage based on GFR.


Hemodialysis: 7/4, 7/5, 7/6, 7/8, 7/10, 7/12, 7/15, 7/17, 7/19, 7/22, 7/24, 

7/26, 7/29.





2. FEN:


Hyperkalemia, HD.


Anion gap MA, improved.


Monitor lytes.





3. Mild bilateral hydronephosis:


S/p hernandez catheter.


Evaluated by Urologist.





4. Rectal adenocarcinoma with malignant Ascites.


S/p Medport.


Followed by Heme-onc.





5. Anemia:


PRBC.


On Epogen, limited choice other than transfusion.


Monitor H/H.





6. HTN:


BP is controlled.





Difficulty in securing outpatient hemodialysis chair due to multiple issues.


Patient may have to come to the ER intermittently for dialysis.














Subjective


Date of service: 07/30/19


Principal diagnosis: rectal ca


Interval history: 


Patient was seen and examined at the bedside.


No new complaint.


Multiple family members at the bedside.








Objective





- Vital Signs


Vital signs: 


                               Vital Signs - 12hr











  07/30/19 07/30/19 07/30/19





  03:43 04:13 05:07


 


Temperature   99.2 F


 


Pulse Rate   97 H


 


Respiratory 19 17 18





Rate   


 


Blood Pressure   118/81


 


O2 Sat by Pulse   98





Oximetry   














  07/30/19 07/30/19 07/30/19





  10:03 10:04 11:48


 


Temperature   98.1 F


 


Pulse Rate   102 H


 


Respiratory   18





Rate   


 


Blood Pressure 98/67 98/67 104/67


 


O2 Sat by Pulse   94





Oximetry   














- General Appearance


General appearance: well-developed, appears stated age, other (no distress, R IJ

tunnel catheter)


EENT: ATNC, PERRL, hearing intact, vision intact


Neck: supple


Respiratory: Present: Clear to Ascultation


Cardiology: regular, S1S2, no murmurs


Gastrointestinal: normoactive bowel sounds, no tenderness, distended, other 

(hernandez catheter)


Integumentary: no rash, warm and dry


Neurologic: no focal deficit, no asterixis, alert and oriented x3


Musculoskeletal: other (no edema)


Psychiatric: cooperative





- Lab





                                 07/28/19 15:03





                                 07/28/19 07:18


                             Most recent lab results











Calcium  9.2 mg/dL (8.4-10.2)   07/28/19  07:18    


 


Phosphorus  6.50 mg/dL (2.5-4.5)  H  07/15/19  06:57    


 


Magnesium  2.00 mg/dL (1.7-2.3)   07/05/19  04:52    


 


  161.7 mg/dL (0.1-20.0)  H  07/05/19  08:51    


 


  72 mmol/L  07/05/19  08:51    














Medications & Allergies





- Medications


Allergies/Adverse Reactions: 


                                    Allergies





No Known Allergies Allergy (Verified 07/04/19 12:02)


   








Home Medications: 


                                Home Medications











 Medication  Instructions  Recorded  Confirmed  Last Taken  Type


 


Prednisone [predniSONE 10 mg 10 mg PO .TAPER #1 tab.ds.pk 09/10/15 07/06/19 

Unknown Rx





(6-Day Pack, 21 Tabs)]     











Active Medications: 














Generic Name Dose Route Start Last Admin





  Trade Name Freq  PRN Reason Stop Dose Admin


 


Acetaminophen  650 mg  07/04/19 17:22  07/09/19 11:32





  Tylenol  PO   650 mg





  Q4H PRN   Administration





  Pain MILD(1-3)/Fever >100.5/HA  


 


Albuterol  2.5 mg  07/04/19 17:22 





  Proventil  IH  





  Q4HRT PRN  





  Shortness Of Breath  


 


Bisacodyl  15 mg  07/07/19 14:51 





  Dulcolax  PO  





  QDAY PRN  





  Constipation  


 


Chlorpromazine HCl  10 mg  07/09/19 15:20  07/29/19 18:19





  Thorazine  PO   10 mg





  Q6H PRN   Administration





  Hiccups  


 


Epoetin Tye  20,000 unit  07/12/19 09:36  07/29/19 15:14





  Procrit  SUB-Q   20,000 unit





  VEE PRN   Administration





  hemodialysis  


 


Famotidine  10 mg  07/12/19 22:00  07/30/19 10:04





  Pepcid  PO   10 mg





  BID BOWEN   Administration


 


Ferrous Sulfate  325 mg  07/10/19 22:00  07/30/19 10:04





  Feosol  PO   325 mg





  BID BOWEN   Administration


 


Folic Acid  1 mg  07/06/19 10:00  07/30/19 10:04





  Folvite  PO   1 mg





  QDAY BOWEN   Administration


 


Heparin Sodium (Porcine)  3,000 unit  07/12/19 09:36  07/29/19 12:43





  Heparin 10,000 Units/10 Ml  IV   3,000 unit





  VEE PRN   Administration





  hemodialysis  


 


Hydralazine HCl  10 mg  07/04/19 19:14  07/04/19 22:17





  Apresoline  IV   10 mg





  Q3H PRN   Administration





  Blood Pressure  


 


Hydromorphone HCl  0.5 mg  07/04/19 17:22  07/30/19 09:57





  Dilaudid  IV   0.5 mg





  Q3H PRN   Administration





  Pain , Severe (7-10)  


 


Chlorpromazine HCl 25 mg/  51 mls @ 100 mls/hr  07/13/19 12:26 





  Sodium Chloride  IV  





  Q4H PRN  





  Hiccups  


 


Sodium Chloride  100 mls @ 999 mls/hr  07/26/19 09:28 





  Nacl 0.9%  IV  





  VEE PRN  





  Hypotension  


 


Metoclopramide HCl  5 mg  07/17/19 15:00 





  Reglan  IV  





  Q6H PRN  





  Nausea And Vomiting  


 


Metoprolol Tartrate  50 mg  07/04/19 23:00  07/30/19 10:04





  Lopressor  PO   Not Given





  BID [facility]  


 


Ondansetron HCl  4 mg  07/04/19 17:22 





  Zofran  IV  





  Q3H PRN  





  Nausea And Vomiting  


 


Oxycodone/Acetaminophen  1 tab  07/13/19 10:53  07/29/19 09:46





  Percocet 5/325  PO   1 tab





  Q6H PRN   Administration





  Pain, Moderate (4-6)  


 


Sodium Chloride  10 ml  07/04/19 22:00  07/30/19 09:59





  Sodium Chloride Flush Syringe 10 Ml  IV   10 ml





  BID BOWEN   Administration


 


Sodium Chloride  10 ml  07/04/19 17:22  07/18/19 14:14





  Sodium Chloride Flush Syringe 10 Ml  IV   10 ml





  PRN PRN   Administration





  LINE FLUSH

## 2019-07-30 NOTE — HEM/ONC PROGRESS NOTE
Assessment and Plan





1.  Rectal ca  - signet cell features - Radiology shows carcinomatosis.  The 

patient has anemia.  MCV is low.  


2.  Anemia, low iron, B12 normal, folate low.  renal impairement may have a role


3.  h/o Thrombocytosis, likely reactive.


4.  History of hiccups.  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 

involvement.


5.  Renal failure.  Nephrology following.


6.  Hyperkalemia status post treatment.


7.  History of hypertension.





I will follow the patient during inpatient stay.    GI team has seen the 

patient.





CEA - 17


CA 19-9 - normal 11





colonoscopy - Rectal tumor from dentate line to mid/proximal rectum (extent of 

exam due to severe stenosis related to tumor)





based on ascites and omental lesions -this is likely metastatic ca


as per Dr Fung - he said no ascites fluid is available in the lab


d/w pt reg stage IV - chemo 


d/w pt reg non curative nature 








port placed





d/w pt reg OP chemo - [employer]ML[employer] -  - HD


pt aware that this is aggresive tumor - FOLFOX q 2 weeks as OP - dose 

modification for HD








7/30/2019 - d/w pt that I will f/u in clinic - however if there is a delay of 

insurance then [facility] is an option


pt aware of clinical suspicion of stage IV cancer due to hiccough - ascites - CT

showing peritoneal nodules and signet ring features of bx


pt will call [facility] for apt as well as see me in clinic


s/p iv iron 7/28





d/w brother, sister in law and girlfriend on 7/29





- Patient Problems


(1) Ascites


Current Visit: Yes   Status: Chronic   





Subjective


Date of service: 07/30/19


Principal diagnosis: rectal ca


Interval history: 





feeling better- says urinating





Objective





- Exam


Narrative Exam: 





Pain  - none


General appearance  no acute distress


Performance status limited self care


Eyes - no icterus


ENT  no thrush





LNs  cervical  not palpable


Neck  - normal ROM


Respiratory  Normal


Breath sounds - CTA





CVS  S1 S2 +


Extremities  normal temperature


General GI  Soft - distended


Rectal  deferred


male  - deferred


Skin  warm -PORT +


Musculoskeletal  moving normal


Neurologically  no focal deficit





- Constitutional


Vitals: 


                                Last Vital Signs











Temp  99.2 F   07/30/19 05:07


 


Pulse  97 H  07/30/19 05:07


 


Resp  18   07/30/19 05:07


 


BP  118/81   07/30/19 05:07


 


Pulse Ox  98   07/30/19 05:07














Medications & Allergies





- Medications


Allergies/Adverse Reactions: 


                                    Allergies





No Known Allergies Allergy (Verified 07/04/19 12:02)


   








Home Medications: 


                                Home Medications











 Medication  Instructions  Recorded  Confirmed  Last Taken  Type


 


Prednisone [predniSONE 10 mg 10 mg PO .TAPER #1 tab.ds.pk 09/10/15 07/06/19 

Unknown Rx





(6-Day Pack, 21 Tabs)]     











Active Medications: 














Generic Name Dose Route Start Last Admin





  Trade Name Freq  PRN Reason Stop Dose Admin


 


Acetaminophen  650 mg  07/04/19 17:22  07/09/19 11:32





  Tylenol  PO   650 mg





  Q4H PRN   Administration





  Pain MILD(1-3)/Fever >100.5/HA  


 


Albuterol  2.5 mg  07/04/19 17:22 





  Proventil  IH  





  Q4HRT PRN  





  Shortness Of Breath  


 


Bisacodyl  15 mg  07/07/19 14:51 





  Dulcolax  PO  





  QDAY PRN  





  Constipation  


 


Chlorpromazine HCl  10 mg  07/09/19 15:20  07/29/19 18:19





  Thorazine  PO   10 mg





  Q6H PRN   Administration





  Hiccups  


 


Epoetin Tye  20,000 unit  07/12/19 09:36  07/29/19 15:14





  Procrit  SUB-Q   20,000 unit





  VEE PRN   Administration





  hemodialysis  


 


Famotidine  10 mg  07/12/19 22:00  07/29/19 22:15





  Pepcid  PO   10 mg





  BID BOWEN   Administration


 


Ferrous Sulfate  325 mg  07/10/19 22:00  07/29/19 22:16





  Feosol  PO   325 mg





  BID BOWEN   Administration


 


Folic Acid  1 mg  07/06/19 10:00  07/29/19 10:50





  Folvite  PO   1 mg





  QDAY BOWEN   Administration


 


Heparin Sodium (Porcine)  3,000 unit  07/12/19 09:36  07/29/19 12:43





  Heparin 10,000 Units/10 Ml  IV   3,000 unit





  VEE PRN   Administration





  hemodialysis  


 


Hydralazine HCl  10 mg  07/04/19 19:14  07/04/19 22:17





  Apresoline  IV   10 mg





  Q3H PRN   Administration





  Blood Pressure  


 


Hydromorphone HCl  0.5 mg  07/04/19 17:22  07/30/19 03:43





  Dilaudid  IV   0.5 mg





  Q3H PRN   Administration





  Pain , Severe (7-10)  


 


Chlorpromazine HCl 25 mg/  51 mls @ 100 mls/hr  07/13/19 12:26 





  Sodium Chloride  IV  





  Q4H PRN  





  Hiccups  


 


Sodium Chloride  100 mls @ 999 mls/hr  07/26/19 09:28 





  Nacl 0.9%  IV  





  VEE PRN  





  Hypotension  


 


Metoclopramide HCl  5 mg  07/17/19 15:00 





  Reglan  IV  





  Q6H PRN  





  Nausea And Vomiting  


 


Metoprolol Tartrate  50 mg  07/04/19 23:00  07/29/19 22:16





  Lopressor  PO   50 mg





  BID BOWEN   Administration


 


Ondansetron HCl  4 mg  07/04/19 17:22 





  Zofran  IV  





  Q3H PRN  





  Nausea And Vomiting  


 


Oxycodone/Acetaminophen  1 tab  07/13/19 10:53  07/29/19 09:46





  Percocet 5/325  PO   1 tab





  Q6H PRN   Administration





  Pain, Moderate (4-6)  


 


Sodium Chloride  10 ml  07/04/19 22:00  07/29/19 22:16





  Sodium Chloride Flush Syringe 10 Ml  IV   10 ml





  BID BOWEN   Administration


 


Sodium Chloride  10 ml  07/04/19 17:22  07/18/19 14:14





  Sodium Chloride Flush Syringe 10 Ml  IV   10 ml





  PRN PRN   Administration





  LINE FLUSH

## 2019-07-31 LAB
%HYPO/RBC NFR BLD AUTO: (no result) %
ALBUMIN SERPL-MCNC: 2.7 G/DL (ref 3.9–5)
ALT SERPL-CCNC: < 5 UNITS/L (ref 7–56)
ANISOCYTOSIS BLD QL SMEAR: (no result)
BAND NEUTROPHILS # (MANUAL): 0 K/MM3
BUN SERPL-MCNC: 19 MG/DL (ref 9–20)
BUN/CREAT SERPL: 2 %
CALCIUM SERPL-MCNC: 9.1 MG/DL (ref 8.4–10.2)
HCT VFR BLD CALC: 28.6 % (ref 35.5–45.6)
HEMOLYSIS INDEX: 2
HGB BLD-MCNC: 8.7 GM/DL (ref 11.8–15.2)
MCHC RBC AUTO-ENTMCNC: 30 % (ref 32–34)
MCV RBC AUTO: 77 FL (ref 84–94)
MYELOCYTES # (MANUAL): 0 K/MM3
PLATELET # BLD: 294 K/MM3 (ref 140–440)
PROMYELOCYTES # (MANUAL): 0 K/MM3
RBC # BLD AUTO: 3.73 M/MM3 (ref 3.65–5.03)
TOTAL CELLS COUNTED BLD: 100

## 2019-07-31 PROCEDURE — 5A1D70Z PERFORMANCE OF URINARY FILTRATION, INTERMITTENT, LESS THAN 6 HOURS PER DAY: ICD-10-PCS | Performed by: INTERNAL MEDICINE

## 2019-07-31 RX ADMIN — OXYCODONE AND ACETAMINOPHEN PRN TAB: 5; 325 TABLET ORAL at 11:00

## 2019-07-31 RX ADMIN — FOLIC ACID SCH: 1 TABLET ORAL at 09:21

## 2019-07-31 RX ADMIN — FERROUS SULFATE TAB 325 MG (65 MG ELEMENTAL FE) SCH: 325 (65 FE) TAB at 09:21

## 2019-07-31 RX ADMIN — HYDROMORPHONE HYDROCHLORIDE PRN MG: 1 INJECTION, SOLUTION INTRAMUSCULAR; INTRAVENOUS; SUBCUTANEOUS at 17:00

## 2019-07-31 RX ADMIN — HYDROMORPHONE HYDROCHLORIDE PRN MG: 1 INJECTION, SOLUTION INTRAMUSCULAR; INTRAVENOUS; SUBCUTANEOUS at 22:44

## 2019-07-31 RX ADMIN — FAMOTIDINE SCH MG: 10 TABLET ORAL at 22:44

## 2019-07-31 RX ADMIN — HYDROMORPHONE HYDROCHLORIDE PRN MG: 1 INJECTION, SOLUTION INTRAMUSCULAR; INTRAVENOUS; SUBCUTANEOUS at 01:53

## 2019-07-31 RX ADMIN — HYDROMORPHONE HYDROCHLORIDE PRN MG: 1 INJECTION, SOLUTION INTRAMUSCULAR; INTRAVENOUS; SUBCUTANEOUS at 08:19

## 2019-07-31 RX ADMIN — FAMOTIDINE SCH: 10 TABLET ORAL at 09:22

## 2019-07-31 RX ADMIN — HEPARIN SODIUM PRN UNIT: 1000 INJECTION, SOLUTION INTRAVENOUS; SUBCUTANEOUS at 11:17

## 2019-07-31 RX ADMIN — METOPROLOL TARTRATE SCH: 50 TABLET, FILM COATED ORAL at 09:21

## 2019-07-31 RX ADMIN — CHLORPROMAZINE HYDROCHLORIDE PRN MG: 10 TABLET, FILM COATED ORAL at 20:14

## 2019-07-31 RX ADMIN — FERROUS SULFATE TAB 325 MG (65 MG ELEMENTAL FE) SCH MG: 325 (65 FE) TAB at 22:44

## 2019-07-31 RX ADMIN — ERYTHROPOIETIN PRN UNIT: 20000 INJECTION, SOLUTION INTRAVENOUS; SUBCUTANEOUS at 11:45

## 2019-07-31 RX ADMIN — Medication SCH: at 09:22

## 2019-07-31 RX ADMIN — Medication SCH ML: at 22:44

## 2019-07-31 RX ADMIN — METOPROLOL TARTRATE SCH: 50 TABLET, FILM COATED ORAL at 22:43

## 2019-07-31 NOTE — PROGRESS NOTE
Assessment and Plan





1. ESRD:


CKD has progressed to ESRD.


Continue hemodialysis three times a week.


Monitor renal function.


Renal prognosis is poor.


Avoid nephrotoxic agents.


Meds dosage based on GFR.


Hemodialysis: 7/4, 7/5, 7/6, 7/8, 7/10, 7/12, 7/15, 7/17, 7/19, 7/22, 7/24, 

7/26, 7/29, today.





2. FEN:


Hyperkalemia, HD.


Anion gap MA, improved.


Monitor lytes.





3. Mild bilateral hydronephosis:


S/p hernandez catheter.


Evaluated by Urologist.





4. Rectal adenocarcinoma with malignant Ascites.


S/p Medport.


Followed by Heme-onc.


Informed patient to call [facility] if they will accept him for Chemo.





5. Anemia:


PRBC.


On Epogen, limited choice other than transfusion.


Monitor H/H.





6. HTN:


BP is controlled.





Difficulty in securing outpatient hemodialysis chair due to multiple issues.


Patient may have to come to the ER intermittently for dialysis.














Subjective


Date of service: 07/31/19


Principal diagnosis: rectal ca


Interval history: 


Patient was seen and examined at the bedside, while in dialysis.


No new complaint.








Objective





- Vital Signs


Vital signs: 


                               Vital Signs - 12hr











  07/30/19 07/30/19 07/31/19





  22:30 22:50 05:40


 


Temperature  98.3 F 98.3 F


 


Pulse Rate 100 H 102 H 71


 


Respiratory  18 18





Rate   


 


Blood Pressure 124/88 127/89 105/71


 


O2 Sat by Pulse  99 99





Oximetry   














- General Appearance


General appearance: well-developed, appears stated age, other (no distress, R IJ

tunnel catheter)


EENT: ATNC, PERRL, hearing intact, vision intact


Neck: supple


Respiratory: Present: Clear to Ascultation


Cardiology: regular, S1S2, no murmurs


Gastrointestinal: normoactive bowel sounds, no tenderness, no distended, other 

(hernandez catheter)


Integumentary: no rash, warm and dry


Neurologic: no asterixis, alert and oriented x3


Musculoskeletal: other (no edema)


Psychiatric: cooperative





- Lab





                                 07/31/19 09:26





                                 07/31/19 15:53


                             Most recent lab results











Calcium  9.2 mg/dL (8.4-10.2)   07/28/19  07:18    


 


Phosphorus  6.50 mg/dL (2.5-4.5)  H  07/15/19  06:57    


 


Magnesium  2.00 mg/dL (1.7-2.3)   07/05/19  04:52    


 


  161.7 mg/dL (0.1-20.0)  H  07/05/19  08:51    


 


  72 mmol/L  07/05/19  08:51    














Medications & Allergies





- Medications


Allergies/Adverse Reactions: 


                                    Allergies





No Known Allergies Allergy (Verified 07/04/19 12:02)


   








Home Medications: 


                                Home Medications











 Medication  Instructions  Recorded  Confirmed  Last Taken  Type


 


Prednisone [predniSONE 10 mg 10 mg PO .TAPER #1 tab.ds.pk 09/10/15 07/06/19 

Unknown Rx





(6-Day Pack, 21 Tabs)]     











Active Medications: 














Generic Name Dose Route Start Last Admin





  Trade Name Freq  PRN Reason Stop Dose Admin


 


Acetaminophen  650 mg  07/04/19 17:22  07/09/19 11:32





  Tylenol  PO   650 mg





  Q4H PRN   Administration





  Pain MILD(1-3)/Fever >100.5/HA  


 


Albuterol  2.5 mg  07/04/19 17:22 





  Proventil  IH  





  Q4HRT PRN  





  Shortness Of Breath  


 


Bisacodyl  15 mg  07/07/19 14:51 





  Dulcolax  PO  





  QDAY PRN  





  Constipation  


 


Chlorpromazine HCl  10 mg  07/09/19 15:20  07/30/19 17:04





  Thorazine  PO   10 mg





  Q6H PRN   Administration





  Hiccups  


 


Epoetin Tye  20,000 unit  07/12/19 09:36  07/29/19 15:14





  Procrit  SUB-Q   20,000 unit





  VEE PRN   Administration





  hemodialysis  


 


Famotidine  10 mg  07/12/19 22:00  07/31/19 09:22





  Pepcid  PO   Not Given





  BID [facility]  


 


Ferrous Sulfate  325 mg  07/10/19 22:00  07/31/19 09:21





  Feosol  PO   Not Given





  BID BOWEN  


 


Folic Acid  1 mg  07/06/19 10:00  07/31/19 09:21





  Folvite  PO   Not Given





  QDAY [facility]  


 


Heparin Sodium (Porcine)  3,000 unit  07/12/19 09:36  07/29/19 12:43





  Heparin 10,000 Units/10 Ml  IV   3,000 unit





  VEE PRN   Administration





  hemodialysis  


 


Hydralazine HCl  10 mg  07/04/19 19:14  07/04/19 22:17





  Apresoline  IV   10 mg





  Q3H PRN   Administration





  Blood Pressure  


 


Hydromorphone HCl  0.5 mg  07/04/19 17:22  07/31/19 08:19





  Dilaudid  IV   0.5 mg





  Q3H PRN   Administration





  Pain , Severe (7-10)  


 


Chlorpromazine HCl 25 mg/  51 mls @ 100 mls/hr  07/13/19 12:26 





  Sodium Chloride  IV  





  Q4H PRN  





  Hiccups  


 


Sodium Chloride  100 mls @ 999 mls/hr  07/26/19 09:28 





  Nacl 0.9%  IV  





  VEE PRN  





  Hypotension  


 


Metoclopramide HCl  5 mg  07/17/19 15:00 





  Reglan  IV  





  Q6H PRN  





  Nausea And Vomiting  


 


Metoprolol Tartrate  50 mg  07/04/19 23:00  07/31/19 09:21





  Lopressor  PO   Not Given





  BID [facility]  


 


Ondansetron HCl  4 mg  07/04/19 17:22 





  Zofran  IV  





  Q3H PRN  





  Nausea And Vomiting  


 


Oxycodone/Acetaminophen  1 tab  07/13/19 10:53  07/29/19 09:46





  Percocet 5/325  PO   1 tab





  Q6H PRN   Administration





  Pain, Moderate (4-6)  


 


Sodium Chloride  10 ml  07/04/19 22:00  07/31/19 09:22





  Sodium Chloride Flush Syringe 10 Ml  IV   Not Given





  BID BOWEN  


 


Sodium Chloride  10 ml  07/04/19 17:22  07/18/19 14:14





  Sodium Chloride Flush Syringe 10 Ml  IV   10 ml





  PRN PRN   Administration





  LINE FLUSH

## 2019-07-31 NOTE — PROGRESS NOTE
Assessment and Plan


Assessment and plan: 


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 

lymphovascular invasion, 


- Chemotherapy port to be placed 7/16/19;  On discharge patient will follow 

hematology oncologist, Dr. Meade





--Acute anemia with Drop in HCT 


Received total 3 units of PRBC, ordered FOBT, GI is following, d/w Dr. Joel, 

s/p colonoscopy





--ESRD: s/p permacath, had emergent hemodialysis due to severe hyperkalemia 

started on on 7/4/19, 


place [facility] on 7/5/19 with very little output. Need outpt HD set up





--Bilateral hydronephrosis: Placed on hernandez, consulted Urology, input 

noted,discharge with [facility]nandez 





--Metastatic Rectal Cancer: Poorly differentiated mucinous adenocarcinoma with 

signet ring cells.


Oncology following. outpt f/u





--Malignant Ascites 


due to suspected Colon cancer with mets to Omentum/Carcinomatosis suspected, 


which would be a very poor prognostic sign: consulted GI, input noted, s/p 

Paracentesis on 7/5/19, 


await cytology, CEA 17 ELEVATED, AFP ELEVATED, CA 19-9 - normal 11





--Positive blood cultures: Abx discontinued following ID eval as culture appears

to be contaminant





--Hyperkalemia: treated with HD, Nephrology is following





--Acute Microcytic anemia, suspect cancer related until proven otherwise, follow

h/h





--Severe malnutrition: Dietitian consulted 





--Hiccups- likely tumor related, on Thorazine.





--DVT prophylaxis: On heparin and GI prophylaxis





Discharge when Outpatient Dialysis ready. Patient will discuss with surgery 

about port placement for chemotherapy. Outpatient follow up with oncologist. If 

no place for HD could be confirmed patient may needs to come to hospital for HD 

- currently he is refusing that option and wants to wait till Monday 














History


Interval history: 


Patient seen and examination medical records reviewed


Scheduled for hemodialysis today


Patient is awaiting outpatient hemodialysis schedule during


Patient has no new complaints


Vital signs reviewed








Hospitalist Physical





- Constitutional


Vitals: 


                                        











Temp Pulse Resp BP Pulse Ox


 


 98.3 F   111 H  18   110/71   99 


 


 07/31/19 09:10  07/31/19 10:40  07/31/19 11:00  07/31/19 10:40  07/31/19 05:40











General appearance: Present: no acute distress, cachectic, disheveled





- EENT


Eyes: Present: PERRL, EOM intact





- Neck


Neck: Present: supple, normal ROM





- Respiratory


Respiratory effort: normal


Respiratory: bilateral: diminished, negative: rales, rhonchi, wheezing





- Cardiovascular


Rhythm: regular


Heart Sounds: Present: S1 & S2





- Extremities


Extremities: no ischemia, No edema





- Abdominal


General gastrointestinal: soft, non-tender, non-distended, normal bowel sounds





- Integumentary


Integumentary: Present: clear, warm





- Psychiatric


Psychiatric: appropriate mood/affect, cooperative





- Neurologic


Neurologic: moves all extremities





Results





- Labs


CBC & Chem 7: 


                                 07/31/19 09:26





                                 07/28/19 07:18


Labs: 


                             Laboratory Last Values











WBC  12.1 K/mm3 (4.5-11.0)  H  07/31/19  09:26    


 


RBC  3.73 M/mm3 (3.65-5.03)   07/31/19  09:26    


 


Hgb  8.7 gm/dl (11.8-15.2)  L  07/31/19  09:26    


 


Hct  28.6 % (35.5-45.6)  L  07/31/19  09:26    


 


MCV  77 fl (84-94)  L  07/31/19  09:26    


 


MCH  23 pg (28-32)  L  07/31/19  09:26    


 


MCHC  30 % (32-34)  L  07/31/19  09:26    


 


RDW  30.8 % (13.2-15.2)  H  07/31/19  09:26    


 


Plt Count  294 K/mm3 (140-440)   07/31/19  09:26    


 


Add Manual Diff  Complete   07/28/19  15:03    


 


Total Counted  100   07/28/19  15:03    


 


Seg Neuts % (Manual)  87.0 % (40.0-70.0)  H  07/28/19  15:03    


 


  0 %  07/28/19  15:03    


 


  9.0 % (13.4-35.0)  L  07/28/19  15:03    


 


Reactive Lymphs % (Man)  0 %  07/28/19  15:03    


 


  4.0 % (0.0-7.3)   07/28/19  15:03    


 


  0 % (0.0-4.3)   07/28/19  15:03    


 


  0 % (0.0-1.8)   07/28/19  15:03    


 


  0 %  07/28/19  15:03    


 


  0 %  07/28/19  15:03    


 


  0 %  07/28/19  15:03    


 


  0 %  07/28/19  15:03    


 


Nucleated RBC %  Not Reportable   07/28/19  15:03    


 


Seg Neutrophils # Man  10.9 K/mm3 (1.8-7.7)  H  07/28/19  15:03    


 


Band Neutrophils #  0.0 K/mm3  07/28/19  15:03    


 


  1.1 K/mm3 (1.2-5.4)  L  07/28/19  15:03    


 


Abs React Lymphs (Man)  0.0 K/mm3  07/28/19  15:03    


 


  0.5 K/mm3 (0.0-0.8)   07/28/19  15:03    


 


  0.0 K/mm3 (0.0-0.4)   07/28/19  15:03    


 


  0.0 K/mm3 (0.0-0.1)   07/28/19  15:03    


 


  0.0 K/mm3  07/28/19  15:03    


 


  0.0 K/mm3  07/28/19  15:03    


 


  0.0 K/mm3  07/28/19  15:03    


 


Blast Cells #  0.0 K/mm3  07/28/19  15:03    


 


WBC Morphology  Not Reportable   07/28/19  15:03    


 


Hypersegmented Neuts  Not Reportable   07/28/19  15:03    


 


Hyposegmented Neuts  Not Reportable   07/28/19  15:03    


 


Hypogranular Neuts  Not Reportable   07/28/19  15:03    


 


  Not Reportable   07/28/19  15:03    


 


  Not Reportable   07/28/19  15:03    


 


  Not Reportable   07/28/19  15:03    


 


  Not Reportable   07/28/19  15:03    


 


  Not Reportable   07/28/19  15:03    


 


  Not Reportable   07/28/19  15:03    


 


  Appears normal   07/28/19  15:03    


 


  Not Reportable   07/28/19  15:03    


 


Plt Clumps, EDTA  Not Reportable   07/28/19  15:03    


 


  Not Reportable   07/28/19  15:03    


 


  Not Reportable   07/28/19  15:03    


 


  Not Reportable   07/28/19  15:03    


 


Plt Morphology Comment  Not Reportable   07/28/19  15:03    


 


RBC Morphology  Not Reportable   07/28/19  15:03    


 


Dimorphic RBCs  Not Reportable   07/28/19  15:03    


 


  Not Reportable   07/28/19  15:03    


 


  Few   07/28/19  15:03    


 


  1+   07/28/19  15:03    


 


  2+   07/28/19  15:03    


 


  Not Reportable   07/28/19  15:03    


 


  Not Reportable   07/28/19  15:03    


 


  Not Reportable   07/28/19  15:03    


 


  Not Reportable   07/28/19  15:03    


 


  Not Reportable   07/28/19  15:03    


 


  1+   07/28/19  15:03    


 


  Not Reportable   07/28/19  15:03    


 


  Few   07/28/19  15:03    


 


  Not Reportable   07/28/19  15:03    


 


  Not Reportable   07/28/19  15:03    


 


  Not Reportable   07/28/19  15:03    


 


  Not Reportable   07/28/19  15:03    


 


  Not Reportable   07/28/19  15:03    


 


  Not Reportable   07/28/19  15:03    


 


  Few   07/28/19  15:03    


 


Acanthocytes (Spur)  Not Reportable   07/28/19  15:03    


 


Rouleaux  Not Reportable   07/28/19  15:03    


 


  Not Reportable   07/28/19  15:03    


 


  Not Reportable   07/28/19  15:03    


 


  Not Reportable   07/28/19  15:03    


 


Percent Retic  1.10 % (0.78-2.58)   07/05/19  04:52    


 


  Not Reportable   07/28/19  15:03    


 


Hem Pathologist Commnt  No   07/28/19  15:03    


 


PT  16.3 Sec. (12.2-14.9)  H  07/05/19  08:51    


 


INR  1.35  (0.87-1.13)  H  07/05/19  08:51    


 


Sodium  136 mmol/L (137-145)  L  07/28/19  07:18    


 


Potassium  4.9 mmol/L (3.6-5.0)   07/28/19  07:18    


 


Chloride  92.7 mmol/L ()  L  07/28/19  07:18    


 


Carbon Dioxide  22 mmol/L (22-30)   07/28/19  07:18    


 


  26 mmol/L  07/28/19  07:18    


 


BUN  31 mg/dL (9-20)  H  07/28/19  07:18    


 


  13.1 mg/dL (0.8-1.5)  H  07/28/19  07:18    


 


Estimated GFR  5 ml/min  07/28/19  07:18    


 


  2 %  07/28/19  07:18    


 


Glucose  86 mg/dL ()   07/28/19  07:18    


 


POC Glucose  103  ()   07/20/19  16:43    


 


Calcium  9.2 mg/dL (8.4-10.2)   07/28/19  07:18    


 


Phosphorus  6.50 mg/dL (2.5-4.5)  H  07/15/19  06:57    


 


Magnesium  2.00 mg/dL (1.7-2.3)   07/05/19  04:52    


 


Iron  20 ug/dL ()  L  07/05/19  08:51    


 


TIBC  219 mcg/dL (250-450)  L  07/05/19  08:51    


 


  63.8 ng/mL (13.0-400.0)   07/05/19  08:51    


 


  0.20 mg/dL (0.1-1.2)   07/10/19  04:43    


 


AST  11 units/L (5-40)   07/10/19  04:43    


 


ALT  12 units/L (7-56)   07/10/19  04:43    


 


  67 units/L ()   07/10/19  04:43    


 


  5.9 g/dL (6.1-8.1)  L  07/09/19  04:50    


 


  6.7 g/dL (6.3-8.2)   07/10/19  04:43    


 


  2.0 g/dL (3.9-5)  L  07/10/19  04:43    


 


  0.4 %  07/10/19  04:43    


 


  0.7 g/dL (0.2-0.3)  H  07/09/19  04:50    


 


  0.8 g/dL (0.5-0.9)   07/09/19  04:50    


 


  0.5 g/dL (0.2-0.5)   07/09/19  04:50    


 


  1.6 g/dL (0.8-1.7)   07/09/19  04:50    


 


Abnorm Protein Band 1  see below   07/09/19  04:50    


 


PEP Interpretation  see below  H  07/09/19  04:50    


 


  88 units/L (13-60)  H  07/04/19  12:47    


 


Tumor Marker AFP  See scanned result   07/05/19  08:51    


 


Carcinoembryonic Ag  17.4 ng/mL (0.0-2.4)  H  07/05/19  08:51    


 


  11 U/mL (<34)   07/05/19  08:51    


 


Vitamin B12  1768 pg/mL (211-911)  H  07/05/19  08:51    


 


  5.05 ng/mL (7.3-26.0)  L  07/05/19  08:51    


 


PTH Intact  238.5 pg/mL (15-65)  H  07/05/19  04:52    


 


  Yellow  (Yellow)   07/23/19  16:35    


 


  Turbid  (Clear)   07/23/19  16:35    


 


  5.0  (5.0-7.0)   07/23/19  16:35    


 


Ur Specific Gravity  1.024  (1.003-1.030)   07/23/19  16:35    


 


  100 mg/dl mg/dL (Negative)   07/23/19  16:35    


 


  Neg mg/dL (Negative)   07/23/19  16:35    


 


  Tr mg/dL (Negative)   07/23/19  16:35    


 


  Lg  (Negative)   07/23/19  16:35    


 


  Neg  (Negative)   07/23/19  16:35    


 


  Neg  (Negative)   07/23/19  16:35    


 


  < 2.0 mg/dL (<2.0)   07/23/19  16:35    


 


Ur Leukocyte Esterase  Mod  (Negative)   07/23/19  16:35    


 


  > 182.0 /HPF (0.0-6.0)  H  07/23/19  16:35    


 


  > 182.0 /HPF (0.0-6.0)   07/23/19  16:35    


 


  2+ /HPF (Negative)   07/23/19  16:35    


 


  2+ /HPF  07/23/19  16:35    


 


  161.7 mg/dL (0.1-20.0)  H  07/05/19  08:51    


 


  72 mmol/L  07/05/19  08:51    


 


Fluid Type  Ascitic   07/05/19  Unknown


 


Fluid Color  Yellow   07/05/19  Unknown


 


Fluid Appearance  Hazy   07/05/19  Unknown


 


Fluid WBC  58 /mm3  07/05/19  Unknown


 


Fluid RBC  1850 /mm3  07/05/19  Unknown


 


Fluid Diff Comment     07/05/19  Unknown


 


Fluid Seg Neutrophils  35.0 %  07/05/19  Unknown


 


Fluid Lymphocytes  14.0 %  07/05/19  Unknown


 


Fluid Reactive Lymphs  0 %  07/05/19  Unknown


 


Fluid Monocytes  51.0 %  07/05/19  Unknown


 


Fluid Eosinophils  0 %  07/05/19  Unknown


 


Fluid Basophils  0 %  07/05/19  Unknown


 


Fluid Glucose  93 mg/dL (40-70)  H  07/05/19  Unknown


 


Fluid Total Protein  4.6  (15.0-45.0)  L  07/05/19  Unknown


 


Fluid Albumin  1.5 g/dL  07/05/19  Unknown


 


Fluid LDH  195   07/05/19  Unknown


 


Random Vancomycin  13.1 ug/mL (0-40.0)   07/09/19  04:50    


 


EVELYN Screen  Negative  (Negative)   07/09/19  04:50    


 


Proteinase 3 (PR3) Ab  <1.0 AI (<1.0)   07/09/19  04:50    


 


Myeloperoxidase Ab  <1.0 AI (<1.0)   07/09/19  04:50    


 


  158 mg/dL ()   07/09/19  04:50    


 


  32 mg/dL (15-53)   07/09/19  04:50    


 


Hepatitis A IgM Ab  Non-reactive  (NonReactive)   07/04/19  17:48    


 


Hep Bs Antigen  Non-reactive  (Negative)   07/04/19  17:48    


 


Hep B Core IgM Ab  Non-reactive  (NonReactive)   07/04/19  17:48    


 


  Non-reactive  (NonReactive)   07/04/19  17:48    


 


Blood Type  A POSITIVE   07/16/19  08:18    


 


Antibody Screen  Negative   07/16/19  08:18    


 


Crossmatch  See Detail   07/16/19  08:18    














Active Medications





- Current Medications


Current Medications: 














Generic Name Dose Route Start Last Admin





  Trade Name Freq  PRN Reason Stop Dose Admin


 


Acetaminophen  650 mg  07/04/19 17:22  07/09/19 11:32





  Tylenol  PO   650 mg





  Q4H PRN   Administration





  Pain MILD(1-3)/Fever >100.5/HA  


 


Albuterol  2.5 mg  07/04/19 17:22 





  Proventil  IH  





  Q4HRT PRN  





  Shortness Of Breath  


 


Bisacodyl  15 mg  07/07/19 14:51 





  Dulcolax  PO  





  QDAY PRN  





  Constipation  


 


Chlorpromazine HCl  10 mg  07/09/19 15:20  07/30/19 17:04





  Thorazine  PO   10 mg





  Q6H PRN   Administration





  Hiccups  


 


Epoetin Tye  20,000 unit  07/12/19 09:36  07/29/19 15:14





  Procrit  SUB-Q   20,000 unit





  VEE PRN   Administration





  hemodialysis  


 


Famotidine  10 mg  07/12/19 22:00  07/31/19 09:22





  Pepcid  PO   Not Given





  BID [facility]  


 


Ferrous Sulfate  325 mg  07/10/19 22:00  07/31/19 09:21





  Feosol  PO   Not Given





  BID [facility]  


 


Folic Acid  1 mg  07/06/19 10:00  07/31/19 09:21





  Folvite  PO   Not Given





  QDAY [facility]  


 


Heparin Sodium (Porcine)  3,000 unit  07/12/19 09:36  07/31/19 11:17





  Heparin 10,000 Units/10 Ml  IV   3,000 unit





  VEE PRN   Administration





  hemodialysis  


 


Hydralazine HCl  10 mg  07/04/19 19:14  07/04/19 22:17





  Apresoline  IV   10 mg





  Q3H PRN   Administration





  Blood Pressure  


 


Hydromorphone HCl  0.5 mg  07/04/19 17:22  07/31/19 08:19





  Dilaudid  IV   0.5 mg





  Q3H PRN   Administration





  Pain , Severe (7-10)  


 


Chlorpromazine HCl 25 mg/  51 mls @ 100 mls/hr  07/13/19 12:26 





  Sodium Chloride  IV  





  Q4H PRN  





  Hiccups  


 


Sodium Chloride  100 mls @ 999 mls/hr  07/26/19 09:28 





  Nacl 0.9%  IV  





  VEE PRN  





  Hypotension  


 


Metoclopramide HCl  5 mg  07/17/19 15:00 





  Reglan  IV  





  Q6H PRN  





  Nausea And Vomiting  


 


Metoprolol Tartrate  50 mg  07/04/19 23:00  07/31/19 09:21





  Lopressor  PO   Not Given





  BID [facility]  


 


Ondansetron HCl  4 mg  07/04/19 17:22 





  Zofran  IV  





  Q3H PRN  





  Nausea And Vomiting  


 


Oxycodone/Acetaminophen  1 tab  07/13/19 10:53  07/31/19 11:00





  Percocet 5/325  PO   1 tab





  Q6H PRN   Administration





  Pain, Moderate (4-6)  


 


Sodium Chloride  10 ml  07/04/19 22:00  07/31/19 09:22





  Sodium Chloride Flush Syringe 10 Ml  IV   Not Given





  BID [facility]  


 


Sodium Chloride  10 ml  07/04/19 17:22  07/18/19 14:14





  Sodium Chloride Flush Syringe 10 Ml  IV   10 ml





  PRN PRN   Administration





  LINE FLUSH  














Nutrition/Malnutrition Assess





- Dietary Evaluation


Nutrition/Malnutrition Findings: 


                                        





Nutrition Notes                                            Start:  07/11/19 

12:03


Freq:                                                      Status: Active       




Protocol:                                                                       




 Document     07/29/19 14:54  ERASTO  (Rec: 07/29/19 15:05  ERASTO  [id number]

[email or web address])


 Nutrition Notes


     Need for Assessment generated from:         Low BMI


     Initial or Follow up                        Assessment


     Current Diagnosis                           CKD (stage V CKD),Hypertension


     Other Pertinent Diagnosis                   Metastatic rectal CA,


                                                 malignant ascites, anemia


     Current Diet                                Renal


     Labs/Tests                                  reviewed


     Pertinent Medications                       Feosol, Folic acid


     Height                                      5 ft 7 in


     Weight                                      51.7 kg


     Usual Body Weight                           63.6 kg


     Ideal Body Weight (kg)                      67.27


     BMI                                         17.8


     Intake Prior to Admission                   Fair


     Weight change and time frame                Pt reports unintentional 18.7%


                                                 wt loss (no time frame given)


     Weight Status                               Underweight


     Subjective/Other Information                Pt screened for low BMI.  He


                                                 is receiving HD at time of


                                                 visit (12:22).  He reports a


                                                 poor appetite right now and is


                                                 amenable to trying ONS.  He c


                                                 /o ongoing hiccups.  Reports


                                                 no food allergies or chewing/


                                                 swallowing difficulties.


     Burn                                        Absent


     Trauma                                      Absent


     #1


      Nutrition Diagnosis                        Malnutrition


      Etiology                                   metastatic CA, poor appetite


      As Evidenced by Signs and Symptoms         BMI <18.5, subcutaneous fat


                                                 loss, muscle loss, poor PO


                                                 intake


     Is patient on ventilator?                   No


     Is Patient Ambulatory and/or Out of Bed     No


     REE-([facility]-confined to bed)      [phone]


     Kcal/Kg value to use for calculation        35


     Approximate Energy Requirements Using       1810


      kcal/Kg                                    


     Calculation Used for Recommendations        Kcal/kg


     Additional Notes                            Pro needs >1.2g/kg: >62g/day


                                                 Fluid needs 1-1.5L/day


 Nutrition Intervention


     Change Diet Order:                          Continue current diet order


     Add Supplement/Snack (indicate name/kcal    Nepro BID (vanilla)


      /protein )                                 


     Provides kCal:                              850


     Provides Protein (gm)                       38


     Goal #1                                     PO intake of meals plus ONS to


                                                 meet at least 75% energy and


                                                 pro needs


     Goal #2                                     Wt maintenance and/or gain


     Anticipated Discharge Needs:                Continue ONS 1-2 times daily


                                                 for wt maintenance


     Follow-Up By:                               07/31/19


     Additional Comments                         F/U: intakes (meals, ONS)

## 2019-07-31 NOTE — HEM/ONC PROGRESS NOTE
Assessment and Plan





1.  Rectal ca  - signet cell features - Radiology shows carcinomatosis.  The 

patient has anemia.  MCV is low.  


2.  Anemia, low iron, B12 normal, folate low.  renal impairement may have a role


3.  h/o Thrombocytosis, likely reactive.


4.  History of hiccups.  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 

involvement.


5.  Renal failure.  Nephrology following.


6.  Hyperkalemia status post treatment.


7.  History of hypertension.





I will follow the patient during inpatient stay.    GI team has seen the 

patient.





CEA - 17


CA 19-9 - normal 11





colonoscopy - Rectal tumor from dentate line to mid/proximal rectum (extent of 

exam due to severe stenosis related to tumor)





based on ascites and omental lesions -this is likely metastatic ca


as per Dr Fung - he said no ascites fluid is available in the lab


d/w pt reg stage IV - chemo 


d/w pt reg non curative nature 








port placed





d/w pt reg OP chemo - [employer] -  - HD


pt aware that this is aggresive tumor - FOLFOX q 2 weeks as OP - dose 

modification for HD








7/31/2019 - d/w pt that I will f/u in clinic - however if there is a delay of 

insurance then [facility] is an option


pt aware of clinical suspicion of stage IV cancer due to hiccough - ascites - CT

showing peritoneal nodules and signet ring features of bx


pt will call [facility] for apt as well as see me in clinic


s/p iv iron 7/28





d/w brother, sister in law and girlfriend on 7/29


waiting for discharge





- Patient Problems


(1) Ascites


Current Visit: Yes   Status: Chronic   





Subjective


Date of service: 07/31/19


Principal diagnosis: rectal ca - anemia


Interval history: 





ambulating - waiting for HD placement





Objective





- Exam


Narrative Exam: 





Pain  - none


General appearance  no acute distress


Performance status limited self care


Eyes - no icterus


ENT  no thrush





LNs  cervical  not palpable


Neck  - normal ROM


Respiratory  Normal


Breath sounds - CTA





CVS  S1 S2 +


Extremities  normal temperature


General GI  Soft - distended


Rectal  deferred


male  - deferred


Skin  warm -PORT +


Musculoskeletal  moving normal


Neurologically  no focal deficit





- Constitutional


Vitals: 


                                Last Vital Signs











Temp  98.3 F   07/31/19 05:40


 


Pulse  71   07/31/19 05:40


 


Resp  18   07/31/19 05:40


 


BP  105/71   07/31/19 05:40


 


Pulse Ox  99   07/31/19 05:40














Medications & Allergies





- Medications


Allergies/Adverse Reactions: 


                                    Allergies





No Known Allergies Allergy (Verified 07/04/19 12:02)


   








Home Medications: 


                                Home Medications











 Medication  Instructions  Recorded  Confirmed  Last Taken  Type


 


Prednisone [predniSONE 10 mg 10 mg PO .TAPER #1 tab.ds.pk 09/10/15 07/06/19 

Unknown Rx





(6-Day Pack, 21 Tabs)]     











Active Medications: 














Generic Name Dose Route Start Last Admin





  Trade Name Freq  PRN Reason Stop Dose Admin


 


Acetaminophen  650 mg  07/04/19 17:22  07/09/19 11:32





  Tylenol  PO   650 mg





  Q4H PRN   Administration





  Pain MILD(1-3)/Fever >100.5/HA  


 


Albuterol  2.5 mg  07/04/19 17:22 





  Proventil  IH  





  Q4HRT PRN  





  Shortness Of Breath  


 


Bisacodyl  15 mg  07/07/19 14:51 





  Dulcolax  PO  





  QDAY PRN  





  Constipation  


 


Chlorpromazine HCl  10 mg  07/09/19 15:20  07/30/19 17:04





  Thorazine  PO   10 mg





  Q6H PRN   Administration





  Hiccups  


 


Epoetin Tye  20,000 unit  07/12/19 09:36  07/29/19 15:14





  Procrit  SUB-Q   20,000 unit





  VEE PRN   Administration





  hemodialysis  


 


Famotidine  10 mg  07/12/19 22:00  07/30/19 22:30





  Pepcid  PO   10 mg





  BID [facility]   Administration


 


Ferrous Sulfate  325 mg  07/10/19 22:00  07/30/19 22:30





  Feosol  PO   325 mg





  BID [facility]   Administration


 


Folic Acid  1 mg  07/06/19 10:00  07/30/19 10:04





  Folvite  PO   1 mg





  QDAY [facility]   Administration


 


Heparin Sodium (Porcine)  3,000 unit  07/12/19 09:36  07/29/19 12:43





  Heparin 10,000 Units/10 Ml  IV   3,000 unit





  VEE PRN   Administration





  hemodialysis  


 


Hydralazine HCl  10 mg  07/04/19 19:14  07/04/19 22:17





  Apresoline  IV   10 mg





  Q3H PRN   Administration





  Blood Pressure  


 


Hydromorphone HCl  0.5 mg  07/04/19 17:22  07/31/19 01:53





  Dilaudid  IV   0.5 mg





  Q3H PRN   Administration





  Pain , Severe (7-10)  


 


Chlorpromazine HCl 25 mg/  51 mls @ 100 mls/hr  07/13/19 12:26 





  Sodium Chloride  IV  





  Q4H PRN  





  Hiccups  


 


Sodium Chloride  100 mls @ 999 mls/hr  07/26/19 09:28 





  Nacl 0.9%  IV  





  VEE PRN  





  Hypotension  


 


Metoclopramide HCl  5 mg  07/17/19 15:00 





  Reglan  IV  





  Q6H PRN  





  Nausea And Vomiting  


 


Metoprolol Tartrate  50 mg  07/04/19 23:00  07/30/19 22:30





  Lopressor  PO   50 mg





  BID BOWEN   Administration


 


Ondansetron HCl  4 mg  07/04/19 17:22 





  Zofran  IV  





  Q3H PRN  





  Nausea And Vomiting  


 


Oxycodone/Acetaminophen  1 tab  07/13/19 10:53  07/29/19 09:46





  Percocet 5/325  PO   1 tab





  Q6H PRN   Administration





  Pain, Moderate (4-6)  


 


Sodium Chloride  10 ml  07/04/19 22:00  07/30/19 22:32





  Sodium Chloride Flush Syringe 10 Ml  IV   10 ml





  BID BOWEN   Administration


 


Sodium Chloride  10 ml  07/04/19 17:22  07/18/19 14:14





  Sodium Chloride Flush Syringe 10 Ml  IV   10 ml





  PRN PRN   Administration





  LINE FLUSH

## 2019-08-01 RX ADMIN — FOLIC ACID SCH MG: 1 TABLET ORAL at 09:03

## 2019-08-01 RX ADMIN — FAMOTIDINE SCH: 10 TABLET ORAL at 21:45

## 2019-08-01 RX ADMIN — OXYCODONE AND ACETAMINOPHEN PRN TAB: 5; 325 TABLET ORAL at 09:03

## 2019-08-01 RX ADMIN — Medication SCH ML: at 09:11

## 2019-08-01 RX ADMIN — CHLORPROMAZINE HYDROCHLORIDE PRN MG: 10 TABLET, FILM COATED ORAL at 19:26

## 2019-08-01 RX ADMIN — FAMOTIDINE SCH MG: 10 TABLET ORAL at 09:03

## 2019-08-01 RX ADMIN — FERROUS SULFATE TAB 325 MG (65 MG ELEMENTAL FE) SCH: 325 (65 FE) TAB at 21:44

## 2019-08-01 RX ADMIN — Medication SCH ML: at 21:47

## 2019-08-01 RX ADMIN — OXYCODONE AND ACETAMINOPHEN PRN TAB: 5; 325 TABLET ORAL at 19:23

## 2019-08-01 RX ADMIN — HYDROMORPHONE HYDROCHLORIDE PRN MG: 1 INJECTION, SOLUTION INTRAMUSCULAR; INTRAVENOUS; SUBCUTANEOUS at 13:00

## 2019-08-01 RX ADMIN — FERROUS SULFATE TAB 325 MG (65 MG ELEMENTAL FE) SCH MG: 325 (65 FE) TAB at 09:03

## 2019-08-01 RX ADMIN — METOPROLOL TARTRATE SCH MG: 50 TABLET, FILM COATED ORAL at 09:04

## 2019-08-01 RX ADMIN — METOPROLOL TARTRATE SCH: 50 TABLET, FILM COATED ORAL at 21:45

## 2019-08-01 NOTE — PROGRESS NOTE
Assessment and Plan


Assessment and plan: 


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 

lymphovascular invasion, 


- Chemotherapy port to be placed 7/16/19;  On discharge patient will follow 

hematology oncologist, Dr. Meade





--Acute anemia with Drop in HCT 


Received total 3 units of PRBC, ordered FOBT, GI is following, d/w Dr. Joel, 

s/p colonoscopy





--ESRD: s/p permacath, had emergent hemodialysis due to severe hyperkalemia 

started on on 7/4/19, 


place [facility] on 7/5/19 with very little output. Need outpt HD set up





--Bilateral hydronephrosis: Placed on hernandez, consulted Urology, input 

noted,discharge with [facility] 





--Metastatic Rectal Cancer: Poorly differentiated mucinous adenocarcinoma with 

signet ring cells.


Oncology following. outpt f/u





--Malignant Ascites 


due to suspected Colon cancer with mets to Omentum/Carcinomatosis suspected, 


which would be a very poor prognostic sign: consulted GI, input noted, s/p 

Paracentesis on 7/5/19, 


await cytology, CEA 17 ELEVATED, AFP ELEVATED, CA 19-9 - normal 11





--Positive blood cultures: Abx discontinued following ID eval as culture appears

to be contaminant





--Hyperkalemia: treated with HD, Nephrology is following





--Acute Microcytic anemia, suspect cancer related until proven otherwise, follow

h/h





--Severe malnutrition: Dietitian consulted 





--Hiccups- likely tumor related, on Thorazine.





--DVT prophylaxis: On heparin and GI prophylaxis





Discharge when Outpatient Dialysis is set up . Outpatient follow up with 

oncologist. 


If no place for HD could be confirmed patient may needs to come to hospital for 

HD 


- currently he is refusing that option 














History


Interval history: 


Patient seen and examined medical records reviewed


Patient looks chronically ill, cachectic and malnourished


Complaints of generalized weakness and fatigue


Vital signs noted








Hospitalist Physical





- Constitutional


Vitals: 


                                        











Temp Pulse Resp BP Pulse Ox


 


 98.2 F   113 H  18   119/76   100 


 


 08/01/19 05:06  08/01/19 09:04  08/01/19 05:06  08/01/19 05:06  08/01/19 05:06











General appearance: Present: mild distress, cachectic, disheveled





- EENT


Eyes: Present: PERRL, EOM intact





- Neck


Neck: Present: supple, normal ROM





- Respiratory


Respiratory effort: normal


Respiratory: bilateral: diminished, negative: rales, rhonchi, wheezing





- Cardiovascular


Rhythm: regular


Heart Sounds: Present: S1 & S2





- Extremities


Extremities: no ischemia, No edema





- Abdominal


General gastrointestinal: soft, non-tender, non-distended, normal bowel sounds





- Integumentary


Integumentary: Present: clear, warm





- Psychiatric


Psychiatric: appropriate mood/affect, cooperative





- Neurologic


Neurologic: CNII-XII intact, moves all extremities





Results





- Labs


CBC & Chem 7: 


                                 07/31/19 09:26





                                 07/31/19 15:53


Labs: 


                             Laboratory Last Values











WBC  12.1 K/mm3 (4.5-11.0)  H  07/31/19  09:26    


 


RBC  3.73 M/mm3 (3.65-5.03)   07/31/19  09:26    


 


Hgb  8.7 gm/dl (11.8-15.2)  L  07/31/19  09:26    


 


Hct  28.6 % (35.5-45.6)  L  07/31/19  09:26    


 


MCV  77 fl (84-94)  L  07/31/19  09:26    


 


MCH  23 pg (28-32)  L  07/31/19  09:26    


 


MCHC  30 % (32-34)  L  07/31/19  09:26    


 


RDW  30.8 % (13.2-15.2)  H  07/31/19  09:26    


 


Plt Count  294 K/mm3 (140-440)   07/31/19  09:26    


 


Add Manual Diff  Complete   07/31/19  09:26    


 


Total Counted  100   07/31/19  09:26    


 


Seg Neuts % (Manual)  80.0 % (40.0-70.0)  H  07/31/19  09:26    


 


  0 %  07/31/19  09:26    


 


  13.0 % (13.4-35.0)  L  07/31/19  09:26    


 


Reactive Lymphs % (Man)  0 %  07/31/19  09:26    


 


  7.0 % (0.0-7.3)   07/31/19  09:26    


 


  0 % (0.0-4.3)   07/31/19  09:26    


 


  0 % (0.0-1.8)   07/31/19  09:26    


 


  0 %  07/31/19  09:26    


 


  0 %  07/31/19  09:26    


 


  0 %  07/31/19  09:26    


 


  0 %  07/31/19  09:26    


 


Nucleated RBC %  Not Reportable   07/31/19  09:26    


 


Seg Neutrophils # Man  9.7 K/mm3 (1.8-7.7)  H  07/31/19  09:26    


 


Band Neutrophils #  0.0 K/mm3  07/31/19  09:26    


 


  1.6 K/mm3 (1.2-5.4)   07/31/19  09:26    


 


Abs React Lymphs (Man)  0.0 K/mm3  07/31/19  09:26    


 


  0.8 K/mm3 (0.0-0.8)   07/31/19  09:26    


 


  0.0 K/mm3 (0.0-0.4)   07/31/19  09:26    


 


  0.0 K/mm3 (0.0-0.1)   07/31/19  09:26    


 


  0.0 K/mm3  07/31/19  09:26    


 


  0.0 K/mm3  07/31/19  09:26    


 


  0.0 K/mm3  07/31/19  09:26    


 


Blast Cells #  0.0 K/mm3  07/31/19  09:26    


 


WBC Morphology  Not Reportable   07/31/19  09:26    


 


Hypersegmented Neuts  Not Reportable   07/31/19  09:26    


 


Hyposegmented Neuts  Not Reportable   07/31/19  09:26    


 


Hypogranular Neuts  Not Reportable   07/31/19  09:26    


 


  Not Reportable   07/31/19  09:26    


 


  Not Reportable   07/31/19  09:26    


 


  Not Reportable   07/31/19  09:26    


 


  Not Reportable   07/31/19  09:26    


 


  Not Reportable   07/31/19  09:26    


 


  Not Reportable   07/31/19  09:26    


 


  Consistent w auto   07/31/19  09:26    


 


  Not Reportable   07/31/19  09:26    


 


Plt Clumps, EDTA  Not Reportable   07/31/19  09:26    


 


  Not Reportable   07/31/19  09:26    


 


  Not Reportable   07/31/19  09:26    


 


  Not Reportable   07/31/19  09:26    


 


Plt Morphology Comment  Not Reportable   07/31/19  09:26    


 


RBC Morphology  Not Reportable   07/31/19  09:26    


 


Dimorphic RBCs  Not Reportable   07/31/19  09:26    


 


  Not Reportable   07/31/19  09:26    


 


  1+   07/31/19  09:26    


 


  Not Reportable   07/31/19  09:26    


 


  2+   07/31/19  09:26    


 


  1+   07/31/19  09:26    


 


  Not Reportable   07/31/19  09:26    


 


  Not Reportable   07/31/19  09:26    


 


  Not Reportable   07/31/19  09:26    


 


  Not Reportable   07/31/19  09:26    


 


  Few   07/31/19  09:26    


 


  Not Reportable   07/31/19  09:26    


 


  Not Reportable   07/31/19  09:26    


 


  Not Reportable   07/31/19  09:26    


 


  Not Reportable   07/31/19  09:26    


 


  Not Reportable   07/31/19  09:26    


 


  Not Reportable   07/31/19  09:26    


 


  Not Reportable   07/31/19  09:26    


 


  Not Reportable   07/31/19  09:26    


 


  Not Reportable   07/31/19  09:26    


 


Acanthocytes (Spur)  Not Reportable   07/31/19  09:26    


 


Rouleaux  Not Reportable   07/31/19  09:26    


 


  Not Reportable   07/31/19  09:26    


 


  Not Reportable   07/31/19  09:26    


 


  Not Reportable   07/31/19  09:26    


 


Percent Retic  1.10 % (0.78-2.58)   07/05/19  04:52    


 


  Not Reportable   07/31/19  09:26    


 


Hem Pathologist Commnt  No   07/31/19  09:26    


 


PT  16.3 Sec. (12.2-14.9)  H  07/05/19  08:51    


 


INR  1.35  (0.87-1.13)  H  07/05/19  08:51    


 


Sodium  140 mmol/L (137-145)   07/31/19  15:53    


 


Potassium  4.7 mmol/L (3.6-5.0)   07/31/19  15:53    


 


Chloride  94.7 mmol/L ()  L  07/31/19  15:53    


 


Carbon Dioxide  25 mmol/L (22-30)   07/31/19  15:53    


 


  25 mmol/L  07/31/19  15:53    


 


BUN  19 mg/dL (9-20)   07/31/19  15:53    


 


  8.2 mg/dL (0.8-1.5)  H  07/31/19  15:53    


 


Estimated GFR  8 ml/min  07/31/19  15:53    


 


  2 %  07/31/19  15:53    


 


Glucose  106 mg/dL ()  H  07/31/19  15:53    


 


POC Glucose  103  ()   07/20/19  16:43    


 


Calcium  9.1 mg/dL (8.4-10.2)   07/31/19  15:53    


 


Phosphorus  6.50 mg/dL (2.5-4.5)  H  07/15/19  06:57    


 


Magnesium  2.00 mg/dL (1.7-2.3)   07/05/19  04:52    


 


Iron  20 ug/dL ()  L  07/05/19  08:51    


 


TIBC  219 mcg/dL (250-450)  L  07/05/19  08:51    


 


  63.8 ng/mL (13.0-400.0)   07/05/19  08:51    


 


  0.50 mg/dL (0.1-1.2)   07/31/19  15:53    


 


AST  18 units/L (5-40)   07/31/19  15:53    


 


ALT  < 5 units/L (7-56)  L  07/31/19  15:53    


 


  99 units/L ()   07/31/19  15:53    


 


  5.9 g/dL (6.1-8.1)  L  07/09/19  04:50    


 


  9.0 g/dL (6.3-8.2)  H  07/31/19  15:53    


 


  2.7 g/dL (3.9-5)  L  07/31/19  15:53    


 


  0.4 %  07/31/19  15:53    


 


  0.7 g/dL (0.2-0.3)  H  07/09/19  04:50    


 


  0.8 g/dL (0.5-0.9)   07/09/19  04:50    


 


  0.5 g/dL (0.2-0.5)   07/09/19  04:50    


 


  1.6 g/dL (0.8-1.7)   07/09/19  04:50    


 


Abnorm Protein Band 1  see below   07/09/19  04:50    


 


PEP Interpretation  see below  H  07/09/19  04:50    


 


  88 units/L (13-60)  H  07/04/19  12:47    


 


Tumor Marker AFP  See scanned result   07/05/19  08:51    


 


Carcinoembryonic Ag  17.4 ng/mL (0.0-2.4)  H  07/05/19  08:51    


 


  11 U/mL (<34)   07/05/19  08:51    


 


Vitamin B12  1768 pg/mL (211-911)  H  07/05/19  08:51    


 


  5.05 ng/mL (7.3-26.0)  L  07/05/19  08:51    


 


PTH Intact  238.5 pg/mL (15-65)  H  07/05/19  04:52    


 


  Yellow  (Yellow)   07/23/19  16:35    


 


  Turbid  (Clear)   07/23/19  16:35    


 


  5.0  (5.0-7.0)   07/23/19  16:35    


 


Ur Specific Gravity  1.024  (1.003-1.030)   07/23/19  16:35    


 


  100 mg/dl mg/dL (Negative)   07/23/19  16:35    


 


  Neg mg/dL (Negative)   07/23/19  16:35    


 


  Tr mg/dL (Negative)   07/23/19  16:35    


 


  Lg  (Negative)   07/23/19  16:35    


 


  Neg  (Negative)   07/23/19  16:35    


 


  Neg  (Negative)   07/23/19  16:35    


 


  < 2.0 mg/dL (<2.0)   07/23/19  16:35    


 


Ur Leukocyte Esterase  Mod  (Negative)   07/23/19  16:35    


 


  > 182.0 /HPF (0.0-6.0)  H  07/23/19  16:35    


 


  > 182.0 /HPF (0.0-6.0)   07/23/19  16:35    


 


  2+ /HPF (Negative)   07/23/19  16:35    


 


  2+ /HPF  07/23/19  16:35    


 


  161.7 mg/dL (0.1-20.0)  H  07/05/19  08:51    


 


  72 mmol/L  07/05/19  08:51    


 


Fluid Type  Ascitic   07/05/19  Unknown


 


Fluid Color  Yellow   07/05/19  Unknown


 


Fluid Appearance  Hazy   07/05/19  Unknown


 


Fluid WBC  58 /mm3  07/05/19  Unknown


 


Fluid RBC  1850 /mm3  07/05/19  Unknown


 


Fluid Diff Comment     07/05/19  Unknown


 


Fluid Seg Neutrophils  35.0 %  07/05/19  Unknown


 


Fluid Lymphocytes  14.0 %  07/05/19  Unknown


 


Fluid Reactive Lymphs  0 %  07/05/19  Unknown


 


Fluid Monocytes  51.0 %  07/05/19  Unknown


 


Fluid Eosinophils  0 %  07/05/19  Unknown


 


Fluid Basophils  0 %  07/05/19  Unknown


 


Fluid Glucose  93 mg/dL (40-70)  H  07/05/19  Unknown


 


Fluid Total Protein  4.6  (15.0-45.0)  L  07/05/19  Unknown


 


Fluid Albumin  1.5 g/dL  07/05/19  Unknown


 


Fluid LDH  195   07/05/19  Unknown


 


Random Vancomycin  13.1 ug/mL (0-40.0)   07/09/19  04:50    


 


EVELYN Screen  Negative  (Negative)   07/09/19  04:50    


 


Proteinase 3 (PR3) Ab  <1.0 AI (<1.0)   07/09/19  04:50    


 


Myeloperoxidase Ab  <1.0 AI (<1.0)   07/09/19  04:50    


 


  158 mg/dL ()   07/09/19  04:50    


 


  32 mg/dL (15-53)   07/09/19  04:50    


 


Hepatitis A IgM Ab  Non-reactive  (NonReactive)   07/04/19  17:48    


 


Hep Bs Antigen  Non-reactive  (Negative)   07/04/19  17:48    


 


Hep B Core IgM Ab  Non-reactive  (NonReactive)   07/04/19  17:48    


 


  Non-reactive  (NonReactive)   07/04/19  17:48    


 


Blood Type  A POSITIVE   07/16/19  08:18    


 


Antibody Screen  Negative   07/16/19  08:18    


 


Crossmatch  See Detail   07/16/19  08:18    














Active Medications





- Current Medications


Current Medications: 














Generic Name Dose Route Start Last Admin





  Trade Name Freq  PRN Reason Stop Dose Admin


 


Acetaminophen  650 mg  07/04/19 17:22  07/09/19 11:32





  Tylenol  PO   650 mg





  Q4H PRN   Administration





  Pain MILD(1-3)/Fever >100.5/HA  


 


Albuterol  2.5 mg  07/04/19 17:22 





  Proventil  IH  





  Q4HRT PRN  





  Shortness Of Breath  


 


Bisacodyl  15 mg  07/07/19 14:51 





  Dulcolax  PO  





  QDAY PRN  





  Constipation  


 


Chlorpromazine HCl  10 mg  07/09/19 15:20  07/31/19 20:14





  Thorazine  PO   10 mg





  Q6H PRN   Administration





  Hiccups  


 


Epoetin Tye  20,000 unit  07/12/19 09:36  07/31/19 11:45





  Procrit  SUB-Q   20,000 unit





  VEE PRN   Administration





  hemodialysis  


 


Famotidine  10 mg  07/12/19 22:00  08/01/19 09:03





  Pepcid  PO   10 mg





  BID BOWEN   Administration


 


Ferrous Sulfate  325 mg  07/10/19 22:00  08/01/19 09:03





  Feosol  PO   325 mg





  BID BOWEN   Administration


 


Folic Acid  1 mg  07/06/19 10:00  08/01/19 09:03





  Folvite  PO   1 mg





  QDAY BOWEN   Administration


 


Heparin Sodium (Porcine)  3,000 unit  07/12/19 09:36  07/31/19 11:17





  Heparin 10,000 Units/10 Ml  IV   3,000 unit





  VEE PRN   Administration





  hemodialysis  


 


Hydralazine HCl  10 mg  07/04/19 19:14  07/04/19 22:17





  Apresoline  IV   10 mg





  Q3H PRN   Administration





  Blood Pressure  


 


Hydromorphone HCl  0.5 mg  07/04/19 17:22  07/31/19 22:44





  Dilaudid  IV   0.5 mg





  Q3H PRN   Administration





  Pain , Severe (7-10)  


 


Chlorpromazine HCl 25 mg/  51 mls @ 100 mls/hr  07/13/19 12:26 





  Sodium Chloride  IV  





  Q4H PRN  





  Hiccups  


 


Sodium Chloride  100 mls @ 999 mls/hr  07/26/19 09:28 





  Nacl 0.9%  IV  





  VEE PRN  





  Hypotension  


 


Metoclopramide HCl  5 mg  07/17/19 15:00 





  Reglan  IV  





  Q6H PRN  





  Nausea And Vomiting  


 


Metoprolol Tartrate  50 mg  07/04/19 23:00  08/01/19 09:04





  Lopressor  PO   50 mg





  BID BOWNE   Administration


 


Ondansetron HCl  4 mg  07/04/19 17:22  07/31/19 13:26





  Zofran  IV   4 mg





  Q3H PRN   Administration





  Nausea And Vomiting  


 


Oxycodone/Acetaminophen  1 tab  07/13/19 10:53  08/01/19 09:03





  Percocet 5/325  PO   1 tab





  Q6H PRN   Administration





  Pain, Moderate (4-6)  


 


Sodium Chloride  10 ml  07/04/19 22:00  08/01/19 09:11





  Sodium Chloride Flush Syringe 10 Ml  IV   10 ml





  BID BOWEN   Administration


 


Sodium Chloride  10 ml  07/04/19 17:22  07/18/19 14:14





  Sodium Chloride Flush Syringe 10 Ml  IV   10 ml





  PRN PRN   Administration





  LINE FLUSH  














Nutrition/Malnutrition Assess





- Dietary Evaluation


Nutrition/Malnutrition Findings: 


                                        





Nutrition Notes                                            Start:  07/11/19 

12:03


Freq:                                                      Status: Active       




Protocol:                                                                       




 Document     07/31/19 15:31  RM  (Rec: 07/31/19 15:43  RM  [id number])


 Nutrition Notes


     Initial or Follow up                        Reassessment


     Current Diagnosis                           CKD (stage V CKD),Hypertension


     Other Pertinent Diagnosis                   on HD, Metastatic rectal CA,


                                                 malignant ascites, anemia


     Current Diet                                Renal


     Labs/Tests                                  reviewed


     Pertinent Medications                       Zofran


     Height                                      5 ft 7 in


     Weight                                      54.6 kg


     Ideal Body Weight (kg)                      67.27


     BMI                                         18.8


     Subjective/Other Information                Pt not in room at time of


                                                 visit. Recorded PO intake 88%


                                                 X 2 meals. Nurse stated that


                                                 she does not think that pt has


                                                 been drinking the Nepro.


                                                 Stated that pt declined to


                                                 drink it today.


     Percent of energy/protein needs met:        98%/100%


     Burn                                        Absent


     Trauma                                      Absent


     #1


      Nutrition Diagnosis                        Malnutrition


      Diagnosis Progress(for reassessment        Continues


       documentation)                            


     Is patient on ventilator?                   No


     Is Patient Ambulatory and/or Out of Bed     No


     REE-([facility] ([place]confined to bed)      [phone]


     Kcal/Kg value to use for calculation        35


     Approximate Energy Requirements Using       1911


      kcal/Kg                                    


     Calculation Used for Recommendations        Kcal/kg


     Additional Notes                            Pro needs >1.2g/kg: >62g/day


                                                 Fluid needs 1-1.5L/day


 Nutrition Intervention


     Change Diet Order:                          Continue current diet order


     Add Supplement/Snack (indicate name/kcal    D/C Nepro BID (vanilla).


      /protein )                                 


     Goal #1                                     PO intake of meals to continue


                                                 meet at least 75% energy and


                                                 pro needs


     Goal #2                                     Wt maintenance and/or gain


     Anticipated Discharge Needs:                Renal diet


     Follow-Up By:                               08/05/19


     Additional Comments                         Follow for PO intakes

## 2019-08-01 NOTE — HEM/ONC PROGRESS NOTE
Assessment and Plan





1.  Rectal ca  - signet cell features - Radiology shows carcinomatosis.  The 

patient has anemia.  MCV is low.  


2.  Anemia, low iron, B12 normal, folate low.  renal impairement may have a role


3.  h/o Thrombocytosis, likely reactive.


4.  History of hiccups.  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 

involvement.


5.  Renal failure.  Nephrology following.


6.  Hyperkalemia status post treatment.


7.  History of hypertension.





I will follow the patient during inpatient stay.    GI team has seen the 

patient.





CEA - 17


CA 19-9 - normal 11





colonoscopy - Rectal tumor from dentate line to mid/proximal rectum (extent of 

exam due to severe stenosis related to tumor)





based on ascites and omental lesions -this is likely metastatic ca


as per Dr Fung - he said no ascites fluid is available in the lab


d/w pt reg stage IV - chemo 


d/w pt reg non curative nature 








port placed





d/w pt reg OP chemo - [employer]ML[employer] -  - HD


pt aware that this is aggresive tumor - FOLFOX q 2 weeks as OP - dose 

modification for HD








8/1/2019 - d/w pt that I will f/u in clinic - however if there is a delay of 

insurance then [facility] is an option


pt aware of clinical suspicion of stage IV cancer due to hiccough - ascites - CT

showing peritoneal nodules and signet ring features of bx


pt will call [facility] for apt as well as see me in clinic


s/p iv iron 7/28





d/w brother, sister in law on 8/1


waiting for discharge





- Patient Problems


(1) Ascites


Current Visit: Yes   Status: Chronic   





Subjective


Date of service: 08/01/19


Principal diagnosis: rectal ca


Interval history: 





no rectal bleed


brother asked questions





Objective





- Exam


Narrative Exam: 





Pain  - none


General appearance  no acute distress


Performance status limited self care


Eyes - no icterus


ENT  no thrush





LNs  cervical  not palpable


Neck  - normal ROM


Respiratory  Normal


Breath sounds - CTA





CVS  S1 S2 +


Extremities  normal temperature


General GI  Soft - distended


Rectal  deferred


male  - deferred


Skin  warm -PORT +


Musculoskeletal  moving normal


Neurologically  no focal deficit





- Constitutional


Vitals: 


                                Last Vital Signs











Temp  98.2 F   08/01/19 05:06


 


Pulse  118 H  08/01/19 05:06


 


Resp  18   08/01/19 05:06


 


BP  119/76   08/01/19 05:06


 


Pulse Ox  100   08/01/19 05:06














- Labs


Lab Results: 


                         Laboratory Results - last 24 hr











  07/31/19 07/31/19





  09:26 15:53


 


WBC  12.1 H 


 


RBC  3.73 


 


Hgb  8.7 L 


 


Hct  28.6 L 


 


MCV  77 L 


 


MCH  23 L 


 


MCHC  30 L 


 


RDW  30.8 H 


 


Plt Count  294 


 


Add Manual Diff  Complete 


 


Total Counted  100 


 


Seg Neuts % (Manual)  80.0 H 


 


Band Neutrophils %  0 


 


Lymphocytes % (Manual)  13.0 L 


 


Reactive Lymphs % (Man)  0 


 


Monocytes % (Manual)  7.0 


 


Eosinophils % (Manual)  0 


 


Basophils % (Manual)  0 


 


Metamyelocytes %  0 


 


Myelocytes %  0 


 


Promyelocytes %  0 


 


Blast Cells %  0 


 


Nucleated RBC %  Not Reportable 


 


Seg Neutrophils # Man  9.7 H 


 


Band Neutrophils #  0.0 


 


Lymphocytes # (Manual)  1.6 


 


Abs React Lymphs (Man)  0.0 


 


Monocytes # (Manual)  0.8 


 


Eosinophils # (Manual)  0.0 


 


Basophils # (Manual)  0.0 


 


Metamyelocytes #  0.0 


 


Myelocytes #  0.0 


 


Promyelocytes #  0.0 


 


Blast Cells #  0.0 


 


WBC Morphology  Not Reportable 


 


Hypersegmented Neuts  Not Reportable 


 


Hyposegmented Neuts  Not Reportable 


 


Hypogranular Neuts  Not Reportable 


 


Smudge Cells  Not Reportable 


 


Toxic Granulation  Not Reportable 


 


Toxic Vacuolation  Not Reportable 


 


Dohle Bodies  Not Reportable 


 


Pelger-Huet Anomaly  Not Reportable 


 


Veena Rods  Not Reportable 


 


Platelet Estimate  Consistent w auto 


 


Clumped Platelets  Not Reportable 


 


Plt Clumps, EDTA  Not Reportable 


 


Large Platelets  Not Reportable 


 


Giant Platelets  Not Reportable 


 


Platelet Satelliting  Not Reportable 


 


Plt Morphology Comment  Not Reportable 


 


RBC Morphology  Not Reportable 


 


Dimorphic RBCs  Not Reportable 


 


Polychromasia  Not Reportable 


 


Hypochromasia  1+ 


 


Poikilocytosis  Not Reportable 


 


Anisocytosis  2+ 


 


Microcytosis  1+ 


 


Macrocytosis  Not Reportable 


 


Spherocytes  Not Reportable 


 


Pappenheimer Bodies  Not Reportable 


 


Sickle Cells  Not Reportable 


 


Target Cells  Few 


 


Tear Drop Cells  Not Reportable 


 


Ovalocytes  Not Reportable 


 


Helmet Cells  Not Reportable 


 


[place] Bodies  Not Reportable 


 


Cabot Rings  Not Reportable 


 


Chapito Cells  Not Reportable 


 


Bite Cells  Not Reportable 


 


Crenated Cell  Not Reportable 


 


Elliptocytes  Not Reportable 


 


Acanthocytes (Spur)  Not Reportable 


 


Rouleaux  Not Reportable 


 


Hemoglobin C Crystals  Not Reportable 


 


Schistocytes  Not Reportable 


 


Malaria parasites  Not Reportable 


 


Reynaldo Bodies  Not Reportable 


 


Hem Pathologist Commnt  No 


 


Sodium   140


 


Potassium   4.7


 


Chloride   94.7 L


 


Carbon Dioxide   25


 


Anion Gap   25


 


BUN   19


 


Creatinine   8.2 H


 


Estimated GFR   8


 


BUN/Creatinine Ratio   2


 


Glucose   106 H


 


Calcium   9.1


 


Total Bilirubin   0.50


 


AST   18


 


ALT   < 5 L


 


Alkaline Phosphatase   99


 


Total Protein   9.0 H


 


Albumin   2.7 L


 


Albumin/Globulin Ratio   0.4














Medications & Allergies





- Medications


Allergies/Adverse Reactions: 


                                    Allergies





No Known Allergies Allergy (Verified 07/04/19 12:02)


   








Home Medications: 


                                Home Medications











 Medication  Instructions  Recorded  Confirmed  Last Taken  Type


 


Prednisone [predniSONE 10 mg 10 mg PO .TAPER #1 tab.ds.pk 09/10/15 07/06/19 

Unknown Rx





(6-Day Pack, 21 Tabs)]     











Active Medications: 














Generic Name Dose Route Start Last Admin





  Trade Name Freq  PRN Reason Stop Dose Admin


 


Acetaminophen  650 mg  07/04/19 17:22  07/09/19 11:32





  Tylenol  PO   650 mg





  Q4H PRN   Administration





  Pain MILD(1-3)/Fever >100.5/HA  


 


Albuterol  2.5 mg  07/04/19 17:22 





  Proventil  IH  





  Q4HRT PRN  





  Shortness Of Breath  


 


Bisacodyl  15 mg  07/07/19 14:51 





  Dulcolax  PO  





  QDAY PRN  





  Constipation  


 


Chlorpromazine HCl  10 mg  07/09/19 15:20  07/31/19 20:14





  Thorazine  PO   10 mg





  Q6H PRN   Administration





  Hiccups  


 


Epoetin Tye  20,000 unit  07/12/19 09:36  07/31/19 11:45





  Procrit  SUB-Q   20,000 unit





  VEE PRN   Administration





  hemodialysis  


 


Famotidine  10 mg  07/12/19 22:00  07/31/19 22:44





  Pepcid  PO   10 mg





  BID BOWEN   Administration


 


Ferrous Sulfate  325 mg  07/10/19 22:00  07/31/19 22:44





  Feosol  PO   325 mg





  BID BOWEN   Administration


 


Folic Acid  1 mg  07/06/19 10:00  07/31/19 09:21





  Folvite  PO   Not Given





  QDAY BOWEN  


 


Heparin Sodium (Porcine)  3,000 unit  07/12/19 09:36  07/31/19 11:17





  Heparin 10,000 Units/10 Ml  IV   3,000 unit





  VEE PRN   Administration





  hemodialysis  


 


Hydralazine HCl  10 mg  07/04/19 19:14  07/04/19 22:17





  Apresoline  IV   10 mg





  Q3H PRN   Administration





  Blood Pressure  


 


Hydromorphone HCl  0.5 mg  07/04/19 17:22  07/31/19 22:44





  Dilaudid  IV   0.5 mg





  Q3H PRN   Administration





  Pain , Severe (7-10)  


 


Chlorpromazine HCl 25 mg/  51 mls @ 100 mls/hr  07/13/19 12:26 





  Sodium Chloride  IV  





  Q4H PRN  





  Hiccups  


 


Sodium Chloride  100 mls @ 999 mls/hr  07/26/19 09:28 





  Nacl 0.9%  IV  





  VEE PRN  





  Hypotension  


 


Metoclopramide HCl  5 mg  07/17/19 15:00 





  Reglan  IV  





  Q6H PRN  





  Nausea And Vomiting  


 


Metoprolol Tartrate  50 mg  07/04/19 23:00  07/31/19 22:43





  Lopressor  PO   Not Given





  BID BOWEN  


 


Ondansetron HCl  4 mg  07/04/19 17:22  07/31/19 13:26





  Zofran  IV   4 mg





  Q3H PRN   Administration





  Nausea And Vomiting  


 


Oxycodone/Acetaminophen  1 tab  07/13/19 10:53  07/31/19 11:00





  Percocet 5/325  PO   1 tab





  Q6H PRN   Administration





  Pain, Moderate (4-6)  


 


Sodium Chloride  10 ml  07/04/19 22:00  07/31/19 22:44





  Sodium Chloride Flush Syringe 10 Ml  IV   10 ml





  BID BOWEN   Administration


 


Sodium Chloride  10 ml  07/04/19 17:22  07/18/19 14:14





  Sodium Chloride Flush Syringe 10 Ml  IV   10 ml





  PRN PRN   Administration





  LINE FLUSH

## 2019-08-01 NOTE — PROGRESS NOTE
Assessment and Plan





1. ESRD:


CKD has progressed to ESRD.


Continue hemodialysis three times a week, MWF schedule.


Monitor renal function.


Renal prognosis is poor.


Avoid nephrotoxic agents.


Meds dosage based on GFR.


Hemodialysis: 7/4, 7/5, 7/6, 7/8, 7/10, 7/12, 7/15, 7/17, 7/19, 7/22, 7/24, 

7/26, 7/29, 7/31.





2. FEN:


Hyperkalemia, HD.


Anion gap MA, improved.


Monitor lytes.





3. Mild bilateral hydronephosis:


S/p hernandez catheter.


Evaluated by Urologist.





4. Rectal adenocarcinoma with malignant Ascites.


S/p Medport.


Followed by Heme-onc.


Per family [facility] wouldn't take him unless he has Insurance.





5. Anemia:


PRBC.


On Epogen, limited choice other than transfusion.


Monitor H/H.





6. HTN:


BP is controlled.





Difficulty in securing outpatient hemodialysis chair due to multiple issues.


Patient may have to come to the ER intermittently for dialysis.














Subjective


Date of service: 08/01/19


Principal diagnosis: rectal ca


Interval history: 


Patient was seen and examined at the bedside.


No new complaint.








Objective





- Vital Signs


Vital signs: 


                               Vital Signs - 12hr











  07/31/19 07/31/19 08/01/19





  22:43 23:01 05:06


 


Temperature  98.6 F 98.2 F


 


Pulse Rate 113 H 119 H 118 H


 


Respiratory  18 18





Rate   


 


Blood Pressure 96/63 84/56 119/76


 


O2 Sat by Pulse  100 100





Oximetry   














  08/01/19





  09:04


 


Temperature 


 


Pulse Rate 113 H


 


Respiratory 





Rate 


 


Blood Pressure 


 


O2 Sat by Pulse 





Oximetry 














- General Appearance


General appearance: well-developed, appears stated age, other (no distress, R IJ

tunnel catheter)


EENT: ATNC, PERRL, hearing intact, vision intact


Neck: supple


Respiratory: Present: Clear to Ascultation


Cardiology: regular, S1S2, no murmurs


Gastrointestinal: normoactive bowel sounds, no tenderness, no distended, other 

(hernandez catheter)


Integumentary: no rash, warm and dry


Neurologic: no focal deficit, no asterixis, alert and oriented x3


Musculoskeletal: other (no edema)


Psychiatric: cooperative





- Lab





                                 07/31/19 09:26





                                 07/31/19 15:53


                             Most recent lab results











Calcium  9.1 mg/dL (8.4-10.2)   07/31/19  15:53    


 


Phosphorus  6.50 mg/dL (2.5-4.5)  H  07/15/19  06:57    


 


Magnesium  2.00 mg/dL (1.7-2.3)   07/05/19  04:52    


 


  161.7 mg/dL (0.1-20.0)  H  07/05/19  08:51    


 


  72 mmol/L  07/05/19  08:51    














Medications & Allergies





- Medications


Allergies/Adverse Reactions: 


                                    Allergies





No Known Allergies Allergy (Verified 07/04/19 12:02)


   








Home Medications: 


                                Home Medications











 Medication  Instructions  Recorded  Confirmed  Last Taken  Type


 


Prednisone [predniSONE 10 mg 10 mg PO .TAPER #1 tab.ds.pk 09/10/15 07/06/19 

Unknown Rx





(6-Day Pack, 21 Tabs)]     











Active Medications: 














Generic Name Dose Route Start Last Admin





  Trade Name Freq  PRN Reason Stop Dose Admin


 


Acetaminophen  650 mg  07/04/19 17:22  07/09/19 11:32





  Tylenol  PO   650 mg





  Q4H PRN   Administration





  Pain MILD(1-3)/Fever >100.5/HA  


 


Albuterol  2.5 mg  07/04/19 17:22 





  Proventil  IH  





  Q4HRT PRN  





  Shortness Of Breath  


 


Bisacodyl  15 mg  07/07/19 14:51 





  Dulcolax  PO  





  QDAY PRN  





  Constipation  


 


Chlorpromazine HCl  10 mg  07/09/19 15:20  07/31/19 20:14





  Thorazine  PO   10 mg





  Q6H PRN   Administration





  Hiccups  


 


Epoetin Tye  20,000 unit  07/12/19 09:36  07/31/19 11:45





  Procrit  SUB-Q   20,000 unit





  VEE PRN   Administration





  hemodialysis  


 


Famotidine  10 mg  07/12/19 22:00  08/01/19 09:03





  Pepcid  PO   10 mg





  BID BOWEN   Administration


 


Ferrous Sulfate  325 mg  07/10/19 22:00  08/01/19 09:03





  Feosol  PO   325 mg





  BID BOWEN   Administration


 


Folic Acid  1 mg  07/06/19 10:00  08/01/19 09:03





  Folvite  PO   1 mg





  QDAY BOWEN   Administration


 


Heparin Sodium (Porcine)  3,000 unit  07/12/19 09:36  07/31/19 11:17





  Heparin 10,000 Units/10 Ml  IV   3,000 unit





  VEE PRN   Administration





  hemodialysis  


 


Hydralazine HCl  10 mg  07/04/19 19:14  07/04/19 22:17





  Apresoline  IV   10 mg





  Q3H PRN   Administration





  Blood Pressure  


 


Hydromorphone HCl  0.5 mg  07/04/19 17:22  07/31/19 22:44





  Dilaudid  IV   0.5 mg





  Q3H PRN   Administration





  Pain , Severe (7-10)  


 


Chlorpromazine HCl 25 mg/  51 mls @ 100 mls/hr  07/13/19 12:26 





  Sodium Chloride  IV  





  Q4H PRN  





  Hiccups  


 


Sodium Chloride  100 mls @ 999 mls/hr  07/26/19 09:28 





  Nacl 0.9%  IV  





  VEE PRN  





  Hypotension  


 


Metoclopramide HCl  5 mg  07/17/19 15:00 





  Reglan  IV  





  Q6H PRN  





  Nausea And Vomiting  


 


Metoprolol Tartrate  50 mg  07/04/19 23:00  08/01/19 09:04





  Lopressor  PO   50 mg





  BID BOWEN   Administration


 


Ondansetron HCl  4 mg  07/04/19 17:22  07/31/19 13:26





  Zofran  IV   4 mg





  Q3H PRN   Administration





  Nausea And Vomiting  


 


Oxycodone/Acetaminophen  1 tab  07/13/19 10:53  08/01/19 09:03





  Percocet 5/325  PO   1 tab





  Q6H PRN   Administration





  Pain, Moderate (4-6)  


 


Sodium Chloride  10 ml  07/04/19 22:00  08/01/19 09:11





  Sodium Chloride Flush Syringe 10 Ml  IV   10 ml





  BID BOWEN   Administration


 


Sodium Chloride  10 ml  07/04/19 17:22  07/18/19 14:14





  Sodium Chloride Flush Syringe 10 Ml  IV   10 ml





  PRN PRN   Administration





  LINE FLUSH

## 2019-08-02 PROCEDURE — 5A1D70Z PERFORMANCE OF URINARY FILTRATION, INTERMITTENT, LESS THAN 6 HOURS PER DAY: ICD-10-PCS | Performed by: INTERNAL MEDICINE

## 2019-08-02 RX ADMIN — CHLORPROMAZINE HYDROCHLORIDE PRN MG: 10 TABLET, FILM COATED ORAL at 19:40

## 2019-08-02 RX ADMIN — OXYCODONE AND ACETAMINOPHEN PRN TAB: 5; 325 TABLET ORAL at 23:59

## 2019-08-02 RX ADMIN — METOPROLOL TARTRATE SCH: 50 TABLET, FILM COATED ORAL at 12:00

## 2019-08-02 RX ADMIN — OXYCODONE AND ACETAMINOPHEN PRN TAB: 5; 325 TABLET ORAL at 17:30

## 2019-08-02 RX ADMIN — FERROUS SULFATE TAB 325 MG (65 MG ELEMENTAL FE) SCH: 325 (65 FE) TAB at 17:36

## 2019-08-02 RX ADMIN — FAMOTIDINE SCH: 10 TABLET ORAL at 17:36

## 2019-08-02 RX ADMIN — HEPARIN SODIUM PRN UNIT: 1000 INJECTION, SOLUTION INTRAVENOUS; SUBCUTANEOUS at 10:40

## 2019-08-02 RX ADMIN — FERROUS SULFATE TAB 325 MG (65 MG ELEMENTAL FE) SCH MG: 325 (65 FE) TAB at 22:41

## 2019-08-02 RX ADMIN — OXYCODONE AND ACETAMINOPHEN PRN TAB: 5; 325 TABLET ORAL at 11:33

## 2019-08-02 RX ADMIN — Medication SCH ML: at 17:32

## 2019-08-02 RX ADMIN — Medication SCH ML: at 22:42

## 2019-08-02 RX ADMIN — FAMOTIDINE SCH MG: 10 TABLET ORAL at 22:41

## 2019-08-02 RX ADMIN — METOPROLOL TARTRATE SCH MG: 50 TABLET, FILM COATED ORAL at 22:41

## 2019-08-02 RX ADMIN — ERYTHROPOIETIN PRN UNIT: 20000 INJECTION, SOLUTION INTRAVENOUS; SUBCUTANEOUS at 13:36

## 2019-08-02 RX ADMIN — FOLIC ACID SCH: 1 TABLET ORAL at 17:36

## 2019-08-02 NOTE — PROGRESS NOTE
Assessment and Plan


Assessment and plan: 


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 

lymphovascular invasion, 


- Chemotherapy port to be placed 7/16/19;  On discharge patient will follow 

hematology oncologist, Dr. Meade





--Acute anemia with Drop in HCT 


Received total 3 units of PRBC, ordered FOBT, GI is following, d/w Dr. Joel, 

s/p colonoscopy





--ESRD: s/p permacath, had emergent hemodialysis due to severe hyperkalemia 

started on on 7/4/19, 


place hernandez on 7/5/19 with very little output. Need outpt HD set up





--Bilateral hydronephrosis: Placed on hernandez, consulted Urology, input 

noted,discharge with hernandez 





--Metastatic Rectal Cancer: Poorly differentiated mucinous adenocarcinoma with 

signet ring cells.


Oncology following. outpt f/u





--Malignant Ascites 


due to suspected Colon cancer with mets to Omentum/Carcinomatosis suspected, 


which would be a very poor prognostic sign: consulted GI, input noted, s/p 

Paracentesis on 7/5/19, 


await cytology, CEA 17 ELEVATED, AFP ELEVATED, CA 19-9 - normal 11





--Positive blood cultures: Abx discontinued following ID eval as culture appears

to be contaminant





--Hyperkalemia: treated with HD, Nephrology is following





--Acute Microcytic anemia, suspect cancer related until proven otherwise, follow

h/h





--Severe malnutrition: Dietitian consulted 





--Hiccups- likely tumor related, on Thorazine.





--DVT prophylaxis: On heparin and GI prophylaxis 





patient is awaiting Outpatient Dialysis scheduling.  We'll follow oncologist 

outpatient upon discharge


Plan discussed the options of therapy /chemotherapy


Continue inpatient management





Disposition; discharge when outpatient HD is set up








History


Interval history: 


Patient seen and examined medical records reviewed


Patient received hemodialysis today


Complaints of generalized weakness


Vital signs noted








Hospitalist Physical





- Constitutional


Vitals: 


                                        











Temp Pulse Resp BP Pulse Ox


 


 98.0 F   115 H  22   99/73   96 


 


 08/02/19 13:50  08/02/19 13:50  08/02/19 13:50  08/02/19 13:50  08/02/19 05:06











General appearance: Present: mild distress, cachectic, disheveled





- EENT


Eyes: Present: PERRL, EOM intact





- Neck


Neck: Present: supple, normal ROM





- Respiratory


Respiratory effort: normal


Respiratory: bilateral: diminished, rhonchi, negative: rales, wheezing





- Cardiovascular


Rhythm: regular


Heart Sounds: Present: S1 & S2





- Extremities


Extremities: no ischemia, No edema





- Abdominal


General gastrointestinal: soft, non-tender, non-distended, normal bowel sounds





- Integumentary


Integumentary: Present: clear, warm





- Psychiatric


Psychiatric: appropriate mood/affect, cooperative





- Neurologic


Neurologic: CNII-XII intact, moves all extremities





Results





- Labs


CBC & Chem 7: 


                                 07/31/19 09:26





                                 07/31/19 15:53


Labs: 


                             Laboratory Last Values











WBC  12.1 K/mm3 (4.5-11.0)  H  07/31/19  09:26    


 


RBC  3.73 M/mm3 (3.65-5.03)   07/31/19  09:26    


 


Hgb  8.7 gm/dl (11.8-15.2)  L  07/31/19  09:26    


 


Hct  28.6 % (35.5-45.6)  L  07/31/19  09:26    


 


MCV  77 fl (84-94)  L  07/31/19  09:26    


 


MCH  23 pg (28-32)  L  07/31/19  09:26    


 


MCHC  30 % (32-34)  L  07/31/19  09:26    


 


RDW  30.8 % (13.2-15.2)  H  07/31/19  09:26    


 


Plt Count  294 K/mm3 (140-440)   07/31/19  09:26    


 


Add Manual Diff  Complete   07/31/19  09:26    


 


Total Counted  100   07/31/19  09:26    


 


Seg Neuts % (Manual)  80.0 % (40.0-70.0)  H  07/31/19  09:26    


 


  0 %  07/31/19  09:26    


 


  13.0 % (13.4-35.0)  L  07/31/19  09:26    


 


Reactive Lymphs % (Man)  0 %  07/31/19  09:26    


 


  7.0 % (0.0-7.3)   07/31/19  09:26    


 


  0 % (0.0-4.3)   07/31/19  09:26    


 


  0 % (0.0-1.8)   07/31/19  09:26    


 


  0 %  07/31/19  09:26    


 


  0 %  07/31/19  09:26    


 


  0 %  07/31/19  09:26    


 


  0 %  07/31/19  09:26    


 


Nucleated RBC %  Not Reportable   07/31/19  09:26    


 


Seg Neutrophils # Man  9.7 K/mm3 (1.8-7.7)  H  07/31/19  09:26    


 


Band Neutrophils #  0.0 K/mm3  07/31/19  09:26    


 


  1.6 K/mm3 (1.2-5.4)   07/31/19  09:26    


 


Abs React Lymphs (Man)  0.0 K/mm3  07/31/19  09:26    


 


  0.8 K/mm3 (0.0-0.8)   07/31/19  09:26    


 


  0.0 K/mm3 (0.0-0.4)   07/31/19  09:26    


 


  0.0 K/mm3 (0.0-0.1)   07/31/19  09:26    


 


  0.0 K/mm3  07/31/19  09:26    


 


  0.0 K/mm3  07/31/19  09:26    


 


  0.0 K/mm3  07/31/19  09:26    


 


Blast Cells #  0.0 K/mm3  07/31/19  09:26    


 


WBC Morphology  Not Reportable   07/31/19  09:26    


 


Hypersegmented Neuts  Not Reportable   07/31/19  09:26    


 


Hyposegmented Neuts  Not Reportable   07/31/19  09:26    


 


Hypogranular Neuts  Not Reportable   07/31/19  09:26    


 


  Not Reportable   07/31/19  09:26    


 


  Not Reportable   07/31/19  09:26    


 


  Not Reportable   07/31/19  09:26    


 


  Not Reportable   07/31/19  09:26    


 


  Not Reportable   07/31/19  09:26    


 


  Not Reportable   07/31/19  09:26    


 


  Consistent w auto   07/31/19  09:26    


 


  Not Reportable   07/31/19  09:26    


 


Plt Clumps, EDTA  Not Reportable   07/31/19  09:26    


 


  Not Reportable   07/31/19  09:26    


 


  Not Reportable   07/31/19  09:26    


 


  Not Reportable   07/31/19  09:26    


 


Plt Morphology Comment  Not Reportable   07/31/19  09:26    


 


RBC Morphology  Not Reportable   07/31/19  09:26    


 


Dimorphic RBCs  Not Reportable   07/31/19  09:26    


 


  Not Reportable   07/31/19  09:26    


 


  1+   07/31/19  09:26    


 


  Not Reportable   07/31/19  09:26    


 


  2+   07/31/19  09:26    


 


  1+   07/31/19  09:26    


 


  Not Reportable   07/31/19  09:26    


 


  Not Reportable   07/31/19  09:26    


 


  Not Reportable   07/31/19  09:26    


 


  Not Reportable   07/31/19  09:26    


 


  Few   07/31/19  09:26    


 


  Not Reportable   07/31/19  09:26    


 


  Not Reportable   07/31/19  09:26    


 


  Not Reportable   07/31/19  09:26    


 


  Not Reportable   07/31/19  09:26    


 


  Not Reportable   07/31/19  09:26    


 


  Not Reportable   07/31/19  09:26    


 


  Not Reportable   07/31/19  09:26    


 


  Not Reportable   07/31/19  09:26    


 


  Not Reportable   07/31/19  09:26    


 


Acanthocytes (Spur)  Not Reportable   07/31/19  09:26    


 


Rouleaux  Not Reportable   07/31/19  09:26    


 


  Not Reportable   07/31/19  09:26    


 


  Not Reportable   07/31/19  09:26    


 


  Not Reportable   07/31/19  09:26    


 


Percent Retic  1.10 % (0.78-2.58)   07/05/19  04:52    


 


  Not Reportable   07/31/19  09:26    


 


Hem Pathologist Commnt  No   07/31/19  09:26    


 


PT  16.3 Sec. (12.2-14.9)  H  07/05/19  08:51    


 


INR  1.35  (0.87-1.13)  H  07/05/19  08:51    


 


Sodium  140 mmol/L (137-145)   07/31/19  15:53    


 


Potassium  4.7 mmol/L (3.6-5.0)   07/31/19  15:53    


 


Chloride  94.7 mmol/L ()  L  07/31/19  15:53    


 


Carbon Dioxide  25 mmol/L (22-30)   07/31/19  15:53    


 


  25 mmol/L  07/31/19  15:53    


 


BUN  19 mg/dL (9-20)   07/31/19  15:53    


 


  8.2 mg/dL (0.8-1.5)  H  07/31/19  15:53    


 


Estimated GFR  8 ml/min  07/31/19  15:53    


 


  2 %  07/31/19  15:53    


 


Glucose  106 mg/dL ()  H  07/31/19  15:53    


 


POC Glucose  103  ()   07/20/19  16:43    


 


Calcium  9.1 mg/dL (8.4-10.2)   07/31/19  15:53    


 


Phosphorus  6.50 mg/dL (2.5-4.5)  H  07/15/19  06:57    


 


Magnesium  2.00 mg/dL (1.7-2.3)   07/05/19  04:52    


 


Iron  20 ug/dL ()  L  07/05/19  08:51    


 


TIBC  219 mcg/dL (250-450)  L  07/05/19  08:51    


 


  63.8 ng/mL (13.0-400.0)   07/05/19  08:51    


 


  0.50 mg/dL (0.1-1.2)   07/31/19  15:53    


 


AST  18 units/L (5-40)   07/31/19  15:53    


 


ALT  < 5 units/L (7-56)  L  07/31/19  15:53    


 


  99 units/L ()   07/31/19  15:53    


 


  5.9 g/dL (6.1-8.1)  L  07/09/19  04:50    


 


  9.0 g/dL (6.3-8.2)  H  07/31/19  15:53    


 


  2.7 g/dL (3.9-5)  L  07/31/19  15:53    


 


  0.4 %  07/31/19  15:53    


 


  0.7 g/dL (0.2-0.3)  H  07/09/19  04:50    


 


  0.8 g/dL (0.5-0.9)   07/09/19  04:50    


 


  0.5 g/dL (0.2-0.5)   07/09/19  04:50    


 


  1.6 g/dL (0.8-1.7)   07/09/19  04:50    


 


Abnorm Protein Band 1  see below   07/09/19  04:50    


 


PEP Interpretation  see below  H  07/09/19  04:50    


 


  88 units/L (13-60)  H  07/04/19  12:47    


 


Tumor Marker AFP  See scanned result   07/05/19  08:51    


 


Carcinoembryonic Ag  17.4 ng/mL (0.0-2.4)  H  07/05/19  08:51    


 


  11 U/mL (<34)   07/05/19  08:51    


 


Vitamin B12  1768 pg/mL (211-911)  H  07/05/19  08:51    


 


  5.05 ng/mL (7.3-26.0)  L  07/05/19  08:51    


 


PTH Intact  238.5 pg/mL (15-65)  H  07/05/19  04:52    


 


  Yellow  (Yellow)   07/23/19  16:35    


 


  Turbid  (Clear)   07/23/19  16:35    


 


  5.0  (5.0-7.0)   07/23/19  16:35    


 


Ur Specific Gravity  1.024  (1.003-1.030)   07/23/19  16:35    


 


  100 mg/dl mg/dL (Negative)   07/23/19  16:35    


 


  Neg mg/dL (Negative)   07/23/19  16:35    


 


  Tr mg/dL (Negative)   07/23/19  16:35    


 


  Lg  (Negative)   07/23/19  16:35    


 


  Neg  (Negative)   07/23/19  16:35    


 


  Neg  (Negative)   07/23/19  16:35    


 


  < 2.0 mg/dL (<2.0)   07/23/19  16:35    


 


Ur Leukocyte Esterase  Mod  (Negative)   07/23/19  16:35    


 


  > 182.0 /HPF (0.0-6.0)  H  07/23/19  16:35    


 


  > 182.0 /HPF (0.0-6.0)   07/23/19  16:35    


 


  2+ /HPF (Negative)   07/23/19  16:35    


 


  2+ /HPF  07/23/19  16:35    


 


  161.7 mg/dL (0.1-20.0)  H  07/05/19  08:51    


 


  72 mmol/L  07/05/19  08:51    


 


Fluid Type  Ascitic   07/05/19  Unknown


 


Fluid Color  Yellow   07/05/19  Unknown


 


Fluid Appearance  Hazy   07/05/19  Unknown


 


Fluid WBC  58 /mm3  07/05/19  Unknown


 


Fluid RBC  1850 /mm3  07/05/19  Unknown


 


Fluid Diff Comment     07/05/19  Unknown


 


Fluid Seg Neutrophils  35.0 %  07/05/19  Unknown


 


Fluid Lymphocytes  14.0 %  07/05/19  Unknown


 


Fluid Reactive Lymphs  0 %  07/05/19  Unknown


 


Fluid Monocytes  51.0 %  07/05/19  Unknown


 


Fluid Eosinophils  0 %  07/05/19  Unknown


 


Fluid Basophils  0 %  07/05/19  Unknown


 


Fluid Glucose  93 mg/dL (40-70)  H  07/05/19  Unknown


 


Fluid Total Protein  4.6  (15.0-45.0)  L  07/05/19  Unknown


 


Fluid Albumin  1.5 g/dL  07/05/19  Unknown


 


Fluid LDH  195   07/05/19  Unknown


 


Random Vancomycin  13.1 ug/mL (0-40.0)   07/09/19  04:50    


 


EVELYN Screen  Negative  (Negative)   07/09/19  04:50    


 


Proteinase 3 (PR3) Ab  <1.0 AI (<1.0)   07/09/19  04:50    


 


Myeloperoxidase Ab  <1.0 AI (<1.0)   07/09/19  04:50    


 


  158 mg/dL ()   07/09/19  04:50    


 


  32 mg/dL (15-53)   07/09/19  04:50    


 


Hepatitis A IgM Ab  Non-reactive  (NonReactive)   07/04/19  17:48    


 


Hep Bs Antigen  Non-reactive  (Negative)   07/04/19  17:48    


 


Hep B Core IgM Ab  Non-reactive  (NonReactive)   07/04/19  17:48    


 


  Non-reactive  (NonReactive)   07/04/19  17:48    


 


Blood Type  A POSITIVE   07/16/19  08:18    


 


Antibody Screen  Negative   07/16/19  08:18    


 


Crossmatch  See Detail   07/16/19  08:18    














Active Medications





- Current Medications


Current Medications: 














Generic Name Dose Route Start Last Admin





  Trade Name Freq  PRN Reason Stop Dose Admin


 


Acetaminophen  650 mg  07/04/19 17:22  07/09/19 11:32





  Tylenol  PO   650 mg





  Q4H PRN   Administration





  Pain MILD(1-3)/Fever >100.5/HA  


 


Albuterol  2.5 mg  07/04/19 17:22 





  Proventil  IH  





  Q4HRT PRN  





  Shortness Of Breath  


 


Bisacodyl  15 mg  07/07/19 14:51 





  Dulcolax  PO  





  QDAY PRN  





  Constipation  


 


Chlorpromazine HCl  10 mg  07/09/19 15:20  08/01/19 19:26





  Thorazine  PO   10 mg





  Q6H PRN   Administration





  Hiccups  


 


Epoetin Tye  20,000 unit  07/12/19 09:36  08/02/19 13:36





  Procrit  SUB-Q   20,000 unit





  VEE PRN   Administration





  hemodialysis  


 


Famotidine  10 mg  07/12/19 22:00  08/02/19 17:36





  Pepcid  PO   Not Given





  BID [facility]  


 


Ferrous Sulfate  325 mg  07/10/19 22:00  08/02/19 17:36





  Feosol  PO   Not Given





  BID [facility]  


 


Folic Acid  1 mg  07/06/19 10:00  08/02/19 17:36





  Folvite  PO   Not Given





  QDAY [facility]  


 


Heparin Sodium (Porcine)  3,000 unit  07/12/19 09:36  08/02/19 10:40





  Heparin 10,000 Units/10 Ml  IV   3,000 unit





  VEE PRN   Administration





  hemodialysis  


 


Hydralazine HCl  10 mg  07/04/19 19:14  07/04/19 22:17





  Apresoline  IV   10 mg





  Q3H PRN   Administration





  Blood Pressure  


 


Hydromorphone HCl  0.5 mg  07/04/19 17:22  08/01/19 13:00





  Dilaudid  IV   0.5 mg





  Q3H PRN   Administration





  Pain , Severe (7-10)  


 


Chlorpromazine HCl 25 mg/  51 mls @ 100 mls/hr  07/13/19 12:26 





  Sodium Chloride  IV  





  Q4H PRN  





  Hiccups  


 


Sodium Chloride  100 mls @ 999 mls/hr  07/26/19 09:28 





  Nacl 0.9%  IV  





  VEE PRN  





  Hypotension  


 


Metoclopramide HCl  5 mg  07/17/19 15:00 





  Reglan  IV  





  Q6H PRN  





  Nausea And Vomiting  


 


Metoprolol Tartrate  50 mg  07/04/19 23:00  08/02/19 12:00





  Lopressor  PO   Not Given





  BID [facility]  


 


Ondansetron HCl  4 mg  07/04/19 17:22  07/31/19 13:26





  Zofran  IV   4 mg





  Q3H PRN   Administration





  Nausea And Vomiting  


 


Oxycodone/Acetaminophen  1 tab  07/13/19 10:53  08/02/19 17:30





  Percocet 5/325  PO   1 tab





  Q6H PRN   Administration





  Pain, Moderate (4-6)  


 


Sodium Chloride  10 ml  07/04/19 22:00  08/02/19 17:32





  Sodium Chloride Flush Syringe 10 Ml  IV   10 ml





  BID BOWEN   Administration


 


Sodium Chloride  10 ml  07/04/19 17:22  07/18/19 14:14





  Sodium Chloride Flush Syringe 10 Ml  IV   10 ml





  PRN PRN   Administration





  LINE FLUSH  














Nutrition/Malnutrition Assess





- Dietary Evaluation


Nutrition/Malnutrition Findings: 


                                        





Nutrition Notes                                            Start:  07/11/19 

12:03


Freq:                                                      Status: Active       




Protocol:                                                                       




 Document     07/31/19 15:31  RM  (Rec: 07/31/19 15:43  RM  [id number])


 Nutrition Notes


     Initial or Follow up                        Reassessment


     Current Diagnosis                           CKD (stage V CKD),Hypertension


     Other Pertinent Diagnosis                   on HD, Metastatic rectal CA,


                                                 malignant ascites, anemia


     Current Diet                                Renal


     Labs/Tests                                  reviewed


     Pertinent Medications                       Zofran


     Height                                      5 ft 7 in


     Weight                                      54.6 kg


     Ideal Body Weight (kg)                      67.27


     BMI                                         18.8


     Subjective/Other Information                Pt not in room at time of


                                                 visit. Recorded PO intake 88%


                                                 X 2 meals. Nurse stated that


                                                 she does not think that pt has


                                                 been drinking the Nepro.


                                                 Stated that pt declined to


                                                 drink it today.


     Percent of energy/protein needs met:        98%/100%


     Burn                                        Absent


     Trauma                                      Absent


     #1


      Nutrition Diagnosis                        Malnutrition


      Diagnosis Progress(for reassessment        Continues


       documentation)                            


     Is patient on ventilator?                   No


     Is Patient Ambulatory and/or Out of Bed     No


     REE-([facility]-confined to bed)      1600.368


     Kcal/Kg value to use for calculation        35


     Approximate Energy Requirements Using       1911


      kcal/Kg                                    


     Calculation Used for Recommendations        Kcal/kg


     Additional Notes                            Pro needs >1.2g/kg: >62g/day


                                                 Fluid needs 1-1.5L/day


 Nutrition Intervention


     Change Diet Order:                          Continue current diet order


     Add Supplement/Snack (indicate name/kcal    D/C Nepro BID (vanilla).


      /protein )                                 


     Goal #1                                     PO intake of meals to continue


                                                 meet at least 75% energy and


                                                 pro needs


     Goal #2                                     Wt maintenance and/or gain


     Anticipated Discharge Needs:                Renal diet


     Follow-Up By:                               08/05/19


     Additional Comments                         Follow for PO intakes

## 2019-08-02 NOTE — HEM/ONC PROGRESS NOTE
Assessment and Plan





1.  Rectal ca  - signet cell features - Radiology shows carcinomatosis.  The 

patient has anemia.  MCV is low.  


2.  Anemia, low iron, B12 normal, folate low.  renal impairement may have a role


3.  h/o Thrombocytosis, likely reactive.


4.  History of hiccups.  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 

involvement.


5.  Renal failure.  Nephrology following.


6.  Hyperkalemia status post treatment.


7.  History of hypertension.





I will follow the patient during inpatient stay.    GI team has seen the 

patient.





CEA - 17


CA 19-9 - normal 11





colonoscopy - Rectal tumor from dentate line to mid/proximal rectum (extent of 

exam due to severe stenosis related to tumor)





based on ascites and omental lesions -this is likely metastatic ca


as per Dr Fung - he said no ascites fluid is available in the lab


d/w pt reg stage IV - chemo 


d/w pt reg non curative nature 








port placed





d/w pt reg OP chemo - [employer] -  - HD


pt aware that this is aggresive tumor - FOLFOX q 2 weeks as OP - dose 

modification for HD








8/2/2019 - d/w pt that I will f/u in clinic - however if there is a delay of 

insurance then [facility] is an option


pt aware of clinical suspicion of stage IV cancer due to hiccough - ascites - CT

showing peritoneal nodules and signet ring features of bx


pt will call [facility] for apt as well as see me in clinic


s/p iv iron 7/28





d/w brother, sister in law on 8/1


waiting for discharge - OP HD placement





- Patient Problems


(1) Ascites


Current Visit: Yes   Status: Chronic   





Subjective


Date of service: 08/02/19


Principal diagnosis: rectal ca 


Interval history: 





waiting for HD





Objective





- Exam


Narrative Exam: 





Pain  - none


General appearance  no acute distress


Performance status limited self care


Eyes - no icterus


ENT  no thrush





LNs  cervical  not palpable


Neck  - normal ROM


Respiratory  Normal


Breath sounds - CTA





CVS  S1 S2 +


Extremities  normal temperature


General GI  Soft - distended


Rectal  deferred


male  - deferred


Skin  warm -PORT +


Musculoskeletal  moving normal


Neurologically  no focal deficit





- Constitutional


Vitals: 


                                Last Vital Signs











Temp  98.0 F   08/02/19 05:06


 


Pulse  108 H  08/02/19 05:06


 


Resp  22   08/02/19 05:06


 


BP  102/70   08/02/19 05:06


 


Pulse Ox  96   08/02/19 05:06














Medications & Allergies





- Medications


Allergies/Adverse Reactions: 


                                    Allergies





No Known Allergies Allergy (Verified 07/04/19 12:02)


   








Home Medications: 


                                Home Medications











 Medication  Instructions  Recorded  Confirmed  Last Taken  Type


 


Prednisone [predniSONE 10 mg 10 mg PO .TAPER #1 tab.ds.pk 09/10/15 07/06/19 

Unknown Rx





(6-Day Pack, 21 Tabs)]     











Active Medications: 














Generic Name Dose Route Start Last Admin





  Trade Name Freq  PRN Reason Stop Dose Admin


 


Acetaminophen  650 mg  07/04/19 17:22  07/09/19 11:32





  Tylenol  PO   650 mg





  Q4H PRN   Administration





  Pain MILD(1-3)/Fever >100.5/HA  


 


Albuterol  2.5 mg  07/04/19 17:22 





  Proventil  IH  





  Q4HRT PRN  





  Shortness Of Breath  


 


Bisacodyl  15 mg  07/07/19 14:51 





  Dulcolax  PO  





  QDAY PRN  





  Constipation  


 


Chlorpromazine HCl  10 mg  07/09/19 15:20  08/01/19 19:26





  Thorazine  PO   10 mg





  Q6H PRN   Administration





  Hiccups  


 


Epoetin Tye  20,000 unit  07/12/19 09:36  07/31/19 11:45





  Procrit  SUB-Q   20,000 unit





  VEE PRN   Administration





  hemodialysis  


 


Famotidine  10 mg  07/12/19 22:00  08/01/19 21:45





  Pepcid  PO   Not Given





  BID [facility]  


 


Ferrous Sulfate  325 mg  07/10/19 22:00  08/01/19 21:44





  Feosol  PO   Not Given





  BID [facility]  


 


Folic Acid  1 mg  07/06/19 10:00  08/01/19 09:03





  Folvite  PO   1 mg





  QDAY BOWEN   Administration


 


Heparin Sodium (Porcine)  3,000 unit  07/12/19 09:36  07/31/19 11:17





  Heparin 10,000 Units/10 Ml  IV   3,000 unit





  VEE PRN   Administration





  hemodialysis  


 


Hydralazine HCl  10 mg  07/04/19 19:14  07/04/19 22:17





  Apresoline  IV   10 mg





  Q3H PRN   Administration





  Blood Pressure  


 


Hydromorphone HCl  0.5 mg  07/04/19 17:22  08/01/19 13:00





  Dilaudid  IV   0.5 mg





  Q3H PRN   Administration





  Pain , Severe (7-10)  


 


Chlorpromazine HCl 25 mg/  51 mls @ 100 mls/hr  07/13/19 12:26 





  Sodium Chloride  IV  





  Q4H PRN  





  Hiccups  


 


Sodium Chloride  100 mls @ 999 mls/hr  07/26/19 09:28 





  Nacl 0.9%  IV  





  VEE PRN  





  Hypotension  


 


Metoclopramide HCl  5 mg  07/17/19 15:00 





  Reglan  IV  





  Q6H PRN  





  Nausea And Vomiting  


 


Metoprolol Tartrate  50 mg  07/04/19 23:00  08/01/19 21:45





  Lopressor  PO   Not Given





  BID BOWEN  


 


Ondansetron HCl  4 mg  07/04/19 17:22  07/31/19 13:26





  Zofran  IV   4 mg





  Q3H PRN   Administration





  Nausea And Vomiting  


 


Oxycodone/Acetaminophen  1 tab  07/13/19 10:53  08/01/19 19:23





  Percocet 5/325  PO   1 tab





  Q6H PRN   Administration





  Pain, Moderate (4-6)  


 


Sodium Chloride  10 ml  07/04/19 22:00  08/01/19 21:47





  Sodium Chloride Flush Syringe 10 Ml  IV   10 ml





  BID BOWEN   Administration


 


Sodium Chloride  10 ml  07/04/19 17:22  07/18/19 14:14





  Sodium Chloride Flush Syringe 10 Ml  IV   10 ml





  PRN PRN   Administration





  LINE FLUSH

## 2019-08-03 RX ADMIN — CHLORPROMAZINE HYDROCHLORIDE PRN MG: 10 TABLET, FILM COATED ORAL at 23:15

## 2019-08-03 RX ADMIN — FERROUS SULFATE TAB 325 MG (65 MG ELEMENTAL FE) SCH MG: 325 (65 FE) TAB at 09:43

## 2019-08-03 RX ADMIN — CHLORPROMAZINE HYDROCHLORIDE PRN MG: 10 TABLET, FILM COATED ORAL at 16:48

## 2019-08-03 RX ADMIN — FERROUS SULFATE TAB 325 MG (65 MG ELEMENTAL FE) SCH MG: 325 (65 FE) TAB at 22:34

## 2019-08-03 RX ADMIN — Medication SCH ML: at 22:34

## 2019-08-03 RX ADMIN — OXYCODONE AND ACETAMINOPHEN PRN TAB: 5; 325 TABLET ORAL at 09:44

## 2019-08-03 RX ADMIN — METOPROLOL TARTRATE SCH: 50 TABLET, FILM COATED ORAL at 22:33

## 2019-08-03 RX ADMIN — HYDROMORPHONE HYDROCHLORIDE PRN MG: 1 INJECTION, SOLUTION INTRAMUSCULAR; INTRAVENOUS; SUBCUTANEOUS at 14:05

## 2019-08-03 RX ADMIN — HYDROMORPHONE HYDROCHLORIDE PRN MG: 1 INJECTION, SOLUTION INTRAMUSCULAR; INTRAVENOUS; SUBCUTANEOUS at 05:21

## 2019-08-03 RX ADMIN — FOLIC ACID SCH MG: 1 TABLET ORAL at 09:45

## 2019-08-03 RX ADMIN — METOPROLOL TARTRATE SCH MG: 50 TABLET, FILM COATED ORAL at 09:44

## 2019-08-03 RX ADMIN — Medication SCH ML: at 09:45

## 2019-08-03 RX ADMIN — FAMOTIDINE SCH MG: 10 TABLET ORAL at 09:45

## 2019-08-03 RX ADMIN — FAMOTIDINE SCH MG: 10 TABLET ORAL at 22:34

## 2019-08-03 NOTE — PROGRESS NOTE
Assessment and Plan





1. ESRD:


CKD has progressed to ESRD.


Continue hemodialysis three times a week, MWF schedule.


Monitor renal function.


Renal prognosis is poor.


Avoid nephrotoxic agents.


Meds dosage based on GFR.


Hemodialysis: 7/4, 7/5, 7/6, 7/8, 7/10, 7/12, 7/15, 7/17, 7/19, 7/22, 7/24, 

7/26, 7/29, 7/31, 8/2.





2. FEN:


Hyperkalemia, HD.


Monitor lytes.





3. Mild bilateral hydronephosis:


S/p hernandez catheter.


Evaluated by Urologist.





4. Rectal adenocarcinoma with malignant Ascites.


S/p Medport.


Followed by Heme-onc.





5. Anemia:


PRBC.


On Epogen, limited choice other than transfusion.


Monitor H/H.





6. HTN:


BP is controlled.





Difficulty in securing outpatient hemodialysis chair due to multiple issues.


Patient may have to come to the ER intermittently for dialysis.














Subjective


Date of service: 08/03/19


Principal diagnosis: rectal ca


Interval history: 


Patient was seen and examined at the bedside.


No new complaint.








Objective





- Vital Signs


Vital signs: 


                               Vital Signs - 12hr











  08/03/19 08/03/19





  05:02 09:44


 


Temperature 98.4 F 


 


Pulse Rate 95 H 95 H


 


Respiratory 18 20





Rate  


 


Blood Pressure 105/74 


 


O2 Sat by Pulse 94 





Oximetry  














- General Appearance


General appearance: well-developed, appears stated age, other (no distress, R IJ

tunnel catheter)


EENT: ATNC, PERRL, hearing intact, vision intact


Neck: supple


Respiratory: Present: Clear to Ascultation


Cardiology: regular, S1S2, no murmurs


Gastrointestinal: normoactive bowel sounds, no tenderness, distended


Integumentary: no rash, warm and dry


Neurologic: no focal deficit, no asterixis, alert and oriented x3


Musculoskeletal: other (no edema)





- Lab





                                 07/31/19 09:26





                                 07/31/19 15:53


                             Most recent lab results











Calcium  9.1 mg/dL (8.4-10.2)   07/31/19  15:53    


 


Phosphorus  6.50 mg/dL (2.5-4.5)  H  07/15/19  06:57    


 


Magnesium  2.00 mg/dL (1.7-2.3)   07/05/19  04:52    


 


  161.7 mg/dL (0.1-20.0)  H  07/05/19  08:51    


 


  72 mmol/L  07/05/19  08:51    














Medications & Allergies





- Medications


Allergies/Adverse Reactions: 


                                    Allergies





No Known Allergies Allergy (Verified 07/04/19 12:02)


   








Home Medications: 


                                Home Medications











 Medication  Instructions  Recorded  Confirmed  Last Taken  Type


 


Prednisone [predniSONE 10 mg 10 mg PO .TAPER #1 tab.ds.pk 09/10/15 07/06/19 

Unknown Rx





(6-Day Pack, 21 Tabs)]     











Active Medications: 














Generic Name Dose Route Start Last Admin





  Trade Name Freq  PRN Reason Stop Dose Admin


 


Acetaminophen  650 mg  07/04/19 17:22  07/09/19 11:32





  Tylenol  PO   650 mg





  Q4H PRN   Administration





  Pain MILD(1-3)/Fever >100.5/HA  


 


Albuterol  2.5 mg  07/04/19 17:22 





  Proventil  IH  





  Q4HRT PRN  





  Shortness Of Breath  


 


Bisacodyl  15 mg  07/07/19 14:51 





  Dulcolax  PO  





  QDAY PRN  





  Constipation  


 


Chlorpromazine HCl  10 mg  07/09/19 15:20  08/02/19 19:40





  Thorazine  PO   10 mg





  Q6H PRN   Administration





  Hiccups  


 


Epoetin Tye  20,000 unit  07/12/19 09:36  08/02/19 13:36





  Procrit  SUB-Q   20,000 unit





  VEE PRN   Administration





  hemodialysis  


 


Famotidine  10 mg  07/12/19 22:00  08/03/19 09:45





  Pepcid  PO   10 mg





  BID BOWEN   Administration


 


Ferrous Sulfate  325 mg  07/10/19 22:00  08/03/19 09:43





  Feosol  PO   325 mg





  BID BOWEN   Administration


 


Folic Acid  1 mg  07/06/19 10:00  08/03/19 09:45





  Folvite  PO   1 mg





  QDAY BOWEN   Administration


 


Heparin Sodium (Porcine)  3,000 unit  07/12/19 09:36  08/02/19 10:40





  Heparin 10,000 Units/10 Ml  IV   3,000 unit





  VEE PRN   Administration





  hemodialysis  


 


Hydralazine HCl  10 mg  07/04/19 19:14  07/04/19 22:17





  Apresoline  IV   10 mg





  Q3H PRN   Administration





  Blood Pressure  


 


Hydromorphone HCl  0.5 mg  07/04/19 17:22  08/03/19 05:21





  Dilaudid  IV   0.5 mg





  Q3H PRN   Administration





  Pain , Severe (7-10)  


 


Chlorpromazine HCl 25 mg/  51 mls @ 100 mls/hr  07/13/19 12:26 





  Sodium Chloride  IV  





  Q4H PRN  





  Hiccups  


 


Sodium Chloride  100 mls @ 999 mls/hr  07/26/19 09:28 





  Nacl 0.9%  IV  





  VEE PRN  





  Hypotension  


 


Metoclopramide HCl  5 mg  07/17/19 15:00 





  Reglan  IV  





  Q6H PRN  





  Nausea And Vomiting  


 


Metoprolol Tartrate  50 mg  07/04/19 23:00  08/03/19 09:44





  Lopressor  PO   50 mg





  BID BOWEN   Administration


 


Ondansetron HCl  4 mg  07/04/19 17:22  07/31/19 13:26





  Zofran  IV   4 mg





  Q3H PRN   Administration





  Nausea And Vomiting  


 


Oxycodone/Acetaminophen  1 tab  07/13/19 10:53  08/03/19 09:44





  Percocet 5/325  PO   1 tab





  Q6H PRN   Administration





  Pain, Moderate (4-6)  


 


Sodium Chloride  10 ml  07/04/19 22:00  08/03/19 09:45





  Sodium Chloride Flush Syringe 10 Ml  IV   10 ml





  BID BOWEN   Administration


 


Sodium Chloride  10 ml  07/04/19 17:22  07/18/19 14:14





  Sodium Chloride Flush Syringe 10 Ml  IV   10 ml





  PRN PRN   Administration





  LINE FLUSH

## 2019-08-03 NOTE — PROGRESS NOTE
Assessment and Plan


Assessment and plan: 


--ESRD: s/p permacath, had emergent hemodialysis due to severe hyperkalemia 

started on on 7/4/19, 


place hernandez on 7/5/19 with very little output. Need outpt HD set up





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 

lymphovascular invasion, 


- Chemotherapy port to be placed 7/16/19;  On discharge patient will follow 

hematology oncologist, Dr. Meade





--Acute anemia with Drop in HCT 


Received total 3 units of PRBC, ordered FOBT, GI is following, d/w Dr. Jeol, 

s/p colonoscopy





--Bilateral hydronephrosis: Placed on hernandez, consulted Urology, input 

noted,discharge with hernandez 





--Metastatic Rectal Cancer: Poorly differentiated mucinous adenocarcinoma with 

signet ring cells.


Oncology following. outpt f/u





--Malignant Ascites 


due to suspected Colon cancer with mets to Omentum/Carcinomatosis suspected, 


which would be a very poor prognostic sign: consulted GI, input noted, s/p 

Paracentesis on 7/5/19, 


await cytology, CEA 17 ELEVATED, AFP ELEVATED, CA 19-9 - normal 11





--Positive blood cultures: Abx discontinued following ID eval as culture appears

to be contaminant





--Hyperkalemia: treated with HD, Nephrology is following





--Acute Microcytic anemia, suspect cancer related until proven otherwise, follow

h/h





--Severe malnutrition: Dietitian consulted 





--Hiccups- likely tumor related, on Thorazine.





--DVT prophylaxis: On heparin and GI prophylaxis 





patient is awaiting Outpatient Dialysis scheduling.  We'll follow oncologist 

outpatient upon discharge


Plan discussed the options of therapy /chemotherapy


Continue inpatient management





Disposition; discharge when outpatient HD is set up








History


Interval history: 


Patient complains of back pain, asked for more pain medications 


complains of generalized weakness 


cachectic severely malnourished


Vital signs noted














Hospitalist Physical





- Constitutional


Vitals: 


                                        











Temp Pulse Resp BP Pulse Ox


 


 98.4 F   95 H  20   105/74   94 


 


 08/03/19 05:02  08/03/19 09:44  08/03/19 09:44  08/03/19 05:02  08/03/19 05:02











General appearance: Present: mild distress, cachectic, disheveled





- EENT


Eyes: Present: PERRL, EOM intact





- Neck


Neck: Present: supple, normal ROM





- Respiratory


Respiratory effort: normal


Respiratory: bilateral: diminished, negative: rales, rhonchi, wheezing





- Cardiovascular


Rhythm: regular


Heart Sounds: Present: S1 & S2





- Extremities


Extremities: no ischemia, No edema





- Abdominal


General gastrointestinal: soft, non-tender, non-distended, normal bowel sounds





- Integumentary


Integumentary: Present: clear, warm





- Psychiatric


Psychiatric: appropriate mood/affect, cooperative





- Neurologic


Neurologic: moves all extremities





Results





- Labs


CBC & Chem 7: 


                                 07/31/19 09:26





                                 07/31/19 15:53


Labs: 


                             Laboratory Last Values











WBC  12.1 K/mm3 (4.5-11.0)  H  07/31/19  09:26    


 


RBC  3.73 M/mm3 (3.65-5.03)   07/31/19  09:26    


 


Hgb  8.7 gm/dl (11.8-15.2)  L  07/31/19  09:26    


 


Hct  28.6 % (35.5-45.6)  L  07/31/19  09:26    


 


MCV  77 fl (84-94)  L  07/31/19  09:26    


 


MCH  23 pg (28-32)  L  07/31/19  09:26    


 


MCHC  30 % (32-34)  L  07/31/19  09:26    


 


RDW  30.8 % (13.2-15.2)  H  07/31/19  09:26    


 


Plt Count  294 K/mm3 (140-440)   07/31/19  09:26    


 


Add Manual Diff  Complete   07/31/19  09:26    


 


Total Counted  100   07/31/19  09:26    


 


Seg Neuts % (Manual)  80.0 % (40.0-70.0)  H  07/31/19  09:26    


 


  0 %  07/31/19  09:26    


 


  13.0 % (13.4-35.0)  L  07/31/19  09:26    


 


Reactive Lymphs % (Man)  0 %  07/31/19  09:26    


 


  7.0 % (0.0-7.3)   07/31/19  09:26    


 


  0 % (0.0-4.3)   07/31/19  09:26    


 


  0 % (0.0-1.8)   07/31/19  09:26    


 


  0 %  07/31/19  09:26    


 


  0 %  07/31/19  09:26    


 


  0 %  07/31/19  09:26    


 


  0 %  07/31/19  09:26    


 


Nucleated RBC %  Not Reportable   07/31/19  09:26    


 


Seg Neutrophils # Man  9.7 K/mm3 (1.8-7.7)  H  07/31/19  09:26    


 


Band Neutrophils #  0.0 K/mm3  07/31/19  09:26    


 


  1.6 K/mm3 (1.2-5.4)   07/31/19  09:26    


 


Abs React Lymphs (Man)  0.0 K/mm3  07/31/19  09:26    


 


  0.8 K/mm3 (0.0-0.8)   07/31/19  09:26    


 


  0.0 K/mm3 (0.0-0.4)   07/31/19  09:26    


 


  0.0 K/mm3 (0.0-0.1)   07/31/19  09:26    


 


  0.0 K/mm3  07/31/19  09:26    


 


  0.0 K/mm3  07/31/19  09:26    


 


  0.0 K/mm3  07/31/19  09:26    


 


Blast Cells #  0.0 K/mm3  07/31/19  09:26    


 


WBC Morphology  Not Reportable   07/31/19  09:26    


 


Hypersegmented Neuts  Not Reportable   07/31/19  09:26    


 


Hyposegmented Neuts  Not Reportable   07/31/19  09:26    


 


Hypogranular Neuts  Not Reportable   07/31/19  09:26    


 


  Not Reportable   07/31/19  09:26    


 


  Not Reportable   07/31/19  09:26    


 


  Not Reportable   07/31/19  09:26    


 


  Not Reportable   07/31/19  09:26    


 


  Not Reportable   07/31/19  09:26    


 


  Not Reportable   07/31/19  09:26    


 


  Consistent w auto   07/31/19  09:26    


 


  Not Reportable   07/31/19  09:26    


 


Plt Clumps, EDTA  Not Reportable   07/31/19  09:26    


 


  Not Reportable   07/31/19  09:26    


 


  Not Reportable   07/31/19  09:26    


 


  Not Reportable   07/31/19  09:26    


 


Plt Morphology Comment  Not Reportable   07/31/19  09:26    


 


RBC Morphology  Not Reportable   07/31/19  09:26    


 


Dimorphic RBCs  Not Reportable   07/31/19  09:26    


 


  Not Reportable   07/31/19  09:26    


 


  1+   07/31/19  09:26    


 


  Not Reportable   07/31/19  09:26    


 


  2+   07/31/19  09:26    


 


  1+   07/31/19  09:26    


 


  Not Reportable   07/31/19  09:26    


 


  Not Reportable   07/31/19  09:26    


 


  Not Reportable   07/31/19  09:26    


 


  Not Reportable   07/31/19  09:26    


 


  Few   07/31/19  09:26    


 


  Not Reportable   07/31/19  09:26    


 


  Not Reportable   07/31/19  09:26    


 


  Not Reportable   07/31/19  09:26    


 


  Not Reportable   07/31/19  09:26    


 


  Not Reportable   07/31/19  09:26    


 


  Not Reportable   07/31/19  09:26    


 


  Not Reportable   07/31/19  09:26    


 


  Not Reportable   07/31/19  09:26    


 


  Not Reportable   07/31/19  09:26    


 


Acanthocytes (Spur)  Not Reportable   07/31/19  09:26    


 


Rouleaux  Not Reportable   07/31/19  09:26    


 


  Not Reportable   07/31/19  09:26    


 


  Not Reportable   07/31/19  09:26    


 


  Not Reportable   07/31/19  09:26    


 


Percent Retic  1.10 % (0.78-2.58)   07/05/19  04:52    


 


  Not Reportable   07/31/19  09:26    


 


Hem Pathologist Commnt  No   07/31/19  09:26    


 


PT  16.3 Sec. (12.2-14.9)  H  07/05/19  08:51    


 


INR  1.35  (0.87-1.13)  H  07/05/19  08:51    


 


Sodium  140 mmol/L (137-145)   07/31/19  15:53    


 


Potassium  4.7 mmol/L (3.6-5.0)   07/31/19  15:53    


 


Chloride  94.7 mmol/L ()  L  07/31/19  15:53    


 


Carbon Dioxide  25 mmol/L (22-30)   07/31/19  15:53    


 


  25 mmol/L  07/31/19  15:53    


 


BUN  19 mg/dL (9-20)   07/31/19  15:53    


 


  8.2 mg/dL (0.8-1.5)  H  07/31/19  15:53    


 


Estimated GFR  8 ml/min  07/31/19  15:53    


 


  2 %  07/31/19  15:53    


 


Glucose  106 mg/dL ()  H  07/31/19  15:53    


 


POC Glucose  103  ()   07/20/19  16:43    


 


Calcium  9.1 mg/dL (8.4-10.2)   07/31/19  15:53    


 


Phosphorus  6.50 mg/dL (2.5-4.5)  H  07/15/19  06:57    


 


Magnesium  2.00 mg/dL (1.7-2.3)   07/05/19  04:52    


 


Iron  20 ug/dL ()  L  07/05/19  08:51    


 


TIBC  219 mcg/dL (250-450)  L  07/05/19  08:51    


 


  63.8 ng/mL (13.0-400.0)   07/05/19  08:51    


 


  0.50 mg/dL (0.1-1.2)   07/31/19  15:53    


 


AST  18 units/L (5-40)   07/31/19  15:53    


 


ALT  < 5 units/L (7-56)  L  07/31/19  15:53    


 


  99 units/L ()   07/31/19  15:53    


 


  5.9 g/dL (6.1-8.1)  L  07/09/19  04:50    


 


  9.0 g/dL (6.3-8.2)  H  07/31/19  15:53    


 


  2.7 g/dL (3.9-5)  L  07/31/19  15:53    


 


  0.4 %  07/31/19  15:53    


 


  0.7 g/dL (0.2-0.3)  H  07/09/19  04:50    


 


  0.8 g/dL (0.5-0.9)   07/09/19  04:50    


 


  0.5 g/dL (0.2-0.5)   07/09/19  04:50    


 


  1.6 g/dL (0.8-1.7)   07/09/19  04:50    


 


Abnorm Protein Band 1  see below   07/09/19  04:50    


 


PEP Interpretation  see below  H  07/09/19  04:50    


 


  88 units/L (13-60)  H  07/04/19  12:47    


 


Tumor Marker AFP  See scanned result   07/05/19  08:51    


 


Carcinoembryonic Ag  17.4 ng/mL (0.0-2.4)  H  07/05/19  08:51    


 


  11 U/mL (<34)   07/05/19  08:51    


 


Vitamin B12  1768 pg/mL (211-911)  H  07/05/19  08:51    


 


  5.05 ng/mL (7.3-26.0)  L  07/05/19  08:51    


 


PTH Intact  238.5 pg/mL (15-65)  H  07/05/19  04:52    


 


  Yellow  (Yellow)   07/23/19  16:35    


 


  Turbid  (Clear)   07/23/19  16:35    


 


  5.0  (5.0-7.0)   07/23/19  16:35    


 


Ur Specific Gravity  1.024  (1.003-1.030)   07/23/19  16:35    


 


  100 mg/dl mg/dL (Negative)   07/23/19  16:35    


 


  Neg mg/dL (Negative)   07/23/19  16:35    


 


  Tr mg/dL (Negative)   07/23/19  16:35    


 


  Lg  (Negative)   07/23/19  16:35    


 


  Neg  (Negative)   07/23/19  16:35    


 


  Neg  (Negative)   07/23/19  16:35    


 


  < 2.0 mg/dL (<2.0)   07/23/19  16:35    


 


Ur Leukocyte Esterase  Mod  (Negative)   07/23/19  16:35    


 


  > 182.0 /HPF (0.0-6.0)  H  07/23/19  16:35    


 


  > 182.0 /HPF (0.0-6.0)   07/23/19  16:35    


 


  2+ /HPF (Negative)   07/23/19  16:35    


 


  2+ /HPF  07/23/19  16:35    


 


  161.7 mg/dL (0.1-20.0)  H  07/05/19  08:51    


 


  72 mmol/L  07/05/19  08:51    


 


Fluid Type  Ascitic   07/05/19  Unknown


 


Fluid Color  Yellow   07/05/19  Unknown


 


Fluid Appearance  Hazy   07/05/19  Unknown


 


Fluid WBC  58 /mm3  07/05/19  Unknown


 


Fluid RBC  1850 /mm3  07/05/19  Unknown


 


Fluid Diff Comment     07/05/19  Unknown


 


Fluid Seg Neutrophils  35.0 %  07/05/19  Unknown


 


Fluid Lymphocytes  14.0 %  07/05/19  Unknown


 


Fluid Reactive Lymphs  0 %  07/05/19  Unknown


 


Fluid Monocytes  51.0 %  07/05/19  Unknown


 


Fluid Eosinophils  0 %  07/05/19  Unknown


 


Fluid Basophils  0 %  07/05/19  Unknown


 


Fluid Glucose  93 mg/dL (40-70)  H  07/05/19  Unknown


 


Fluid Total Protein  4.6  (15.0-45.0)  L  07/05/19  Unknown


 


Fluid Albumin  1.5 g/dL  07/05/19  Unknown


 


Fluid LDH  195   07/05/19  Unknown


 


Random Vancomycin  13.1 ug/mL (0-40.0)   07/09/19  04:50    


 


EVELYN Screen  Negative  (Negative)   07/09/19  04:50    


 


Proteinase 3 (PR3) Ab  <1.0 AI (<1.0)   07/09/19  04:50    


 


Myeloperoxidase Ab  <1.0 AI (<1.0)   07/09/19  04:50    


 


  158 mg/dL ()   07/09/19  04:50    


 


  32 mg/dL (15-53)   07/09/19  04:50    


 


Hepatitis A IgM Ab  Non-reactive  (NonReactive)   07/04/19  17:48    


 


Hep Bs Antigen  Non-reactive  (Negative)   07/04/19  17:48    


 


Hep B Core IgM Ab  Non-reactive  (NonReactive)   07/04/19  17:48    


 


  Non-reactive  (NonReactive)   07/04/19  17:48    


 


Blood Type  A POSITIVE   07/16/19  08:18    


 


Antibody Screen  Negative   07/16/19  08:18    


 


Crossmatch  See Detail   07/16/19  08:18    














Active Medications





- Current Medications


Current Medications: 














Generic Name Dose Route Start Last Admin





  Trade Name Freq  PRN Reason Stop Dose Admin


 


Acetaminophen  650 mg  07/04/19 17:22  07/09/19 11:32





  Tylenol  PO   650 mg





  Q4H PRN   Administration





  Pain MILD(1-3)/Fever >100.5/HA  


 


Albuterol  2.5 mg  07/04/19 17:22 





  Proventil  IH  





  Q4HRT PRN  





  Shortness Of Breath  


 


Bisacodyl  15 mg  07/07/19 14:51 





  Dulcolax  PO  





  QDAY PRN  





  Constipation  


 


Chlorpromazine HCl  10 mg  07/09/19 15:20  08/02/19 19:40





  Thorazine  PO   10 mg





  Q6H PRN   Administration





  Hiccups  


 


Epoetin Tye  20,000 unit  07/12/19 09:36  08/02/19 13:36





  Procrit  SUB-Q   20,000 unit





  VEE PRN   Administration





  hemodialysis  


 


Famotidine  10 mg  07/12/19 22:00  08/03/19 09:45





  Pepcid  PO   10 mg





  BID BOWEN   Administration


 


Ferrous Sulfate  325 mg  07/10/19 22:00  08/03/19 09:43





  Feosol  PO   325 mg





  BID BOWEN   Administration


 


Folic Acid  1 mg  07/06/19 10:00  08/03/19 09:45





  Folvite  PO   1 mg





  QDAY BOWEN   Administration


 


Heparin Sodium (Porcine)  3,000 unit  07/12/19 09:36  08/02/19 10:40





  Heparin 10,000 Units/10 Ml  IV   3,000 unit





  VEE PRN   Administration





  hemodialysis  


 


Hydralazine HCl  10 mg  07/04/19 19:14  07/04/19 22:17





  Apresoline  IV   10 mg





  Q3H PRN   Administration





  Blood Pressure  


 


Hydromorphone HCl  0.5 mg  07/04/19 17:22  08/03/19 05:21





  Dilaudid  IV   0.5 mg





  Q3H PRN   Administration





  Pain , Severe (7-10)  


 


Chlorpromazine HCl 25 mg/  51 mls @ 100 mls/hr  07/13/19 12:26 





  Sodium Chloride  IV  





  Q4H PRN  





  Hiccups  


 


Sodium Chloride  100 mls @ 999 mls/hr  07/26/19 09:28 





  Nacl 0.9%  IV  





  VEE PRN  





  Hypotension  


 


Metoclopramide HCl  5 mg  07/17/19 15:00 





  Reglan  IV  





  Q6H PRN  





  Nausea And Vomiting  


 


Metoprolol Tartrate  50 mg  07/04/19 23:00  08/03/19 09:44





  Lopressor  PO   50 mg





  BID BOWEN   Administration


 


Ondansetron HCl  4 mg  07/04/19 17:22  07/31/19 13:26





  Zofran  IV   4 mg





  Q3H PRN   Administration





  Nausea And Vomiting  


 


Oxycodone/Acetaminophen  1 tab  07/13/19 10:53  08/03/19 09:44





  Percocet 5/325  PO   1 tab





  Q6H PRN   Administration





  Pain, Moderate (4-6)  


 


Sodium Chloride  10 ml  07/04/19 22:00  08/03/19 09:45





  Sodium Chloride Flush Syringe 10 Ml  IV   10 ml





  BID BOWEN   Administration


 


Sodium Chloride  10 ml  07/04/19 17:22  07/18/19 14:14





  Sodium Chloride Flush Syringe 10 Ml  IV   10 ml





  PRN PRN   Administration





  LINE FLUSH  














Nutrition/Malnutrition Assess





- Dietary Evaluation


Nutrition/Malnutrition Findings: 


                                        





Nutrition Notes                                            Start:  07/11/19 

12:03


Freq:                                                      Status: Active       




Protocol:                                                                       




 Document     07/31/19 15:31  RM  (Rec: 07/31/19 15:43  RM  [id number])


 Nutrition Notes


     Initial or Follow up                        Reassessment


     Current Diagnosis                           CKD (stage V CKD),Hypertension


     Other Pertinent Diagnosis                   on HD, Metastatic rectal CA,


                                                 malignant ascites, anemia


     Current Diet                                Renal


     Labs/Tests                                  reviewed


     Pertinent Medications                       Zofran


     Height                                      5 ft 7 in


     Weight                                      54.6 kg


     Ideal Body Weight (kg)                      67.27


     BMI                                         18.8


     Subjective/Other Information                Pt not in room at time of


                                                 visit. Recorded PO intake 88%


                                                 X 2 meals. Nurse stated that


                                                 she does not think that pt has


                                                 been drinking the Nepro.


                                                 Stated that pt declined to


                                                 drink it today.


     Percent of energy/protein needs met:        98%/100%


     Burn                                        Absent


     Trauma                                      Absent


     #1


      Nutrition Diagnosis                        Malnutrition


      Diagnosis Progress(for reassessment        Continues


       documentation)                            


     Is patient on ventilator?                   No


     Is Patient Ambulatory and/or Out of Bed     No


     REE-([facility]-confined to bed)      1600.368


     Kcal/Kg value to use for calculation        35


     Approximate Energy Requirements Using       1911


      kcal/Kg                                    


     Calculation Used for Recommendations        Kcal/kg


     Additional Notes                            Pro needs >1.2g/kg: >62g/day


                                                 Fluid needs 1-1.5L/day


 Nutrition Intervention


     Change Diet Order:                          Continue current diet order


     Add Supplement/Snack (indicate name/kcal    D/C Nepro BID (vanilla).


      /protein )                                 


     Goal #1                                     PO intake of meals to continue


                                                 meet at least 75% energy and


                                                 pro needs


     Goal #2                                     Wt maintenance and/or gain


     Anticipated Discharge Needs:                Renal diet


     Follow-Up By:                               08/05/19


     Additional Comments                         Follow for PO intakes

## 2019-08-04 RX ADMIN — FOLIC ACID SCH MG: 1 TABLET ORAL at 10:36

## 2019-08-04 RX ADMIN — FERROUS SULFATE TAB 325 MG (65 MG ELEMENTAL FE) SCH MG: 325 (65 FE) TAB at 10:36

## 2019-08-04 RX ADMIN — OXYCODONE AND ACETAMINOPHEN PRN TAB: 5; 325 TABLET ORAL at 18:06

## 2019-08-04 RX ADMIN — OXYCODONE AND ACETAMINOPHEN PRN TAB: 5; 325 TABLET ORAL at 23:53

## 2019-08-04 RX ADMIN — METOPROLOL TARTRATE SCH: 50 TABLET, FILM COATED ORAL at 23:12

## 2019-08-04 RX ADMIN — CHLORPROMAZINE HYDROCHLORIDE PRN MG: 10 TABLET, FILM COATED ORAL at 18:06

## 2019-08-04 RX ADMIN — OXYCODONE AND ACETAMINOPHEN PRN TAB: 5; 325 TABLET ORAL at 11:48

## 2019-08-04 RX ADMIN — FAMOTIDINE SCH MG: 10 TABLET ORAL at 23:12

## 2019-08-04 RX ADMIN — METOPROLOL TARTRATE SCH MG: 50 TABLET, FILM COATED ORAL at 10:37

## 2019-08-04 RX ADMIN — FERROUS SULFATE TAB 325 MG (65 MG ELEMENTAL FE) SCH MG: 325 (65 FE) TAB at 23:12

## 2019-08-04 RX ADMIN — OXYCODONE AND ACETAMINOPHEN PRN TAB: 5; 325 TABLET ORAL at 05:59

## 2019-08-04 RX ADMIN — Medication SCH ML: at 10:37

## 2019-08-04 RX ADMIN — FAMOTIDINE SCH MG: 10 TABLET ORAL at 10:36

## 2019-08-04 RX ADMIN — Medication SCH ML: at 23:14

## 2019-08-04 NOTE — PROGRESS NOTE
Assessment and Plan


Assessment and plan: 


--ESRD: s/p permacath, had emergent hemodialysis due to severe hyperkalemia 

started on on 7/4/19, 


HD per schedule,MWF. Nephrology following Need outpt HD set up





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 

lymphovascular invasion, 


- Chemotherapy port to be placed 7/16/19;  On discharge patient will follow 

hematology oncologist, Dr. Meade





--Acute anemia with Drop in HCT 


Received total 3 units of PRBC, ordered FOBT, GI is following, d/w Dr. Joel, 

s/p colonoscopy





--Bilateral hydronephrosis: Placed on hernandez, consulted Urology, input 

noted,discharge with hernandez 





--Metastatic Rectal Cancer: Poorly differentiated mucinous adenocarcinoma with 

signet ring cells.


Oncology following. outpt f/u





--Malignant Ascites 


due to suspected Colon cancer with mets to Omentum/Carcinomatosis suspected, 


which would be a very poor prognostic sign: consulted GI, input noted, s/p Par

acentesis on 7/5/19, 


await cytology, CEA 17 ELEVATED, AFP ELEVATED, CA 19-9 - normal 11





--Positive blood cultures: Abx discontinued following ID eval as culture appears

to be contaminant





--Hyperkalemia: treated with HD, Nephrology is following





--Acute Microcytic anemia, suspect cancer related until proven otherwise, follow

h/h





--Severe malnutrition: Dietitian consulted 





--Hiccups- likely tumor related, on Thorazine.





--DVT prophylaxis: On heparin and GI prophylaxis 





patient is awaiting Outpatient Dialysis scheduling.  We'll follow oncologist 

outpatient upon discharge


Plan discussed the options of therapy /chemotherapy


Continue inpatient management





Disposition; discharge when outpatient HD is set up











History


Interval history: 


Patient seen and examined medical records reviewed


Impression complaints of pain in the back and abdomen


Receiving Hemodialysis per schedule


Awaiting outpatient HD scheduling per case management


Vital signs normal.








Hospitalist Physical





- Constitutional


Vitals: 


                                        











Temp Pulse Resp BP Pulse Ox


 


 98.3 F   115 H  20   117/81   100 


 


 08/04/19 17:36  08/04/19 17:36  08/04/19 18:06  08/04/19 17:36  08/04/19 17:36











General appearance: Present: mild distress, cachectic, disheveled





- EENT


Eyes: Present: PERRL, EOM intact





- Neck


Neck: Present: supple, normal ROM





- Respiratory


Respiratory effort: normal


Respiratory: bilateral: diminished, negative: rales, rhonchi, wheezing





- Cardiovascular


Rhythm: regular


Heart Sounds: Present: S1 & S2





- Extremities


Extremities: no ischemia, No edema





- Abdominal


General gastrointestinal: soft, non-tender, non-distended, normal bowel sounds





- Integumentary


Integumentary: Present: clear, warm





- Psychiatric


Psychiatric: appropriate mood/affect, cooperative





- Neurologic


Neurologic: moves all extremities





Results





- Labs


CBC & Chem 7: 


                                 07/31/19 09:26





                                 07/31/19 15:53


Labs: 


                             Laboratory Last Values











WBC  12.1 K/mm3 (4.5-11.0)  H  07/31/19  09:26    


 


RBC  3.73 M/mm3 (3.65-5.03)   07/31/19  09:26    


 


Hgb  8.7 gm/dl (11.8-15.2)  L  07/31/19  09:26    


 


Hct  28.6 % (35.5-45.6)  L  07/31/19  09:26    


 


MCV  77 fl (84-94)  L  07/31/19  09:26    


 


MCH  23 pg (28-32)  L  07/31/19  09:26    


 


MCHC  30 % (32-34)  L  07/31/19  09:26    


 


RDW  30.8 % (13.2-15.2)  H  07/31/19  09:26    


 


Plt Count  294 K/mm3 (140-440)   07/31/19  09:26    


 


Add Manual Diff  Complete   07/31/19  09:26    


 


Total Counted  100   07/31/19  09:26    


 


Seg Neuts % (Manual)  80.0 % (40.0-70.0)  H  07/31/19  09:26    


 


  0 %  07/31/19  09:26    


 


  13.0 % (13.4-35.0)  L  07/31/19  09:26    


 


Reactive Lymphs % (Man)  0 %  07/31/19  09:26    


 


  7.0 % (0.0-7.3)   07/31/19  09:26    


 


  0 % (0.0-4.3)   07/31/19  09:26    


 


  0 % (0.0-1.8)   07/31/19  09:26    


 


  0 %  07/31/19  09:26    


 


  0 %  07/31/19  09:26    


 


  0 %  07/31/19  09:26    


 


  0 %  07/31/19  09:26    


 


Nucleated RBC %  Not Reportable   07/31/19  09:26    


 


Seg Neutrophils # Man  9.7 K/mm3 (1.8-7.7)  H  07/31/19  09:26    


 


Band Neutrophils #  0.0 K/mm3  07/31/19  09:26    


 


  1.6 K/mm3 (1.2-5.4)   07/31/19  09:26    


 


Abs React Lymphs (Man)  0.0 K/mm3  07/31/19  09:26    


 


  0.8 K/mm3 (0.0-0.8)   07/31/19  09:26    


 


  0.0 K/mm3 (0.0-0.4)   07/31/19  09:26    


 


  0.0 K/mm3 (0.0-0.1)   07/31/19  09:26    


 


  0.0 K/mm3  07/31/19  09:26    


 


  0.0 K/mm3  07/31/19  09:26    


 


  0.0 K/mm3  07/31/19  09:26    


 


Blast Cells #  0.0 K/mm3  07/31/19  09:26    


 


WBC Morphology  Not Reportable   07/31/19  09:26    


 


Hypersegmented Neuts  Not Reportable   07/31/19  09:26    


 


Hyposegmented Neuts  Not Reportable   07/31/19  09:26    


 


Hypogranular Neuts  Not Reportable   07/31/19  09:26    


 


  Not Reportable   07/31/19  09:26    


 


  Not Reportable   07/31/19  09:26    


 


  Not Reportable   07/31/19  09:26    


 


  Not Reportable   07/31/19  09:26    


 


  Not Reportable   07/31/19  09:26    


 


  Not Reportable   07/31/19  09:26    


 


  Consistent w auto   07/31/19  09:26    


 


  Not Reportable   07/31/19  09:26    


 


Plt Clumps, EDTA  Not Reportable   07/31/19  09:26    


 


  Not Reportable   07/31/19  09:26    


 


  Not Reportable   07/31/19  09:26    


 


  Not Reportable   07/31/19  09:26    


 


Plt Morphology Comment  Not Reportable   07/31/19  09:26    


 


RBC Morphology  Not Reportable   07/31/19  09:26    


 


Dimorphic RBCs  Not Reportable   07/31/19  09:26    


 


  Not Reportable   07/31/19  09:26    


 


  1+   07/31/19  09:26    


 


  Not Reportable   07/31/19  09:26    


 


  2+   07/31/19  09:26    


 


  1+   07/31/19  09:26    


 


  Not Reportable   07/31/19  09:26    


 


  Not Reportable   07/31/19  09:26    


 


  Not Reportable   07/31/19  09:26    


 


  Not Reportable   07/31/19  09:26    


 


  Few   07/31/19  09:26    


 


  Not Reportable   07/31/19  09:26    


 


  Not Reportable   07/31/19  09:26    


 


  Not Reportable   07/31/19  09:26    


 


  Not Reportable   07/31/19  09:26    


 


  Not Reportable   07/31/19  09:26    


 


  Not Reportable   07/31/19  09:26    


 


  Not Reportable   07/31/19  09:26    


 


  Not Reportable   07/31/19  09:26    


 


  Not Reportable   07/31/19  09:26    


 


Acanthocytes (Spur)  Not Reportable   07/31/19  09:26    


 


Rouleaux  Not Reportable   07/31/19  09:26    


 


  Not Reportable   07/31/19  09:26    


 


  Not Reportable   07/31/19  09:26    


 


  Not Reportable   07/31/19  09:26    


 


Percent Retic  1.10 % (0.78-2.58)   07/05/19  04:52    


 


  Not Reportable   07/31/19  09:26    


 


Hem Pathologist Commnt  No   07/31/19  09:26    


 


PT  16.3 Sec. (12.2-14.9)  H  07/05/19  08:51    


 


INR  1.35  (0.87-1.13)  H  07/05/19  08:51    


 


Sodium  140 mmol/L (137-145)   07/31/19  15:53    


 


Potassium  4.7 mmol/L (3.6-5.0)   07/31/19  15:53    


 


Chloride  94.7 mmol/L ()  L  07/31/19  15:53    


 


Carbon Dioxide  25 mmol/L (22-30)   07/31/19  15:53    


 


  25 mmol/L  07/31/19  15:53    


 


BUN  19 mg/dL (9-20)   07/31/19  15:53    


 


  8.2 mg/dL (0.8-1.5)  H  07/31/19  15:53    


 


Estimated GFR  8 ml/min  07/31/19  15:53    


 


  2 %  07/31/19  15:53    


 


Glucose  106 mg/dL ()  H  07/31/19  15:53    


 


POC Glucose  103  ()   07/20/19  16:43    


 


Calcium  9.1 mg/dL (8.4-10.2)   07/31/19  15:53    


 


Phosphorus  6.50 mg/dL (2.5-4.5)  H  07/15/19  06:57    


 


Magnesium  2.00 mg/dL (1.7-2.3)   07/05/19  04:52    


 


Iron  20 ug/dL ()  L  07/05/19  08:51    


 


TIBC  219 mcg/dL (250-450)  L  07/05/19  08:51    


 


  63.8 ng/mL (13.0-400.0)   07/05/19  08:51    


 


  0.50 mg/dL (0.1-1.2)   07/31/19  15:53    


 


AST  18 units/L (5-40)   07/31/19  15:53    


 


ALT  < 5 units/L (7-56)  L  07/31/19  15:53    


 


  99 units/L ()   07/31/19  15:53    


 


  5.9 g/dL (6.1-8.1)  L  07/09/19  04:50    


 


  9.0 g/dL (6.3-8.2)  H  07/31/19  15:53    


 


  2.7 g/dL (3.9-5)  L  07/31/19  15:53    


 


  0.4 %  07/31/19  15:53    


 


  0.7 g/dL (0.2-0.3)  H  07/09/19  04:50    


 


  0.8 g/dL (0.5-0.9)   07/09/19  04:50    


 


  0.5 g/dL (0.2-0.5)   07/09/19  04:50    


 


  1.6 g/dL (0.8-1.7)   07/09/19  04:50    


 


Abnorm Protein Band 1  see below   07/09/19  04:50    


 


PEP Interpretation  see below  H  07/09/19  04:50    


 


  88 units/L (13-60)  H  07/04/19  12:47    


 


Tumor Marker AFP  See scanned result   07/05/19  08:51    


 


Carcinoembryonic Ag  17.4 ng/mL (0.0-2.4)  H  07/05/19  08:51    


 


  11 U/mL (<34)   07/05/19  08:51    


 


Vitamin B12  1768 pg/mL (211-911)  H  07/05/19  08:51    


 


  5.05 ng/mL (7.3-26.0)  L  07/05/19  08:51    


 


PTH Intact  238.5 pg/mL (15-65)  H  07/05/19  04:52    


 


  Yellow  (Yellow)   07/23/19  16:35    


 


  Turbid  (Clear)   07/23/19  16:35    


 


  5.0  (5.0-7.0)   07/23/19  16:35    


 


Ur Specific Gravity  1.024  (1.003-1.030)   07/23/19  16:35    


 


  100 mg/dl mg/dL (Negative)   07/23/19  16:35    


 


  Neg mg/dL (Negative)   07/23/19  16:35    


 


  Tr mg/dL (Negative)   07/23/19  16:35    


 


  Lg  (Negative)   07/23/19  16:35    


 


  Neg  (Negative)   07/23/19  16:35    


 


  Neg  (Negative)   07/23/19  16:35    


 


  < 2.0 mg/dL (<2.0)   07/23/19  16:35    


 


Ur Leukocyte Esterase  Mod  (Negative)   07/23/19  16:35    


 


  > 182.0 /HPF (0.0-6.0)  H  07/23/19  16:35    


 


  > 182.0 /HPF (0.0-6.0)   07/23/19  16:35    


 


  2+ /HPF (Negative)   07/23/19  16:35    


 


  2+ /HPF  07/23/19  16:35    


 


  161.7 mg/dL (0.1-20.0)  H  07/05/19  08:51    


 


  72 mmol/L  07/05/19  08:51    


 


Fluid Type  Ascitic   07/05/19  Unknown


 


Fluid Color  Yellow   07/05/19  Unknown


 


Fluid Appearance  Hazy   07/05/19  Unknown


 


Fluid WBC  58 /mm3  07/05/19  Unknown


 


Fluid RBC  1850 /mm3  07/05/19  Unknown


 


Fluid Diff Comment     07/05/19  Unknown


 


Fluid Seg Neutrophils  35.0 %  07/05/19  Unknown


 


Fluid Lymphocytes  14.0 %  07/05/19  Unknown


 


Fluid Reactive Lymphs  0 %  07/05/19  Unknown


 


Fluid Monocytes  51.0 %  07/05/19  Unknown


 


Fluid Eosinophils  0 %  07/05/19  Unknown


 


Fluid Basophils  0 %  07/05/19  Unknown


 


Fluid Glucose  93 mg/dL (40-70)  H  07/05/19  Unknown


 


Fluid Total Protein  4.6  (15.0-45.0)  L  07/05/19  Unknown


 


Fluid Albumin  1.5 g/dL  07/05/19  Unknown


 


Fluid LDH  195   07/05/19  Unknown


 


Random Vancomycin  13.1 ug/mL (0-40.0)   07/09/19  04:50    


 


EVELYN Screen  Negative  (Negative)   07/09/19  04:50    


 


Proteinase 3 (PR3) Ab  <1.0 AI (<1.0)   07/09/19  04:50    


 


Myeloperoxidase Ab  <1.0 AI (<1.0)   07/09/19  04:50    


 


  158 mg/dL ()   07/09/19  04:50    


 


  32 mg/dL (15-53)   07/09/19  04:50    


 


Hepatitis A IgM Ab  Non-reactive  (NonReactive)   07/04/19  17:48    


 


Hep Bs Antigen  Non-reactive  (Negative)   07/04/19  17:48    


 


Hep B Core IgM Ab  Non-reactive  (NonReactive)   07/04/19  17:48    


 


  Non-reactive  (NonReactive)   07/04/19  17:48    


 


Blood Type  A POSITIVE   07/16/19  08:18    


 


Antibody Screen  Negative   07/16/19  08:18    


 


Crossmatch  See Detail   07/16/19  08:18    














Active Medications





- Current Medications


Current Medications: 














Generic Name Dose Route Start Last Admin





  Trade Name Freq  PRN Reason Stop Dose Admin


 


Bisacodyl  15 mg  07/07/19 14:51 





  Dulcolax  PO  





  QDAY PRN  





  Constipation  


 


Chlorpromazine HCl  10 mg  07/09/19 15:20  08/04/19 18:06





  Thorazine  PO   10 mg





  Q6H PRN   Administration





  Hiccups  


 


Epoetin Tye  20,000 unit  07/12/19 09:36  08/02/19 13:36





  Procrit  SUB-Q   20,000 unit





  VEE PRN   Administration





  hemodialysis  


 


Famotidine  10 mg  07/12/19 22:00  08/04/19 10:36





  Pepcid  PO   10 mg





  BID BOWEN   Administration


 


Ferrous Sulfate  325 mg  07/10/19 22:00  08/04/19 10:36





  Feosol  PO   325 mg





  BID BOWEN   Administration


 


Folic Acid  1 mg  07/06/19 10:00  08/04/19 10:36





  Folvite  PO   1 mg





  QDAY BOWEN   Administration


 


Heparin Sodium (Porcine)  3,000 unit  07/12/19 09:36  08/02/19 10:40





  Heparin 10,000 Units/10 Ml  IV   3,000 unit





  VEE PRN   Administration





  hemodialysis  


 


Hydralazine HCl  10 mg  07/04/19 19:14  07/04/19 22:17





  Apresoline  IV   10 mg





  Q3H PRN   Administration





  Blood Pressure  


 


Chlorpromazine HCl 25 mg/  51 mls @ 100 mls/hr  07/13/19 12:26 





  Sodium Chloride  IV  





  Q4H PRN  





  Hiccups  


 


Sodium Chloride  100 mls @ 999 mls/hr  07/26/19 09:28 





  Nacl 0.9%  IV  





  VEE PRN  





  Hypotension  


 


Metoclopramide HCl  5 mg  07/17/19 15:00 





  Reglan  IV  





  Q6H PRN  





  Nausea And Vomiting  


 


Metoprolol Tartrate  50 mg  07/04/19 23:00  08/04/19 10:37





  Lopressor  PO   50 mg





  BID BOWEN   Administration


 


Oxycodone/Acetaminophen  1 tab  07/13/19 10:53  08/04/19 18:06





  Percocet 5/325  PO   1 tab





  Q6H PRN   Administration





  Pain, Moderate (4-6)  


 


Sodium Chloride  10 ml  07/04/19 22:00  08/04/19 10:37





  Sodium Chloride Flush Syringe 10 Ml  IV   10 ml





  BID BOWEN   Administration














Nutrition/Malnutrition Assess





- Dietary Evaluation


Nutrition/Malnutrition Findings: 


                                        





Nutrition Notes                                            Start:  07/11/19 

12:03


Freq:                                                      Status: Active       




Protocol:                                                                       




 Document     07/31/19 15:31  RM  (Rec: 07/31/19 15:43  RM  [id number])


 Nutrition Notes


     Initial or Follow up                        Reassessment


     Current Diagnosis                           CKD (stage V CKD),Hypertension


     Other Pertinent Diagnosis                   on HD, Metastatic rectal CA,


                                                 malignant ascites, anemia


     Current Diet                                Renal


     Labs/Tests                                  reviewed


     Pertinent Medications                       Zofran


     Height                                      5 ft 7 in


     Weight                                      54.6 kg


     Ideal Body Weight (kg)                      67.27


     BMI                                         18.8


     Subjective/Other Information                Pt not in room at time of


                                                 visit. Recorded PO intake 88%


                                                 X 2 meals. Nurse stated that


                                                 she does not think that pt has


                                                 been drinking the Nepro.


                                                 Stated that pt declined to


                                                 drink it today.


     Percent of energy/protein needs met:        98%/100%


     Burn                                        Absent


     Trauma                                      Absent


     #1


      Nutrition Diagnosis                        Malnutrition


      Diagnosis Progress(for reassessment        Continues


       documentation)                            


     Is patient on ventilator?                   No


     Is Patient Ambulatory and/or Out of Bed     No


     REE-([facility]-confined to bed)      [phone]


     Kcal/Kg value to use for calculation        35


     Approximate Energy Requirements Using       1911


      kcal/Kg                                    


     Calculation Used for Recommendations        Kcal/kg


     Additional Notes                            Pro needs >1.2g/kg: >62g/day


                                                 Fluid needs 1-1.5L/day


 Nutrition Intervention


     Change Diet Order:                          Continue current diet order


     Add Supplement/Snack (indicate name/kcal    D/C Nepro BID (vanilla).


      /protein )                                 


     Goal #1                                     PO intake of meals to continue


                                                 meet at least 75% energy and


                                                 pro needs


     Goal #2                                     Wt maintenance and/or gain


     Anticipated Discharge Needs:                Renal diet


     Follow-Up By:                               08/05/19


     Additional Comments                         Follow for PO intakes

## 2019-08-05 LAB
%HYPO/RBC NFR BLD AUTO: (no result) %
ALBUMIN SERPL-MCNC: 2.9 G/DL (ref 3.9–5)
ALT SERPL-CCNC: < 5 UNITS/L (ref 7–56)
ANISOCYTOSIS BLD QL SMEAR: (no result)
BAND NEUTROPHILS # (MANUAL): 0 K/MM3
BASOPHILS # (AUTO): 0.1 K/MM3 (ref 0–0.1)
BUN SERPL-MCNC: 36 MG/DL (ref 9–20)
BUN/CREAT SERPL: 4 %
CALCIUM SERPL-MCNC: 9 MG/DL (ref 8.4–10.2)
EOSINOPHIL # BLD AUTO: 0.1 K/MM3 (ref 0–0.4)
EOSINOPHIL NFR BLD AUTO: 0.8 % (ref 0–4.3)
HCT VFR BLD CALC: 26 % (ref 35.5–45.6)
HEMOLYSIS INDEX: 0
HGB BLD-MCNC: 7.9 GM/DL (ref 11.8–15.2)
MCHC RBC AUTO-ENTMCNC: 30 % (ref 32–34)
MCV RBC AUTO: 77 FL (ref 84–94)
MONOCYTES # (AUTO): 0.6 K/MM3 (ref 0–0.8)
MONOCYTES % (AUTO): 5.3 % (ref 0–7.3)
MYELOCYTES # (MANUAL): 0 K/MM3
PLATELET # BLD: 268 K/MM3 (ref 140–440)
PROMYELOCYTES # (MANUAL): 0 K/MM3
RBC # BLD AUTO: 3.38 M/MM3 (ref 3.65–5.03)
TOTAL CELLS COUNTED BLD: 100

## 2019-08-05 PROCEDURE — 5A1D70Z PERFORMANCE OF URINARY FILTRATION, INTERMITTENT, LESS THAN 6 HOURS PER DAY: ICD-10-PCS | Performed by: INTERNAL MEDICINE

## 2019-08-05 RX ADMIN — OXYCODONE AND ACETAMINOPHEN PRN TAB: 5; 325 TABLET ORAL at 09:04

## 2019-08-05 RX ADMIN — OXYCODONE AND ACETAMINOPHEN PRN TAB: 5; 325 TABLET ORAL at 18:41

## 2019-08-05 RX ADMIN — FAMOTIDINE SCH: 10 TABLET ORAL at 21:44

## 2019-08-05 RX ADMIN — FERROUS SULFATE TAB 325 MG (65 MG ELEMENTAL FE) SCH: 325 (65 FE) TAB at 21:44

## 2019-08-05 RX ADMIN — FERROUS SULFATE TAB 325 MG (65 MG ELEMENTAL FE) SCH: 325 (65 FE) TAB at 10:06

## 2019-08-05 RX ADMIN — METOPROLOL TARTRATE SCH: 50 TABLET, FILM COATED ORAL at 10:06

## 2019-08-05 RX ADMIN — METOPROLOL TARTRATE SCH: 50 TABLET, FILM COATED ORAL at 21:40

## 2019-08-05 RX ADMIN — Medication SCH: at 10:07

## 2019-08-05 RX ADMIN — FAMOTIDINE SCH: 10 TABLET ORAL at 10:07

## 2019-08-05 RX ADMIN — HEPARIN SODIUM PRN UNIT: 1000 INJECTION, SOLUTION INTRAVENOUS; SUBCUTANEOUS at 09:55

## 2019-08-05 RX ADMIN — FOLIC ACID SCH: 1 TABLET ORAL at 10:06

## 2019-08-05 RX ADMIN — ERYTHROPOIETIN PRN UNIT: 20000 INJECTION, SOLUTION INTRAVENOUS; SUBCUTANEOUS at 12:07

## 2019-08-05 RX ADMIN — Medication SCH: at 21:44

## 2019-08-05 NOTE — HEM/ONC PROGRESS NOTE
Assessment and Plan





1.  Rectal ca  - signet cell features - Radiology shows carcinomatosis.  The 

patient has anemia.  MCV is low.  


2.  Anemia, low iron, B12 normal, folate low.  renal impairement may have a role


3.  h/o Thrombocytosis, likely reactive.


4.  History of hiccups.  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 

involvement.


5.  Renal failure.  Nephrology following.


6.  Hyperkalemia status post treatment.


7.  History of hypertension.





I will follow the patient during inpatient stay.    GI team has seen the 

patient.





CEA - 17


CA 19-9 - normal 11





colonoscopy - Rectal tumor from dentate line to mid/proximal rectum (extent of 

exam due to severe stenosis related to tumor)





based on ascites and omental lesions -this is likely metastatic ca


as per Dr Fung - he said no ascites fluid is available in the lab


d/w pt reg stage IV - chemo 


d/w pt reg non curative nature 








port placed





d/w pt reg OP chemo - [employer] -  - HD


pt aware that this is aggresive tumor - FOLFOX q 2 weeks as OP - dose 

modification for HD








8/5/2019 - d/w pt that I will f/u in clinic - however if there is a delay of 

insurance then [facility] is an option


pt aware of clinical suspicion of stage IV cancer due to hiccough - ascites - CT

showing peritoneal nodules and signet ring features of bx


pt will call [facility] for apt, as well as, see me in clinic


s/p iv iron 7/28





d/w brother, sister in law on 8/1


waiting for discharge - OP HD placement





- Patient Problems


(1) Ascites


Current Visit: Yes   Status: Chronic   





Subjective


Date of service: 08/05/19


Principal diagnosis: rectal ca


Interval history: 





abdo distension





Objective





- Exam


Narrative Exam: 





Pain  - none


General appearance  no acute distress


Performance status limited self care


Eyes - no icterus


ENT  no thrush





LNs  cervical  not palpable


Neck  - normal ROM


Respiratory  Normal


Breath sounds - CTA





CVS  S1 S2 +


Extremities  normal temperature


General GI  Soft - distended


Rectal  deferred


male  - deferred


Skin  warm -PORT +


Musculoskeletal  moving normal


Neurologically  no focal deficit





- Constitutional


Vitals: 


                                Last Vital Signs











Temp  97.8 F   08/05/19 04:56


 


Pulse  106 H  08/05/19 04:56


 


Resp  18   08/05/19 04:56


 


BP  127/89   08/05/19 04:56


 


Pulse Ox  99   08/05/19 04:56














Medications & Allergies





- Medications


Allergies/Adverse Reactions: 


                                    Allergies





No Known Allergies Allergy (Verified 07/04/19 12:02)


   








Home Medications: 


                                Home Medications











 Medication  Instructions  Recorded  Confirmed  Last Taken  Type


 


Prednisone [predniSONE 10 mg 10 mg PO .TAPER #1 tab.ds.pk 09/10/15 07/06/19 

Unknown Rx





(6-Day Pack, 21 Tabs)]     











Active Medications: 














Generic Name Dose Route Start Last Admin





  Trade Name Freq  PRN Reason Stop Dose Admin


 


Bisacodyl  15 mg  07/07/19 14:51 





  Dulcolax  PO  





  QDAY PRN  





  Constipation  


 


Chlorpromazine HCl  10 mg  07/09/19 15:20  08/04/19 18:06





  Thorazine  PO   10 mg





  Q6H PRN   Administration





  Hiccups  


 


Epoetin Tye  20,000 unit  07/12/19 09:36  08/02/19 13:36





  Procrit  SUB-Q   20,000 unit





  VEE PRN   Administration





  hemodialysis  


 


Famotidine  10 mg  07/12/19 22:00  08/04/19 23:12





  Pepcid  PO   10 mg





  BID BOWEN   Administration


 


Ferrous Sulfate  325 mg  07/10/19 22:00  08/04/19 23:12





  Feosol  PO   325 mg





  BID BOWEN   Administration


 


Folic Acid  1 mg  07/06/19 10:00  08/04/19 10:36





  Folvite  PO   1 mg





  QDAY BOWEN   Administration


 


Heparin Sodium (Porcine)  3,000 unit  07/12/19 09:36  08/02/19 10:40





  Heparin 10,000 Units/10 Ml  IV   3,000 unit





  VEE PRN   Administration





  hemodialysis  


 


Hydralazine HCl  10 mg  07/04/19 19:14  07/04/19 22:17





  Apresoline  IV   10 mg





  Q3H PRN   Administration





  Blood Pressure  


 


Chlorpromazine HCl 25 mg/  51 mls @ 100 mls/hr  07/13/19 12:26 





  Sodium Chloride  IV  





  Q4H PRN  





  Hiccups  


 


Sodium Chloride  100 mls @ 999 mls/hr  07/26/19 09:28 





  Nacl 0.9%  IV  





  VEE PRN  





  Hypotension  


 


Metoclopramide HCl  5 mg  07/17/19 15:00 





  Reglan  IV  





  Q6H PRN  





  Nausea And Vomiting  


 


Metoprolol Tartrate  50 mg  07/04/19 23:00  08/04/19 23:12





  Lopressor  PO   Not Given





  BID BOWEN  


 


Oxycodone/Acetaminophen  1 tab  07/13/19 10:53  08/04/19 23:53





  Percocet 5/325  PO   1 tab





  Q6H PRN   Administration





  Pain, Moderate (4-6)  


 


Sodium Chloride  10 ml  07/04/19 22:00  08/04/19 23:14





  Sodium Chloride Flush Syringe 10 Ml  IV   10 ml





  BID BOWEN   Administration

## 2019-08-05 NOTE — PROGRESS NOTE
Assessment and Plan


Assessment and plan: 


--ESRD: s/p permacath, had emergent hemodialysis due to severe hyperkalemia 

started on on 7/4/19, 


HD per schedule,MWF. Nephrology following Need outpt HD set up





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 

lymphovascular invasion, 


- Chemotherapy port to be placed 7/16/19;  On discharge patient will follow 

hematology oncologist, Dr. Meade





--Acute anemia with Drop in HCT 


Received total 3 units of PRBC, ordered FOBT, GI is following, d/w Dr. Joel, 

s/p colonoscopy





--Bilateral hydronephrosis: Placed on hernandez, consulted Urology, input 

noted,discharge with hernandez 





--Metastatic Rectal Cancer: Poorly differentiated mucinous adenocarcinoma with 

signet ring cells.


Oncology following. outpt f/u





--Malignant Ascites 


due to suspected Colon cancer with mets to Omentum/Carcinomatosis suspected, 


which would be a very poor prognostic sign: consulted GI, input noted, s/p Par

acentesis on 7/5/19, 


await cytology, CEA 17 ELEVATED, AFP ELEVATED, CA 19-9 - normal 11





--Positive blood cultures: Abx discontinued following ID eval as culture appears

to be contaminant





--Hyperkalemia: treated with HD, Nephrology is following





--Acute Microcytic anemia, suspect cancer related until proven otherwise, follow

h/h





--Severe malnutrition: Dietitian consulted 





--Hiccups-resolved





--DVT prophylaxis: On heparin and GI prophylaxis 


Awaiting  outpatientHD scheduled





Disposition; discharge when outpatient HD is set up














Brief history;


Patient is a 60 yo man with a history of hypertension and Asthma who presented 

to [facility] ED with urinary frequency, back pains, n/v, diarrhea and abd pains with 

distension x 1 week. He was diagnosis with Ascites and underwent a paracentesis.

he was found to have Rectal cancer with Mets. [facility] paperwork filled out.





* Initial labs: wbc 13.0 to 10.0, hgb 6.3 to 7.0 (mcv 60), na 132, k 9.5, cr 

  34.8, bun 129, bg 164, lipase 88


* CT abd/pelvis without contrast IMPRESSION: 1. There is ascites. There is 

  increased density in the omentum. Findings are concerning for carcinomatosis. 

  There is some mild adenopathy in the pelvis a possible pericolonic mass, 

  series 2 image 139. There is mild bilateral hydronephrosis. The exact cause is

  not determined by this study. There is minimal nephrolithiasis on the left. No

  ureteral calculi are identified however.


* Colonoscopy, There was a circumferential rectal tumor from the dentate line 

  extending to mid-proximal rectum.  There was severe stenosis related to 

  circumferential tumor which was unable to be traversed.  The tumor was friable

  to touch.   Multiple biopsies were obtained Impression:1. Rectal tumor from 

  dentate line to mid/proximal rectum (extent of exam due to severe stenosis 

  related to tumor).  Biopsies were obtained. Recommendations: follow-up 

  pathology, -surgery consult, -heme/onc following, -screen first degree 

  relatives for colorectal cancer, -will need colonoscopy in 3-6 months to rule 

  out proximal colon lesions.


* Path Rectal biopsy: poorly differentiated mucinous adenocarcinoma with signet 

  cells and lymphovascular invasion


* Path Ascites: malignant cells consistent with metastatic carcinoma








History


Interval history: 


Patient seen and examined medical efficacy.


Clinically no change, complaints of abdominal pain


Is receiving HD per scheduled


Awaiting out pt HD scheduling








Hospitalist Physical





- Constitutional


Vitals: 


                                        











Temp Pulse Resp BP Pulse Ox


 


 97.0 F L  118 H  18   133/83   99 


 


 08/05/19 12:45  08/05/19 12:45  08/05/19 12:45  08/05/19 12:45  08/05/19 04:56











General appearance: Present: mild distress, cachectic, disheveled





- EENT


Eyes: Present: PERRL, EOM intact





- Neck


Neck: Present: supple, normal ROM





- Respiratory


Respiratory effort: normal


Respiratory: bilateral: diminished, negative: rales, rhonchi, wheezing





- Cardiovascular


Rhythm: regular


Heart Sounds: Present: S1 & S2





- Extremities


Extremities: no ischemia, No edema





- Abdominal


General gastrointestinal: soft, non-tender, non-distended, normal bowel sounds





- Integumentary


Integumentary: Present: clear, warm





- Psychiatric


Psychiatric: appropriate mood/affect, cooperative





- Neurologic


Neurologic: CNII-XII intact, moves all extremities





Results





- Labs


CBC & Chem 7: 


                                 08/05/19 10:10





                                 08/05/19 10:10


Labs: 


                             Laboratory Last Values











WBC  11.1 K/mm3 (4.5-11.0)  H  08/05/19  10:10    


 


RBC  3.38 M/mm3 (3.65-5.03)  L  08/05/19  10:10    


 


Hgb  7.9 gm/dl (11.8-15.2)  L  08/05/19  10:10    


 


Hct  26.0 % (35.5-45.6)  L  08/05/19  10:10    


 


MCV  77 fl (84-94)  L  08/05/19  10:10    


 


MCH  23 pg (28-32)  L  08/05/19  10:10    


 


MCHC  30 % (32-34)  L  08/05/19  10:10    


 


RDW  29.8 % (13.2-15.2)  H  08/05/19  10:10    


 


Plt Count  268 K/mm3 (140-440)   08/05/19  10:10    


 


Mono % (Auto)  5.3 % (0.0-7.3)   08/05/19  10:10    


 


Eos % (Auto)  0.8 % (0.0-4.3)   08/05/19  10:10    


 


Mono #  0.6 K/mm3 (0.0-0.8)   08/05/19  10:10    


 


Eos #  0.1 K/mm3 (0.0-0.4)   08/05/19  10:10    


 


Baso #  0.1 K/mm3 (0.0-0.1)   08/05/19  10:10    


 


Add Manual Diff  Complete   08/05/19  10:10    


 


Total Counted  100   08/05/19  10:10    


 


Seg Neutrophils %  86.3 % (40.0-70.0)  H  08/05/19  10:10    


 


Seg Neuts % (Manual)  79.0 % (40.0-70.0)  H  08/05/19  10:10    


 


  0 %  08/05/19  10:10    


 


  12.0 % (13.4-35.0)  L  08/05/19  10:10    


 


Reactive Lymphs % (Man)  0 %  08/05/19  10:10    


 


  7.0 % (0.0-7.3)   08/05/19  10:10    


 


  1.0 % (0.0-4.3)   08/05/19  10:10    


 


  1.0 % (0.0-1.8)   08/05/19  10:10    


 


  0 %  08/05/19  10:10    


 


  0 %  08/05/19  10:10    


 


  0 %  08/05/19  10:10    


 


  0 %  08/05/19  10:10    


 


Nucleated RBC %  Not Reportable   08/05/19  10:10    


 


Seg Neutrophils #  9.8 K/mm3 (1.8-7.7)  H  08/05/19  10:10    


 


Seg Neutrophils # Man  8.8 K/mm3 (1.8-7.7)  H  08/05/19  10:10    


 


Band Neutrophils #  0.0 K/mm3  08/05/19  10:10    


 


  1.3 K/mm3 (1.2-5.4)   08/05/19  10:10    


 


Abs React Lymphs (Man)  0.0 K/mm3  08/05/19  10:10    


 


  0.8 K/mm3 (0.0-0.8)   08/05/19  10:10    


 


  0.1 K/mm3 (0.0-0.4)   08/05/19  10:10    


 


  0.1 K/mm3 (0.0-0.1)   08/05/19  10:10    


 


  0.0 K/mm3  08/05/19  10:10    


 


  0.0 K/mm3  08/05/19  10:10    


 


  0.0 K/mm3  08/05/19  10:10    


 


Blast Cells #  0.0 K/mm3  08/05/19  10:10    


 


WBC Morphology  Not Reportable   08/05/19  10:10    


 


Hypersegmented Neuts  Not Reportable   08/05/19  10:10    


 


Hyposegmented Neuts  Not Reportable   08/05/19  10:10    


 


Hypogranular Neuts  Not Reportable   08/05/19  10:10    


 


  Not Reportable   08/05/19  10:10    


 


  Not Reportable   08/05/19  10:10    


 


  Not Reportable   08/05/19  10:10    


 


  Not Reportable   08/05/19  10:10    


 


  Not Reportable   08/05/19  10:10    


 


  Not Reportable   08/05/19  10:10    


 


  Not Reportable   08/05/19  10:10    


 


  Not Reportable   08/05/19  10:10    


 


Plt Clumps, EDTA  Not Reportable   08/05/19  10:10    


 


  Not Reportable   08/05/19  10:10    


 


  Not Reportable   08/05/19  10:10    


 


  Not Reportable   08/05/19  10:10    


 


Plt Morphology Comment  Not Reportable   08/05/19  10:10    


 


RBC Morphology  Not Reportable   08/05/19  10:10    


 


Dimorphic RBCs  Not Reportable   08/05/19  10:10    


 


  Not Reportable   08/05/19  10:10    


 


  1+   08/05/19  10:10    


 


  Not Reportable   08/05/19  10:10    


 


  2+   08/05/19  10:10    


 


  Not Reportable   08/05/19  10:10    


 


  Not Reportable   08/05/19  10:10    


 


  Not Reportable   08/05/19  10:10    


 


  Not Reportable   08/05/19  10:10    


 


  Not Reportable   08/05/19  10:10    


 


  Not Reportable   08/05/19  10:10    


 


  Not Reportable   08/05/19  10:10    


 


  Not Reportable   08/05/19  10:10    


 


  Not Reportable   08/05/19  10:10    


 


  Not Reportable   08/05/19  10:10    


 


  Not Reportable   08/05/19  10:10    


 


  Not Reportable   08/05/19  10:10    


 


  Not Reportable   08/05/19  10:10    


 


  Not Reportable   08/05/19  10:10    


 


  Not Reportable   08/05/19  10:10    


 


Acanthocytes (Spur)  Not Reportable   08/05/19  10:10    


 


Rouleaux  Not Reportable   08/05/19  10:10    


 


  Not Reportable   08/05/19  10:10    


 


  Not Reportable   08/05/19  10:10    


 


  Not Reportable   08/05/19  10:10    


 


Percent Retic  1.10 % (0.78-2.58)   07/05/19  04:52    


 


  Not Reportable   08/05/19  10:10    


 


Hem Pathologist Commnt  No   08/05/19  10:10    


 


PT  16.3 Sec. (12.2-14.9)  H  07/05/19  08:51    


 


INR  1.35  (0.87-1.13)  H  07/05/19  08:51    


 


Sodium  136 mmol/L (137-145)  L  08/05/19  10:10    


 


Potassium  4.5 mmol/L (3.6-5.0)   08/05/19  10:10    


 


Chloride  90.9 mmol/L ()  L  08/05/19  10:10    


 


Carbon Dioxide  24 mmol/L (22-30)   08/05/19  10:10    


 


  26 mmol/L  08/05/19  10:10    


 


BUN  36 mg/dL (9-20)  H  08/05/19  10:10    


 


  9.8 mg/dL (0.8-1.5)  H  08/05/19  10:10    


 


Estimated GFR  7 ml/min  08/05/19  10:10    


 


  4 %  08/05/19  10:10    


 


Glucose  123 mg/dL ()  H  08/05/19  10:10    


 


POC Glucose  103  ()   07/20/19  16:43    


 


Calcium  9.0 mg/dL (8.4-10.2)   08/05/19  10:10    


 


Phosphorus  6.50 mg/dL (2.5-4.5)  H  07/15/19  06:57    


 


Magnesium  2.00 mg/dL (1.7-2.3)   07/05/19  04:52    


 


Iron  20 ug/dL ()  L  07/05/19  08:51    


 


TIBC  219 mcg/dL (250-450)  L  07/05/19  08:51    


 


  63.8 ng/mL (13.0-400.0)   07/05/19  08:51    


 


  0.40 mg/dL (0.1-1.2)   08/05/19  10:10    


 


AST  16 units/L (5-40)   08/05/19  10:10    


 


ALT  < 5.0 units/L (7-56)  L  08/05/19  10:10    


 


  106 units/L ()   08/05/19  10:10    


 


  5.9 g/dL (6.1-8.1)  L  07/09/19  04:50    


 


  8.3 g/dL (6.3-8.2)  H  08/05/19  10:10    


 


  2.9 g/dL (3.9-5)  L  08/05/19  10:10    


 


  0.5 %  08/05/19  10:10    


 


  0.7 g/dL (0.2-0.3)  H  07/09/19  04:50    


 


  0.8 g/dL (0.5-0.9)   07/09/19  04:50    


 


  0.5 g/dL (0.2-0.5)   07/09/19  04:50    


 


  1.6 g/dL (0.8-1.7)   07/09/19  04:50    


 


Abnorm Protein Band 1  see below   07/09/19  04:50    


 


PEP Interpretation  see below  H  07/09/19  04:50    


 


  88 units/L (13-60)  H  07/04/19  12:47    


 


Tumor Marker AFP  See scanned result   07/05/19  08:51    


 


Carcinoembryonic Ag  17.4 ng/mL (0.0-2.4)  H  07/05/19  08:51    


 


  11 U/mL (<34)   07/05/19  08:51    


 


Vitamin B12  1768 pg/mL (211-911)  H  07/05/19  08:51    


 


  5.05 ng/mL (7.3-26.0)  L  07/05/19  08:51    


 


PTH Intact  238.5 pg/mL (15-65)  H  07/05/19  04:52    


 


  Yellow  (Yellow)   07/23/19  16:35    


 


  Turbid  (Clear)   07/23/19  16:35    


 


  5.0  (5.0-7.0)   07/23/19  16:35    


 


Ur Specific Gravity  1.024  (1.003-1.030)   07/23/19  16:35    


 


  100 mg/dl mg/dL (Negative)   07/23/19  16:35    


 


  Neg mg/dL (Negative)   07/23/19  16:35    


 


  Tr mg/dL (Negative)   07/23/19  16:35    


 


  Lg  (Negative)   07/23/19  16:35    


 


  Neg  (Negative)   07/23/19  16:35    


 


  Neg  (Negative)   07/23/19  16:35    


 


  < 2.0 mg/dL (<2.0)   07/23/19  16:35    


 


Ur Leukocyte Esterase  Mod  (Negative)   07/23/19  16:35    


 


  > 182.0 /HPF (0.0-6.0)  H  07/23/19  16:35    


 


  > 182.0 /HPF (0.0-6.0)   07/23/19  16:35    


 


  2+ /HPF (Negative)   07/23/19  16:35    


 


  2+ /HPF  07/23/19  16:35    


 


  161.7 mg/dL (0.1-20.0)  H  07/05/19  08:51    


 


  72 mmol/L  07/05/19  08:51    


 


Fluid Type  Ascitic   07/05/19  Unknown


 


Fluid Color  Yellow   07/05/19  Unknown


 


Fluid Appearance  Hazy   07/05/19  Unknown


 


Fluid WBC  58 /mm3  07/05/19  Unknown


 


Fluid RBC  1850 /mm3  07/05/19  Unknown


 


Fluid Diff Comment     07/05/19  Unknown


 


Fluid Seg Neutrophils  35.0 %  07/05/19  Unknown


 


Fluid Lymphocytes  14.0 %  07/05/19  Unknown


 


Fluid Reactive Lymphs  0 %  07/05/19  Unknown


 


Fluid Monocytes  51.0 %  07/05/19  Unknown


 


Fluid Eosinophils  0 %  07/05/19  Unknown


 


Fluid Basophils  0 %  07/05/19  Unknown


 


Fluid Glucose  93 mg/dL (40-70)  H  07/05/19  Unknown


 


Fluid Total Protein  4.6  (15.0-45.0)  L  07/05/19  Unknown


 


Fluid Albumin  1.5 g/dL  07/05/19  Unknown


 


Fluid LDH  195   07/05/19  Unknown


 


Random Vancomycin  13.1 ug/mL (0-40.0)   07/09/19  04:50    


 


EVELYN Screen  Negative  (Negative)   07/09/19  04:50    


 


Proteinase 3 (PR3) Ab  <1.0 AI (<1.0)   07/09/19  04:50    


 


Myeloperoxidase Ab  <1.0 AI (<1.0)   07/09/19  04:50    


 


  158 mg/dL ()   07/09/19  04:50    


 


  32 mg/dL (15-53)   07/09/19  04:50    


 


Hepatitis A IgM Ab  Non-reactive  (NonReactive)   07/04/19  17:48    


 


Hep Bs Antigen  Non-reactive  (Negative)   07/04/19  17:48    


 


Hep B Core IgM Ab  Non-reactive  (NonReactive)   07/04/19  17:48    


 


  Non-reactive  (NonReactive)   07/04/19  17:48    


 


Blood Type  A POSITIVE   07/16/19  08:18    


 


Antibody Screen  Negative   07/16/19  08:18    


 


Crossmatch  See Detail   07/16/19  08:18    














Active Medications





- Current Medications


Current Medications: 














Generic Name Dose Route Start Last Admin





  Trade Name Freq  PRN Reason Stop Dose Admin


 


Bisacodyl  15 mg  07/07/19 14:51 





  Dulcolax  PO  





  QDAY PRN  





  Constipation  


 


Chlorpromazine HCl  10 mg  07/09/19 15:20  08/04/19 18:06





  Thorazine  PO   10 mg





  Q6H PRN   Administration





  Hiccups  


 


Epoetin Tye  20,000 unit  07/12/19 09:36  08/05/19 12:07





  Procrit  SUB-Q   20,000 unit





  VEE PRN   Administration





  hemodialysis  


 


Famotidine  10 mg  07/12/19 22:00  08/05/19 10:07





  Pepcid  PO   Not Given





  BID BOWEN  


 


Ferrous Sulfate  325 mg  07/10/19 22:00  08/05/19 10:06





  Feosol  PO   Not Given





  BID [facility]  


 


Folic Acid  1 mg  07/06/19 10:00  08/05/19 10:06





  Folvite  PO   Not Given





  QDAY [facility]  


 


Heparin Sodium (Porcine)  3,000 unit  07/12/19 09:36  08/05/19 09:55





  Heparin 10,000 Units/10 Ml  IV   3,000 unit





  VEE PRN   Administration





  hemodialysis  


 


Hydralazine HCl  10 mg  07/04/19 19:14  07/04/19 22:17





  Apresoline  IV   10 mg





  Q3H PRN   Administration





  Blood Pressure  


 


Chlorpromazine HCl 25 mg/  51 mls @ 100 mls/hr  07/13/19 12:26 





  Sodium Chloride  IV  





  Q4H PRN  





  Hiccups  


 


Sodium Chloride  100 mls @ 999 mls/hr  07/26/19 09:28 





  Nacl 0.9%  IV  





  VEE PRN  





  Hypotension  


 


Metoclopramide HCl  5 mg  07/17/19 15:00 





  Reglan  IV  





  Q6H PRN  





  Nausea And Vomiting  


 


Metoprolol Tartrate  50 mg  07/04/19 23:00  08/05/19 10:06





  Lopressor  PO   Not Given





  BID [facility]  


 


Oxycodone/Acetaminophen  1 tab  07/13/19 10:53  08/05/19 18:41





  Percocet 5/325  PO   1 tab





  Q6H PRN   Administration





  Pain, Moderate (4-6)  


 


Sodium Chloride  10 ml  07/04/19 22:00  08/05/19 10:07





  Sodium Chloride Flush Syringe 10 Ml  IV   Not Given





  BID [facility]  














Nutrition/Malnutrition Assess





- Dietary Evaluation


Nutrition/Malnutrition Findings: 


                                        





Nutrition Notes                                            Start:  07/11/19 

12:03


Freq:                                                      Status: Active       




Protocol:                                                                       




 Document     08/05/19 16:13  RM  (Rec: 08/05/19 16:18  RM  [id number])


 Nutrition Notes


     Initial or Follow up                        Reassessment


     Current Diagnosis                           CKD (stage V CKD),Hypertension


     Other Pertinent Diagnosis                   on HD, Metastatic rectal CA,


                                                 malignant ascites, anemia


     Current Diet                                Renal w/Nepro BID


     Labs/Tests                                  reviewed


     Pertinent Medications                       Zofran


     Height                                      5 ft 7 in


     Weight                                      55 kg


     Ideal Body Weight (kg)                      67.27


     BMI                                         19.0


     Subjective/Other Information                Pt not in room at time of


                                                 visit. Per nurse pt has been


                                                 eating bites of his meals and


                                                 drinks the Nepro sometimes.


     Burn                                        Absent


     Trauma                                      Absent


     #1


      Nutrition Diagnosis                        Malnutrition


      Diagnosis Progress(for reassessment        Continues


       documentation)                            


     Is patient on ventilator?                   No


     Is Patient Ambulatory and/or Out of Bed     No


     REE-([facility]-confined to bed)      1605.168


     Kcal/Kg value to use for calculation        35


     Approximate Energy Requirements Using       1925


      kcal/Kg                                    


     Calculation Used for Recommendations        Kcal/kg


     Additional Notes                            Pro needs >1.2g/kg: >62g/day


                                                 Fluid needs 1-1.5L/day


 Nutrition Intervention


     Change Diet Order:                          Continue current diet order


     Add Supplement/Snack (indicate name/kcal    Was not D/C'd. Continue Nepro


      /protein )                                 1 daily


     Provides kCal:                              425


     Provides Protein (gm)                       19


     Goal #1                                     PO intake of meals to meet at


                                                 least 75% energy and pro needs


     Goal #2                                     Wt maintenance and/or gain


     Anticipated Discharge Needs:                Renal diet


     Follow-Up By:                               08/08/19


     Additional Comments                         Follow for PO and ONS intakes

## 2019-08-05 NOTE — PROGRESS NOTE
Assessment and Plan





1. ESRD:


CKD has progressed to ESRD.


Continue hemodialysis three times a week, MWF schedule.


Monitor renal function.


Renal prognosis is poor.


Avoid nephrotoxic agents.


Meds dosage based on GFR.


Hemodialysis: 7/4, 7/5, 7/6, 7/8, 7/10, 7/12, 7/15, 7/17, 7/19, 7/22, 7/24, 

7/26, 7/29, 7/31, 8/2, today.





2. FEN:


Hyperkalemia, HD.


Monitor lytes.





3. Mild bilateral hydronephosis:


S/p hernandez catheter.


Evaluated by Urologist.





4. Rectal adenocarcinoma with malignant Ascites.


S/p Medport.


Followed by Heme-onc.





5. Anemia:


PRBC.


On Epogen, limited choice other than transfusion.


Monitor H/H.





6. HTN:


BP is controlled.





Difficulty in securing outpatient hemodialysis chair due to multiple issues.


Patient may have to come to the ER intermittently for dialysis.














Subjective


Date of service: 08/05/19


Principal diagnosis: rectal ca


Interval history: 


Patient was seen and examined at the bedside.


No new complaint.








Objective





- Vital Signs


Vital signs: 


                               Vital Signs - 12hr











  08/05/19 08/05/19 08/05/19





  04:56 10:00 10:15


 


Temperature 97.8 F  


 


Pulse Rate 106 H 107 H 58 L


 


Respiratory 18  





Rate   


 


Blood Pressure 127/89 134/96 142/95


 


O2 Sat by Pulse 99  





Oximetry   














  08/05/19 08/05/19 08/05/19





  10:32 10:35 10:45


 


Temperature  97.8 F 


 


Pulse Rate 122 H 104 H 130 H


 


Respiratory  18 





Rate   


 


Blood Pressure 129/83 135/82 156/81


 


O2 Sat by Pulse   





Oximetry   














  08/05/19 08/05/19 08/05/19





  11:00 11:03 11:15


 


Temperature   


 


Pulse Rate 128 H  129 H


 


Respiratory  18 





Rate   


 


Blood Pressure 116/70  106/70


 


O2 Sat by Pulse   





Oximetry   














  08/05/19 08/05/19 08/05/19





  11:30 11:45 12:00


 


Temperature   


 


Pulse Rate 123 H 125 H 104 H


 


Respiratory   





Rate   


 


Blood Pressure 117/57 118/62 135/82


 


O2 Sat by Pulse   





Oximetry   














- General Appearance


General appearance: well-developed, appears stated age, other (no distress, R IJ

tunnel catheter)


EENT: ATNC, PERRL, hearing intact, vision intact


Neck: no JVD, supple


Respiratory: Present: Clear to Ascultation


Cardiology: regular, S1S2, no murmurs


Gastrointestinal: normoactive bowel sounds, no tenderness, distended


Integumentary: no rash, warm and dry


Neurologic: no focal deficit, no asterixis, alert and oriented x3


Musculoskeletal: other (no edema)





- Lab





                                 08/05/19 10:10





                                 08/05/19 10:10


                             Most recent lab results











Calcium  9.0 mg/dL (8.4-10.2)   08/05/19  10:10    


 


Phosphorus  6.50 mg/dL (2.5-4.5)  H  07/15/19  06:57    


 


Magnesium  2.00 mg/dL (1.7-2.3)   07/05/19  04:52    


 


  161.7 mg/dL (0.1-20.0)  H  07/05/19  08:51    


 


  72 mmol/L  07/05/19  08:51    














Medications & Allergies





- Medications


Allergies/Adverse Reactions: 


                                    Allergies





No Known Allergies Allergy (Verified 07/04/19 12:02)


   








Home Medications: 


                                Home Medications











 Medication  Instructions  Recorded  Confirmed  Last Taken  Type


 


Prednisone [predniSONE 10 mg 10 mg PO .TAPER #1 tab.ds.pk 09/10/15 07/06/19 

Unknown Rx





(6-Day Pack, 21 Tabs)]     











Active Medications: 














Generic Name Dose Route Start Last Admin





  Trade Name Freq  PRN Reason Stop Dose Admin


 


Bisacodyl  15 mg  07/07/19 14:51 





  Dulcolax  PO  





  QDAY PRN  





  Constipation  


 


Chlorpromazine HCl  10 mg  07/09/19 15:20  08/04/19 18:06





  Thorazine  PO   10 mg





  Q6H PRN   Administration





  Hiccups  


 


Epoetin Tye  20,000 unit  07/12/19 09:36  08/05/19 12:07





  Procrit  SUB-Q   20,000 unit





  VEE PRN   Administration





  hemodialysis  


 


Famotidine  10 mg  07/12/19 22:00  08/05/19 10:07





  Pepcid  PO   Not Given





  BID [facility]  


 


Ferrous Sulfate  325 mg  07/10/19 22:00  08/05/19 10:06





  Feosol  PO   Not Given





  BID [facility]  


 


Folic Acid  1 mg  07/06/19 10:00  08/05/19 10:06





  Folvite  PO   Not Given





  QDAY [facility]  


 


Heparin Sodium (Porcine)  3,000 unit  07/12/19 09:36  08/02/19 10:40





  Heparin 10,000 Units/10 Ml  IV   3,000 unit





  VEE PRN   Administration





  hemodialysis  


 


Hydralazine HCl  10 mg  07/04/19 19:14  07/04/19 22:17





  Apresoline  IV   10 mg





  Q3H PRN   Administration





  Blood Pressure  


 


Chlorpromazine HCl 25 mg/  51 mls @ 100 mls/hr  07/13/19 12:26 





  Sodium Chloride  IV  





  Q4H PRN  





  Hiccups  


 


Sodium Chloride  100 mls @ 999 mls/hr  07/26/19 09:28 





  Nacl 0.9%  IV  





  VEE PRN  





  Hypotension  


 


Metoclopramide HCl  5 mg  07/17/19 15:00 





  Reglan  IV  





  Q6H PRN  





  Nausea And Vomiting  


 


Metoprolol Tartrate  50 mg  07/04/19 23:00  08/05/19 10:06





  Lopressor  PO   Not Given





  BID [facility]  


 


Oxycodone/Acetaminophen  1 tab  07/13/19 10:53  08/05/19 09:04





  Percocet 5/325  PO   1 tab





  Q6H PRN   Administration





  Pain, Moderate (4-6)  


 


Sodium Chloride  10 ml  07/04/19 22:00  08/05/19 10:07





  Sodium Chloride Flush Syringe 10 Ml  IV   Not Given





  BID [facility]

## 2019-08-06 RX ADMIN — CHLORPROMAZINE HYDROCHLORIDE PRN MG: 10 TABLET, FILM COATED ORAL at 17:09

## 2019-08-06 RX ADMIN — METOPROLOL TARTRATE SCH: 50 TABLET, FILM COATED ORAL at 16:12

## 2019-08-06 RX ADMIN — OXYCODONE AND ACETAMINOPHEN PRN TAB: 5; 325 TABLET ORAL at 14:55

## 2019-08-06 RX ADMIN — Medication SCH ML: at 11:37

## 2019-08-06 RX ADMIN — OXYCODONE AND ACETAMINOPHEN PRN TAB: 5; 325 TABLET ORAL at 11:36

## 2019-08-06 RX ADMIN — Medication SCH: at 17:14

## 2019-08-06 RX ADMIN — OXYCODONE AND ACETAMINOPHEN PRN TAB: 5; 325 TABLET ORAL at 18:39

## 2019-08-06 RX ADMIN — FAMOTIDINE SCH MG: 10 INJECTION, SOLUTION INTRAVENOUS at 17:10

## 2019-08-06 RX ADMIN — FOLIC ACID SCH: 1 TABLET ORAL at 16:12

## 2019-08-06 NOTE — PROGRESS NOTE
Assessment and Plan








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 

lymphovascular invasion, 


- On discharge patient will follow hematology oncologist, Dr. Meade





Acute anemia with Drop in HCT 


- s/p transfused 3 units of PRBC, GI is following, d/w Dr. Joel, s/p 

colonoscopy





ESRD: s/p permacath, had emergent hemodialysis due to severe hyperkalemia 

started on on 7/4/19, place hernandez on 7/5/19 with very little output. Need outpt 

HD set up





Bilateral hydronephrosis: Placed on hernandez, consulted Urology, input 

noted==>discharge with hernandez 





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 

signet ring cells. outpt f/u





Malignant Ascites 


- due to suspected Colon cancer with mets to Omentum/Carcinomatosis suspected, 

which would be a very poor prognostic sign: consulted GI, input noted, s/p 

Paracentesis on 7/5/19, await cytology, CEA 17 ELEVATED, AFP ELEVATED, CA 19-9 -

normal 11





Positive blood cultures: Abx discontinued following ID eval as culture appears 

to be contaminant





Hyperkalemia: treated with HD, Nephrology is following





Acute Microcytic anemia, suspect cancer related until proven otherwise, follow 

h/h





Severe malnutrition: Dietitian consulted 





Hiccups- likely tumor related, on Thorazine.





DVT prophylaxis: On heparin and GI prophylaxis





Discharge when Outpatient Dialysis ready. Patient will discuss with surgery 

about port placement for chemotherapy. Outpatient follow up with oncologist. If 

no place for HD could be confirmed patient may needs to come to hospital for HD 

- currently he is refusing that option and wants to wait till HD could be setup.

 








Brief History


Patient is a 58 yo man with a history of hypertension and Asthma who presented 

to [facility] ED with urinary frequency, back pains, n/v, diarrhea and abd pains with 

distension x 1 week. He was diagnosis with Ascites and underwent a paracentesis.

he was found to have Rectal cancer with Mets. [facility] paperwork filled out.





* Initial labs: wbc 13.0 to 10.0, hgb 6.3 to 7.0 (mcv 60), na 132, k 9.5, cr 

  34.8, bun 129, bg 164, lipase 88


* CT abd/pelvis without contrast IMPRESSION: 1. There is ascites. There is 

  increased density in the omentum. Findings are concerning for carcinomatosis. 

  There is some mild adenopathy in the pelvis a possible pericolonic mass, 

  series 2 image 139. There is mild bilateral hydronephrosis. The exact cause is

  not determined by this study. There is minimal nephrolithiasis on the left. No

  ureteral calculi are identified however.


* Colonoscopy, There was a circumferential rectal tumor from the dentate line 

  extending to mid-proximal rectum.  There was severe stenosis related to 

  circumferential tumor which was unable to be traversed.  The tumor was friable

  to touch.   Multiple biopsies were obtained Impression:1. Rectal tumor from 

  dentate line to mid/proximal rectum (extent of exam due to severe stenosis 

  related to tumor).  Biopsies were obtained. Recommendations: follow-up 

  pathology, -surgery consult, -heme/onc following, -screen first degree 

  relatives for colorectal cancer, -will need colonoscopy in 3-6 months to rule 

  out proximal colon lesions.


* Path Rectal biopsy: poorly differentiated mucinous adenocarcinoma with signet 

  cells and lymphovascular invasion


* Path Ascites: malignant cells consistent with metastatic carcinoma





Hospitalist Physical


Gen: thin frial, ill appearing, NAD, Awake, Alert, Orientated 


HEENT: NCAT, EOMI, PERRL, OP Clear 


Neck: supple, no adenopathy, no thyromegaly, no JVD 


CVS/Heart: RRR, normal S1S2, pulses present bilaterally 


Chest/Lungs: CTA B, Symmetrical chest expansion, good air entry bilaterally 


GI/Abdomen: soft, abdomen distended, good bowel sounds, no guarding or rebound 


/Bladder: no suprapubic tenderness, no CVA or paraspinal tenderness 


Extermity/Skin: no c/c/e, no obvious rash 


MSK: FROM x 4 


Neuro: CN 2-12 grossly intact, no new focal deficits 


Psych: calm 














Subjective


Date of service: 08/06/19


Principal diagnosis: rectal ca


Interval history: 





Patient seen and examined.  Medical records and medication list reviewed.  


No acute event overnight noted by the RN.  


Patient denies any chest pain or difficulty breathing.  Patient is tolerating 

diet.  


Discussed plan of care at bedside with patient.


waiting for outpt Hd setup - but having difficulty to setup for multiple issues


Patient wants to pursue HD and doesnot want hospice 











Objective





- Constitutional


Vitals: 


                               Vital Signs - 12hr











  08/06/19 08/06/19





  05:49 12:12


 


Temperature 98.4 F 98.5 F


 


Pulse Rate 106 H 112 H


 


Respiratory 20 18





Rate  


 


Blood Pressure 130/87 112/82


 


O2 Sat by Pulse 98 100





Oximetry  














- Labs


CBC & Chem 7: 


                                 08/05/19 10:10





                                 08/05/19 10:10


Labs: 


                              Abnormal lab results











  08/05/19 Range/Units





  10:10 


 


Seg Neuts % (Manual)  79.0 H  (40.0-70.0)  %


 


Lymphocytes % (Manual)  12.0 L  (13.4-35.0)  %


 


Seg Neutrophils # Man  8.8 H  (1.8-7.7)  K/mm3

## 2019-08-06 NOTE — PROGRESS NOTE
Assessment and Plan





1. ESRD:


CKD has progressed to ESRD.


Continue hemodialysis three times a week, MWF schedule.


Monitor renal function.


Renal prognosis is poor.


Avoid nephrotoxic agents.


Meds dosage based on GFR.


Hemodialysis: 


7/4, 7/5, 7/6, 7/8, 7/10, 7/12, 7/15, 7/17, 7/19, 7/22, 7/24, 7/26, 7/29, 7/31,


8/2, 8/5.





2. FEN:


Hyperkalemia, HD.


Monitor lytes.





3. Mild bilateral hydronephosis:


S/p hernandez catheter.


Evaluated by Urologist.





4. Rectal adenocarcinoma with malignant Ascites.


S/p Medport.


Followed by Heme-onc.





5. Anemia:


PRBC.


On Epogen, limited choice other than transfusion.


Also d/w Heme-Onc.


Monitor H/H.





6. HTN:


BP is controlled.





Difficulty in securing outpatient hemodialysis chair due to multiple issues.


Patient may have to come to the ER intermittently for dialysis.














Subjective


Date of service: 08/06/19


Principal diagnosis: rectal ca


Interval history: 


Patient was seen and examined at the bedside.


No new complaint.








Objective





- Vital Signs


Vital signs: 


                               Vital Signs - 12hr











  08/06/19 08/06/19





  05:49 12:12


 


Temperature 98.4 F 98.5 F


 


Pulse Rate 106 H 112 H


 


Respiratory 20 18





Rate  


 


Blood Pressure 130/87 112/82


 


O2 Sat by Pulse 98 100





Oximetry  














- General Appearance


General appearance: well-developed, appears stated age, other (no distress, R IJ

tunnel catheter)


EENT: ATNC, PERRL, hearing intact, vision intact


Neck: supple


Respiratory: Present: Clear to Ascultation


Cardiology: regular, S1S2, no murmurs


Gastrointestinal: normoactive bowel sounds, no tenderness, distended, other 

(Hernandez catheter)


Integumentary: no rash, warm and dry


Neurologic: no focal deficit, no asterixis, alert and oriented x3


Musculoskeletal: other (no edema)





- Lab





                                 08/05/19 10:10





                                 08/05/19 10:10


                             Most recent lab results











Calcium  9.0 mg/dL (8.4-10.2)   08/05/19  10:10    


 


Phosphorus  6.50 mg/dL (2.5-4.5)  H  07/15/19  06:57    


 


Magnesium  2.00 mg/dL (1.7-2.3)   07/05/19  04:52    


 


  161.7 mg/dL (0.1-20.0)  H  07/05/19  08:51    


 


  72 mmol/L  07/05/19  08:51    














Medications & Allergies





- Medications


Allergies/Adverse Reactions: 


                                    Allergies





No Known Allergies Allergy (Verified 07/04/19 12:02)


   








Home Medications: 


                                Home Medications











 Medication  Instructions  Recorded  Confirmed  Last Taken  Type


 


Prednisone [predniSONE 10 mg 10 mg PO .TAPER #1 tab.ds.pk 09/10/15 07/06/19 

Unknown Rx





(6-Day Pack, 21 Tabs)]     











Active Medications: 














Generic Name Dose Route Start Last Admin





  Trade Name Freq  PRN Reason Stop Dose Admin


 


Bisacodyl  15 mg  07/07/19 14:51 





  Dulcolax  PO  





  QDAY PRN  





  Constipation  


 


Chlorpromazine HCl  10 mg  07/09/19 15:20  08/04/19 18:06





  Thorazine  PO   10 mg





  Q6H PRN   Administration





  Hiccups  


 


Epoetin Tye  20,000 unit  07/12/19 09:36  08/05/19 12:07





  Procrit  SUB-Q   20,000 unit





  VEE PRN   Administration





  hemodialysis  


 


Famotidine  10 mg  08/06/19 14:00 





  Pepcid  IV  





  BID [facility]  


 


Ferrous Sulfate  308 mg  08/06/19 14:30 





  Ferrous Sulfate  PO  





  BID [facility]  


 


Folic Acid  1 mg  07/06/19 10:00  08/05/19 10:06





  Folvite  PO   Not Given





  QDAY [facility]  


 


Heparin Sodium (Porcine)  3,000 unit  07/12/19 09:36  08/05/19 09:55





  Heparin 10,000 Units/10 Ml  IV   3,000 unit





  VEE PRN   Administration





  hemodialysis  


 


Hydralazine HCl  10 mg  07/04/19 19:14  07/04/19 22:17





  Apresoline  IV   10 mg





  Q3H PRN   Administration





  Blood Pressure  


 


Chlorpromazine HCl 25 mg/  51 mls @ 100 mls/hr  07/13/19 12:26 





  Sodium Chloride  IV  





  Q4H PRN  





  Hiccups  


 


Sodium Chloride  100 mls @ 999 mls/hr  07/26/19 09:28 





  Nacl 0.9%  IV  





  VEE PRN  





  Hypotension  


 


Metoclopramide HCl  5 mg  07/17/19 15:00 





  Reglan  IV  





  Q6H PRN  





  Nausea And Vomiting  


 


Metoprolol Tartrate  50 mg  07/04/19 23:00  08/05/19 21:40





  Lopressor  PO   Not Given





  BID BOWEN  


 


Oxycodone/Acetaminophen  1 tab  07/13/19 10:53  08/06/19 11:36





  Percocet 5/325  PO   1 tab





  Q6H PRN   Administration





  Pain, Moderate (4-6)  


 


Sodium Chloride  10 ml  07/04/19 22:00  08/06/19 11:37





  Sodium Chloride Flush Syringe 10 Ml  IV   10 ml





  BID BOWEN   Administration

## 2019-08-06 NOTE — HEM/ONC PROGRESS NOTE
Assessment and Plan





1.  Rectal ca  - signet cell features - Radiology shows carcinomatosis.  The 

patient has anemia.  MCV is low.  


2.  Anemia, low iron, B12 normal, folate low.  renal impairement may have a role


3.  h/o Thrombocytosis, likely reactive.


4.  History of hiccups.  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 

involvement.


5.  Renal failure.  Nephrology following.


6.  Hyperkalemia status post treatment.


7.  History of hypertension.





I will follow the patient during inpatient stay.    GI team has seen the 

patient.





CEA - 17


CA 19-9 - normal 11





colonoscopy - Rectal tumor from dentate line to mid/proximal rectum (extent of 

exam due to severe stenosis related to tumor)





based on ascites and omental lesions -this is likely metastatic ca


as per Dr Fung - he said no ascites fluid is available in the lab


d/w pt reg stage IV - chemo 


d/w pt reg non curative nature 








port placed





d/w pt reg OP chemo - [employer]ML[employer] -  - HD


pt aware that this is aggresive tumor - FOLFOX q 2 weeks as OP - dose 

modification for HD








8/6/2019 - d/w pt that I will f/u in clinic - however if there is a delay of 

insurance then [facility] is an option


pt aware of clinical suspicion of stage IV cancer due to hiccough - ascites - CT

showing peritoneal nodules and signet ring features of bx


pt will call [facility] for apt, as well as, see me in clinic


s/p iv iron 7/28





d/w brother, sister in law on 8/1


waiting for discharge - OP HD placement


no chemo is done in this hospital





- Patient Problems


(1) Ascites


Current Visit: Yes   Status: Chronic   





Subjective


Date of service: 08/06/19


Principal diagnosis: rectal ca 


Interval history: 





d/w rn - pt still waiting for HD placement





Objective





- Exam


Narrative Exam: 





Pain  - none


General appearance  no acute distress


Performance status limited self care


Eyes - no icterus


ENT  no thrush





LNs  cervical  not palpable


Neck  - normal ROM


Respiratory  Normal


Breath sounds - CTA





CVS  S1 S2 +


Extremities  normal temperature


General GI  Soft - distended


Rectal  deferred


male  - deferred


Skin  warm -PORT +


Musculoskeletal  moving normal


Neurologically  no focal deficit





- Constitutional


Vitals: 


                                Last Vital Signs











Temp  98.4 F   08/06/19 05:49


 


Pulse  106 H  08/06/19 05:49


 


Resp  20   08/06/19 05:49


 


BP  130/87   08/06/19 05:49


 


Pulse Ox  98   08/06/19 05:49














- Labs


Lab Results: 


                         Laboratory Results - last 24 hr











  08/05/19 08/05/19





  10:10 10:10


 


WBC  11.1 H 


 


RBC  3.38 L 


 


Hgb  7.9 L 


 


Hct  26.0 L 


 


MCV  77 L 


 


MCH  23 L 


 


MCHC  30 L 


 


RDW  29.8 H 


 


Plt Count  268 


 


Mono % (Auto)  5.3 


 


Eos % (Auto)  0.8 


 


Mono #  0.6 


 


Eos #  0.1 


 


Baso #  0.1 


 


Add Manual Diff  Complete 


 


Total Counted  100 


 


Seg Neutrophils %  86.3 H 


 


Seg Neuts % (Manual)  79.0 H 


 


Band Neutrophils %  0 


 


Lymphocytes % (Manual)  12.0 L 


 


Reactive Lymphs % (Man)  0 


 


Monocytes % (Manual)  7.0 


 


Eosinophils % (Manual)  1.0 


 


Basophils % (Manual)  1.0 


 


Metamyelocytes %  0 


 


Myelocytes %  0 


 


Promyelocytes %  0 


 


Blast Cells %  0 


 


Nucleated RBC %  Not Reportable 


 


Seg Neutrophils #  9.8 H 


 


Seg Neutrophils # Man  8.8 H 


 


Band Neutrophils #  0.0 


 


Lymphocytes # (Manual)  1.3 


 


Abs React Lymphs (Man)  0.0 


 


Monocytes # (Manual)  0.8 


 


Eosinophils # (Manual)  0.1 


 


Basophils # (Manual)  0.1 


 


Metamyelocytes #  0.0 


 


Myelocytes #  0.0 


 


Promyelocytes #  0.0 


 


Blast Cells #  0.0 


 


WBC Morphology  Not Reportable 


 


Hypersegmented Neuts  Not Reportable 


 


Hyposegmented Neuts  Not Reportable 


 


Hypogranular Neuts  Not Reportable 


 


Smudge Cells  Not Reportable 


 


Toxic Granulation  Not Reportable 


 


Toxic Vacuolation  Not Reportable 


 


Dohle Bodies  Not Reportable 


 


Pelger-Huet Anomaly  Not Reportable 


 


Veena Rods  Not Reportable 


 


Platelet Estimate  Not Reportable 


 


Clumped Platelets  Not Reportable 


 


Plt Clumps, EDTA  Not Reportable 


 


Large Platelets  Not Reportable 


 


Giant Platelets  Not Reportable 


 


Platelet Satelliting  Not Reportable 


 


Plt Morphology Comment  Not Reportable 


 


RBC Morphology  Not Reportable 


 


Dimorphic RBCs  Not Reportable 


 


Polychromasia  Not Reportable 


 


Hypochromasia  1+ 


 


Poikilocytosis  Not Reportable 


 


Anisocytosis  2+ 


 


Microcytosis  Not Reportable 


 


Macrocytosis  Not Reportable 


 


Spherocytes  Not Reportable 


 


Pappenheimer Bodies  Not Reportable 


 


Sickle Cells  Not Reportable 


 


Target Cells  Not Reportable 


 


Tear Drop Cells  Not Reportable 


 


Ovalocytes  Not Reportable 


 


Helmet Cells  Not Reportable 


 


[place] Bodies  Not Reportable 


 


Cabot Rings  Not Reportable 


 


Chapito Cells  Not Reportable 


 


Bite Cells  Not Reportable 


 


Crenated Cell  Not Reportable 


 


Elliptocytes  Not Reportable 


 


Acanthocytes (Spur)  Not Reportable 


 


Rouleaux  Not Reportable 


 


Hemoglobin C Crystals  Not Reportable 


 


Schistocytes  Not Reportable 


 


Malaria parasites  Not Reportable 


 


Reynaldo Bodies  Not Reportable 


 


Hem Pathologist Commnt  No 


 


Sodium   136 L


 


Potassium   4.5


 


Chloride   90.9 L


 


Carbon Dioxide   24


 


Anion Gap   26


 


BUN   36 H


 


Creatinine   9.8 H


 


Estimated GFR   7


 


BUN/Creatinine Ratio   4


 


Glucose   123 H


 


Calcium   9.0


 


Total Bilirubin   0.40


 


AST   16


 


ALT   < 5.0 L


 


Alkaline Phosphatase   106


 


Total Protein   8.3 H


 


Albumin   2.9 L


 


Albumin/Globulin Ratio   0.5














Medications & Allergies





- Medications


Allergies/Adverse Reactions: 


                                    Allergies





No Known Allergies Allergy (Verified 07/04/19 12:02)


   








Home Medications: 


                                Home Medications











 Medication  Instructions  Recorded  Confirmed  Last Taken  Type


 


Prednisone [predniSONE 10 mg 10 mg PO .TAPER #1 tab.ds.pk 09/10/15 07/06/19 Unk

nown Rx





(6-Day Pack, 21 Tabs)]     











Active Medications: 














Generic Name Dose Route Start Last Admin





  Trade Name Freq  PRN Reason Stop Dose Admin


 


Bisacodyl  15 mg  07/07/19 14:51 





  Dulcolax  PO  





  QDAY PRN  





  Constipation  


 


Chlorpromazine HCl  10 mg  07/09/19 15:20  08/04/19 18:06





  Thorazine  PO   10 mg





  Q6H PRN   Administration





  Hiccups  


 


Epoetin Tye  20,000 unit  07/12/19 09:36  08/05/19 12:07





  Procrit  SUB-Q   20,000 unit





  VEE PRN   Administration





  hemodialysis  


 


Famotidine  10 mg  07/12/19 22:00  08/05/19 21:44





  Pepcid  PO   Not Given





  BID BOWEN  


 


Ferrous Sulfate  325 mg  07/10/19 22:00  08/05/19 21:44





  Feosol  PO   Not Given





  BID BOWEN  


 


Folic Acid  1 mg  07/06/19 10:00  08/05/19 10:06





  Folvite  PO   Not Given





  QDAY BOWEN  


 


Heparin Sodium (Porcine)  3,000 unit  07/12/19 09:36  08/05/19 09:55





  Heparin 10,000 Units/10 Ml  IV   3,000 unit





  VEE PRN   Administration





  hemodialysis  


 


Hydralazine HCl  10 mg  07/04/19 19:14  07/04/19 22:17





  Apresoline  IV   10 mg





  Q3H PRN   Administration





  Blood Pressure  


 


Chlorpromazine HCl 25 mg/  51 mls @ 100 mls/hr  07/13/19 12:26 





  Sodium Chloride  IV  





  Q4H PRN  





  Hiccups  


 


Sodium Chloride  100 mls @ 999 mls/hr  07/26/19 09:28 





  Nacl 0.9%  IV  





  VEE PRN  





  Hypotension  


 


Metoclopramide HCl  5 mg  07/17/19 15:00 





  Reglan  IV  





  Q6H PRN  





  Nausea And Vomiting  


 


Metoprolol Tartrate  50 mg  07/04/19 23:00  08/05/19 21:40





  Lopressor  PO   Not Given





  BID BOWEN  


 


Oxycodone/Acetaminophen  1 tab  07/13/19 10:53  08/05/19 18:41





  Percocet 5/325  PO   1 tab





  Q6H PRN   Administration





  Pain, Moderate (4-6)  


 


Sodium Chloride  10 ml  07/04/19 22:00  08/05/19 21:44





  Sodium Chloride Flush Syringe 10 Ml  IV   Not Given





  BID BOWEN

## 2019-08-07 PROCEDURE — 5A1D70Z PERFORMANCE OF URINARY FILTRATION, INTERMITTENT, LESS THAN 6 HOURS PER DAY: ICD-10-PCS | Performed by: INTERNAL MEDICINE

## 2019-08-07 RX ADMIN — METOPROLOL TARTRATE SCH MG: 50 TABLET, FILM COATED ORAL at 21:16

## 2019-08-07 RX ADMIN — OXYCODONE AND ACETAMINOPHEN PRN TAB: 5; 325 TABLET ORAL at 23:26

## 2019-08-07 RX ADMIN — OXYCODONE AND ACETAMINOPHEN PRN TAB: 5; 325 TABLET ORAL at 05:25

## 2019-08-07 RX ADMIN — FAMOTIDINE SCH MG: 10 INJECTION, SOLUTION INTRAVENOUS at 21:15

## 2019-08-07 RX ADMIN — OXYCODONE AND ACETAMINOPHEN PRN TAB: 5; 325 TABLET ORAL at 19:15

## 2019-08-07 RX ADMIN — OXYCODONE AND ACETAMINOPHEN PRN TAB: 5; 325 TABLET ORAL at 17:22

## 2019-08-07 RX ADMIN — ERYTHROPOIETIN PRN UNIT: 20000 INJECTION, SOLUTION INTRAVENOUS; SUBCUTANEOUS at 12:50

## 2019-08-07 RX ADMIN — Medication SCH: at 16:09

## 2019-08-07 RX ADMIN — Medication SCH: at 00:58

## 2019-08-07 RX ADMIN — METOPROLOL TARTRATE SCH: 50 TABLET, FILM COATED ORAL at 03:00

## 2019-08-07 RX ADMIN — FAMOTIDINE SCH: 10 INJECTION, SOLUTION INTRAVENOUS at 16:08

## 2019-08-07 RX ADMIN — FOLIC ACID SCH: 1 TABLET ORAL at 16:08

## 2019-08-07 RX ADMIN — Medication SCH ML: at 23:37

## 2019-08-07 RX ADMIN — Medication SCH: at 03:01

## 2019-08-07 RX ADMIN — FAMOTIDINE SCH: 10 INJECTION, SOLUTION INTRAVENOUS at 00:57

## 2019-08-07 RX ADMIN — METOPROLOL TARTRATE SCH: 50 TABLET, FILM COATED ORAL at 16:08

## 2019-08-07 RX ADMIN — Medication SCH: at 16:08

## 2019-08-07 RX ADMIN — HEPARIN SODIUM PRN UNIT: 1000 INJECTION, SOLUTION INTRAVENOUS; SUBCUTANEOUS at 10:12

## 2019-08-07 RX ADMIN — OXYCODONE AND ACETAMINOPHEN PRN TAB: 5; 325 TABLET ORAL at 10:09

## 2019-08-07 NOTE — PROGRESS NOTE
Assessment and Plan





1. ESRD:


CKD has progressed to ESRD.


Continue hemodialysis three times a week, MWF schedule.


Monitor renal function.


Renal prognosis is poor.


Avoid nephrotoxic agents.


Meds dosage based on GFR.


Hemodialysis: 


7/4, 7/5, 7/6, 7/8, 7/10, 7/12, 7/15, 7/17, 7/19, 7/22, 7/24, 7/26, 7/29, 7/31,


8/2, 8/5, today.





2. FEN:


Hyperkalemia, HD.


Monitor lytes.





3. Mild bilateral hydronephosis:


S/p hernandez catheter.


Evaluated by Urologist.





4. Rectal adenocarcinoma with malignant Ascites.


S/p Medport.


Followed by Heme-onc.





5. Anemia:


PRBC.


On Epogen, limited choice other than transfusion.


Also d/w Heme-Onc.


Monitor H/H.





6. HTN:


BP is controlled.





Difficulty in securing outpatient hemodialysis chair due to multiple issues.











Subjective


Date of service: 08/07/19


Principal diagnosis: rectal ca


Interval history: 


Patient was seen and examined at the bedside.


No new complaint.








Objective





- Vital Signs


Vital signs: 


                               Vital Signs - 12hr











  08/06/19 08/07/19 08/07/19





  23:22 03:00 05:07


 


Temperature 98.6 F  98.0 F


 


Pulse Rate 105 H 105 H 102 H


 


Respiratory 20  20





Rate   


 


Blood Pressure 125/85 125/85 117/90


 


O2 Sat by Pulse 100  99





Oximetry   














  08/07/19 08/07/19





  05:25 09:45


 


Temperature  98.4 F


 


Pulse Rate  105 H


 


Respiratory 18 16





Rate  


 


Blood Pressure  134/91


 


O2 Sat by Pulse  





Oximetry  














- General Appearance


General appearance: well-developed, appears stated age, other (no distress, R IJ

tunnel catheter)


EENT: ATNC, PERRL, hearing intact, vision intact


Neck: supple


Respiratory: Present: Clear to Ascultation


Cardiology: regular, S1S2, no murmurs


Gastrointestinal: normoactive bowel sounds, no tenderness, distended


Integumentary: no rash, warm and dry


Neurologic: no focal deficit, no asterixis, alert and oriented x3


Musculoskeletal: other (no edema)





- Lab





                                 08/05/19 10:10





                                 08/05/19 10:10


                             Most recent lab results











Calcium  9.0 mg/dL (8.4-10.2)   08/05/19  10:10    


 


Phosphorus  6.50 mg/dL (2.5-4.5)  H  07/15/19  06:57    


 


Magnesium  2.00 mg/dL (1.7-2.3)   07/05/19  04:52    


 


  161.7 mg/dL (0.1-20.0)  H  07/05/19  08:51    


 


  72 mmol/L  07/05/19  08:51    














Medications & Allergies





- Medications


Allergies/Adverse Reactions: 


                                    Allergies





No Known Allergies Allergy (Verified 07/04/19 12:02)


   








Home Medications: 


                                Home Medications











 Medication  Instructions  Recorded  Confirmed  Last Taken  Type


 


Folic Acid [Folvite] 1 mg PO QDAY #30 tablet 08/07/19  Unknown Rx


 


Megestrol [Megace] 400 mg PO DAILY 30 Days  oral.liqd 08/07/19  Unknown Rx


 


Metoclopramide [Reglan] 10 mg PO TID #90 tab 08/07/19  Unknown Rx


 


Metoprolol [Lopressor TAB] 50 mg PO BID #60 tablet 08/07/19  Unknown Rx


 


chlorproMAZINE [Thorazine] 10 mg PO Q6H PRN #30 tablet 08/07/19  Unknown Rx


 


oxyCODONE /ACETAMINOPHEN [Percocet 1 tab PO Q4H PRN #30 tablet 08/07/19  Unknown

 Rx





5/325 mg]     











Active Medications: 














Generic Name Dose Route Start Last Admin





  Trade Name Freq  PRN Reason Stop Dose Admin


 


Bisacodyl  15 mg  07/07/19 14:51 





  Dulcolax  PO  





  QDAY PRN  





  Constipation  


 


Chlorpromazine HCl  10 mg  07/09/19 15:20  08/06/19 17:09





  Thorazine  PO   10 mg





  Q6H PRN   Administration





  Hiccups  


 


Epoetin Tye  20,000 unit  07/12/19 09:36  08/05/19 12:07





  Procrit  SUB-Q   20,000 unit





  VEE PRN   Administration





  hemodialysis  


 


Famotidine  10 mg  08/06/19 14:00  08/07/19 00:57





  Pepcid  IV   Not Given





  BID [facility]  


 


Ferrous Sulfate  308 mg  08/06/19 14:30  08/07/19 00:58





  Ferrous Sulfate  PO   Not Given





  BID [facility]  


 


Folic Acid  1 mg  07/06/19 10:00  08/06/19 16:12





  Folvite  PO   Not Given





  QDAY BOWEN  


 


Heparin Sodium (Porcine)  3,000 unit  07/12/19 09:36  08/07/19 10:12





  Heparin 10,000 Units/10 Ml  IV   3,000 unit





  VEE PRN   Administration





  hemodialysis  


 


Hydralazine HCl  10 mg  07/04/19 19:14  07/04/19 22:17





  Apresoline  IV   10 mg





  Q3H PRN   Administration





  Blood Pressure  


 


Chlorpromazine HCl 25 mg/  51 mls @ 100 mls/hr  07/13/19 12:26 





  Sodium Chloride  IV  





  Q4H PRN  





  Hiccups  


 


Sodium Chloride  100 mls @ 999 mls/hr  07/26/19 09:28 





  Nacl 0.9%  IV  





  VEE PRN  





  Hypotension  


 


Metoclopramide HCl  5 mg  07/17/19 15:00 





  Reglan  IV  





  Q6H PRN  





  Nausea And Vomiting  


 


Metoprolol Tartrate  50 mg  07/04/19 23:00  08/07/19 03:00





  Lopressor  PO   Not Given





  BID [facility]  


 


Oxycodone/Acetaminophen  1 tab  08/06/19 14:16  08/07/19 10:09





  Percocet 5/325  PO   1 tab





  Q4H PRN   Administration





  Pain, Moderate (4-6)  


 


Sodium Chloride  10 ml  07/04/19 22:00  08/07/19 03:01





  Sodium Chloride Flush Syringe 10 Ml  IV   Not Given





  BID BOWEN

## 2019-08-07 NOTE — DISCHARGE SUMMARY
Providers





- Providers


Date of Admission: 


07/04/19 16:04





Date of discharge: 08/08/19


Attending physician: 


YASHIRA GARRIDO





                                        





07/04/19 15:41


Consult to Physician [CONS] Urgent 


   Comment: Dr. Patricia notified @ 14:55- LXM


   Consulting Provider: ROBERTO CARLOS PATRICIA


   Physician Instructions: 


   Reason For Exam: hyperkalemia with newonset renal failure





07/04/19 16:10


Consult to Physician [CONS] Urgent 


   Comment: Dr. Swift notified @ 16:34- LXM


   Consulting Provider: IRENE SWIFT


   Physician Instructions: 


   Reason For Exam: Hemodialysis catheter placement.





07/04/19 20:02


Consult to Physician [CONS] Routine 


   Comment: 


   Consulting Provider: MARYSE MEADE


   Physician Instructions: 


   Reason For Exam: carcinomatosis





07/05/19 08:07


Consult to Physician [CONS] Routine 


   Comment: 


   Consulting Provider: GAURAV LOCKWOOD


   Physician Instructions: 


   Reason For Exam: Ascities, ?Colon cancer with mets to omentum





07/05/19 08:08


Consult to Physician [CONS] Routine 


   Comment: 


   Consulting Provider: DAVION FERGUSON


   Physician Instructions: I notified


   Reason For Exam: bilateral hydronephrosis





07/10/19 11:09


Consult to Physician [CONS] Routine 


   Comment: 


   Consulting Provider: MARILEE PACK


   Physician Instructions: consult surgery


   Reason For Exam: colon cancer





07/10/19 11:54


Consult to Physician [CONS] Routine 


   Comment: 


   Consulting Provider: MARILEE PACK


   Physician Instructions: 


   Reason For Exam: port





07/11/19 11:34


Consult to Physician [CONS] Routine 


   Comment: 


   Consulting Provider: MANAN ROBISON


   Physician Instructions: 


   Reason For Exam: SBP.











Primary care physician: 


[facility], [place]








Hospitalization


Condition: Critical


Pertinent studies: 





CT Abdomen/pelvis


US Paracentesis


CXR








Hospital course: 


Brief History


Patient is a 58 yo man with a history of hypertension and Asthma who presented 

to [facility] ED with urinary frequency, back pains, n/v, diarrhea and abd pains with 

distension x 1 week. He was diagnosis with Ascites and underwent a paracentesis.

 he was found to have Rectal cancer with Mets. [facility] paperwork filled out.





* Initial labs: wbc 13.0 to 10.0, hgb 6.3 to 7.0 (mcv 60), na 132, k 9.5, cr 

  34.8, bun 129, bg 164, lipase 88


* CT abd/pelvis without contrast IMPRESSION: 1. There is ascites. There is 

  increased density in the omentum. Findings are concerning for carcinomatosis. 

  There is some mild adenopathy in the pelvis a possible pericolonic mass, 

  series 2 image 139. There is mild bilateral hydronephrosis. The exact cause is

   not determined by this study. There is minimal nephrolithiasis on the left. 

  No ureteral calculi are identified however.


* Colonoscopy, There was a circumferential rectal tumor from the dentate line 

  extending to mid-proximal rectum.  There was severe stenosis related to 

  circumferential tumor which was unable to be traversed.  The tumor was friable

   to touch.   Multiple biopsies were obtained Impression:1. Rectal tumor from 

  dentate line to mid/proximal rectum (extent of exam due to severe stenosis 

  related to tumor).  Biopsies were obtained. Recommendations: follow-up 

  pathology, -surgery consult, -heme/onc following, -screen first degree rel

  atives for colorectal cancer, -will need colonoscopy in 3-6 months to rule out

   proximal colon lesions.


* Path Rectal biopsy: poorly differentiated mucinous adenocarcinoma with signet 

  cells and lymphovascular invasion


* Path Ascites: malignant cells consistent with metastatic carcinoma








Discharge diagnosis and management:





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 

lymphovascular invasion, 


- On discharge patient will follow hematology oncologist, Dr. Meade





Acute anemia with Drop in HCT 


- s/p transfused 3 units of PRBC, GI is following, d/w Dr. Joel, s/p 

colonoscopy





ESRD: s/p permacath, had emergent hemodialysis due to severe hyperkalemia 

started on on 7/4/19, place hernandez on 7/5/19 with very little output. Need outpt 

HD set up - so far no success





Bilateral hydronephrosis: Placed on hernandez, consulted Urology, input 

noted==>discharge with hernandez 





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 

signet ring cells. outpt f/u





Malignant Ascites 


- due to suspected Colon cancer with mets to Omentum/Carcinomatosis suspected, 

which would be a very poor prognostic sign: consulted GI, input noted, s/p 

Paracentesis on 7/5/19, await cytology, CEA 17 ELEVATED, AFP ELEVATED, CA 19-9 -

 normal 11





Positive blood cultures: Abx discontinued following ID eval as culture appears 

to be contaminant





Hyperkalemia: treated with HD, Nephrology is following





Acute Microcytic anemia, suspect cancer related until proven otherwise, follow 

h/h





Severe malnutrition: Dietitian consulted 





Hiccups- likely tumor related, on Thorazine.





DVT prophylaxis: On heparin and GI prophylaxis





Discharge: Home with home hospice.





Hospitalist Physical


Gen: thin frial, ill appearing, NAD, Awake, Alert, Orientated 


HEENT: NCAT, EOMI, PERRL, OP Clear 


Neck: supple, no adenopathy, no thyromegaly, no JVD 


CVS/Heart: RRR, normal S1S2, pulses present bilaterally 


Chest/Lungs: CTA B, Symmetrical chest expansion, good air entry bilaterally 


GI/Abdomen: soft, abdomen distended, good bowel sounds, no guarding or rebound 


/Bladder: no suprapubic tenderness, no CVA or paraspinal tenderness 


Extermity/Skin: no c/c/e, no obvious rash 


MSK: FROM x 4 


Neuro: CN 2-12 grossly intact, no new focal deficits 


Psych: calm 











Disposition: DC/TX-06 HOME UNDER HOME [facility]


Time spent for discharge: 34 minutes





Core Measure Documentation





- Palliative Care


Palliative Care/ Comfort Measures: Not Applicable (Patient denies)





- Core Measures


Any of the following diagnoses?: none





Exam





- Constitutional


Vitals: 


                                        











Temp Pulse Resp BP Pulse Ox


 


 98.4 F   121 H  16   104/83   99 


 


 08/07/19 09:45  08/07/19 12:30  08/07/19 09:45  08/07/19 12:30  08/07/19 05:07














Plan


Activity: advance as tolerated, fall precautions


Weight Bearing Status: Non-Weight Bearing


Diet: renal


Follow up with: 


[facility] [Primary Care Provider] - 3-5 Days


MARYSE MEADE MD [Staff Physician] - 7 Days


DAVION FERGUSON MD [Staff Physician] - 7 Days


Prescriptions: 


Folic Acid [Folvite] 1 mg PO QDAY #30 tablet


Metoprolol [Lopressor TAB] 50 mg PO BID #60 tablet


Megestrol [Megace] 400 mg PO DAILY 30 Days  oral.liqd


oxyCODONE /ACETAMINOPHEN [Percocet 5/325 mg] 1 tab PO Q4H PRN #30 tablet


 PRN Reason: Pain, Moderate (4-6)


Metoclopramide [Reglan] 10 mg PO TID #90 tab


chlorproMAZINE [Thorazine] 10 mg PO Q6H PRN #30 tablet


 PRN Reason: Hiccups


Other Discharge Orders: 


XR chest routine 2V Location: None Selected

## 2019-08-08 RX ADMIN — METOPROLOL TARTRATE SCH: 50 TABLET, FILM COATED ORAL at 06:09

## 2019-08-08 RX ADMIN — METOPROLOL TARTRATE SCH: 50 TABLET, FILM COATED ORAL at 11:07

## 2019-08-08 RX ADMIN — FAMOTIDINE SCH: 10 INJECTION, SOLUTION INTRAVENOUS at 22:40

## 2019-08-08 RX ADMIN — Medication SCH MG: at 12:54

## 2019-08-08 RX ADMIN — Medication SCH: at 22:40

## 2019-08-08 RX ADMIN — OXYCODONE AND ACETAMINOPHEN PRN TAB: 5; 325 TABLET ORAL at 09:51

## 2019-08-08 RX ADMIN — Medication SCH: at 00:20

## 2019-08-08 RX ADMIN — Medication SCH ML: at 12:55

## 2019-08-08 RX ADMIN — FAMOTIDINE SCH: 10 INJECTION, SOLUTION INTRAVENOUS at 13:00

## 2019-08-08 RX ADMIN — METOPROLOL TARTRATE SCH: 50 TABLET, FILM COATED ORAL at 22:40

## 2019-08-08 RX ADMIN — FOLIC ACID SCH: 1 TABLET ORAL at 11:07

## 2019-08-08 RX ADMIN — Medication SCH ML: at 22:41

## 2019-08-08 RX ADMIN — OXYCODONE AND ACETAMINOPHEN PRN TAB: 5; 325 TABLET ORAL at 17:57

## 2019-08-08 NOTE — PROGRESS NOTE
Assessment and Plan





1. ESRD:


CKD has progressed to ESRD.


Continue hemodialysis three times a week, MWF schedule.


Monitor renal function.


Renal prognosis is poor.


Avoid nephrotoxic agents.


Meds dosage based on GFR.


Hemodialysis: 


7/4, 7/5, 7/6, 7/8, 7/10, 7/12, 7/15, 7/17, 7/19, 7/22, 7/24, 7/26, 7/29, 7/31,


8/2, 8/5, 8/7.





2. FEN:


Monitor lytes.





3. Mild bilateral hydronephosis:


S/p hernandez catheter.


Evaluated by Urologist.





4. Rectal adenocarcinoma with malignant Ascites.


S/p Medport.


Followed by Heme-onc.





5. Anemia:


PRBC.


On Epogen, limited choice other than transfusion.


Also d/w Heme-Onc.


Monitor H/H.





6. HTN:


BP is controlled.





Difficulty in securing outpatient hemodialysis chair.











Subjective


Date of service: 08/08/19


Principal diagnosis: rectal ca


Interval history: 


Patient was seen and examined at the bedside.


No new complaint.








Objective





- Vital Signs


Vital signs: 


                               Vital Signs - 12hr











  08/07/19 08/07/19 08/08/19





  21:20 23:46 05:30


 


Temperature 98.0 F 98.4 F 98.5 F


 


Pulse Rate 118 H 107 H 108 H


 


Respiratory 18 18 20





Rate   


 


Blood Pressure 99/73 113/80 113/81


 


O2 Sat by Pulse 96 99 98





Oximetry   














- General Appearance


General appearance: well-developed, appears stated age, other (no distress, R IJ

tunnel catheter)


EENT: ATNC, PERRL


Neck: supple


Respiratory: Present: Clear to Ascultation


Cardiology: regular, S1S2, no murmurs


Gastrointestinal: normoactive bowel sounds, no tenderness, distended


Integumentary: no rash, warm and dry


Neurologic: no focal deficit, no asterixis, alert and oriented x3


Musculoskeletal: other (no edema)





- Lab





                                 08/05/19 10:10





                                 08/05/19 10:10


                             Most recent lab results











Calcium  9.0 mg/dL (8.4-10.2)   08/05/19  10:10    


 


Phosphorus  6.50 mg/dL (2.5-4.5)  H  07/15/19  06:57    


 


Magnesium  2.00 mg/dL (1.7-2.3)   07/05/19  04:52    


 


  161.7 mg/dL (0.1-20.0)  H  07/05/19  08:51    


 


  72 mmol/L  07/05/19  08:51    














Medications & Allergies





- Medications


Allergies/Adverse Reactions: 


                                    Allergies





No Known Allergies Allergy (Verified 07/04/19 12:02)


   








Home Medications: 


                                Home Medications











 Medication  Instructions  Recorded  Confirmed  Last Taken  Type


 


Folic Acid [Folvite] 1 mg PO QDAY #30 tablet 08/07/19  Unknown Rx


 


Megestrol [Megace] 400 mg PO DAILY 30 Days  oral.liqd 08/07/19  Unknown Rx


 


Metoclopramide [Reglan] 10 mg PO TID #90 tab 08/07/19  Unknown Rx


 


Metoprolol [Lopressor TAB] 50 mg PO BID #60 tablet 08/07/19  Unknown Rx


 


chlorproMAZINE [Thorazine] 10 mg PO Q6H PRN #30 tablet 08/07/19  Unknown Rx


 


oxyCODONE /ACETAMINOPHEN [Percocet 1 tab PO Q4H PRN #30 tablet 08/07/19  Unknown

 Rx





5/325 mg]     











Active Medications: 














Generic Name Dose Route Start Last Admin





  Trade Name Freq  PRN Reason Stop Dose Admin


 


Bisacodyl  15 mg  07/07/19 14:51 





  Dulcolax  PO  





  QDAY PRN  





  Constipation  


 


Chlorpromazine HCl  10 mg  07/09/19 15:20  08/06/19 17:09





  Thorazine  PO   10 mg





  Q6H PRN   Administration





  Hiccups  


 


Epoetin Tye  20,000 unit  07/12/19 09:36  08/07/19 12:50





  Procrit  SUB-Q   20,000 unit





  VEE PRN   Administration





  hemodialysis  


 


Famotidine  10 mg  08/06/19 14:00  08/07/19 21:15





  Pepcid  IV   10 mg





  BID BOWEN   Administration


 


Ferrous Sulfate  308 mg  08/06/19 14:30  08/08/19 00:20





  Ferrous Sulfate  PO   Not Given





  BID BOWEN  


 


Folic Acid  1 mg  07/06/19 10:00  08/07/19 16:08





  Folvite  PO   Not Given





  QDAY BOWEN  


 


Heparin Sodium (Porcine)  3,000 unit  07/12/19 09:36  08/07/19 10:12





  Heparin 10,000 Units/10 Ml  IV   3,000 unit





  VEE PRN   Administration





  hemodialysis  


 


Hydralazine HCl  10 mg  07/04/19 19:14  07/04/19 22:17





  Apresoline  IV   10 mg





  Q3H PRN   Administration





  Blood Pressure  


 


Chlorpromazine HCl 25 mg/  51 mls @ 100 mls/hr  07/13/19 12:26 





  Sodium Chloride  IV  





  Q4H PRN  





  Hiccups  


 


Sodium Chloride  100 mls @ 999 mls/hr  07/26/19 09:28 





  Nacl 0.9%  IV  





  VEE PRN  





  Hypotension  


 


Metoclopramide HCl  5 mg  07/17/19 15:00 





  Reglan  IV  





  Q6H PRN  





  Nausea And Vomiting  


 


Metoprolol Tartrate  50 mg  07/04/19 23:00  08/08/19 06:09





  Lopressor  PO   Not Given





  BID BOWEN  


 


Oxycodone/Acetaminophen  2 tab  08/07/19 18:59  08/07/19 23:26





  Percocet 5/325  PO   2 tab





  Q4H PRN   Administration





  Pain, Moderate (4-6)  


 


Sodium Chloride  10 ml  07/04/19 22:00  08/07/19 23:37





  Sodium Chloride Flush Syringe 10 Ml  IV   10 ml





  BID BOWEN   Administration

## 2019-08-08 NOTE — PROGRESS NOTE
Assessment and Plan








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 

lymphovascular invasion, 


- On discharge patient will follow hematology oncologist, Dr. Meade





Acute anemia with Drop in HCT 


- s/p transfused 3 units of PRBC, GI is following, d/w Dr. Joel, s/p 

colonoscopy





ESRD: s/p permacath, had emergent hemodialysis due to severe hyperkalemia 

started on on 7/4/19, place hernandez on 7/5/19 with very little output. Need outpt 

HD set up





Bilateral hydronephrosis: Placed on hernandez, consulted Urology, input 

noted==>discharge with hernandez 





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 

signet ring cells. outpt f/u





Malignant Ascites 


- due to suspected Colon cancer with mets to Omentum/Carcinomatosis suspected, 

which would be a very poor prognostic sign: consulted GI, input noted, s/p 

Paracentesis on 7/5/19, await cytology, CEA 17 ELEVATED, AFP ELEVATED, CA 19-9 -

normal 11





Positive blood cultures: Abx discontinued following ID eval as culture appears 

to be contaminant





Hyperkalemia: treated with HD, Nephrology is following





Acute Microcytic anemia, suspect cancer related until proven otherwise, follow 

h/h





Severe malnutrition: Dietitian consulted 





Hiccups- likely tumor related, on Thorazine.





DVT prophylaxis: On heparin and GI prophylaxis





Discharge when Outpatient Dialysis ready. Patient will discuss with surgery 

about port placement for chemotherapy. Outpatient follow up with oncologist. If 

no place for HD could be confirmed patient may needs to come to hospital for HD 

- currently he is refusing that option and wants to wait till HD could be setup.

 








Brief History


Patient is a 58 yo man with a history of hypertension and Asthma who presented 

to [facility] ED with urinary frequency, back pains, n/v, diarrhea and abd pains with 

distension x 1 week. He was diagnosis with Ascites and underwent a paracentesis.

he was found to have Rectal cancer with Mets. [facility] paperwork filled out.





* Initial labs: wbc 13.0 to 10.0, hgb 6.3 to 7.0 (mcv 60), na 132, k 9.5, cr 

  34.8, bun 129, bg 164, lipase 88


* CT abd/pelvis without contrast IMPRESSION: 1. There is ascites. There is 

  increased density in the omentum. Findings are concerning for carcinomatosis. 

  There is some mild adenopathy in the pelvis a possible pericolonic mass, 

  series 2 image 139. There is mild bilateral hydronephrosis. The exact cause is

  not determined by this study. There is minimal nephrolithiasis on the left. No

  ureteral calculi are identified however.


* Colonoscopy, There was a circumferential rectal tumor from the dentate line 

  extending to mid-proximal rectum.  There was severe stenosis related to 

  circumferential tumor which was unable to be traversed.  The tumor was friable

  to touch.   Multiple biopsies were obtained Impression:1. Rectal tumor from 

  dentate line to mid/proximal rectum (extent of exam due to severe stenosis 

  related to tumor).  Biopsies were obtained. Recommendations: follow-up 

  pathology, -surgery consult, -heme/onc following, -screen first degree 

  relatives for colorectal cancer, -will need colonoscopy in 3-6 months to rule 

  out proximal colon lesions.


* Path Rectal biopsy: poorly differentiated mucinous adenocarcinoma with signet 

  cells and lymphovascular invasion


* Path Ascites: malignant cells consistent with metastatic carcinoma





Hospitalist Physical


Gen: thin frial, ill appearing, NAD, Awake, Alert, Orientated 


HEENT: NCAT, EOMI, PERRL, OP Clear 


Neck: supple, no adenopathy, no thyromegaly, no JVD 


CVS/Heart: RRR, normal S1S2, pulses present bilaterally 


Chest/Lungs: CTA B, Symmetrical chest expansion, good air entry bilaterally 


GI/Abdomen: soft, abdomen distended, good bowel sounds, no guarding or rebound 


/Bladder: no suprapubic tenderness, no CVA or paraspinal tenderness 


Extermity/Skin: no c/c/e, no obvious rash 


MSK: FROM x 4 


Neuro: CN 2-12 grossly intact, no new focal deficits 


Psych: calm 














Subjective


Date of service: 08/07/19


Principal diagnosis: rectal ca


Interval history: 





Patient seen and examined.  Medical records and medication list reviewed.  


No acute event overnight noted by the RN.  


Patient denies any chest pain or difficulty breathing.  Patient is tolerating 

diet.  


Discussed plan of care at bedside with patient.


waiting for outpt Hd setup - but having difficulty to setup for multiple issues


Patient wants to pursue HD and doesnot want hospice 











Objective





- Constitutional


Vitals: 


                               Vital Signs - 12hr











  08/07/19 08/08/19





  23:46 05:30


 


Temperature 98.4 F 98.5 F


 


Pulse Rate 107 H 108 H


 


Respiratory 18 20





Rate  


 


Blood Pressure 113/80 113/81


 


O2 Sat by Pulse 99 98





Oximetry  














- Labs


CBC & Chem 7: 


                                 08/05/19 10:10





                                 08/05/19 10:10

## 2019-08-09 VITALS — SYSTOLIC BLOOD PRESSURE: 140 MMHG | DIASTOLIC BLOOD PRESSURE: 100 MMHG

## 2019-08-09 PROCEDURE — 5A1D70Z PERFORMANCE OF URINARY FILTRATION, INTERMITTENT, LESS THAN 6 HOURS PER DAY: ICD-10-PCS | Performed by: INTERNAL MEDICINE

## 2019-08-09 RX ADMIN — OXYCODONE AND ACETAMINOPHEN PRN TAB: 5; 325 TABLET ORAL at 10:52

## 2019-08-09 RX ADMIN — HEPARIN SODIUM PRN UNIT: 1000 INJECTION, SOLUTION INTRAVENOUS; SUBCUTANEOUS at 10:24

## 2019-08-09 RX ADMIN — Medication SCH MG: at 09:00

## 2019-08-09 RX ADMIN — OXYCODONE AND ACETAMINOPHEN PRN TAB: 5; 325 TABLET ORAL at 04:22

## 2019-08-09 RX ADMIN — FAMOTIDINE SCH MG: 10 INJECTION, SOLUTION INTRAVENOUS at 09:00

## 2019-08-09 NOTE — PROGRESS NOTE
Assessment and Plan








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 

lymphovascular invasion, 


- On discharge patient will follow hematology oncologist, Dr. Meade





Acute anemia with Drop in HCT 


- s/p transfused 3 units of PRBC, GI is following, d/w Dr. Joel, s/p 

colonoscopy





ESRD: s/p permacath, had emergent hemodialysis due to severe hyperkalemia 

started on on 7/4/19, place hernandez on 7/5/19 with very little output. Need outpt 

HD set up - so far no success





Bilateral hydronephrosis: Placed on hernandez, consulted Urology, input noted

==>discharge with hernandez 





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 

signet ring cells. outpt f/u





Malignant Ascites 


- due to suspected Colon cancer with mets to Omentum/Carcinomatosis suspected, 

which would be a very poor prognostic sign: consulted GI, input noted, s/p 

Paracentesis on 7/5/19, await cytology, CEA 17 ELEVATED, AFP ELEVATED, CA 19-9 -

normal 11





Positive blood cultures: Abx discontinued following ID eval as culture appears 

to be contaminant





Hyperkalemia: treated with HD, Nephrology is following





Acute Microcytic anemia, suspect cancer related until proven otherwise, follow 

h/h





Severe malnutrition: Dietitian consulted 





Hiccups- likely tumor related, on Thorazine.





DVT prophylaxis: On heparin and GI prophylaxis





Discharge: Home with home hospice.








Brief History


Patient is a 60 yo man with a history of hypertension and Asthma who presented 

to [facility] ED with urinary frequency, back pains, n/v, diarrhea and abd pains with 

distension x 1 week. He was diagnosis with Ascites and underwent a paracentesis.

he was found to have Rectal cancer with Mets. [facility] paperwork filled out.





* Initial labs: wbc 13.0 to 10.0, hgb 6.3 to 7.0 (mcv 60), na 132, k 9.5, cr 

  34.8, bun 129, bg 164, lipase 88


* CT abd/pelvis without contrast IMPRESSION: 1. There is ascites. There is 

  increased density in the omentum. Findings are concerning for carcinomatosis. 

  There is some mild adenopathy in the pelvis a possible pericolonic mass, se

  heladio 2 image 139. There is mild bilateral hydronephrosis. The exact cause is 

  not determined by this study. There is minimal nephrolithiasis on the left. No

  ureteral calculi are identified however.


* Colonoscopy, There was a circumferential rectal tumor from the dentate line 

  extending to mid-proximal rectum.  There was severe stenosis related to 

  circumferential tumor which was unable to be traversed.  The tumor was friable

  to touch.   Multiple biopsies were obtained Impression:1. Rectal tumor from 

  dentate line to mid/proximal rectum (extent of exam due to severe stenosis 

  related to tumor).  Biopsies were obtained. Recommendations: follow-up 

  pathology, -surgery consult, -heme/onc following, -screen first degree 

  relatives for colorectal cancer, -will need colonoscopy in 3-6 months to rule 

  out proximal colon lesions.


* Path Rectal biopsy: poorly differentiated mucinous adenocarcinoma with signet 

  cells and lymphovascular invasion


* Path Ascites: malignant cells consistent with metastatic carcinoma





Hospitalist Physical


Gen: thin frial, ill appearing, NAD, Awake, Alert, Orientated 


HEENT: NCAT, EOMI, PERRL, OP Clear 


Neck: supple, no adenopathy, no thyromegaly, no JVD 


CVS/Heart: RRR, normal S1S2, pulses present bilaterally 


Chest/Lungs: CTA B, Symmetrical chest expansion, good air entry bilaterally 


GI/Abdomen: soft, abdomen distended, good bowel sounds, no guarding or rebound 


/Bladder: no suprapubic tenderness, no CVA or paraspinal tenderness 


Extermity/Skin: no c/c/e, no obvious rash 


MSK: FROM x 4 


Neuro: CN 2-12 grossly intact, no new focal deficits 


Psych: calm 














Subjective


Date of service: 08/08/19


Principal diagnosis: rectal ca


Interval history: 





Patient seen and examined.  Medical records and medication list reviewed.  


No acute event overnight noted by the RN.  


Patient denies any chest pain or difficulty breathing.  Patient is tolerating 

diet.  


Discussed plan of care at bedside with patient.


waiting for outpt Hd setup - but having difficulty to setup for multiple issues


Patient initially wanted to pursue HD and did not want hospice 


Discussed with family members today including mother/sister/brother and plan to 

discharge him with hospice - patient agrees 











Objective





- Constitutional


Vitals: 


                               Vital Signs - 12hr











  08/09/19 08/09/19 08/09/19





  04:49 09:45 09:50


 


Temperature 97.6 F 97.2 F L 


 


Pulse Rate 91 H 88 90


 


Respiratory 18 16 





Rate   


 


Blood Pressure 132/90 134/88 116/85


 


O2 Sat by Pulse 100  





Oximetry   














  08/09/19 08/09/19 08/09/19





  10:00 10:15 10:30


 


Temperature   


 


Pulse Rate 96 H 100 H 108 H


 


Respiratory   





Rate   


 


Blood Pressure 136/100 127/82 127/100


 


O2 Sat by Pulse   





Oximetry   














  08/09/19 08/09/19 08/09/19





  10:45 11:00 11:15


 


Temperature   


 


Pulse Rate 113 H 111 H 68


 


Respiratory   





Rate   


 


Blood Pressure 119/81 132/93 113/45


 


O2 Sat by Pulse   





Oximetry   














- Labs


CBC & Chem 7: 


                                 08/05/19 10:10





                                 08/05/19 10:10

## 2019-08-09 NOTE — PROGRESS NOTE
Assessment and Plan





1. ESRD:


CKD has progressed to ESRD.


Continue hemodialysis three times a week, MWF schedule.


Monitor renal function.


Renal prognosis is poor.


Avoid nephrotoxic agents.


Meds dosage based on GFR.


Hemodialysis: 


7/4, 7/5, 7/6, 7/8, 7/10, 7/12, 7/15, 7/17, 7/19, 7/22, 7/24, 7/26, 7/29, 7/31,


8/2, 8/5, 8/7, today.





2. FEN:


Monitor lytes.





3. Mild bilateral hydronephosis:


S/p hernandez catheter.


Evaluated by Urologist.





4. Rectal adenocarcinoma with malignant Ascites.


S/p Medport.


Followed by Heme-onc.





5. Anemia:


PRBC.


On Epogen, limited choice other than transfusion.


Also d/w Heme-Onc.


Monitor H/H.





6. HTN:


BP is controlled.





Possible d/c home with hospice care.











Subjective


Date of service: 08/09/19


Principal diagnosis: rectal ca


Interval history: 


Patient was seen and examined at the bedside.


No new complaint.








Objective





- Vital Signs


Vital signs: 


                               Vital Signs - 12hr











  08/09/19 08/09/19 08/09/19





  04:49 09:45 09:50


 


Temperature 97.6 F 97.2 F L 


 


Pulse Rate 91 H 88 90


 


Respiratory 18 16 





Rate   


 


Blood Pressure 132/90 134/88 116/85


 


O2 Sat by Pulse 100  





Oximetry   














  08/09/19 08/09/19 08/09/19





  10:00 10:15 10:30


 


Temperature   


 


Pulse Rate 96 H 100 H 108 H


 


Respiratory   





Rate   


 


Blood Pressure 136/100 127/82 127/100


 


O2 Sat by Pulse   





Oximetry   














  08/09/19 08/09/19 08/09/19





  10:45 11:00 11:15


 


Temperature   


 


Pulse Rate 113 H 111 H 68


 


Respiratory   





Rate   


 


Blood Pressure 119/81 132/93 113/45


 


O2 Sat by Pulse   





Oximetry   














- General Appearance


General appearance: well-developed, appears stated age, other (no distress, R IJ

tunnel catheter, emaciated)


EENT: ATNC, PERRL, hearing intact, vision intact


Neck: supple


Respiratory: Present: Clear to Ascultation


Cardiology: regular, S1S2, no murmurs


Gastrointestinal: normoactive bowel sounds, no tenderness, distended, other 

(hernandez catheter)


Integumentary: no rash, warm and dry


Neurologic: no focal deficit, no asterixis, alert and oriented x3


Musculoskeletal: other (no edema)





- Lab





                                 08/05/19 10:10





                                 08/05/19 10:10


                             Most recent lab results











Calcium  9.0 mg/dL (8.4-10.2)   08/05/19  10:10    


 


Phosphorus  6.50 mg/dL (2.5-4.5)  H  07/15/19  06:57    


 


Magnesium  2.00 mg/dL (1.7-2.3)   07/05/19  04:52    


 


  161.7 mg/dL (0.1-20.0)  H  07/05/19  08:51    


 


  72 mmol/L  07/05/19  08:51    














Medications & Allergies





- Medications


Allergies/Adverse Reactions: 


                                    Allergies





No Known Allergies Allergy (Verified 07/04/19 12:02)


   








Home Medications: 


                                Home Medications











 Medication  Instructions  Recorded  Confirmed  Last Taken  Type


 


Folic Acid [Folvite] 1 mg PO QDAY #30 tablet 08/07/19  Unknown Rx


 


Megestrol [Megace] 400 mg PO DAILY 30 Days  oral.liqd 08/07/19  Unknown Rx


 


Metoclopramide [Reglan] 10 mg PO TID #90 tab 08/07/19  Unknown Rx


 


Metoprolol [Lopressor TAB] 50 mg PO BID #60 tablet 08/07/19  Unknown Rx


 


chlorproMAZINE [Thorazine] 10 mg PO Q6H PRN #30 tablet 08/07/19  Unknown Rx


 


oxyCODONE /ACETAMINOPHEN [Percocet 1 tab PO Q4H PRN #30 tablet 08/07/19  Unknown

 Rx





5/325 mg]     











Active Medications: 














Generic Name Dose Route Start Last Admin





  Trade Name Freq  PRN Reason Stop Dose Admin


 


Bisacodyl  15 mg  07/07/19 14:51 





  Dulcolax  PO  





  QDAY PRN  





  Constipation  


 


Chlorpromazine HCl  10 mg  07/09/19 15:20  08/06/19 17:09





  Thorazine  PO   10 mg





  Q6H PRN   Administration





  Hiccups  


 


Epoetin Tye  20,000 unit  07/12/19 09:36  08/07/19 12:50





  Procrit  SUB-Q   20,000 unit





  VEE PRN   Administration





  hemodialysis  


 


Famotidine  10 mg  08/06/19 14:00  08/09/19 09:00





  Pepcid  IV   10 mg





  BID BOWEN   Administration


 


Ferrous Sulfate  308 mg  08/06/19 14:30  08/09/19 09:00





  Ferrous Sulfate  PO   308 mg





  BID BOWEN   Administration


 


Folic Acid  1 mg  07/06/19 10:00  08/08/19 11:07





  Folvite  PO   Not Given





  QDAY BOWEN  


 


Heparin Sodium (Porcine)  3,000 unit  07/12/19 09:36  08/09/19 10:24





  Heparin 10,000 Units/10 Ml  IV   3,000 unit





  VEE PRN   Administration





  hemodialysis  


 


Hydralazine HCl  10 mg  07/04/19 19:14  07/04/19 22:17





  Apresoline  IV   10 mg





  Q3H PRN   Administration





  Blood Pressure  


 


Chlorpromazine HCl 25 mg/  51 mls @ 100 mls/hr  07/13/19 12:26 





  Sodium Chloride  IV  





  Q4H PRN  





  Hiccups  


 


Sodium Chloride  100 mls @ 999 mls/hr  07/26/19 09:28 





  Nacl 0.9%  IV  





  VEE PRN  





  Hypotension  


 


Metoclopramide HCl  5 mg  07/17/19 15:00 





  Reglan  IV  





  Q6H PRN  





  Nausea And Vomiting  


 


Metoprolol Tartrate  50 mg  07/04/19 23:00  08/08/19 22:40





  Lopressor  PO   Not Given





  BID BOWEN  


 


Oxycodone/Acetaminophen  2 tab  08/07/19 18:59  08/09/19 10:52





  Percocet 5/325  PO   2 tab





  Q4H PRN   Administration





  Pain, Moderate (4-6)  


 


Sodium Chloride  10 ml  07/04/19 22:00  08/08/19 22:41





  Sodium Chloride Flush Syringe 10 Ml  IV   10 ml





  BID BOWEN   Administration

## 2023-09-08 NOTE — PROGRESS NOTE
Assessment and Plan





1. ESRD:


CKD has progressed to ESRD.


Continue hemodialysis three times a week, MWF schedule.


Monitor renal function.


Renal prognosis is poor.


Avoid nephrotoxic agents.


Meds dosage based on GFR.


Hemodialysis: 7/4, 7/5, 7/6, 7/8, 7/10, 7/12, 7/15, 7/17, 7/19, 7/22, 7/24, 

7/26, 7/29, 7/31, today.





2. FEN:


Hyperkalemia, HD.


Monitor lytes.





3. Mild bilateral hydronephosis:


S/p hernandez catheter.


Evaluated by Urologist.





4. Rectal adenocarcinoma with malignant Ascites.


S/p Medport.


Followed by Heme-onc.


Per family [facility] wouldn't take him unless he has Insurance.





5. Anemia:


PRBC.


On Epogen, limited choice other than transfusion.


Monitor H/H.





6. HTN:


BP is controlled.





Difficulty in securing outpatient hemodialysis chair due to multiple issues.


Patient may have to come to the ER intermittently for dialysis.














Subjective


Date of service: 08/02/19


Principal diagnosis: rectal ca


Interval history: 


Patient was seen and examined at the bedside.


No new complaint.








Objective





- Vital Signs


Vital signs: 


                               Vital Signs - 12hr











  08/02/19 08/02/19





  00:28 05:06


 


Temperature 98.7 F 98.0 F


 


Pulse Rate 109 H 108 H


 


Respiratory 18 22





Rate  


 


Blood Pressure 141/85 102/70


 


O2 Sat by Pulse 98 96





Oximetry  














- General Appearance


General appearance: well-developed, appears stated age, other (no distress, R IJ

tunnel catheter)


EENT: ATNC, PERRL, hearing intact, vision intact


Neck: supple


Respiratory: Present: Clear to Ascultation


Cardiology: regular, S1S2, no murmurs


Gastrointestinal: normoactive bowel sounds, no tenderness, distended, other 

(hernandez catheter)


Integumentary: no rash, warm and dry


Neurologic: no focal deficit, no asterixis, alert and oriented x3


Musculoskeletal: other (no edema)





- Lab





                                 07/31/19 09:26





                                 07/31/19 15:53


                             Most recent lab results











Calcium  9.1 mg/dL (8.4-10.2)   07/31/19  15:53    


 


Phosphorus  6.50 mg/dL (2.5-4.5)  H  07/15/19  06:57    


 


Magnesium  2.00 mg/dL (1.7-2.3)   07/05/19  04:52    


 


  161.7 mg/dL (0.1-20.0)  H  07/05/19  08:51    


 


  72 mmol/L  07/05/19  08:51    














Medications & Allergies





- Medications


Allergies/Adverse Reactions: 


                                    Allergies





No Known Allergies Allergy (Verified 07/04/19 12:02)


   








Home Medications: 


                                Home Medications











 Medication  Instructions  Recorded  Confirmed  Last Taken  Type


 


Prednisone [predniSONE 10 mg 10 mg PO .TAPER #1 tab.ds.pk 09/10/15 07/06/19 

Unknown Rx





(6-Day Pack, 21 Tabs)]     











Active Medications: 














Generic Name Dose Route Start Last Admin





  Trade Name Freq  PRN Reason Stop Dose Admin


 


Acetaminophen  650 mg  07/04/19 17:22  07/09/19 11:32





  Tylenol  PO   650 mg





  Q4H PRN   Administration





  Pain MILD(1-3)/Fever >100.5/HA  


 


Albuterol  2.5 mg  07/04/19 17:22 





  Proventil  IH  





  Q4HRT PRN  





  Shortness Of Breath  


 


Bisacodyl  15 mg  07/07/19 14:51 





  Dulcolax  PO  





  QDAY PRN  





  Constipation  


 


Chlorpromazine HCl  10 mg  07/09/19 15:20  08/01/19 19:26





  Thorazine  PO   10 mg





  Q6H PRN   Administration





  Hiccups  


 


Epoetin Tye  20,000 unit  07/12/19 09:36  07/31/19 11:45





  Procrit  SUB-Q   20,000 unit





  VEE PRN   Administration





  hemodialysis  


 


Famotidine  10 mg  07/12/19 22:00  08/01/19 21:45





  Pepcid  PO   Not Given





  BID [facility]  


 


Ferrous Sulfate  325 mg  07/10/19 22:00  08/01/19 21:44





  Feosol  PO   Not Given





  BID BOWEN  


 


Folic Acid  1 mg  07/06/19 10:00  08/01/19 09:03





  Folvite  PO   1 mg





  QDAY BOWEN   Administration


 


Heparin Sodium (Porcine)  3,000 unit  07/12/19 09:36  07/31/19 11:17





  Heparin 10,000 Units/10 Ml  IV   3,000 unit





  VEE PRN   Administration





  hemodialysis  


 


Hydralazine HCl  10 mg  07/04/19 19:14  07/04/19 22:17





  Apresoline  IV   10 mg





  Q3H PRN   Administration





  Blood Pressure  


 


Hydromorphone HCl  0.5 mg  07/04/19 17:22  08/01/19 13:00





  Dilaudid  IV   0.5 mg





  Q3H PRN   Administration





  Pain , Severe (7-10)  


 


Chlorpromazine HCl 25 mg/  51 mls @ 100 mls/hr  07/13/19 12:26 





  Sodium Chloride  IV  





  Q4H PRN  





  Hiccups  


 


Sodium Chloride  100 mls @ 999 mls/hr  07/26/19 09:28 





  Nacl 0.9%  IV  





  VEE PRN  





  Hypotension  


 


Metoclopramide HCl  5 mg  07/17/19 15:00 





  Reglan  IV  





  Q6H PRN  





  Nausea And Vomiting  


 


Metoprolol Tartrate  50 mg  07/04/19 23:00  08/01/19 21:45





  Lopressor  PO   Not Given





  BID BOWEN  


 


Ondansetron HCl  4 mg  07/04/19 17:22  07/31/19 13:26





  Zofran  IV   4 mg





  Q3H PRN   Administration





  Nausea And Vomiting  


 


Oxycodone/Acetaminophen  1 tab  07/13/19 10:53  08/01/19 19:23





  Percocet 5/325  PO   1 tab





  Q6H PRN   Administration





  Pain, Moderate (4-6)  


 


Sodium Chloride  10 ml  07/04/19 22:00  08/01/19 21:47





  Sodium Chloride Flush Syringe 10 Ml  IV   10 ml





  BID BOWEN   Administration


 


Sodium Chloride  10 ml  07/04/19 17:22  07/18/19 14:14





  Sodium Chloride Flush Syringe 10 Ml  IV   10 ml





  PRN PRN   Administration





  LINE FLUSH Cryotherapy Text: The wound bed was treated with cryotherapy after the biopsy was performed.